# Patient Record
Sex: FEMALE | Race: WHITE | NOT HISPANIC OR LATINO | Employment: OTHER | URBAN - METROPOLITAN AREA
[De-identification: names, ages, dates, MRNs, and addresses within clinical notes are randomized per-mention and may not be internally consistent; named-entity substitution may affect disease eponyms.]

---

## 2017-01-07 ENCOUNTER — CONVERSION ENCOUNTER (OUTPATIENT)
Dept: OTHER | Facility: HOSPITAL | Age: 57
End: 2017-01-07

## 2017-03-29 ENCOUNTER — CONVERSION ENCOUNTER (OUTPATIENT)
Dept: OTHER | Facility: HOSPITAL | Age: 57
End: 2017-03-29

## 2017-03-29 ENCOUNTER — HOSPITAL ENCOUNTER (OUTPATIENT)
Dept: URGENT CARE | Facility: CLINIC | Age: 57
Discharge: HOME | End: 2017-03-29
Attending: FAMILY MEDICINE | Admitting: FAMILY MEDICINE

## 2017-03-29 ENCOUNTER — HOSPITAL ENCOUNTER (OUTPATIENT)
Dept: URGENT CARE | Facility: CLINIC | Age: 57
Discharge: HOME | End: 2017-03-29
Admitting: FAMILY MEDICINE

## 2017-04-05 ENCOUNTER — HOSPITAL ENCOUNTER (OUTPATIENT)
Dept: CT IMAGING | Facility: HOSPITAL | Age: 57
Discharge: HOME | End: 2017-04-05
Attending: FAMILY MEDICINE | Admitting: FAMILY MEDICINE

## 2017-04-05 LAB — CREAT BLDA-MCNC: 1 MG/DL (ref 0.6–1.3)

## 2017-07-12 ENCOUNTER — HOSPITAL ENCOUNTER (OUTPATIENT)
Dept: OTHER | Facility: HOSPITAL | Age: 57
Discharge: HOME | End: 2017-07-12
Attending: INTERNAL MEDICINE | Admitting: INTERNAL MEDICINE

## 2017-07-12 LAB
ANION GAP SERPL CALC-SCNC: 11 MMOL/L (ref 10–20)
APTT BLD: 25.2 SEC (ref 24–31)
BASOPHILS # BLD AUTO: 0.1 10*3/UL (ref 0–0.2)
BASOPHILS NFR BLD AUTO: 1 % (ref 0–2)
BUN SERPL-MCNC: 19 MG/DL (ref 8–20)
BUN/CREAT SERPL: 17.3 (ref 5.4–26.2)
CALCIUM SERPL-MCNC: 9.8 MG/DL (ref 8.9–10.3)
CHLORIDE SERPL-SCNC: 106 MMOL/L (ref 101–111)
CONV CO2: 27 MMOL/L (ref 22–32)
CREAT UR-MCNC: 1.1 MG/DL (ref 0.4–1)
DIFFERENTIAL METHOD BLD: (no result)
EOSINOPHIL # BLD AUTO: 0.3 10*3/UL (ref 0–0.3)
EOSINOPHIL # BLD AUTO: 5 % (ref 0–3)
ERYTHROCYTE [DISTWIDTH] IN BLOOD BY AUTOMATED COUNT: 13.1 % (ref 11.5–14.5)
GLUCOSE SERPL-MCNC: 99 MG/DL (ref 65–99)
HCT VFR BLD AUTO: 38.2 % (ref 35–49)
HGB BLD-MCNC: 13 G/DL (ref 12–15)
INR PPP: 1
LYMPHOCYTES # BLD AUTO: 1.9 10*3/UL (ref 0.8–4.8)
LYMPHOCYTES NFR BLD AUTO: 36 % (ref 18–42)
MCH RBC QN AUTO: 30.3 PG (ref 26–32)
MCHC RBC AUTO-ENTMCNC: 33.9 G/DL (ref 32–36)
MCV RBC AUTO: 89.2 FL (ref 80–94)
MONOCYTES # BLD AUTO: 0.3 10*3/UL (ref 0.1–1.3)
MONOCYTES NFR BLD AUTO: 6 % (ref 2–11)
NEUTROPHILS # BLD AUTO: 2.8 10*3/UL (ref 2.3–8.6)
NEUTROPHILS NFR BLD AUTO: 52 % (ref 50–75)
NRBC BLD AUTO-RTO: 0 /100{WBCS}
NRBC/RBC NFR BLD MANUAL: 0 10*3/UL
PLATELET # BLD AUTO: 215 10*3/UL (ref 150–450)
PMV BLD AUTO: 8.2 FL (ref 7.4–10.4)
POTASSIUM SERPL-SCNC: 4 MMOL/L (ref 3.6–5.1)
PROTHROMBIN TIME: 11 SEC (ref 9.6–11.7)
RBC # BLD AUTO: 4.28 10*6/UL (ref 4–5.4)
SODIUM SERPL-SCNC: 140 MMOL/L (ref 136–144)
WBC # BLD AUTO: 5.4 10*3/UL (ref 4.5–11.5)

## 2018-03-04 ENCOUNTER — INPATIENT HOSPITAL (AMBULATORY)
Dept: URBAN - METROPOLITAN AREA HOSPITAL 84 | Facility: HOSPITAL | Age: 58
End: 2018-03-04
Payer: MEDICARE

## 2018-03-04 DIAGNOSIS — R11.0 NAUSEA: ICD-10-CM

## 2018-03-04 DIAGNOSIS — R07.9 CHEST PAIN, UNSPECIFIED: ICD-10-CM

## 2018-03-04 DIAGNOSIS — R10.9 UNSPECIFIED ABDOMINAL PAIN: ICD-10-CM

## 2018-03-04 DIAGNOSIS — R94.5 ABNORMAL RESULTS OF LIVER FUNCTION STUDIES: ICD-10-CM

## 2018-03-04 DIAGNOSIS — K59.00 CONSTIPATION, UNSPECIFIED: ICD-10-CM

## 2018-03-04 PROCEDURE — 99204 OFFICE O/P NEW MOD 45 MIN: CPT | Performed by: NURSE PRACTITIONER

## 2018-03-04 PROCEDURE — 99222 1ST HOSP IP/OBS MODERATE 55: CPT | Performed by: NURSE PRACTITIONER

## 2018-03-05 ENCOUNTER — ON CAMPUS - OUTPATIENT (AMBULATORY)
Dept: URBAN - METROPOLITAN AREA HOSPITAL 85 | Facility: HOSPITAL | Age: 58
End: 2018-03-05

## 2018-03-05 DIAGNOSIS — R94.5 ABNORMAL RESULTS OF LIVER FUNCTION STUDIES: ICD-10-CM

## 2018-03-05 DIAGNOSIS — R11.0 NAUSEA: ICD-10-CM

## 2018-03-05 DIAGNOSIS — K59.00 CONSTIPATION, UNSPECIFIED: ICD-10-CM

## 2018-03-05 DIAGNOSIS — R10.9 UNSPECIFIED ABDOMINAL PAIN: ICD-10-CM

## 2018-03-05 DIAGNOSIS — R14.0 ABDOMINAL DISTENSION (GASEOUS): ICD-10-CM

## 2018-03-05 DIAGNOSIS — E72.20 DISORDER OF UREA CYCLE METABOLISM, UNSPECIFIED: ICD-10-CM

## 2018-03-05 PROCEDURE — 99212 OFFICE O/P EST SF 10 MIN: CPT | Performed by: NURSE PRACTITIONER

## 2018-03-26 ENCOUNTER — OFFICE (AMBULATORY)
Dept: URBAN - METROPOLITAN AREA CLINIC 64 | Facility: CLINIC | Age: 58
End: 2018-03-26

## 2018-03-26 VITALS
HEIGHT: 63 IN | SYSTOLIC BLOOD PRESSURE: 107 MMHG | WEIGHT: 197 LBS | HEART RATE: 69 BPM | DIASTOLIC BLOOD PRESSURE: 73 MMHG

## 2018-03-26 DIAGNOSIS — R11.2 NAUSEA WITH VOMITING, UNSPECIFIED: ICD-10-CM

## 2018-03-26 DIAGNOSIS — R74.8 ABNORMAL LEVELS OF OTHER SERUM ENZYMES: ICD-10-CM

## 2018-03-26 DIAGNOSIS — K59.00 CONSTIPATION, UNSPECIFIED: ICD-10-CM

## 2018-03-26 LAB
ACTIN (SMOOTH MUSCLE) ANTIBODY: 15 UNITS (ref 0–19)
AFP, SERUM, TUMOR MARKER: 3.8 NG/ML (ref 0–8.3)
ALPHA-1-ANTITRYPSIN PHENOTYP: ALPHA-1-ANTITRYPSIN, SERUM: 114 MG/DL (ref 90–200)
ALPHA-1-ANTITRYPSIN PHENOTYP: PHENOTYPE (PI): (no result)
ANTINUCLEAR ANTIBODIES, IFA: NEGATIVE
CBC WITH DIFFERENTIAL/PLATELET: BASO (ABSOLUTE): 0.1 X10E3/UL (ref 0–0.2)
CBC WITH DIFFERENTIAL/PLATELET: BASOS: 1 %
CBC WITH DIFFERENTIAL/PLATELET: EOS (ABSOLUTE): 0.2 X10E3/UL (ref 0–0.4)
CBC WITH DIFFERENTIAL/PLATELET: EOS: 3 %
CBC WITH DIFFERENTIAL/PLATELET: HEMATOCRIT: 43.5 % (ref 34–46.6)
CBC WITH DIFFERENTIAL/PLATELET: HEMATOLOGY COMMENTS: (no result)
CBC WITH DIFFERENTIAL/PLATELET: HEMOGLOBIN: 14.7 G/DL (ref 11.1–15.9)
CBC WITH DIFFERENTIAL/PLATELET: IMMATURE CELLS: (no result)
CBC WITH DIFFERENTIAL/PLATELET: IMMATURE GRANS (ABS): 0 X10E3/UL (ref 0–0.1)
CBC WITH DIFFERENTIAL/PLATELET: IMMATURE GRANULOCYTES: 0 %
CBC WITH DIFFERENTIAL/PLATELET: LYMPHS (ABSOLUTE): 2.9 X10E3/UL (ref 0.7–3.1)
CBC WITH DIFFERENTIAL/PLATELET: LYMPHS: 39 %
CBC WITH DIFFERENTIAL/PLATELET: MCH: 29.2 PG (ref 26.6–33)
CBC WITH DIFFERENTIAL/PLATELET: MCHC: 33.8 G/DL (ref 31.5–35.7)
CBC WITH DIFFERENTIAL/PLATELET: MCV: 86 FL (ref 79–97)
CBC WITH DIFFERENTIAL/PLATELET: MONOCYTES(ABSOLUTE): 0.6 X10E3/UL (ref 0.1–0.9)
CBC WITH DIFFERENTIAL/PLATELET: MONOCYTES: 8 %
CBC WITH DIFFERENTIAL/PLATELET: NEUTROPHILS (ABSOLUTE): 3.7 X10E3/UL (ref 1.4–7)
CBC WITH DIFFERENTIAL/PLATELET: NEUTROPHILS: 49 %
CBC WITH DIFFERENTIAL/PLATELET: NRBC: (no result)
CBC WITH DIFFERENTIAL/PLATELET: PLATELETS: 303 X10E3/UL (ref 150–379)
CBC WITH DIFFERENTIAL/PLATELET: RBC: 5.04 X10E6/UL (ref 3.77–5.28)
CBC WITH DIFFERENTIAL/PLATELET: RDW: 13.5 % (ref 12.3–15.4)
CBC WITH DIFFERENTIAL/PLATELET: WBC: 7.5 X10E3/UL (ref 3.4–10.8)
CELIAC DISEASE COMPREHENSIVE: DEAMIDATED GLIADIN ABS, IGA: 3 UNITS (ref 0–19)
CELIAC DISEASE COMPREHENSIVE: DEAMIDATED GLIADIN ABS, IGG: 2 UNITS (ref 0–19)
CELIAC DISEASE COMPREHENSIVE: ENDOMYSIAL ANTIBODY IGA: NEGATIVE
CELIAC DISEASE COMPREHENSIVE: IMMUNOGLOBULIN A, QN, SERUM: 220 MG/DL (ref 87–352)
CELIAC DISEASE COMPREHENSIVE: T-TRANSGLUTAMINASE (TTG) IGA: <2 U/ML
CELIAC DISEASE COMPREHENSIVE: T-TRANSGLUTAMINASE (TTG) IGG: 4 U/ML (ref 0–5)
CERULOPLASMIN: 33.3 MG/DL (ref 19–39)
COMP. METABOLIC PANEL (14): A/G RATIO: 1.6 (ref 1.2–2.2)
COMP. METABOLIC PANEL (14): ALBUMIN: 4.6 G/DL (ref 3.5–5.5)
COMP. METABOLIC PANEL (14): ALKALINE PHOSPHATASE: 90 IU/L (ref 39–117)
COMP. METABOLIC PANEL (14): ALT (SGPT): 35 IU/L — HIGH (ref 0–32)
COMP. METABOLIC PANEL (14): AST (SGOT): 24 IU/L (ref 0–40)
COMP. METABOLIC PANEL (14): BILIRUBIN, TOTAL: 0.3 MG/DL (ref 0–1.2)
COMP. METABOLIC PANEL (14): BUN/CREATININE RATIO: 21 (ref 9–23)
COMP. METABOLIC PANEL (14): BUN: 32 MG/DL — HIGH (ref 6–24)
COMP. METABOLIC PANEL (14): CALCIUM: 10.2 MG/DL (ref 8.7–10.2)
COMP. METABOLIC PANEL (14): CARBON DIOXIDE, TOTAL: 26 MMOL/L (ref 18–29)
COMP. METABOLIC PANEL (14): CHLORIDE: 95 MMOL/L — LOW (ref 96–106)
COMP. METABOLIC PANEL (14): CREATININE: 1.53 MG/DL — HIGH (ref 0.57–1)
COMP. METABOLIC PANEL (14): EGFR IF AFRICN AM: 43 ML/MIN/1.73 — LOW (ref 59–?)
COMP. METABOLIC PANEL (14): EGFR IF NONAFRICN AM: 37 ML/MIN/1.73 — LOW (ref 59–?)
COMP. METABOLIC PANEL (14): GLOBULIN, TOTAL: 2.8 G/DL (ref 1.5–4.5)
COMP. METABOLIC PANEL (14): GLUCOSE: 104 MG/DL — HIGH (ref 65–99)
COMP. METABOLIC PANEL (14): POTASSIUM: 3.4 MMOL/L — LOW (ref 3.5–5.2)
COMP. METABOLIC PANEL (14): PROTEIN, TOTAL: 7.4 G/DL (ref 6–8.5)
COMP. METABOLIC PANEL (14): SODIUM: 140 MMOL/L (ref 134–144)
FERRITIN, SERUM: 150 NG/ML (ref 15–150)
GGT: 46 IU/L (ref 0–60)
IMMUNOGLOBULINS A/G/M, QN, SER: IMMUNOGLOBULIN G, QN, SERUM: 947 MG/DL (ref 700–1600)
IMMUNOGLOBULINS A/G/M, QN, SER: IMMUNOGLOBULIN M, QN, SERUM: 115 MG/DL (ref 26–217)
IRON AND TIBC: IRON BIND.CAP.(TIBC): 329 UG/DL (ref 250–450)
IRON AND TIBC: IRON SATURATION: 22 % (ref 15–55)
IRON AND TIBC: IRON: 73 UG/DL (ref 27–159)
IRON AND TIBC: UIBC: 256 UG/DL (ref 131–425)
LP+NON-HDL CHOLESTEROL: CHOLESTEROL, TOTAL: 188 MG/DL (ref 100–199)
LP+NON-HDL CHOLESTEROL: COMMENT: (no result)
LP+NON-HDL CHOLESTEROL: HDL CHOLESTEROL: 44 MG/DL (ref 39–?)
LP+NON-HDL CHOLESTEROL: LDL CHOLESTEROL CALC: 69 MG/DL (ref 0–99)
LP+NON-HDL CHOLESTEROL: NON-HDL CHOLESTEROL: 144 MG/DL — HIGH (ref 0–129)
LP+NON-HDL CHOLESTEROL: TRIGLYCERIDES: 373 MG/DL — HIGH (ref 0–149)
LP+NON-HDL CHOLESTEROL: VLDL CHOLESTEROL CAL: 75 MG/DL — HIGH (ref 5–40)
MITOCHONDRIAL (M2) ANTIBODY: <20 UNITS
PROTHROMBIN TIME (PT): INR: 1.1 (ref 0.8–1.2)
PROTHROMBIN TIME (PT): PROTHROMBIN TIME: 11 SEC (ref 9.1–12)
TSH: 3.81 UIU/ML (ref 0.45–4.5)

## 2018-03-26 PROCEDURE — 99215 OFFICE O/P EST HI 40 MIN: CPT | Performed by: INTERNAL MEDICINE

## 2018-03-26 RX ORDER — ONDANSETRON HYDROCHLORIDE 4 MG/1
TABLET, FILM COATED ORAL
Qty: 45 | Refills: 1 | Status: COMPLETED
End: 2024-08-21

## 2018-03-26 RX ORDER — MAGESIUM CITRATE 1.75 G/29.6ML
LIQUID ORAL
Qty: 1 | Refills: -1 | Status: COMPLETED
Start: 2018-03-26 | End: 2018-03-29

## 2018-03-30 ENCOUNTER — OFFICE (AMBULATORY)
Dept: URBAN - METROPOLITAN AREA PATHOLOGY 4 | Facility: PATHOLOGY | Age: 58
End: 2018-03-30
Payer: MEDICARE

## 2018-03-30 ENCOUNTER — ON CAMPUS - OUTPATIENT (AMBULATORY)
Dept: URBAN - METROPOLITAN AREA HOSPITAL 2 | Facility: HOSPITAL | Age: 58
End: 2018-03-30
Payer: MEDICARE

## 2018-03-30 ENCOUNTER — HOSPITAL ENCOUNTER (OUTPATIENT)
Dept: OTHER | Facility: HOSPITAL | Age: 58
Setting detail: SPECIMEN
Discharge: HOME | End: 2018-03-30
Attending: INTERNAL MEDICINE | Admitting: INTERNAL MEDICINE

## 2018-03-30 VITALS
DIASTOLIC BLOOD PRESSURE: 64 MMHG | HEART RATE: 61 BPM | DIASTOLIC BLOOD PRESSURE: 79 MMHG | RESPIRATION RATE: 12 BRPM | OXYGEN SATURATION: 95 % | OXYGEN SATURATION: 100 % | RESPIRATION RATE: 15 BRPM | SYSTOLIC BLOOD PRESSURE: 135 MMHG | DIASTOLIC BLOOD PRESSURE: 57 MMHG | OXYGEN SATURATION: 90 % | RESPIRATION RATE: 14 BRPM | DIASTOLIC BLOOD PRESSURE: 76 MMHG | HEART RATE: 62 BPM | WEIGHT: 195 LBS | HEART RATE: 63 BPM | DIASTOLIC BLOOD PRESSURE: 90 MMHG | HEART RATE: 65 BPM | DIASTOLIC BLOOD PRESSURE: 88 MMHG | SYSTOLIC BLOOD PRESSURE: 126 MMHG | SYSTOLIC BLOOD PRESSURE: 119 MMHG | RESPIRATION RATE: 18 BRPM | SYSTOLIC BLOOD PRESSURE: 106 MMHG | OXYGEN SATURATION: 94 % | DIASTOLIC BLOOD PRESSURE: 66 MMHG | HEART RATE: 66 BPM | SYSTOLIC BLOOD PRESSURE: 101 MMHG | TEMPERATURE: 97.5 F | RESPIRATION RATE: 16 BRPM | HEIGHT: 63 IN | SYSTOLIC BLOOD PRESSURE: 137 MMHG

## 2018-03-30 DIAGNOSIS — K29.70 GASTRITIS, UNSPECIFIED, WITHOUT BLEEDING: ICD-10-CM

## 2018-03-30 DIAGNOSIS — R93.3 ABNORMAL FINDINGS ON DIAGNOSTIC IMAGING OF OTHER PARTS OF DI: ICD-10-CM

## 2018-03-30 DIAGNOSIS — K29.60 OTHER GASTRITIS WITHOUT BLEEDING: ICD-10-CM

## 2018-03-30 DIAGNOSIS — R11.2 NAUSEA WITH VOMITING, UNSPECIFIED: ICD-10-CM

## 2018-03-30 LAB
GI HISTOLOGY: A. SELECT: (no result)
GI HISTOLOGY: B. SELECT: (no result)
GI HISTOLOGY: PDF REPORT: (no result)

## 2018-03-30 PROCEDURE — 88305 TISSUE EXAM BY PATHOLOGIST: CPT | Mod: 26 | Performed by: INTERNAL MEDICINE

## 2018-03-30 PROCEDURE — 43239 EGD BIOPSY SINGLE/MULTIPLE: CPT | Performed by: INTERNAL MEDICINE

## 2018-03-30 RX ORDER — PANTOPRAZOLE SODIUM 40 MG/1
TABLET, DELAYED RELEASE ORAL
Qty: 90 | Refills: 1 | Status: ACTIVE

## 2018-03-30 RX ADMIN — PROPOFOL: 10 INJECTION, EMULSION INTRAVENOUS at 10:29

## 2018-04-02 ENCOUNTER — CONVERSION ENCOUNTER (OUTPATIENT)
Dept: OTHER | Facility: HOSPITAL | Age: 58
End: 2018-04-02

## 2018-04-27 ENCOUNTER — OFFICE (AMBULATORY)
Dept: URBAN - METROPOLITAN AREA CLINIC 64 | Facility: CLINIC | Age: 58
End: 2018-04-27

## 2018-04-27 VITALS
HEART RATE: 75 BPM | WEIGHT: 197 LBS | HEIGHT: 63 IN | DIASTOLIC BLOOD PRESSURE: 99 MMHG | SYSTOLIC BLOOD PRESSURE: 146 MMHG

## 2018-04-27 DIAGNOSIS — K59.00 CONSTIPATION, UNSPECIFIED: ICD-10-CM

## 2018-04-27 DIAGNOSIS — R74.8 ABNORMAL LEVELS OF OTHER SERUM ENZYMES: ICD-10-CM

## 2018-04-27 DIAGNOSIS — R79.89 OTHER SPECIFIED ABNORMAL FINDINGS OF BLOOD CHEMISTRY: ICD-10-CM

## 2018-04-27 PROCEDURE — 99214 OFFICE O/P EST MOD 30 MIN: CPT | Performed by: INTERNAL MEDICINE

## 2018-06-22 ENCOUNTER — CONVERSION ENCOUNTER (OUTPATIENT)
Dept: OTHER | Facility: HOSPITAL | Age: 58
End: 2018-06-22

## 2018-09-14 ENCOUNTER — HOSPITAL ENCOUNTER (OUTPATIENT)
Dept: MAMMOGRAPHY | Facility: HOSPITAL | Age: 58
Discharge: HOME | End: 2018-09-14
Attending: FAMILY MEDICINE | Admitting: FAMILY MEDICINE

## 2018-10-02 ENCOUNTER — CONVERSION ENCOUNTER (OUTPATIENT)
Dept: OTHER | Facility: HOSPITAL | Age: 58
End: 2018-10-02

## 2018-10-03 ENCOUNTER — ON CAMPUS - OUTPATIENT (AMBULATORY)
Dept: URBAN - METROPOLITAN AREA HOSPITAL 85 | Facility: HOSPITAL | Age: 58
End: 2018-10-03

## 2018-10-03 DIAGNOSIS — R19.4 CHANGE IN BOWEL HABIT: ICD-10-CM

## 2018-10-03 DIAGNOSIS — R11.2 NAUSEA WITH VOMITING, UNSPECIFIED: ICD-10-CM

## 2018-10-03 DIAGNOSIS — K21.9 GASTRO-ESOPHAGEAL REFLUX DISEASE WITHOUT ESOPHAGITIS: ICD-10-CM

## 2018-10-03 DIAGNOSIS — R14.0 ABDOMINAL DISTENSION (GASEOUS): ICD-10-CM

## 2018-10-03 PROCEDURE — 99213 OFFICE O/P EST LOW 20 MIN: CPT | Performed by: NURSE PRACTITIONER

## 2018-11-13 ENCOUNTER — CONVERSION ENCOUNTER (OUTPATIENT)
Dept: OTHER | Facility: HOSPITAL | Age: 58
End: 2018-11-13

## 2018-11-13 LAB
BUN SERPL-MCNC: 18 MG/DL (ref 7–25)
BUN/CREAT SERPL: ABNORMAL (ref 6–22)
CALCIUM SERPL-MCNC: 10 MG/DL (ref 8.6–10.4)
CHLORIDE SERPL-SCNC: 101 MMOL/L (ref 98–110)
CO2 CONTENT VENOUS: 29 MMOL/L (ref 20–32)
CREAT UR-MCNC: 1.13 MG/DL (ref 0.5–0.99)
GLUCOSE SERPL-MCNC: 115 MG/DL (ref 65–99)
POTASSIUM SERPL-SCNC: 3.9 MMOL/L (ref 3.5–5.3)
SODIUM SERPL-SCNC: 140 MMOL/L (ref 135–146)

## 2019-01-24 ENCOUNTER — HOSPITAL ENCOUNTER (OUTPATIENT)
Dept: OTHER | Facility: HOSPITAL | Age: 59
Discharge: HOME | End: 2019-01-24
Attending: FAMILY MEDICINE | Admitting: FAMILY MEDICINE

## 2019-01-24 ENCOUNTER — HOSPITAL ENCOUNTER (OUTPATIENT)
Dept: OTHER | Facility: HOSPITAL | Age: 59
Setting detail: SPECIMEN
Discharge: HOME | End: 2019-01-24
Attending: FAMILY MEDICINE | Admitting: FAMILY MEDICINE

## 2019-01-24 ENCOUNTER — CONVERSION ENCOUNTER (OUTPATIENT)
Dept: OTHER | Facility: HOSPITAL | Age: 59
End: 2019-01-24

## 2019-01-24 LAB
ALBUMIN SERPL-MCNC: 4.4 G/DL (ref 3.5–4.8)
ALBUMIN/GLOB SERPL: 1.5 {RATIO} (ref 1–1.7)
ALP SERPL-CCNC: 56 IU/L (ref 32–91)
ALT SERPL-CCNC: 38 IU/L (ref 14–54)
ANION GAP SERPL CALC-SCNC: 18 MMOL/L (ref 10–20)
AST SERPL-CCNC: 33 IU/L (ref 15–41)
BASOPHILS # BLD AUTO: 0.1 10*3/UL (ref 0–0.2)
BASOPHILS NFR BLD AUTO: 1 % (ref 0–2)
BILIRUB SERPL-MCNC: 0.3 MG/DL (ref 0.3–1.2)
BUN SERPL-MCNC: 13 MG/DL (ref 8–20)
BUN/CREAT SERPL: 11.8 (ref 5.4–26.2)
CALCIUM SERPL-MCNC: 10.1 MG/DL (ref 8.9–10.3)
CHLORIDE SERPL-SCNC: 99 MMOL/L (ref 101–111)
CHOLEST SERPL-MCNC: 190 MG/DL
CHOLEST/HDLC SERPL: 3.5 {RATIO}
CONV CO2: 23 MMOL/L (ref 22–32)
CONV LDL CHOLESTEROL DIRECT: 101 MG/DL (ref 0–100)
CONV TOTAL PROTEIN: 7.4 G/DL (ref 6.1–7.9)
CREAT UR-MCNC: 1.1 MG/DL (ref 0.4–1)
DIFFERENTIAL METHOD BLD: (no result)
EOSINOPHIL # BLD AUTO: 0.2 10*3/UL (ref 0–0.3)
EOSINOPHIL # BLD AUTO: 4 % (ref 0–3)
ERYTHROCYTE [DISTWIDTH] IN BLOOD BY AUTOMATED COUNT: 12.9 % (ref 11.5–14.5)
GLOBULIN UR ELPH-MCNC: 3 G/DL (ref 2.5–3.8)
GLUCOSE SERPL-MCNC: 100 MG/DL (ref 65–99)
HCT VFR BLD AUTO: 41.9 % (ref 35–49)
HDLC SERPL-MCNC: 54 MG/DL
HGB BLD-MCNC: 14 G/DL (ref 12–15)
LDLC/HDLC SERPL: 1.9 {RATIO}
LIPID INTERPRETATION: ABNORMAL
LYMPHOCYTES # BLD AUTO: 1.9 10*3/UL (ref 0.8–4.8)
LYMPHOCYTES NFR BLD AUTO: 29 % (ref 18–42)
MAGNESIUM SERPL-MCNC: 2.3 MG/DL (ref 1.8–2.5)
MCH RBC QN AUTO: 29.8 PG (ref 26–32)
MCHC RBC AUTO-ENTMCNC: 33.3 G/DL (ref 32–36)
MCV RBC AUTO: 89.5 FL (ref 80–94)
MONOCYTES # BLD AUTO: 0.4 10*3/UL (ref 0.1–1.3)
MONOCYTES NFR BLD AUTO: 6 % (ref 2–11)
NEUTROPHILS # BLD AUTO: 3.8 10*3/UL (ref 2.3–8.6)
NEUTROPHILS NFR BLD AUTO: 60 % (ref 50–75)
NRBC BLD AUTO-RTO: 1 /100{WBCS}
NRBC/RBC NFR BLD MANUAL: 0 10*3/UL
PLATELET # BLD AUTO: 245 10*3/UL (ref 150–450)
PMV BLD AUTO: 8 FL (ref 7.4–10.4)
POTASSIUM SERPL-SCNC: 5 MMOL/L (ref 3.6–5.1)
RBC # BLD AUTO: 4.69 10*6/UL (ref 4–5.4)
SODIUM SERPL-SCNC: 135 MMOL/L (ref 136–144)
TRIGL SERPL-MCNC: 261 MG/DL
VLDLC SERPL CALC-MCNC: 34.9 MG/DL
WBC # BLD AUTO: 6.3 10*3/UL (ref 4.5–11.5)

## 2019-04-24 ENCOUNTER — CONVERSION ENCOUNTER (OUTPATIENT)
Dept: OTHER | Facility: HOSPITAL | Age: 59
End: 2019-04-24

## 2019-05-04 ENCOUNTER — HOSPITAL ENCOUNTER (OUTPATIENT)
Dept: URGENT CARE | Facility: CLINIC | Age: 59
Discharge: HOME | End: 2019-05-04
Attending: FAMILY MEDICINE | Admitting: FAMILY MEDICINE

## 2019-06-04 VITALS
HEART RATE: 70 BPM | SYSTOLIC BLOOD PRESSURE: 140 MMHG | HEART RATE: 78 BPM | OXYGEN SATURATION: 96 % | DIASTOLIC BLOOD PRESSURE: 100 MMHG | HEART RATE: 71 BPM | OXYGEN SATURATION: 98 % | HEIGHT: 63 IN | HEIGHT: 63 IN | BODY MASS INDEX: 33.06 KG/M2 | HEIGHT: 63 IN | BODY MASS INDEX: 38.06 KG/M2 | WEIGHT: 189.4 LBS | DIASTOLIC BLOOD PRESSURE: 89 MMHG | DIASTOLIC BLOOD PRESSURE: 84 MMHG | WEIGHT: 202 LBS | SYSTOLIC BLOOD PRESSURE: 102 MMHG | DIASTOLIC BLOOD PRESSURE: 91 MMHG | OXYGEN SATURATION: 97 % | SYSTOLIC BLOOD PRESSURE: 126 MMHG | WEIGHT: 192.8 LBS | WEIGHT: 183.4 LBS | BODY MASS INDEX: 34.16 KG/M2 | HEART RATE: 89 BPM | OXYGEN SATURATION: 91 % | BODY MASS INDEX: 33.56 KG/M2 | HEIGHT: 63 IN | WEIGHT: 188.2 LBS | HEIGHT: 63 IN | RESPIRATION RATE: 18 BRPM | BODY MASS INDEX: 35.79 KG/M2 | BODY MASS INDEX: 36.29 KG/M2 | DIASTOLIC BLOOD PRESSURE: 72 MMHG | WEIGHT: 204.8 LBS | SYSTOLIC BLOOD PRESSURE: 126 MMHG | HEIGHT: 63 IN | HEART RATE: 80 BPM | HEART RATE: 108 BPM | OXYGEN SATURATION: 93 % | SYSTOLIC BLOOD PRESSURE: 167 MMHG | HEART RATE: 78 BPM | DIASTOLIC BLOOD PRESSURE: 71 MMHG | OXYGEN SATURATION: 96 % | SYSTOLIC BLOOD PRESSURE: 102 MMHG | RESPIRATION RATE: 18 BRPM | DIASTOLIC BLOOD PRESSURE: 84 MMHG | DIASTOLIC BLOOD PRESSURE: 83 MMHG | WEIGHT: 214.8 LBS | OXYGEN SATURATION: 99 % | SYSTOLIC BLOOD PRESSURE: 138 MMHG | WEIGHT: 186.6 LBS | HEART RATE: 78 BPM | SYSTOLIC BLOOD PRESSURE: 121 MMHG

## 2019-06-24 RX ORDER — POTASSIUM CHLORIDE 1500 MG/1
TABLET, EXTENDED RELEASE ORAL
Qty: 60 TABLET | Refills: 0 | Status: SHIPPED | OUTPATIENT
Start: 2019-06-24 | End: 2019-07-29 | Stop reason: SDUPTHER

## 2019-07-10 ENCOUNTER — OFFICE VISIT (OUTPATIENT)
Dept: PSYCHIATRY | Facility: CLINIC | Age: 59
End: 2019-07-10

## 2019-07-10 DIAGNOSIS — F43.12 CHRONIC POST-TRAUMATIC STRESS DISORDER (PTSD): Primary | ICD-10-CM

## 2019-07-10 DIAGNOSIS — F33.2 SEVERE EPISODE OF RECURRENT MAJOR DEPRESSIVE DISORDER, WITHOUT PSYCHOTIC FEATURES (HCC): ICD-10-CM

## 2019-07-10 PROCEDURE — 99214 OFFICE O/P EST MOD 30 MIN: CPT | Performed by: PSYCHIATRY & NEUROLOGY

## 2019-07-10 RX ORDER — METOCLOPRAMIDE 10 MG/1
10 TABLET ORAL
Qty: 90 TABLET | Refills: 1 | Status: SHIPPED | OUTPATIENT
Start: 2019-07-10 | End: 2019-10-21

## 2019-07-10 RX ORDER — HYDROXYZINE PAMOATE 25 MG/1
25 CAPSULE ORAL 3 TIMES DAILY PRN
Qty: 90 CAPSULE | Refills: 2 | Status: SHIPPED | OUTPATIENT
Start: 2019-07-10 | End: 2019-12-09 | Stop reason: SDUPTHER

## 2019-07-10 RX ORDER — CITALOPRAM 40 MG/1
TABLET ORAL
COMMUNITY
Start: 2019-06-10 | End: 2019-07-10 | Stop reason: SDUPTHER

## 2019-07-10 RX ORDER — METOCLOPRAMIDE 10 MG/1
TABLET ORAL
Refills: 1 | COMMUNITY
Start: 2019-04-06 | End: 2019-07-10 | Stop reason: SDUPTHER

## 2019-07-10 RX ORDER — ALPRAZOLAM 0.5 MG/1
0.5 TABLET ORAL 3 TIMES DAILY PRN
Qty: 90 TABLET | Refills: 2 | Status: SHIPPED | OUTPATIENT
Start: 2019-07-10 | End: 2019-10-10 | Stop reason: SDUPTHER

## 2019-07-10 RX ORDER — GABAPENTIN 300 MG/1
300 CAPSULE ORAL 3 TIMES DAILY
Qty: 90 CAPSULE | Refills: 2 | Status: SHIPPED | OUTPATIENT
Start: 2019-07-10 | End: 2019-10-10 | Stop reason: SDUPTHER

## 2019-07-10 RX ORDER — RISPERIDONE 0.5 MG/1
0.5 TABLET ORAL
Refills: 1 | COMMUNITY
Start: 2019-06-16 | End: 2019-07-10 | Stop reason: SDUPTHER

## 2019-07-10 RX ORDER — RISPERIDONE 0.5 MG/1
0.5 TABLET ORAL
Qty: 30 TABLET | Refills: 2 | Status: SHIPPED | OUTPATIENT
Start: 2019-07-10 | End: 2019-10-10 | Stop reason: SDUPTHER

## 2019-07-10 RX ORDER — GABAPENTIN 300 MG/1
CAPSULE ORAL
COMMUNITY
Start: 2019-06-10 | End: 2019-07-10 | Stop reason: SDUPTHER

## 2019-07-10 RX ORDER — CITALOPRAM 40 MG/1
40 TABLET ORAL EVERY MORNING
Qty: 30 TABLET | Refills: 2 | Status: SHIPPED | OUTPATIENT
Start: 2019-07-10 | End: 2019-08-09 | Stop reason: SDUPTHER

## 2019-07-10 RX ORDER — ALPRAZOLAM 0.5 MG/1
0.5 TABLET ORAL 3 TIMES DAILY PRN
Refills: 1 | COMMUNITY
Start: 2019-06-22 | End: 2019-07-10 | Stop reason: SDUPTHER

## 2019-07-10 RX ORDER — HYDROXYZINE PAMOATE 25 MG/1
1 CAPSULE ORAL EVERY 8 HOURS
COMMUNITY
Start: 2019-04-23 | End: 2019-07-10 | Stop reason: SDUPTHER

## 2019-07-10 NOTE — PROGRESS NOTES
Subjective   Jasmine Cerda is a 59 y.o. female who presents today for follow up    Chief Complaint:  Depression anxiety     History of Present Illness: long hx of depression anxiety, was not stable for the last year due to issues at home . Now son is in prison   Depression is rated as 9/10, no desire , no sleep , waking up at 3 AM, cleaning at night, but no E, getting vitamins but still no improvement   depressed every day  she has no hobbies , she used to work a lot in the past   her husbans is trying to protect her from working at home and hse feels useless, she needs to have a purpose of for the day   denied AVH/SI/HI   anxiety is pretty intense and persistent, constantly worries about her son   Precipitating and Ameliorating Factors: son is back to prison and eventually he will go to the rehab program   Alleviating factors - to spend time with her grand daughter         PAST PSYCHIATRIC HISTORY      Previous Psychiatric Diagnoses   Axis I: Anxiety/Panic Disorder     Past Hospitalizations or Residential Treatment   Locations\Providers: none      Past Outpatient Treatment   Diagnosis Treated: Anxiety/Panic Dis.  Treatment Type: Medication Management  Location: with PCP, Dr Manning      Prior Psychiatric Medications   Comments: xanax - effective      celexa- not effective   zoloft - not effective  cymbalta - not effective      Consequences of Mental Disorder   Consequences: emotional distress     SOCIAL HISTORY     Number of children: 2  Number of grandkids: 1  Current Relationship is: supportive  Family of Origin is: supportive  Comments: Patient has never smoked.  Passive Smoke: N  Alcohol Use: N  Drug Use: N  HIV/High Risk: N        Current Living Situation   Lives with: spouse     Education   Level: high school graduate     Employment   Job Status: disabled     Hobbies and Leisure Activities   Activity Type: quiet activities     Smoking History   Smoking Hx: Never smoker     Exercise   Exercise sessions (per  wk): 1     Illicit Drug Use   Illicit Drugs used: no     FAMILY HISTORY OF MENTAL DISORDERS   fh Grandparents: Non-contributory  fh Mother: Non-contributory  fh Father: Non-contributory  fh Siblings: Non-contributory  fh Other: Non-contributory  The following portions of the patient's history were reviewed and updated as appropriate: allergies, current medications, past family history, past medical history, past social history, past surgical history and problem list.            Interval History  Deteriorated    Side Effects  Denied       Past Medical History:  Past Medical History:   Diagnosis Date   • Anxiety    • CHF (congestive heart failure) (CMS/HCC)    • Coronary heart disease    • Depression    • Hyperlipidemia    • Hypertension        Social History:  Social History     Socioeconomic History   • Marital status:      Spouse name: Not on file   • Number of children: Not on file   • Years of education: Not on file   • Highest education level: Not on file   Tobacco Use   • Smoking status: Never Smoker   • Tobacco comment: Passive Smoke: N   Substance and Sexual Activity   • Alcohol use: No     Frequency: Never   • Drug use: No       Family History:  History reviewed. No pertinent family history.    Past Surgical History:  Past Surgical History:   Procedure Laterality Date   • APPENDECTOMY     • CARDIAC CATHETERIZATION  2017    No Stents placed - [facility]   • CERVICAL FUSION      C6-C7   •  SECTION      x 2   • CHOLECYSTECTOMY         Problem List:  Patient Active Problem List   Diagnosis   • Chronic post-traumatic stress disorder (PTSD)   • Severe episode of recurrent major depressive disorder (CMS/HCC)       Allergy:   No Known Allergies     Discontinued Medications:  Medications Discontinued During This Encounter   Medication Reason   • citalopram (CeleXA) 40 MG tablet Reorder   • ALPRAZolam (XANAX) 0.5 MG tablet Reorder   • gabapentin (NEURONTIN) 300 MG capsule Reorder   • hydrOXYzine pamoate  (VISTARIL) 25 MG capsule Reorder   • risperiDONE (risperDAL) 0.5 MG tablet Reorder       Current Medications:   Current Outpatient Medications   Medication Sig Dispense Refill   • albuterol (PROVENTIL) (5 MG/ML) 0.5% nebulizer solution ALBUTEROL SULFATE (5 MG/ML) 0.5% NEBU     • hydrOXYzine pamoate (VISTARIL) 25 MG capsule Take 1 capsule by mouth 3 (Three) Times a Day As Needed for Itching. 90 capsule 2   • ALPRAZolam (XANAX) 0.5 MG tablet Take 1 tablet by mouth 3 (Three) Times a Day As Needed for Anxiety. 90 tablet 2   • citalopram (CeleXA) 40 MG tablet Take 1 tablet by mouth Every Morning. 30 tablet 2   • gabapentin (NEURONTIN) 300 MG capsule Take 1 capsule by mouth 3 (Three) Times a Day. 90 capsule 2   • KLOR-CON 20 MEQ CR tablet TAKE 1 TABLET BY MOUTH TWICE DAILY WITH BREAKFAST AND SUPPER 60 tablet 0   • risperiDONE (risperDAL) 0.5 MG tablet Take 1 tablet by mouth every night at bedtime. 30 tablet 2     No current facility-administered medications for this visit.          Review of Symptoms:    Psychiatric/Behavioral: Negative for agitation, behavioral problems, confusion, decreased concentration, dysphoric mood, hallucinations, self-injury, sleep disturbance and suicidal ideas.   The patient is depressed,  nervous/anxious and is not hyperactive.        Physical Exam:   There were no vitals taken for this visit.    Mental Status Exam:   Hygiene:   good  Cooperation:  Cooperative  Eye Contact:  Good  Psychomotor Behavior:  Restless  Affect:  labile   Mood: anxious  Hopelessness: Denies  Speech:  Normal  Thought Process:  Goal directed and Linear  Thought Content:  Normal  Suicidal:  None  Homicidal:  None  Hallucinations:  None  Delusion:  None  Memory:  Intact  Orientation:  Person, Place, Time and Situation  Reliability:  fair  Insight:  Good  Judgement:  Good  Impulse Control:  Good  Physical/Medical Issues:  Yes GI issues      PHQ-9 Score:  PHQ-9 Score: 14      Never smoker    I advised Jasmine of the risks of  tobacco use.     Lab Results:   No visits with results within 3 Month(s) from this visit.   Latest known visit with results is:   Hospital Outpatient Visit on 01/24/2019   Component Date Value Ref Range Status   • WBC 01/24/2019 6.3  4.5 - 11.5 10*3/uL Final   • RBC 01/24/2019 4.69  4.00 - 5.40 10*6/uL Final   • Hemoglobin 01/24/2019 14.0  12.0 - 15.0 g/dL Final   • Hematocrit 01/24/2019 41.9  35 - 49 % Final   • MCV 01/24/2019 89.5  80 - 94 fL Final   • MCH 01/24/2019 29.8  26 - 32 pg Final   • MCHC 01/24/2019 33.3  32 - 36 g/dL Final   • RDW 01/24/2019 12.9  11.5 - 14.5 % Final   • Platelets 01/24/2019 245  150 - 450 10*3/uL Final   • MPV 01/24/2019 8.0  7.4 - 10.4 fL Final   • Differential Type 01/24/2019 AUTO   Final   • Neutrophils Absolute 01/24/2019 3.8  2.3 - 8.6 10*3/uL Final   • Lymphocytes Absolute 01/24/2019 1.9  0.8 - 4.8 10*3/uL Final   • Monocytes Absolute 01/24/2019 0.4  0.1 - 1.3 10*3/uL Final   • Eosinophils Absolute 01/24/2019 0.2  0.0 - 0.3 10*3/uL Final   • Basophils Absolute 01/24/2019 0.1  0 - 0.2 10*3/uL Final   • Neutrophil Rel % 01/24/2019 60  50 - 75 % Final   • Lymphocyte Rel % 01/24/2019 29  18 - 42 % Final   • Monocyte Rel % 01/24/2019 6  2 - 11 % Final   • Eosinophil Rel % 01/24/2019 4* 0 - 3 % Final   • Basophil Rel % 01/24/2019 1  0 - 2 % Final   • nRBC 01/24/2019 1* 0 /100[WBCs] Final   • Absolute nRBC 01/24/2019 0  10*3/uL Final   • Total Cholesterol 01/24/2019 190  <200 mg/dL Final   • Triglycerides 01/24/2019 261* <150 mg/dL Final   • HDL Cholesterol 01/24/2019 54  >39 mg/dL Final   • LDL Cholesterol  01/24/2019 101* 0 - 100 mg/dL Final   • VLDL Cholesterol 01/24/2019 34.9* <21 mg/dL Final   • LDL/HDL Ratio 01/24/2019 1.9   Final   • Chol/HDL Ratio 01/24/2019 3.5   Final    (SEE BELOW)  The following guidelines have been recommended by the NCEP for -    • Lipid Interpretation 01/24/2019 Total Cholesterol, Total Triglycerides, LDL Cholesterol,and HDL Cholesterol:    Final   •  Lipid Interpretation 01/24/2019 TOTAL CHOLESTEROL                    HDL CHOLESTEROL  Desirable:              Final   • Lipid Interpretation 01/24/2019 <200 mg/dL     Low HDL:            <40 mg/dL  Borderline High:   200-239 mg/dL    Final   • Lipid Interpretation 01/24/2019    Normal:           40-60 mg/dL  High:           > or = 240 mg/dL       Final   • Lipid Interpretation 01/24/2019 Desirable:          >60 mg/dL  TOTAL TRIGLYCERIDE                   LDL   Final   • Lipid Interpretation 01/24/2019 CHOLESTEROL  Normal:               <150 mg/dL     Optimal:           <100 mg/dL   Final   • Lipid Interpretation 01/24/2019  Borderline High:   150-199 mg/dL     Low Risk:       100-129 mg/dL  High:        Final   • Lipid Interpretation 01/24/2019         200-499 mg/dL     Borderline High:130-159 mg/dL  Very High:      > or =   Final   • Lipid Interpretation 01/24/2019 500 mg/dL     High:           160-189 mg/dL                                       Final   • Lipid Interpretation 01/24/2019   Very High:   > or = 190 mg/dL  The following ratios of LDL to HDL and Total   Final   • Lipid Interpretation 01/24/2019 Cholesterol to HDL are for information only:  LDL/HDL RATIO                       Final   • Lipid Interpretation 01/24/2019    TOTAL CHOLESTEROL/HDL RATIO  Desirable:              <5           Low Risk:    Final   • Lipid Interpretation 01/24/2019      3.3-4.4   Optimal:        < or = 3.5           Average Risk:   4.4-7.1       Final   • Lipid Interpretation 01/24/2019                                    Medium Risk:    7.1-11                         Final   • Lipid Interpretation 01/24/2019                   High Risk:         >11      Final   • Magnesium 01/24/2019 2.3  1.8 - 2.5 mg/dL Final   • Sodium 01/24/2019 135* 136 - 144 mmol/L Final   • Potassium 01/24/2019 5.0  3.6 - 5.1 mmol/L Final   • Chloride 01/24/2019 99* 101 - 111 mmol/L Final   • CO2 01/24/2019 23  22 - 32 mmol/L Final   • Glucose 01/24/2019  100* 65 - 99 mg/dL Final   • BUN 01/24/2019 13  8 - 20 mg/dL Final   • Creatinine 01/24/2019 1.1* 0.4 - 1.0 mg/dl Final   • Calcium 01/24/2019 10.1  8.9 - 10.3 mg/dL Final   • Total Protein 01/24/2019 7.4  6.1 - 7.9 g/dL Final   • Albumin 01/24/2019 4.4  3.5 - 4.8 g/dL Final   • Total Bilirubin 01/24/2019 0.3  0.3 - 1.2 mg/dL Final   • Alkaline Phosphatase 01/24/2019 56  32 - 91 IU/L Final   • AST (SGOT) 01/24/2019 33  15 - 41 IU/L Final   • ALT (SGPT) 01/24/2019 38  14 - 54 IU/L Final   • Anion Gap 01/24/2019 18.0  10 - 20 Final   • BUN/Creatinine Ratio 01/24/2019 11.8  5.4 - 26.2 Final   • GFR MDRD Non  01/24/2019 51* >60 mL/min/1.73m2 Final   • GFR MDRD  01/24/2019 >60  >60 mL/min/1.73m2 Final   • Globulin 01/24/2019 3.0  2.5 - 3.8 G/dL Final   • A/G Ratio 01/24/2019 1.5  1.0 - 1.7 Final       Assessment/Plan   Problems Addressed this Visit        Other    Chronic post-traumatic stress disorder (PTSD) - Primary    Relevant Medications    citalopram (CeleXA) 40 MG tablet    ALPRAZolam (XANAX) 0.5 MG tablet    gabapentin (NEURONTIN) 300 MG capsule    hydrOXYzine pamoate (VISTARIL) 25 MG capsule    risperiDONE (risperDAL) 0.5 MG tablet    Severe episode of recurrent major depressive disorder (CMS/HCC)    Relevant Medications    citalopram (CeleXA) 40 MG tablet    ALPRAZolam (XANAX) 0.5 MG tablet    hydrOXYzine pamoate (VISTARIL) 25 MG capsule    risperiDONE (risperDAL) 0.5 MG tablet          Visit Diagnoses:    ICD-10-CM ICD-9-CM   1. Chronic post-traumatic stress disorder (PTSD) F43.12 309.81   2. Severe episode of recurrent major depressive disorder, without psychotic features (CMS/HCC) F33.2 296.33       TREATMENT PLAN/GOALS: Continue supportive psychotherapy efforts and medications as indicated. Treatment and medication options discussed during today's visit. Patient ackowledged and verbally consented to continue with current treatment plan and was educated on the importance of  compliance with treatment and follow-up appointments.    MEDICATION ISSUES:  Cont current meds   supportive therapy, ALONON, support groups   INSPECT reviewed as expected  Discussed medication options and treatment plan of prescribed medication as well as the risks, benefits, and side effects including potential falls, possible impaired driving and metabolic adversities among others. Patient is agreeable to call the office with any worsening of symptoms or onset of side effects. Patient is agreeable to call 911 or go to the nearest ER should he/she begin having SI/HI. No medication side effects or related complaints today.     MEDS ORDERED DURING VISIT:  New Medications Ordered This Visit   Medications   • citalopram (CeleXA) 40 MG tablet     Sig: Take 1 tablet by mouth Every Morning.     Dispense:  30 tablet     Refill:  2   • ALPRAZolam (XANAX) 0.5 MG tablet     Sig: Take 1 tablet by mouth 3 (Three) Times a Day As Needed for Anxiety.     Dispense:  90 tablet     Refill:  2   • gabapentin (NEURONTIN) 300 MG capsule     Sig: Take 1 capsule by mouth 3 (Three) Times a Day.     Dispense:  90 capsule     Refill:  2   • hydrOXYzine pamoate (VISTARIL) 25 MG capsule     Sig: Take 1 capsule by mouth 3 (Three) Times a Day As Needed for Itching.     Dispense:  90 capsule     Refill:  2   • risperiDONE (risperDAL) 0.5 MG tablet     Sig: Take 1 tablet by mouth every night at bedtime.     Dispense:  30 tablet     Refill:  2       Return in about 3 months (around 10/10/2019).         This document has been electronically signed by Sivan Ken MD  July 10, 2019 11:57 AM

## 2019-07-10 NOTE — PATIENT INSTRUCTIONS
Living With Post-Traumatic Stress Disorder  If you have been diagnosed with post-traumatic stress disorder (PTSD), you may be relieved that you now know why you have felt or behaved a certain way. Still, you may feel overwhelmed about the treatment ahead. You may also wonder how to get the support you need and how to deal with the condition day-to-day.  If you are living with PTSD, there are ways to help you recover from it and manage your symptoms.  How to manage lifestyle changes  Managing stress  Stress is your body's reaction to life changes and events, both good and bad. Stress can make PTSD worse. Take the following steps to cope with stress:  · Talk with your health care provider or a counselor if you would like to learn more about techniques to reduce your stress. He or she may suggest some stress reduction techniques such as:  ? Muscle relaxation exercises.  ? Regular exercise.  ? Meditation, yoga, or other mind-body exercises.  ? Breathing exercises.  ? Listening to quiet music.  ? Spending time outside.  · Maintain a healthy lifestyle. Eat a healthy diet, exercise regularly, get plenty of sleep, and take time to relax.  · Spend time with others. Talk with them about how you are feeling and what kind of support you need. Try to not isolate yourself, even though you may feel like doing that. Isolating yourself can delay your recovery.  · Do activities and hobbies that you enjoy.  · Pace yourself when doing stressful things. Take breaks, and reward yourself when you finish. Make sure that you do not overload your schedule.    Medicines  Your health care provider may suggest certain medicines if he or she feels that they will help to improve your condition. Antidepressants or antipsychotic medicines may be used to treat PTSD. Avoid using alcohol and other substances that may prevent your medicines from working properly (may interact). It is also important to:  · Talk with your pharmacist or health care  provider about all medicines that you take, their possible side effects, and which medicines are safe to take together.  · Make it your goal to take part in all treatment decisions (shared decision-making). Ask about possible side effects of medicines that your health care provider recommends, and tell him or her how you feel about having those side effects. It is best if shared decision-making with your health care provider is part of your total treatment plan.    If your health care provider prescribes a medicine, you may not notice the full benefits of it for 4-8 weeks. Most people who are treated for PTSD need to take medicine for at least 6-12 months after they feel better. If you are taking medicines as part of your treatment, do not stop taking medicines before you ask your health care provider if it is safe to stop. You may need to have the medicine slowly decreased (tapered) over time to lower the risk of harmful side effects.  Relationships  Many people who have PTSD have difficulty trusting others. Make an effort to:  · Take risks and develop trust with close friends and family members. Developing trust in others can help you feel safe and connect you with emotional support.  · Be open and honest about your feelings.  · Try to have fun and relax in safe spaces, such as with friends and family.  · Think about going to couples counseling, family education classes, or family therapy. Your loved ones may not always know how to be supportive. Therapy can be helpful for everyone.    How to recognize changes in your condition  Be aware of your symptoms and how often you have them. The following symptoms mean that you need to seek help for your PTSD:  · You feel suspicious and angry.  · You have repeated flashbacks.  · You avoid going out or being with others.  · You have an increasing number of fights with close friends or family members, such as your spouse.  · You have thoughts about hurting yourself or  others.  · You cannot get relief from feelings of depression or anxiety.    Where to find support  Talking to others  · Explain that PTSD is a mental health problem. It is something that a person can develop after experiencing or seeing a life-threatening event. Tell them that PTSD makes you feel stress like you did during the event.  · Talk to your loved ones about the symptoms you have. Also tell them what things or situations can cause symptoms to start (are triggers for you).  · Assure your loved ones that there are treatments to help PTSD. Discuss possibly seeking family therapy or couples therapy.  · If you are worried or fearful about seeking treatment, ask for support.  Finances  Not all insurance plans cover mental health care, so it is important to check with your insurance carrier. If paying for co-pays or counseling services is a problem, search for a local or [facility] mental health care center. Public mental health care services may be offered there at a low cost or no cost when you are not able to see a private health care provider. If you are a , contact a local veterans organization or [facility] for more information.  If you are taking medicine for PTSD, you may be able to get the genericform, which may be less expensive than brand-name medicine. Some makers of prescription medicines also offer help to patients who cannot afford the medicines that they need.  Community resources  · Find a support group in your community. Often, groups are available for  veterans, trauma victims, and family members or caregivers.  · Look into volunteer opportunities. Taking part in these can help you feel more connected to your community.  · Contact a local organization to find out if you are eligible for a service dog.  · Keep daily contact with at least one trusted friend or family member.  Follow these instructions at home:  Lifestyle  · Exercise regularly. Try to do 30 or more minutes of  physical activity on most days of the week.  · Try to get 7-9 hours of sleep each night. To help with sleep:  ? Keep your bedroom cool and dark.  ? Do not eat a heavy meal during the hour before you go to bed.  ? Do not drink alcohol or caffeinated drinks before bed.  ? Avoid screen time before bedtime. This means avoiding use of your TV, computer, tablet, and cell phone.  · Avoid using alcohol or drugs.  · Practice self-soothing skills and use them daily.  · Try to have fun and seek humor in your life.  General instructions  · If your PTSD is affecting your marriage or family, seek help from a family therapist.  · Take over-the-counter and prescription medicines only as told by your health care provider.  · Make sure to let all of your health care providers know that you have PTSD. This is especially important if you are having surgery or need to be admitted to the hospital.  · Keep all follow-up visits as told by your health care providers. This is important.  Where to find more information  Go to this website to find more information about PTSD, treatment for PTSD, and how to get support:  · National Center for PTSD: www.ptsd.va.gov    Contact a health care provider if:  · Your symptoms get worse or they do not get better.  Get help right away if:  · You have thoughts about hurting yourself or others.  If you ever feel like you may hurt yourself or others, or have thoughts about taking your own life, get help right away. You can go to your nearest emergency department or call:  · Your local emergency services (911 in the [place]  · A suicide crisis helpline, such as the National Suicide Prevention Lifeline at 1-724.220.7091. This is open 24-hours a day.    Summary  · If you are living with PTSD, there are ways to help you recover from it and manage your symptoms.  · Find supportive environments and people who understand PTSD. Spend time in those places, and maintain contact with those people.  · Work with your  health care team to create a management plan that includes counseling, stress management techniques, and healthy lifestyle habits.  This information is not intended to replace advice given to you by your health care provider. Make sure you discuss any questions you have with your health care provider.  Document Released: 04/19/2018 Document Revised: 04/19/2018 Document Reviewed: 04/19/2018  [employer] Patient Education © 2019 [employer]

## 2019-07-29 RX ORDER — POTASSIUM CHLORIDE 20 MEQ/1
20 TABLET, EXTENDED RELEASE ORAL 2 TIMES DAILY
Qty: 60 TABLET | Refills: 2 | Status: SHIPPED | OUTPATIENT
Start: 2019-07-29 | End: 2019-10-21 | Stop reason: SDUPTHER

## 2019-08-02 ENCOUNTER — TELEPHONE (OUTPATIENT)
Dept: PSYCHIATRY | Facility: CLINIC | Age: 59
End: 2019-08-02

## 2019-08-02 NOTE — TELEPHONE ENCOUNTER
PATIENT IS ASKING FOR THE GABAPENTIN AND CITALOPRAM RX TO [email or web address]  THANKS

## 2019-08-09 DIAGNOSIS — F33.2 SEVERE EPISODE OF RECURRENT MAJOR DEPRESSIVE DISORDER, WITHOUT PSYCHOTIC FEATURES (HCC): ICD-10-CM

## 2019-08-09 DIAGNOSIS — F43.12 CHRONIC POST-TRAUMATIC STRESS DISORDER (PTSD): ICD-10-CM

## 2019-08-09 RX ORDER — CITALOPRAM 40 MG/1
TABLET ORAL
Qty: 90 TABLET | Refills: 1 | Status: SHIPPED | OUTPATIENT
Start: 2019-08-09 | End: 2019-10-10 | Stop reason: SDUPTHER

## 2019-08-30 RX ORDER — BUMETANIDE 2 MG/1
TABLET ORAL
Qty: 180 TABLET | Refills: 1 | Status: SHIPPED | OUTPATIENT
Start: 2019-08-30 | End: 2019-12-31

## 2019-09-10 ENCOUNTER — OFFICE VISIT (OUTPATIENT)
Dept: FAMILY MEDICINE CLINIC | Facility: CLINIC | Age: 59
End: 2019-09-10

## 2019-09-10 ENCOUNTER — HOSPITAL ENCOUNTER (EMERGENCY)
Facility: HOSPITAL | Age: 59
Discharge: HOME | End: 2019-09-10
Attending: EMERGENCY MEDICINE | Admitting: EMERGENCY MEDICINE

## 2019-09-10 ENCOUNTER — APPOINTMENT (OUTPATIENT)
Dept: GENERAL RADIOLOGY | Facility: HOSPITAL | Age: 59
End: 2019-09-10

## 2019-09-10 VITALS
HEIGHT: 63 IN | BODY MASS INDEX: 34.73 KG/M2 | HEART RATE: 90 BPM | SYSTOLIC BLOOD PRESSURE: 160 MMHG | WEIGHT: 196 LBS | DIASTOLIC BLOOD PRESSURE: 96 MMHG | OXYGEN SATURATION: 95 %

## 2019-09-10 VITALS
WEIGHT: 185 LBS | RESPIRATION RATE: 18 BRPM | HEIGHT: 63 IN | SYSTOLIC BLOOD PRESSURE: 121 MMHG | TEMPERATURE: 98.3 F | BODY MASS INDEX: 32.78 KG/M2 | HEART RATE: 104 BPM | OXYGEN SATURATION: 94 % | DIASTOLIC BLOOD PRESSURE: 93 MMHG

## 2019-09-10 DIAGNOSIS — Z13.29 SCREENING FOR THYROID DISORDER: ICD-10-CM

## 2019-09-10 DIAGNOSIS — R07.89 CHEST TIGHTNESS: Primary | ICD-10-CM

## 2019-09-10 DIAGNOSIS — R73.03 PREDIABETES: ICD-10-CM

## 2019-09-10 DIAGNOSIS — R00.0 TACHYCARDIA: ICD-10-CM

## 2019-09-10 DIAGNOSIS — Z13.0 SCREENING, ANEMIA, DEFICIENCY, IRON: Primary | ICD-10-CM

## 2019-09-10 DIAGNOSIS — E55.9 VITAMIN D DEFICIENCY: ICD-10-CM

## 2019-09-10 DIAGNOSIS — I10 ESSENTIAL HYPERTENSION: ICD-10-CM

## 2019-09-10 PROBLEM — I50.9 CONGESTIVE HEART FAILURE: Status: ACTIVE | Noted: 2019-01-24

## 2019-09-10 PROBLEM — M51.37 DEGENERATION OF INTERVERTEBRAL DISC OF LUMBOSACRAL REGION: Status: ACTIVE | Noted: 2019-04-24

## 2019-09-10 PROBLEM — M54.50 CHRONIC LOW BACK PAIN: Status: ACTIVE | Noted: 2019-01-24

## 2019-09-10 PROBLEM — M51.379 DEGENERATION OF INTERVERTEBRAL DISC OF LUMBOSACRAL REGION: Status: ACTIVE | Noted: 2019-04-24

## 2019-09-10 PROBLEM — R51.9 HEADACHE, TEMPORAL: Status: ACTIVE | Noted: 2017-01-07

## 2019-09-10 PROBLEM — G89.29 CHRONIC LOW BACK PAIN: Status: ACTIVE | Noted: 2019-01-24

## 2019-09-10 PROBLEM — J45.909 ASTHMA: Status: ACTIVE | Noted: 2018-06-22

## 2019-09-10 PROBLEM — I25.10 CORONARY HEART DISEASE: Status: ACTIVE | Noted: 2018-01-16

## 2019-09-10 PROBLEM — F32.A DEPRESSION: Status: ACTIVE | Noted: 2018-04-02

## 2019-09-10 LAB
ALBUMIN SERPL-MCNC: 5.1 G/DL (ref 3.5–4.8)
ALBUMIN/GLOB SERPL: 1.8 G/DL (ref 1–1.7)
ALP SERPL-CCNC: 46 U/L (ref 32–91)
ALT SERPL W P-5'-P-CCNC: 24 U/L (ref 14–54)
ANION GAP SERPL CALCULATED.3IONS-SCNC: 16.1 MMOL/L (ref 5–15)
ARTICHOKE IGE QN: 125 MG/DL (ref 0–100)
AST SERPL-CCNC: 24 U/L (ref 15–41)
BASOPHILS # BLD AUTO: 0.1 10*3/MM3 (ref 0–0.2)
BASOPHILS NFR BLD AUTO: 1.4 % (ref 0–1.5)
BILIRUB SERPL-MCNC: 0.9 MG/DL (ref 0.3–1.2)
BUN BLD-MCNC: 13 MG/DL (ref 8–20)
BUN/CREAT SERPL: 13 (ref 5.4–26.2)
CALCIUM SPEC-SCNC: 10.5 MG/DL (ref 8.9–10.3)
CHLORIDE SERPL-SCNC: 100 MMOL/L (ref 101–111)
CHOLEST SERPL-MCNC: 230 MG/DL
CO2 SERPL-SCNC: 26 MMOL/L (ref 22–32)
CREAT BLD-MCNC: 1 MG/DL (ref 0.4–1)
DEPRECATED RDW RBC AUTO: 45.1 FL (ref 37–54)
EOSINOPHIL # BLD AUTO: 0.2 10*3/MM3 (ref 0–0.4)
EOSINOPHIL NFR BLD AUTO: 2.3 % (ref 0.3–6.2)
ERYTHROCYTE [DISTWIDTH] IN BLOOD BY AUTOMATED COUNT: 13.8 % (ref 12.3–15.4)
GFR SERPL CREATININE-BSD FRML MDRD: 56 ML/MIN/1.73
GLOBULIN UR ELPH-MCNC: 2.9 GM/DL (ref 2.5–3.8)
GLUCOSE BLD-MCNC: 110 MG/DL (ref 65–99)
HBA1C MFR BLD: 5.3 % (ref 3.5–5.6)
HCT VFR BLD AUTO: 44.1 % (ref 34–46.6)
HDLC SERPL QL: 4.89
HDLC SERPL-MCNC: 47 MG/DL
HGB BLD-MCNC: 15 G/DL (ref 12–15.9)
HOLD SPECIMEN: NORMAL
LDLC/HDLC SERPL: 2.11 {RATIO}
LYMPHOCYTES # BLD AUTO: 2.5 10*3/MM3 (ref 0.7–3.1)
LYMPHOCYTES NFR BLD AUTO: 31.1 % (ref 19.6–45.3)
MAGNESIUM SERPL-MCNC: 2.2 MG/DL (ref 1.8–2.5)
MCH RBC QN AUTO: 31.4 PG (ref 26.6–33)
MCHC RBC AUTO-ENTMCNC: 34 G/DL (ref 31.5–35.7)
MCV RBC AUTO: 92.2 FL (ref 79–97)
MONOCYTES # BLD AUTO: 0.6 10*3/MM3 (ref 0.1–0.9)
MONOCYTES NFR BLD AUTO: 7.1 % (ref 5–12)
NEUTROPHILS # BLD AUTO: 4.7 10*3/MM3 (ref 1.7–7)
NEUTROPHILS NFR BLD AUTO: 58.1 % (ref 42.7–76)
NRBC BLD AUTO-RTO: 0 /100 WBC (ref 0–0.2)
PLATELET # BLD AUTO: 275 10*3/MM3 (ref 140–450)
PMV BLD AUTO: 7.5 FL (ref 6–12)
POTASSIUM BLD-SCNC: 4.1 MMOL/L (ref 3.6–5.1)
PROT SERPL-MCNC: 8 G/DL (ref 6.1–7.9)
RBC # BLD AUTO: 4.79 10*6/MM3 (ref 3.77–5.28)
SODIUM BLD-SCNC: 138 MMOL/L (ref 136–144)
TRIGL SERPL-MCNC: 418 MG/DL
TROPONIN I SERPL-MCNC: <0.03 NG/ML (ref 0–0.03)
TSH SERPL DL<=0.05 MIU/L-ACNC: 1.04 UIU/ML (ref 0.34–5.6)
VLDLC SERPL-MCNC: 83.6 MG/DL
WBC NRBC COR # BLD: 8.2 10*3/MM3 (ref 3.4–10.8)
WHOLE BLOOD HOLD SPECIMEN: NORMAL

## 2019-09-10 PROCEDURE — 71045 X-RAY EXAM CHEST 1 VIEW: CPT

## 2019-09-10 PROCEDURE — 84443 ASSAY THYROID STIM HORMONE: CPT | Performed by: NURSE PRACTITIONER

## 2019-09-10 PROCEDURE — 84484 ASSAY OF TROPONIN QUANT: CPT | Performed by: EMERGENCY MEDICINE

## 2019-09-10 PROCEDURE — 85025 COMPLETE CBC W/AUTO DIFF WBC: CPT | Performed by: EMERGENCY MEDICINE

## 2019-09-10 PROCEDURE — 80053 COMPREHEN METABOLIC PANEL: CPT | Performed by: EMERGENCY MEDICINE

## 2019-09-10 PROCEDURE — 93005 ELECTROCARDIOGRAM TRACING: CPT | Performed by: EMERGENCY MEDICINE

## 2019-09-10 PROCEDURE — 82306 VITAMIN D 25 HYDROXY: CPT | Performed by: NURSE PRACTITIONER

## 2019-09-10 PROCEDURE — 99284 EMERGENCY DEPT VISIT MOD MDM: CPT

## 2019-09-10 PROCEDURE — 83036 HEMOGLOBIN GLYCOSYLATED A1C: CPT | Performed by: NURSE PRACTITIONER

## 2019-09-10 PROCEDURE — 93000 ELECTROCARDIOGRAM COMPLETE: CPT | Performed by: NURSE PRACTITIONER

## 2019-09-10 PROCEDURE — 99213 OFFICE O/P EST LOW 20 MIN: CPT | Performed by: NURSE PRACTITIONER

## 2019-09-10 PROCEDURE — 83735 ASSAY OF MAGNESIUM: CPT | Performed by: EMERGENCY MEDICINE

## 2019-09-10 PROCEDURE — 80061 LIPID PANEL: CPT | Performed by: NURSE PRACTITIONER

## 2019-09-10 RX ORDER — BUDESONIDE AND FORMOTEROL FUMARATE DIHYDRATE 160; 4.5 UG/1; UG/1
2 AEROSOL RESPIRATORY (INHALATION) 2 TIMES DAILY
Refills: 5 | COMMUNITY
Start: 2019-08-31 | End: 2020-04-27

## 2019-09-10 RX ORDER — SODIUM CHLORIDE 0.9 % (FLUSH) 0.9 %
10 SYRINGE (ML) INJECTION AS NEEDED
Status: DISCONTINUED | OUTPATIENT
Start: 2019-09-10 | End: 2019-09-10 | Stop reason: HOSPADM

## 2019-09-10 RX ORDER — PANTOPRAZOLE SODIUM 40 MG/1
40 TABLET, DELAYED RELEASE ORAL DAILY
Refills: 1 | COMMUNITY
Start: 2019-08-03 | End: 2021-03-30

## 2019-09-10 RX ORDER — CARVEDILOL 25 MG/1
TABLET ORAL
COMMUNITY
Start: 2019-07-06 | End: 2019-09-11 | Stop reason: SDUPTHER

## 2019-09-10 RX ORDER — ASPIRIN 81 MG/1
TABLET ORAL
COMMUNITY
Start: 2016-05-15

## 2019-09-10 RX ORDER — ELECTROLYTES/DEXTROSE
1 SOLUTION, ORAL ORAL EVERY 24 HOURS
COMMUNITY
Start: 2019-01-24

## 2019-09-10 RX ORDER — LISINOPRIL 10 MG/1
10 TABLET ORAL DAILY
Qty: 30 TABLET | Refills: 0 | Status: SHIPPED | OUTPATIENT
Start: 2019-09-10 | End: 2019-10-08 | Stop reason: SDUPTHER

## 2019-09-10 RX ORDER — NITROGLYCERIN 0.4 MG/1
TABLET SUBLINGUAL
COMMUNITY
Start: 2017-09-19

## 2019-09-10 RX ORDER — ROSUVASTATIN CALCIUM 40 MG/1
TABLET, COATED ORAL
COMMUNITY
Start: 2019-07-06 | End: 2019-09-11 | Stop reason: SDUPTHER

## 2019-09-10 NOTE — ED PROVIDER NOTES
Subjective   Complaint elevated blood pressure chest tightness    History of present illness 59-year-old female complains 2-week history of elevated blood pressure in the morning with a diastolic running of 110 systolics in the 180s.  She states she been had some tightness in her chest which is been mild no neck arm or jaw pain.  No shortness of breath no dizziness or syncope nothing seems to make it better worse and is been intermittent for the last couple weeks.  Patient went to her primary care doctor today and was sent to the ER for possibly being in atrial fibrillation.  No foreign travels no recent long car ride plane ride mobilizations.  No leg pain or swelling.            Review of Systems   Constitutional: Negative for chills and fever.   HENT: Negative for congestion and sinus pressure.    Eyes: Negative for photophobia and visual disturbance.   Respiratory: Positive for chest tightness. Negative for shortness of breath.    Cardiovascular: Negative for chest pain and leg swelling.   Gastrointestinal: Negative for abdominal pain and vomiting.   Endocrine: Negative for cold intolerance and heat intolerance.   Genitourinary: Negative for difficulty urinating and dysuria.   Musculoskeletal: Negative for arthralgias and back pain.   Skin: Negative for color change and pallor.   Neurological: Negative for dizziness and light-headedness.   Psychiatric/Behavioral: Negative for agitation and behavioral problems.       Past Medical History:   Diagnosis Date   • Anxiety    • CHF (congestive heart failure) (CMS/HCC)    • Coronary heart disease    • Depression    • Hyperlipidemia    • Hypertension        No Known Allergies    Past Surgical History:   Procedure Laterality Date   • APPENDECTOMY     • CARDIAC CATHETERIZATION  2017    No Stents placed - [facility]   • CERVICAL FUSION      C6-C7   •  SECTION      x 2   • CHOLECYSTECTOMY         History reviewed. No pertinent family history.    Social History      Socioeconomic History   • Marital status:      Spouse name: Not on file   • Number of children: Not on file   • Years of education: Not on file   • Highest education level: Not on file   Tobacco Use   • Smoking status: Never Smoker   • Tobacco comment: Passive Smoke: N   Substance and Sexual Activity   • Alcohol use: No     Frequency: Never   • Drug use: No       Prior to Admission medications    Medication Sig Start Date End Date Taking? Authorizing Provider   albuterol (PROVENTIL) (5 MG/ML) 0.5% nebulizer solution ALBUTEROL SULFATE (5 MG/ML) 0.5% NEBU 10/2/18   Julieta Koehler MD   ALPRAZolam (XANAX) 0.5 MG tablet Take 1 tablet by mouth 3 (Three) Times a Day As Needed for Anxiety. 7/10/19   Sivan Ken MD   aspirin 81 MG EC tablet ASPIRIN EC 81 MG TBEC 5/15/16   Julieta Koehler MD   bumetanide (BUMEX) 2 MG tablet TAKE 1 TABLET TWICE DAILY 8/30/19   Cuauhtemoc Kwon MD   carvedilol (COREG) 25 MG tablet  7/6/19   Julieta Koehler MD   citalopram (CeleXA) 40 MG tablet TAKE 1 TABLET EVERY MORNING 8/9/19   Sivan Ken MD   gabapentin (NEURONTIN) 300 MG capsule Take 1 capsule by mouth 3 (Three) Times a Day. 7/10/19   Sivan Ken MD   hydrOXYzine pamoate (VISTARIL) 25 MG capsule Take 1 capsule by mouth 3 (Three) Times a Day As Needed for Itching. 7/10/19   Sivan Ken MD   lisinopril (PRINIVIL,ZESTRIL) 10 MG tablet Take 1 tablet by mouth Daily. 9/10/19   Lety Koch APRN   metoclopramide (REGLAN) 10 MG tablet Take 1 tablet by mouth 3 (Three) Times a Day Before Meals. 7/10/19   Henrry Monteiro Jr., MD   Multiple Vitamins-Minerals (MULTIVITAMIN ADULT) tablet Daily. 1/24/19   Julieta Koehler MD   nitroglycerin (NITROSTAT) 0.4 MG SL tablet NITROSTAT 0.4 MG SUBL 9/19/17   Julieta Koehler MD   pantoprazole (PROTONIX) 40 MG EC tablet Take 40 mg by mouth Daily. 8/3/19   Provider, MD Julieta   potassium chloride (KLOR-CON) 20 MEQ CR tablet Take 1  tablet by mouth 2 (Two) Times a Day. Take 1 tablet by mouth 2 (two) times a day with breakfast and supper 7/29/19   Henrry Monteiro Jr., MD   risperiDONE (risperDAL) 0.5 MG tablet Take 1 tablet by mouth every night at bedtime. 7/10/19   Sivan Ken MD   rosuvastatin (CRESTOR) 40 MG tablet  7/6/19   ProviderJulieta MD   SYMBICORT 160-4.5 MCG/ACT inhaler Inhale 2 puffs 2 (Two) Times a Day. 8/31/19   Provider, MD Julieta         Objective   Physical Exam  59-year-old awake alert no acute distress HEENT extraocular muscles intact pupils equal and reactive mouth is clear neck is supple no adenopathy no JVD no bruits lungs clear no retractions heart regular without murmur abdomen soft without tenderness no masses extremities pulses are equal throughout upper and lower extremities no edema cords or Homans sign no evidence of DVT.  Patient's awake alert follows commands no facial asymmetry normal speech no focal weakness.  Procedures           ED Course      Results for orders placed or performed during the hospital encounter of 09/10/19   Comprehensive Metabolic Panel   Result Value Ref Range    Glucose 110 (H) 65 - 99 mg/dL    BUN 13 8 - 20 mg/dL    Creatinine 1.00 0.40 - 1.00 mg/dL    Sodium 138 136 - 144 mmol/L    Potassium 4.1 3.6 - 5.1 mmol/L    Chloride 100 (L) 101 - 111 mmol/L    CO2 26.0 22.0 - 32.0 mmol/L    Calcium 10.5 (H) 8.9 - 10.3 mg/dL    Total Protein 8.0 (H) 6.1 - 7.9 g/dL    Albumin 5.10 (H) 3.50 - 4.80 g/dL    ALT (SGPT) 24 14 - 54 U/L    AST (SGOT) 24 15 - 41 U/L    Alkaline Phosphatase 46 32 - 91 U/L    Total Bilirubin 0.9 0.3 - 1.2 mg/dL    eGFR Non African Amer 56 (L) >60 mL/min/1.73    Globulin 2.9 2.5 - 3.8 gm/dL    A/G Ratio 1.8 (H) 1.0 - 1.7 g/dL    BUN/Creatinine Ratio 13.0 5.4 - 26.2    Anion Gap 16.1 (H) 5.0 - 15.0 mmol/L   Troponin   Result Value Ref Range    Troponin I <0.030 0.000 - 0.030 ng/mL   Magnesium   Result Value Ref Range    Magnesium 2.2 1.8 - 2.5 mg/dL   CBC  Auto Differential   Result Value Ref Range    WBC 8.20 3.40 - 10.80 10*3/mm3    RBC 4.79 3.77 - 5.28 10*6/mm3    Hemoglobin 15.0 12.0 - 15.9 g/dL    Hematocrit 44.1 34.0 - 46.6 %    MCV 92.2 79.0 - 97.0 fL    MCH 31.4 26.6 - 33.0 pg    MCHC 34.0 31.5 - 35.7 g/dL    RDW 13.8 12.3 - 15.4 %    RDW-SD 45.1 37.0 - 54.0 fl    MPV 7.5 6.0 - 12.0 fL    Platelets 275 140 - 450 10*3/mm3    Neutrophil % 58.1 42.7 - 76.0 %    Lymphocyte % 31.1 19.6 - 45.3 %    Monocyte % 7.1 5.0 - 12.0 %    Eosinophil % 2.3 0.3 - 6.2 %    Basophil % 1.4 0.0 - 1.5 %    Neutrophils, Absolute 4.70 1.70 - 7.00 10*3/mm3    Lymphocytes, Absolute 2.50 0.70 - 3.10 10*3/mm3    Monocytes, Absolute 0.60 0.10 - 0.90 10*3/mm3    Eosinophils, Absolute 0.20 0.00 - 0.40 10*3/mm3    Basophils, Absolute 0.10 0.00 - 0.20 10*3/mm3    nRBC 0.0 0.0 - 0.2 /100 WBC     Xr Chest 1 View    Result Date: 9/10/2019  Stable mild cardiac enlargement. No acute chest findings.  Electronically Signed By-Dr. Gillian Andrea MD On:9/10/2019 11:56 AM This report was finalized on [id number] by Dr. Gillian Andrea MD.    Medications   sodium chloride 0.9 % flush 10 mL (not administered)     EKG my interpretation normal sinus rhythm rate of 100 normal axis no hypertrophy nonspecific T wave changes noted lateral leads and nothing old to compare with abnormal EKG              MDM  Number of Diagnoses or Management Options  Chest tightness:   Diagnosis management comments: Medical decision making.  Patient was placed on a monitor IV established and had the above exam and evaluation CBC electrolytes troponin unremarkable.  Chest x-ray negative EKG is a normal sinus rhythm.  I see no evidence of atrial fibrillation EKG from the patient's office has so much artifact is not interpretable but she is been in a sinus rhythm with well-controlled rate.  Blood pressure in the 120s over 90s.  The patient has a lisinopril prescription which is been called in for her starting today.  At this point  is recommend to be placed in the hospital observation for stress testing and further cardiac work-up.  She does have some risk factors.  The patient voiced understanding but she refused we talked about risk of potential complications and what to return for.  She states she will follow-up with her cardiologist and come back she gets worse.  She feels like it is just related to her blood pressure would rather go home.  I see no evidence of acute cardiac ischemia pulmonary embolism aortic dissection.  Patient has heart score of 2 and she will be discharged home for outpatient follow-up      Final diagnoses:   Chest tightness              Agusto Chand MD  09/10/19 [id number]

## 2019-09-10 NOTE — PROGRESS NOTES
Subjective   Jasmine Cerda is a 59 y.o. female.     Patient presents today with concerns of hypertension over the past 2 weeks. She does have hx of CAD and CHF and reports sensation of heart palpitations. Denies chest pain.      Hypertension   This is a new problem. The current episode started in the past 7 days. The problem has been gradually worsening since onset. Associated symptoms include palpitations. Pertinent negatives include no anxiety, blurred vision, chest pain, headaches, malaise/fatigue, neck pain, orthopnea, peripheral edema, PND, shortness of breath or sweats.        The following portions of the patient's history were reviewed and updated as appropriate: allergies, current medications, past family history, past medical history, past social history, past surgical history and problem list.    Review of Systems   Constitutional: Negative for diaphoresis, fatigue and malaise/fatigue.   Eyes: Negative for blurred vision.   Respiratory: Negative for cough, choking, shortness of breath, wheezing and stridor.    Cardiovascular: Positive for palpitations. Negative for chest pain, orthopnea, leg swelling and PND.   Musculoskeletal: Negative for neck pain.       Objective   Physical Exam   Constitutional: She is oriented to person, place, and time. She appears well-developed and well-nourished. No distress.   Cardiovascular:   No murmur heard.  Tachycardia with variable rate   Pulmonary/Chest: Effort normal and breath sounds normal. No stridor. No respiratory distress. She has no wheezes. She has no rales. She exhibits no tenderness.   Neurological: She is alert and oriented to person, place, and time.   Skin: She is not diaphoretic.   Psychiatric: She has a normal mood and affect. Her behavior is normal. Judgment and thought content normal.         Assessment/Plan   Jasmine was seen today for hypertension.    Diagnoses and all orders for this visit:    Screening, anemia, deficiency, iron  -     Cancel: CBC &  Differential    Screening for thyroid disorder  -     TSH    Essential hypertension  -     Comprehensive Metabolic Panel  -     Lipid Panel  - BP is elevated today.  Patient HR with fluctuating rate and heart palpitations. Send to ED for evaluation.      Vitamin D deficiency  -     Vitamin D 25 Hydroxy    Prediabetes  -     Hemoglobin A1c    Tachycardia  - EKG completed and abnormal. Send to ED for further evaluation.  Call to ED for report.    ECG 12 Lead  Date/Time: 9/10/2019 12:17 PM  Performed by: Lety Koch APRN  Authorized by: Lety Koch APRN   Rhythm: sinus tachycardia  Rate: tachycardic  ST Segments: ST segments normal  Other findings: T wave abnormality    Clinical impression: abnormal EKG

## 2019-09-10 NOTE — DISCHARGE INSTRUCTIONS
Follow-up with your cardiologist this week.  Return for fever increasing pain neck arm jaw pain shortness of breath change of mind about staying or any other new or worsening problems or concerns.  Start your new blood pressure medication today

## 2019-09-11 LAB — 25(OH)D3 SERPL-MCNC: 18.1 NG/ML (ref 30–100)

## 2019-09-11 RX ORDER — CARVEDILOL 25 MG/1
TABLET ORAL
Qty: 180 TABLET | Refills: 1 | Status: SHIPPED | OUTPATIENT
Start: 2019-09-11 | End: 2020-01-21 | Stop reason: SDUPTHER

## 2019-09-11 RX ORDER — ERGOCALCIFEROL 1.25 MG/1
50000 CAPSULE ORAL WEEKLY
Qty: 5 CAPSULE | Refills: 1 | Status: SHIPPED | OUTPATIENT
Start: 2019-09-11 | End: 2019-11-13 | Stop reason: SDUPTHER

## 2019-09-11 RX ORDER — ROSUVASTATIN CALCIUM 40 MG/1
TABLET, COATED ORAL
Qty: 90 TABLET | Refills: 1 | Status: SHIPPED | OUTPATIENT
Start: 2019-09-11 | End: 2020-01-21 | Stop reason: SDUPTHER

## 2019-10-08 ENCOUNTER — OFFICE VISIT (OUTPATIENT)
Dept: CARDIOLOGY | Facility: CLINIC | Age: 59
End: 2019-10-08

## 2019-10-08 VITALS
HEART RATE: 79 BPM | SYSTOLIC BLOOD PRESSURE: 132 MMHG | BODY MASS INDEX: 35.26 KG/M2 | OXYGEN SATURATION: 95 % | RESPIRATION RATE: 18 BRPM | WEIGHT: 199 LBS | DIASTOLIC BLOOD PRESSURE: 84 MMHG | HEIGHT: 63 IN

## 2019-10-08 DIAGNOSIS — R07.89 CHEST PRESSURE: Primary | ICD-10-CM

## 2019-10-08 DIAGNOSIS — E78.2 MIXED HYPERLIPIDEMIA: ICD-10-CM

## 2019-10-08 DIAGNOSIS — I10 ESSENTIAL HYPERTENSION: ICD-10-CM

## 2019-10-08 DIAGNOSIS — I20.8 STABLE ANGINA PECTORIS (HCC): ICD-10-CM

## 2019-10-08 DIAGNOSIS — I25.118 CORONARY ARTERY DISEASE OF NATIVE ARTERY OF NATIVE HEART WITH STABLE ANGINA PECTORIS (HCC): ICD-10-CM

## 2019-10-08 PROCEDURE — 99214 OFFICE O/P EST MOD 30 MIN: CPT | Performed by: INTERNAL MEDICINE

## 2019-10-08 RX ORDER — LISINOPRIL 10 MG/1
10 TABLET ORAL DAILY
Qty: 30 TABLET | Refills: 0 | Status: SHIPPED | OUTPATIENT
Start: 2019-10-08 | End: 2019-11-01 | Stop reason: SDUPTHER

## 2019-10-08 NOTE — PROGRESS NOTES
"Cardiology Office Visit      Encounter Date:  10/08/2019    Patient ID:   Jasmine Cerda is a 59 y.o. female.    Reason For Followup:  Chest pain  Coronary artery disease    Brief Clinical History:  Dear Lety Gillis APRN    I had the pleasure of seeing Jasmine Cerda today. As you are well aware, this is a 59 y.o. female  had a prior history of known coronary artery disease prior moderate disease in the LAD that is currently being treated medically and also history of congestive heart failure secondary to diastolic dysfunction.     Echocardiogram with  normal LV systolic function and evidence of diastolic dysfunction    Interval History:  Patient is complaining of episodes of chest discomfort and also shortness of breath  His chest discomfort in the substernal pain with radiation to the left arm with some diaphoresis consistent with the anginal pain    Assessment & Plan    Impressions:  Coronary artery disease  Hypertension  Hyperlipidemia  Obesity    Recommendations:  Scheduled for a stress test for further stratification for symptoms of chest discomfort  Prior cath films reviewed with the patient    Objective:    Vitals:  Vitals:    10/08/19 0913   BP: 132/84   BP Location: Left arm   Pulse: 79   Resp: 18   SpO2: 95%   Weight: 90.3 kg (199 lb)   Height: 160 cm (63\")       Physical Exam:    General: Alert, cooperative, no distress, appears stated age  Head:  Normocephalic, atraumatic, mucous membranes moist  Eyes:  Conjunctiva/corneas clear, EOM's intact     Neck:  Supple,  no adenopathy;      Lungs: Clear to auscultation bilaterally, no wheezes rhonchi rales are noted  Chest wall: No tenderness  Heart::  Regular rate and rhythm, S1 and S2 normal, no murmur, rub or gallop  Abdomen: Soft, non-tender, nondistended bowel sounds active  Extremities: No cyanosis, clubbing, or edema  Pulses: 2+ and symmetric all extremities  Skin:  No rashes or lesions  Neuro/psych: A&O x3. CN II through XII are grossly intact with " appropriate affect      Allergies:  No Known Allergies    Medication Review:     Current Outpatient Medications:   •  albuterol (PROVENTIL) (5 MG/ML) 0.5% nebulizer solution, ALBUTEROL SULFATE (5 MG/ML) 0.5% NEBU, Disp: , Rfl:   •  ALPRAZolam (XANAX) 0.5 MG tablet, Take 1 tablet by mouth 3 (Three) Times a Day As Needed for Anxiety., Disp: 90 tablet, Rfl: 2  •  aspirin 81 MG EC tablet, ASPIRIN EC 81 MG TBEC, Disp: , Rfl:   •  bumetanide (BUMEX) 2 MG tablet, TAKE 1 TABLET TWICE DAILY, Disp: 180 tablet, Rfl: 1  •  carvedilol (COREG) 25 MG tablet, TAKE 1 TABLET TWICE DAILY, Disp: 180 tablet, Rfl: 1  •  citalopram (CeleXA) 40 MG tablet, TAKE 1 TABLET EVERY MORNING, Disp: 90 tablet, Rfl: 1  •  gabapentin (NEURONTIN) 300 MG capsule, Take 1 capsule by mouth 3 (Three) Times a Day., Disp: 90 capsule, Rfl: 2  •  hydrOXYzine pamoate (VISTARIL) 25 MG capsule, Take 1 capsule by mouth 3 (Three) Times a Day As Needed for Itching., Disp: 90 capsule, Rfl: 2  •  lisinopril (PRINIVIL,ZESTRIL) 10 MG tablet, Take 1 tablet by mouth Daily., Disp: 30 tablet, Rfl: 0  •  metoclopramide (REGLAN) 10 MG tablet, Take 1 tablet by mouth 3 (Three) Times a Day Before Meals., Disp: 90 tablet, Rfl: 1  •  Multiple Vitamins-Minerals (MULTIVITAMIN ADULT) tablet, Daily., Disp: , Rfl:   •  nitroglycerin (NITROSTAT) 0.4 MG SL tablet, NITROSTAT 0.4 MG SUBL, Disp: , Rfl:   •  pantoprazole (PROTONIX) 40 MG EC tablet, Take 40 mg by mouth Daily., Disp: , Rfl: 1  •  potassium chloride (KLOR-CON) 20 MEQ CR tablet, Take 1 tablet by mouth 2 (Two) Times a Day. Take 1 tablet by mouth 2 (two) times a day with breakfast and supper, Disp: 60 tablet, Rfl: 2  •  risperiDONE (risperDAL) 0.5 MG tablet, Take 1 tablet by mouth every night at bedtime., Disp: 30 tablet, Rfl: 2  •  rosuvastatin (CRESTOR) 40 MG tablet, TAKE 1 TABLET EVERY EVENING, Disp: 90 tablet, Rfl: 1  •  SYMBICORT 160-4.5 MCG/ACT inhaler, Inhale 2 puffs 2 (Two) Times a Day., Disp: , Rfl: 5  •  vitamin D  (ERGOCALCIFEROL) 90362 units capsule capsule, Take 1 capsule by mouth 1 (One) Time Per Week., Disp: 5 capsule, Rfl: 1    Family History:  History reviewed. No pertinent family history.    Past Medical History:  Past Medical History:   Diagnosis Date   • Anxiety    • CHF (congestive heart failure) (CMS/HCC)    • Coronary heart disease    • Depression    • Hyperlipidemia    • Hypertension        Past surgical History:  Past Surgical History:   Procedure Laterality Date   • APPENDECTOMY     • CARDIAC CATHETERIZATION  2017    No Stents placed - [facility]   • CERVICAL FUSION      C6-C7   •  SECTION      x 2   • CHOLECYSTECTOMY         Social History:  Social History     Socioeconomic History   • Marital status:      Spouse name: Not on file   • Number of children: Not on file   • Years of education: Not on file   • Highest education level: Not on file   Tobacco Use   • Smoking status: Never Smoker   • Smokeless tobacco: Never Used   • Tobacco comment: Passive Smoke: N   Substance and Sexual Activity   • Alcohol use: No     Frequency: Never   • Drug use: No   • Sexual activity: Defer       Review of Systems:  The following systems were reviewed as they relate to the cardiovascular system: Constitutional, Eyes, ENT, Cardiovascular, Respiratory, Gastrointestinal, Integumentary, Neurological, Psychiatric, Hematologic, Endocrine, Musculoskeletal, and Genitourinary. The pertinent cardiovascular findings are reported above with all other cardiovascular points within those systems being negative.    Diagnostic Study Review:     Current Electrocardiogram:  Procedures      NOTE: The following portions of the patient's history were reviewed and updated this visit as appropriate: allergies, current medications, past family history, past medical history, past social history, past surgical history and problem list.

## 2019-10-10 ENCOUNTER — OFFICE VISIT (OUTPATIENT)
Dept: PSYCHIATRY | Facility: CLINIC | Age: 59
End: 2019-10-10

## 2019-10-10 DIAGNOSIS — F43.12 CHRONIC POST-TRAUMATIC STRESS DISORDER (PTSD): ICD-10-CM

## 2019-10-10 DIAGNOSIS — F33.2 SEVERE EPISODE OF RECURRENT MAJOR DEPRESSIVE DISORDER, WITHOUT PSYCHOTIC FEATURES (HCC): Primary | ICD-10-CM

## 2019-10-10 PROCEDURE — 99213 OFFICE O/P EST LOW 20 MIN: CPT | Performed by: PSYCHIATRY & NEUROLOGY

## 2019-10-10 RX ORDER — CITALOPRAM 40 MG/1
40 TABLET ORAL EVERY MORNING
Qty: 90 TABLET | Refills: 1 | Status: SHIPPED | OUTPATIENT
Start: 2019-10-10 | End: 2019-12-02 | Stop reason: SDUPTHER

## 2019-10-10 RX ORDER — GABAPENTIN 300 MG/1
300 CAPSULE ORAL 3 TIMES DAILY
Qty: 270 CAPSULE | Refills: 1 | Status: SHIPPED | OUTPATIENT
Start: 2019-10-10 | End: 2019-12-02 | Stop reason: SDUPTHER

## 2019-10-10 RX ORDER — ALPRAZOLAM 0.5 MG/1
0.5 TABLET ORAL 3 TIMES DAILY PRN
Qty: 90 TABLET | Refills: 2 | Status: SHIPPED | OUTPATIENT
Start: 2019-10-10 | End: 2020-01-08

## 2019-10-10 RX ORDER — RISPERIDONE 0.5 MG/1
0.5 TABLET ORAL
Qty: 30 TABLET | Refills: 2 | Status: SHIPPED | OUTPATIENT
Start: 2019-10-10 | End: 2020-01-16 | Stop reason: SDUPTHER

## 2019-10-10 NOTE — PATIENT INSTRUCTIONS
Major Depressive Disorder, Adult  Major depressive disorder (MDD) is a mental health condition. MDD often makes you feel sad, hopeless, or helpless. MDD can also cause symptoms in your body. MDD can affect your:  · Work.  · School.  · Relationships.  · Other normal activities.  MDD can range from mild to very bad. It may occur once (single episode MDD). It can also occur many times (recurrent MDD).  The main symptoms of MDD often include:  · Feeling sad, depressed, or irritable most of the time.  · Loss of interest.  MDD symptoms also include:  · Sleeping too much or too little.  · Eating too much or too little.  · A change in your weight.  · Feeling tired (fatigue) or having low energy.  · Feeling worthless.  · Feeling guilty.  · Trouble making decisions.  · Trouble thinking clearly.  · Thoughts of suicide or harming others.  · Feeling weak.  · Feeling agitated.  · Keeping yourself from being around other people (isolation).  Follow these instructions at home:  Activity  · Do these things as told by your doctor:  ? Go back to your normal activities.  ? Exercise regularly.  ? Spend time outdoors.  Alcohol  · Talk with your doctor about how alcohol can affect your antidepressant medicines.  · Do not drink alcohol. Or, limit how much alcohol you drink.  ? This means no more than 1 drink a day for nonpregnant women and 2 drinks a day for men. One drink equals one of these:  § 12 oz of beer.  § 5 oz of wine.  § 1½ oz of hard liquor.  General instructions  · Take over-the-counter and prescription medicines only as told by your doctor.  · Eat a healthy diet.  · Get plenty of sleep.  · Find activities that you enjoy. Make time to do them.  · Think about joining a support group. Your doctor may be able to suggest a group for you.  · Keep all follow-up visits as told by your doctor. This is important.  Where to find more information:  · National [place] on Mental Illness:  ? www.[place].org  · [place] National [place] of Mental  Health:  ? www.[facility].gov  · National Suicide Prevention Lifeline:  ? 8-870-562-2398. This is free, 24-hour help.  Contact a doctor if:  · Your symptoms get worse.  · You have new symptoms.  Get help right away if:  · You self-harm.  · You see, hear, taste, smell, or feel things that are not present (hallucinate).  If you ever feel like you may hurt yourself or others, or have thoughts about taking your own life, get help right away. You can go to your nearest emergency department or call:  · Your local emergency services (911 in the [place]  · A suicide crisis helpline, such as the National Suicide Prevention Lifeline:  ? 9-792-521-0841. This is open 24 hours a day.  This information is not intended to replace advice given to you by your health care provider. Make sure you discuss any questions you have with your health care provider.  Document Released: 11/28/2016 Document Revised: 09/03/2017 Document Reviewed: 09/03/2017  [employer]vier Interactive Patient Education © 2019 [employer]
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
## 2019-10-21 ENCOUNTER — OFFICE VISIT (OUTPATIENT)
Dept: FAMILY MEDICINE CLINIC | Facility: CLINIC | Age: 59
End: 2019-10-21

## 2019-10-21 VITALS
BODY MASS INDEX: 35.65 KG/M2 | DIASTOLIC BLOOD PRESSURE: 74 MMHG | HEART RATE: 76 BPM | HEIGHT: 63 IN | SYSTOLIC BLOOD PRESSURE: 98 MMHG | WEIGHT: 201.2 LBS | OXYGEN SATURATION: 92 %

## 2019-10-21 DIAGNOSIS — Z23 FLU VACCINE NEED: ICD-10-CM

## 2019-10-21 DIAGNOSIS — E78.2 MIXED HYPERLIPIDEMIA: ICD-10-CM

## 2019-10-21 DIAGNOSIS — I10 ESSENTIAL HYPERTENSION: ICD-10-CM

## 2019-10-21 DIAGNOSIS — G89.29 CHRONIC LEFT-SIDED LOW BACK PAIN WITH LEFT-SIDED SCIATICA: Primary | ICD-10-CM

## 2019-10-21 DIAGNOSIS — M54.42 CHRONIC LEFT-SIDED LOW BACK PAIN WITH LEFT-SIDED SCIATICA: Primary | ICD-10-CM

## 2019-10-21 PROCEDURE — 99213 OFFICE O/P EST LOW 20 MIN: CPT | Performed by: NURSE PRACTITIONER

## 2019-10-21 PROCEDURE — G0008 ADMIN INFLUENZA VIRUS VAC: HCPCS | Performed by: NURSE PRACTITIONER

## 2019-10-21 PROCEDURE — 90674 CCIIV4 VAC NO PRSV 0.5 ML IM: CPT | Performed by: NURSE PRACTITIONER

## 2019-10-21 PROCEDURE — 80053 COMPREHEN METABOLIC PANEL: CPT | Performed by: NURSE PRACTITIONER

## 2019-10-21 RX ORDER — POTASSIUM CHLORIDE 20 MEQ/1
20 TABLET, EXTENDED RELEASE ORAL 2 TIMES DAILY
Qty: 60 TABLET | Refills: 2 | Status: SHIPPED | OUTPATIENT
Start: 2019-10-21 | End: 2020-01-17 | Stop reason: SDUPTHER

## 2019-10-21 RX ORDER — ICOSAPENT ETHYL 1000 MG/1
2 CAPSULE ORAL 2 TIMES DAILY WITH MEALS
Qty: 120 CAPSULE | Refills: 0 | Status: SHIPPED | OUTPATIENT
Start: 2019-10-21 | End: 2020-05-11 | Stop reason: SDUPTHER

## 2019-10-21 NOTE — PROGRESS NOTES
Subjective   Jasmine Cerda is a 59 y.o. female.     Patient presents today for follow up from hospital. She is being followed by cardiology and is scheduled for stress test. She reports chronic low back pain requesting pain medication.   She denies recent injury.          The following portions of the patient's history were reviewed and updated as appropriate: allergies, current medications, past family history, past medical history, past social history, past surgical history and problem list.    Review of Systems   Constitutional: Negative for diaphoresis, fatigue and fever.   Respiratory: Negative for cough, choking, chest tightness, shortness of breath, wheezing and stridor.    Cardiovascular: Positive for palpitations. Negative for chest pain and leg swelling.   Musculoskeletal: Positive for back pain. Negative for arthralgias, joint swelling and myalgias.       Objective   Physical Exam   Constitutional: She is oriented to person, place, and time. She appears well-developed and well-nourished. No distress.   Cardiovascular: Normal rate, regular rhythm, normal heart sounds and intact distal pulses. Exam reveals no gallop and no friction rub.   No murmur heard.  Pulmonary/Chest: Effort normal and breath sounds normal. No stridor. No respiratory distress. She has no wheezes. She has no rales. She exhibits no tenderness.   Musculoskeletal: Normal range of motion. She exhibits no edema.   Neurological: She is alert and oriented to person, place, and time. She displays normal reflexes. No cranial nerve deficit or sensory deficit. She exhibits normal muscle tone. Coordination normal.   Skin: She is not diaphoretic.   Psychiatric: She has a normal mood and affect. Her behavior is normal. Judgment and thought content normal.         Assessment/Plan   Jasmine was seen today for abnormal ecg and back pain.    Diagnoses and all orders for this visit:    Chronic left-sided low back pain with left-sided sciatica  -      Ambulatory Referral to Pain Management  - Patient with x-ray in Jan. Refer to pain management.     Essential hypertension  -     Comprehensive Metabolic Panel   -   Monitor at home.  Flu vaccine need  -     Flucelvax Quad=>4Years (PFS)    Hyperlipidemia  -     icosapent ethyl (VASCEPA) 1 g capsule capsule; Take 2 g by mouth 2 (Two) Times a Day With Meals.   - Add Vascepa

## 2019-10-22 LAB
ALBUMIN SERPL-MCNC: 4.7 G/DL (ref 3.5–5.2)
ALBUMIN/GLOB SERPL: 2 G/DL
ALP SERPL-CCNC: 61 U/L (ref 39–117)
ALT SERPL W P-5'-P-CCNC: 17 U/L (ref 1–33)
ANION GAP SERPL CALCULATED.3IONS-SCNC: 14.5 MMOL/L (ref 5–15)
AST SERPL-CCNC: 15 U/L (ref 1–32)
BILIRUB SERPL-MCNC: 0.2 MG/DL (ref 0.2–1.2)
BUN BLD-MCNC: 24 MG/DL (ref 8–23)
BUN/CREAT SERPL: 24.5 (ref 7–25)
CALCIUM SPEC-SCNC: 9.1 MG/DL (ref 8.6–10.5)
CHLORIDE SERPL-SCNC: 100 MMOL/L (ref 98–107)
CO2 SERPL-SCNC: 24.5 MMOL/L (ref 22–29)
CREAT BLD-MCNC: 0.98 MG/DL (ref 0.57–1)
GFR SERPL CREATININE-BSD FRML MDRD: 58 ML/MIN/1.73
GLOBULIN UR ELPH-MCNC: 2.4 GM/DL
GLUCOSE BLD-MCNC: 108 MG/DL (ref 65–99)
POTASSIUM BLD-SCNC: 4 MMOL/L (ref 3.5–5.2)
PROT SERPL-MCNC: 7.1 G/DL (ref 6–8.5)
SODIUM BLD-SCNC: 139 MMOL/L (ref 136–145)

## 2019-10-23 ENCOUNTER — TELEPHONE (OUTPATIENT)
Dept: FAMILY MEDICINE CLINIC | Facility: CLINIC | Age: 59
End: 2019-10-23

## 2019-10-23 RX ORDER — ONDANSETRON 4 MG/1
4 TABLET, FILM COATED ORAL EVERY 8 HOURS PRN
Qty: 20 TABLET | Refills: 0 | Status: SHIPPED | OUTPATIENT
Start: 2019-10-23 | End: 2019-11-05 | Stop reason: SDUPTHER

## 2019-10-29 ENCOUNTER — HOSPITAL ENCOUNTER (OUTPATIENT)
Dept: CARDIOLOGY | Facility: HOSPITAL | Age: 59
Discharge: HOME | End: 2019-10-29

## 2019-10-29 DIAGNOSIS — R07.89 CHEST PRESSURE: ICD-10-CM

## 2019-10-29 LAB
BH CV STRESS COMMENTS STAGE 1: NORMAL
BH CV STRESS DOSE REGADENOSON STAGE 1: 0.4
BH CV STRESS DURATION MIN STAGE 1: 0
BH CV STRESS DURATION SEC STAGE 1: 10
BH CV STRESS PROTOCOL 1: NORMAL
BH CV STRESS RECOVERY BP: NORMAL MMHG
BH CV STRESS RECOVERY HR: 79 BPM
BH CV STRESS STAGE 1: 1
LV EF NUC BP: 60 %
MAXIMAL PREDICTED HEART RATE: 159 BPM
PERCENT MAX PREDICTED HR: 51.57 %
STRESS BASELINE BP: NORMAL MMHG
STRESS BASELINE HR: 73 BPM
STRESS PERCENT HR: 61 %
STRESS POST PEAK BP: NORMAL MMHG
STRESS POST PEAK HR: 82 BPM
STRESS TARGET HR: 135 BPM

## 2019-10-29 PROCEDURE — 0 TECHNETIUM SESTAMIBI: Performed by: INTERNAL MEDICINE

## 2019-10-29 PROCEDURE — A9500 TC99M SESTAMIBI: HCPCS | Performed by: INTERNAL MEDICINE

## 2019-10-29 PROCEDURE — 25010000002 REGADENOSON 0.4 MG/5ML SOLUTION: Performed by: INTERNAL MEDICINE

## 2019-10-29 PROCEDURE — 93017 CV STRESS TEST TRACING ONLY: CPT

## 2019-10-29 PROCEDURE — 78452 HT MUSCLE IMAGE SPECT MULT: CPT | Performed by: INTERNAL MEDICINE

## 2019-10-29 PROCEDURE — 93016 CV STRESS TEST SUPVJ ONLY: CPT | Performed by: INTERNAL MEDICINE

## 2019-10-29 PROCEDURE — 93018 CV STRESS TEST I&R ONLY: CPT | Performed by: INTERNAL MEDICINE

## 2019-10-29 PROCEDURE — 78452 HT MUSCLE IMAGE SPECT MULT: CPT

## 2019-10-29 RX ADMIN — REGADENOSON 0.4 MG: 0.08 INJECTION, SOLUTION INTRAVENOUS at 09:45

## 2019-10-29 RX ADMIN — TECHNETIUM TC 99M SESTAMIBI 1 DOSE: 1 INJECTION INTRAVENOUS at 09:00

## 2019-10-29 RX ADMIN — TECHNETIUM TC 99M SESTAMIBI 1 DOSE: 1 INJECTION INTRAVENOUS at 09:45

## 2019-11-04 RX ORDER — LISINOPRIL 10 MG/1
TABLET ORAL
Qty: 90 TABLET | Refills: 1 | Status: SHIPPED | OUTPATIENT
Start: 2019-11-04 | End: 2020-04-30 | Stop reason: SDUPTHER

## 2019-11-06 RX ORDER — ONDANSETRON 4 MG/1
4 TABLET, FILM COATED ORAL EVERY 8 HOURS PRN
Qty: 20 TABLET | Refills: 0 | Status: SHIPPED | OUTPATIENT
Start: 2019-11-06 | End: 2019-11-25 | Stop reason: SDUPTHER

## 2019-11-14 RX ORDER — ERGOCALCIFEROL 1.25 MG/1
50000 CAPSULE ORAL WEEKLY
Qty: 5 CAPSULE | Refills: 1 | Status: SHIPPED | OUTPATIENT
Start: 2019-11-14 | End: 2020-08-18 | Stop reason: SDUPTHER

## 2019-11-25 RX ORDER — ONDANSETRON 4 MG/1
4 TABLET, FILM COATED ORAL EVERY 8 HOURS PRN
Qty: 20 TABLET | Refills: 0 | Status: SHIPPED | OUTPATIENT
Start: 2019-11-25 | End: 2019-12-04 | Stop reason: SDUPTHER

## 2019-12-02 DIAGNOSIS — F43.12 CHRONIC POST-TRAUMATIC STRESS DISORDER (PTSD): ICD-10-CM

## 2019-12-02 DIAGNOSIS — F33.2 SEVERE EPISODE OF RECURRENT MAJOR DEPRESSIVE DISORDER, WITHOUT PSYCHOTIC FEATURES (HCC): ICD-10-CM

## 2019-12-03 RX ORDER — GABAPENTIN 300 MG/1
300 CAPSULE ORAL 3 TIMES DAILY
Qty: 270 CAPSULE | Refills: 1 | Status: SHIPPED | OUTPATIENT
Start: 2019-12-03 | End: 2020-01-16 | Stop reason: SDUPTHER

## 2019-12-03 RX ORDER — CITALOPRAM 40 MG/1
40 TABLET ORAL EVERY MORNING
Qty: 90 TABLET | Refills: 1 | Status: SHIPPED | OUTPATIENT
Start: 2019-12-03 | End: 2020-01-16 | Stop reason: SDUPTHER

## 2019-12-04 RX ORDER — ONDANSETRON 4 MG/1
4 TABLET, FILM COATED ORAL EVERY 8 HOURS PRN
Qty: 20 TABLET | Refills: 0 | Status: SHIPPED | OUTPATIENT
Start: 2019-12-04 | End: 2020-01-06 | Stop reason: SDUPTHER

## 2019-12-07 DIAGNOSIS — F43.12 CHRONIC POST-TRAUMATIC STRESS DISORDER (PTSD): ICD-10-CM

## 2019-12-09 RX ORDER — HYDROXYZINE PAMOATE 25 MG/1
25 CAPSULE ORAL 3 TIMES DAILY PRN
Qty: 270 CAPSULE | Refills: 0 | Status: SHIPPED | OUTPATIENT
Start: 2019-12-09 | End: 2020-01-16 | Stop reason: SDUPTHER

## 2019-12-31 RX ORDER — BUMETANIDE 2 MG/1
TABLET ORAL
Qty: 180 TABLET | Refills: 1 | Status: SHIPPED | OUTPATIENT
Start: 2019-12-31 | End: 2020-05-11 | Stop reason: SDUPTHER

## 2020-01-06 RX ORDER — ONDANSETRON 4 MG/1
4 TABLET, FILM COATED ORAL EVERY 8 HOURS PRN
Qty: 20 TABLET | Refills: 0 | Status: SHIPPED | OUTPATIENT
Start: 2020-01-06 | End: 2020-02-03

## 2020-01-08 DIAGNOSIS — F43.12 CHRONIC POST-TRAUMATIC STRESS DISORDER (PTSD): ICD-10-CM

## 2020-01-08 RX ORDER — ALPRAZOLAM 0.5 MG/1
0.5 TABLET ORAL 3 TIMES DAILY PRN
Qty: 21 TABLET | Refills: 0 | Status: SHIPPED | OUTPATIENT
Start: 2020-01-08 | End: 2020-01-16 | Stop reason: SDUPTHER

## 2020-01-16 ENCOUNTER — OFFICE VISIT (OUTPATIENT)
Dept: PSYCHIATRY | Facility: CLINIC | Age: 60
End: 2020-01-16

## 2020-01-16 DIAGNOSIS — F51.04 PSYCHOPHYSIOLOGICAL INSOMNIA: ICD-10-CM

## 2020-01-16 DIAGNOSIS — F43.12 CHRONIC POST-TRAUMATIC STRESS DISORDER (PTSD): ICD-10-CM

## 2020-01-16 DIAGNOSIS — F33.2 SEVERE EPISODE OF RECURRENT MAJOR DEPRESSIVE DISORDER, WITHOUT PSYCHOTIC FEATURES (HCC): Primary | ICD-10-CM

## 2020-01-16 PROCEDURE — 99213 OFFICE O/P EST LOW 20 MIN: CPT | Performed by: PSYCHIATRY & NEUROLOGY

## 2020-01-16 RX ORDER — ALPRAZOLAM 0.5 MG/1
0.5 TABLET ORAL 3 TIMES DAILY PRN
Qty: 90 TABLET | Refills: 2 | Status: SHIPPED | OUTPATIENT
Start: 2020-01-16 | End: 2020-04-07

## 2020-01-16 RX ORDER — GABAPENTIN 300 MG/1
300 CAPSULE ORAL 3 TIMES DAILY
Qty: 270 CAPSULE | Refills: 1 | Status: SHIPPED | OUTPATIENT
Start: 2020-01-16 | End: 2020-05-01 | Stop reason: SDUPTHER

## 2020-01-16 RX ORDER — HYDROXYZINE PAMOATE 25 MG/1
25 CAPSULE ORAL 3 TIMES DAILY PRN
Qty: 270 CAPSULE | Refills: 0 | Status: SHIPPED | OUTPATIENT
Start: 2020-01-16 | End: 2020-05-29

## 2020-01-16 RX ORDER — RISPERIDONE 1 MG/1
1 TABLET ORAL
Qty: 90 TABLET | Refills: 1 | Status: SHIPPED | OUTPATIENT
Start: 2020-01-16 | End: 2020-07-17

## 2020-01-16 RX ORDER — CITALOPRAM 40 MG/1
40 TABLET ORAL EVERY MORNING
Qty: 90 TABLET | Refills: 1 | Status: SHIPPED | OUTPATIENT
Start: 2020-01-16 | End: 2020-05-01 | Stop reason: SDUPTHER

## 2020-01-16 NOTE — PATIENT INSTRUCTIONS
Living With Depression  Everyone experiences occasional disappointment, sadness, and loss in their lives. When you are feeling down, blue, or sad for at least 2 weeks in a row, it may mean that you have depression. Depression can affect your thoughts and feelings, relationships, daily activities, and physical health. It is caused by changes in the way your brain functions. If you receive a diagnosis of depression, your health care provider will tell you which type of depression you have and what treatment options are available to you.  If you are living with depression, there are ways to help you recover from it and also ways to prevent it from coming back.  How to cope with lifestyle changes  Coping with stress         Stress is your body’s reaction to life changes and events, both good and bad. Stressful situations may include:  · Getting .  · The death of a spouse.  · Losing a job.  · Retiring.  · Having a baby.  Stress can last just a few hours or it can be ongoing. Stress can play a major role in depression, so it is important to learn both how to cope with stress and how to think about it differently.  Talk with your health care provider or a counselor if you would like to learn more about stress reduction. He or she may suggest some stress reduction techniques, such as:  · Music therapy. This can include creating music or listening to music. Choose music that you enjoy and that inspires you.  · Mindfulness-based meditation. This kind of meditation can be done while sitting or walking. It involves being aware of your normal breaths, rather than trying to control your breathing.  · Centering prayer. This is a kind of meditation that involves focusing on a spiritual word or phrase. Choose a word, phrase, or sacred image that is meaningful to you and that brings you peace.  · Deep breathing. To do this, expand your stomach and inhale slowly through your nose. Hold your breath for 3-5 seconds, then exhale  slowly, allowing your stomach muscles to relax.  · Muscle relaxation. This involves intentionally tensing muscles then relaxing them.  Choose a stress reduction technique that fits your lifestyle and personality. Stress reduction techniques take time and practice to develop. Set aside 5-15 minutes a day to do them. Therapists can offer training in these techniques. The training may be covered by some insurance plans. Other things you can do to manage stress include:  · Keeping a stress diary. This can help you learn what triggers your stress and ways to control your response.  · Understanding what your limits are and saying no to requests or events that lead to a schedule that is too full.  · Thinking about how you respond to certain situations. You may not be able to control everything, but you can control how you react.  · Adding humor to your life by watching funny films or TV shows.  · Making time for activities that help you relax and not feeling guilty about spending your time this way.    Medicines  Your health care provider may suggest certain medicines if he or she feels that they will help improve your condition. Avoid using alcohol and other substances that may prevent your medicines from working properly (may interact). It is also important to:  · Talk with your pharmacist or health care provider about all the medicines that you take, their possible side effects, and what medicines are safe to take together.  · Make it your goal to take part in all treatment decisions (shared decision-making). This includes giving input on the side effects of medicines. It is best if shared decision-making with your health care provider is part of your total treatment plan.  If your health care provider prescribes a medicine, you may not notice the full benefits of it for 4-8 weeks. Most people who are treated for depression need to be on medicine for at least 6-12 months after they feel better. If you are taking  medicines as part of your treatment, do not stop taking medicines without first talking to your health care provider. You may need to have the medicine slowly decreased (tapered) over time to decrease the risk of harmful side effects.  Relationships  Your health care provider may suggest family therapy along with individual therapy and drug therapy. While there may not be family problems that are causing you to feel depressed, it is still important to make sure your family learns as much as they can about your mental health. Having your family’s support can help make your treatment successful.  How to recognize changes in your condition  Everyone has a different response to treatment for depression. Recovery from major depression happens when you have not had signs of major depression for two months. This may mean that you will start to:  · Have more interest in doing activities.  · Feel less hopeless than you did 2 months ago.  · Have more energy.  · Overeat less often, or have better or improving appetite.  · Have better concentration.  Your health care provider will work with you to decide the next steps in your recovery. It is also important to recognize when your condition is getting worse. Watch for these signs:  · Having fatigue or low energy.  · Eating too much or too little.  · Sleeping too much or too little.  · Feeling restless, agitated, or hopeless.  · Having trouble concentrating or making decisions.  · Having unexplained physical complaints.  · Feeling irritable, angry, or aggressive.  Get help as soon as you or your family members notice these symptoms coming back.  How to get support and help from others  How to talk with friends and family members about your condition    Talking to friends and family members about your condition can provide you with one way to get support and guidance. Reach out to trusted friends or family members, explain your symptoms to them, and let them know that you are  working with a health care provider to treat your depression.  Financial resources  Not all insurance plans cover mental health care, so it is important to check with your insurance carrier. If paying for co-pays or counseling services is a problem, search for a local or [facility] mental health care center. They may be able to offer public mental health care services at low or no cost when you are not able to see a private health care provider.  If you are taking medicine for depression, you may be able to get the generic form, which may be less expensive. Some makers of prescription medicines also offer help to patients who cannot afford the medicines they need.  Follow these instructions at home:    · Get the right amount and quality of sleep.  · Cut down on using caffeine, tobacco, alcohol, and other potentially harmful substances.  · Try to exercise, such as walking or lifting small weights.  · Take over-the-counter and prescription medicines only as told by your health care provider.  · Eat a healthy diet that includes plenty of vegetables, fruits, whole grains, low-fat dairy products, and lean protein. Do not eat a lot of foods that are high in solid fats, added sugars, or salt.  · Keep all follow-up visits as told by your health care provider. This is important.  Contact a health care provider if:  · You stop taking your antidepressant medicines, and you have any of these symptoms:  ? Nausea.  ? Headache.  ? Feeling lightheaded.  ? Chills and body aches.  ? Not being able to sleep (insomnia).  · You or your friends and family think your depression is getting worse.  Get help right away if:  · You have thoughts of hurting yourself or others.  If you ever feel like you may hurt yourself or others, or have thoughts about taking your own life, get help right away. You can go to your nearest emergency department or call:  · Your local emergency services (911 in the [place]  · A suicide crisis helpline, such as the  National Suicide Prevention Lifeline at 1-510.835.6598. This is open 24-hours a day.  Summary  · If you are living with depression, there are ways to help you recover from it and also ways to prevent it from coming back.  · Work with your health care team to create a management plan that includes counseling, stress management techniques, and healthy lifestyle habits.  This information is not intended to replace advice given to you by your health care provider. Make sure you discuss any questions you have with your health care provider.  Document Released: 11/20/2017 Document Revised: 11/20/2017 Document Reviewed: 11/20/2017  [employer] Interactive Patient Education © 2019 [employer]
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
## 2020-01-17 RX ORDER — POTASSIUM CHLORIDE 20 MEQ/1
20 TABLET, EXTENDED RELEASE ORAL 2 TIMES DAILY
Qty: 60 TABLET | Refills: 2 | Status: SHIPPED | OUTPATIENT
Start: 2020-01-17 | End: 2020-04-16

## 2020-01-21 RX ORDER — ROSUVASTATIN CALCIUM 40 MG/1
40 TABLET, COATED ORAL EVERY EVENING
Qty: 90 TABLET | Refills: 1 | Status: SHIPPED | OUTPATIENT
Start: 2020-01-21 | End: 2020-06-02

## 2020-01-21 RX ORDER — CARVEDILOL 25 MG/1
25 TABLET ORAL 2 TIMES DAILY
Qty: 180 TABLET | Refills: 1 | Status: SHIPPED | OUTPATIENT
Start: 2020-01-21 | End: 2020-06-02

## 2020-01-23 RX ORDER — CARVEDILOL 25 MG/1
TABLET ORAL
Qty: 180 TABLET | Refills: 1 | OUTPATIENT
Start: 2020-01-23

## 2020-01-23 RX ORDER — ROSUVASTATIN CALCIUM 40 MG/1
TABLET, COATED ORAL
Qty: 90 TABLET | Refills: 1 | OUTPATIENT
Start: 2020-01-23

## 2020-02-03 RX ORDER — ONDANSETRON 4 MG/1
4 TABLET, FILM COATED ORAL EVERY 8 HOURS PRN
Qty: 20 TABLET | Refills: 0 | Status: SHIPPED | OUTPATIENT
Start: 2020-02-03 | End: 2020-03-06

## 2020-03-02 RX ORDER — METOCLOPRAMIDE 10 MG/1
TABLET ORAL
COMMUNITY
Start: 2020-01-06 | End: 2020-03-02 | Stop reason: SDUPTHER

## 2020-03-02 RX ORDER — METOCLOPRAMIDE 10 MG/1
10 TABLET ORAL
Qty: 90 TABLET | Refills: 0 | Status: SHIPPED | OUTPATIENT
Start: 2020-03-02 | End: 2020-03-30

## 2020-03-04 NOTE — TELEPHONE ENCOUNTER
Anesthetic History   No history of anesthetic complications            Review of Systems / Medical History  Patient summary reviewed, nursing notes reviewed and pertinent labs reviewed    Pulmonary          Smoker      Comments: Smoker - 0.5 ppd x 50 years   Neuro/Psych             Comments: Bilateral lower extremity neuropathy Cardiovascular    Hypertension: well controlled          Hyperlipidemia    Exercise tolerance: >4 METS     GI/Hepatic/Renal     GERD: well controlled      Liver disease (daily ETOH )    Comments: Urinary Incontinence  Fatty liver Endo/Other        Arthritis    Comments: H/O Prostate cancer s/p prostatectomy (2010) Other Findings   Comments: Gout         Physical Exam    Airway  Mallampati: I  TM Distance: > 6 cm  Neck ROM: normal range of motion   Mouth opening: Normal     Cardiovascular  Regular rate and rhythm,  S1 and S2 normal,  no murmur, click, rub, or gallop             Dental      Comments: Several damaged teeth, none loose.    Pulmonary  Breath sounds clear to auscultation               Abdominal  GI exam deferred       Other Findings            Anesthetic Plan    ASA: 2  Anesthesia type: general          Induction: Intravenous  Anesthetic plan and risks discussed with: Patient LOV 10/21/19

## 2020-03-06 RX ORDER — ONDANSETRON 4 MG/1
4 TABLET, FILM COATED ORAL EVERY 8 HOURS PRN
Qty: 20 TABLET | Refills: 0 | Status: SHIPPED | OUTPATIENT
Start: 2020-03-06 | End: 2020-03-20

## 2020-03-20 RX ORDER — ONDANSETRON 4 MG/1
4 TABLET, FILM COATED ORAL EVERY 8 HOURS PRN
Qty: 20 TABLET | Refills: 0 | Status: SHIPPED | OUTPATIENT
Start: 2020-03-20 | End: 2020-03-30

## 2020-03-30 ENCOUNTER — TELEPHONE (OUTPATIENT)
Dept: FAMILY MEDICINE CLINIC | Facility: CLINIC | Age: 60
End: 2020-03-30

## 2020-03-30 RX ORDER — ONDANSETRON 4 MG/1
4 TABLET, FILM COATED ORAL EVERY 8 HOURS PRN
Qty: 20 TABLET | Refills: 0 | Status: SHIPPED | OUTPATIENT
Start: 2020-03-30 | End: 2020-03-30

## 2020-03-30 RX ORDER — ONDANSETRON 4 MG/1
4 TABLET, FILM COATED ORAL EVERY 8 HOURS PRN
Qty: 20 TABLET | Refills: 0 | Status: SHIPPED | OUTPATIENT
Start: 2020-03-30 | End: 2020-04-22 | Stop reason: SDUPTHER

## 2020-03-30 RX ORDER — METOCLOPRAMIDE 10 MG/1
10 TABLET ORAL
Qty: 90 TABLET | Refills: 0 | Status: SHIPPED | OUTPATIENT
Start: 2020-03-30 | End: 2020-04-27

## 2020-03-30 NOTE — TELEPHONE ENCOUNTER
Pt has not seen you before, however she has vomiting and very sick to her stomach.  Said she had cough, right now no fever or shortness of breath.  She said she called her pharmacy for something but didn't know the name of it.  Please advise .  She does not have a computer and does us my chart has no way of signing up she said. bm

## 2020-04-06 DIAGNOSIS — F43.12 CHRONIC POST-TRAUMATIC STRESS DISORDER (PTSD): ICD-10-CM

## 2020-04-07 RX ORDER — ALPRAZOLAM 0.5 MG/1
0.5 TABLET ORAL 3 TIMES DAILY PRN
Qty: 90 TABLET | Refills: 2 | Status: SHIPPED | OUTPATIENT
Start: 2020-04-07 | End: 2020-07-01 | Stop reason: SDUPTHER

## 2020-04-16 RX ORDER — ONDANSETRON 4 MG/1
4 TABLET, FILM COATED ORAL EVERY 8 HOURS PRN
Qty: 20 TABLET | Refills: 0 | OUTPATIENT
Start: 2020-04-16

## 2020-04-16 RX ORDER — POTASSIUM CHLORIDE 1500 MG/1
TABLET, EXTENDED RELEASE ORAL
Qty: 180 TABLET | Refills: 0 | Status: SHIPPED | OUTPATIENT
Start: 2020-04-16 | End: 2020-07-09

## 2020-04-21 ENCOUNTER — TELEPHONE (OUTPATIENT)
Dept: FAMILY MEDICINE CLINIC | Facility: CLINIC | Age: 60
End: 2020-04-21

## 2020-04-21 NOTE — TELEPHONE ENCOUNTER
This patient requested a refill on her medication.  Can you please call to schedule her an appointment?  Then I will send medication refill to [facility] after appt is made.  Thank you.

## 2020-04-22 ENCOUNTER — OFFICE VISIT (OUTPATIENT)
Dept: FAMILY MEDICINE CLINIC | Facility: CLINIC | Age: 60
End: 2020-04-22

## 2020-04-22 DIAGNOSIS — R11.2 NAUSEA AND VOMITING, INTRACTABILITY OF VOMITING NOT SPECIFIED, UNSPECIFIED VOMITING TYPE: ICD-10-CM

## 2020-04-22 DIAGNOSIS — Z20.822 SUSPECTED COVID-19 VIRUS INFECTION: Primary | ICD-10-CM

## 2020-04-22 DIAGNOSIS — Z71.89 ADVICE GIVEN ABOUT COVID-19 VIRUS BY TELEPHONE: ICD-10-CM

## 2020-04-22 PROCEDURE — 99442 PR PHYS/QHP TELEPHONE EVALUATION 11-20 MIN: CPT | Performed by: NURSE PRACTITIONER

## 2020-04-22 RX ORDER — ONDANSETRON 4 MG/1
4 TABLET, FILM COATED ORAL EVERY 8 HOURS PRN
Qty: 60 TABLET | Refills: 0 | Status: SHIPPED | OUTPATIENT
Start: 2020-04-22 | End: 2020-05-14

## 2020-04-22 NOTE — PATIENT INSTRUCTIONS

## 2020-04-22 NOTE — PROGRESS NOTES
Chief Complaint   Patient presents with   • Nausea   • Diarrhea   • Cough   • Fever     low grade     You have chosen to receive care through a telephone visit. Do you consent to use a telephone visit for your medical care today? Yes    HPI     Pt called office for acute visit, reports fever tmax 100.4, cough/copd, nausea, vomiting, diarrhea present for approx 6-7 days now, nausea/vomiting with movement/position change, comes/goes, has been using ondansetron and improves s/s. She denies any recent known covid19 exposure. She has self quarantined for 8 days now. She has COPD, using nebulizer and symbicort, she denies being severely short of air and distressed. Reports the worst of her s/s is the nausea. Reports s/s have not improved or worsened, essentially unchanged.         Past Medical History:   Diagnosis Date   • Anxiety    • CHF (congestive heart failure) (CMS/HCC)    • Coronary heart disease    • Depression    • Hyperlipidemia    • Hypertension      Past Surgical History:   Procedure Laterality Date   • APPENDECTOMY     • CARDIAC CATHETERIZATION  2017    No Stents placed - [facility]   • CERVICAL FUSION      C6-C7   •  SECTION      x 2   • CHOLECYSTECTOMY       No family history on file.  Social History     Tobacco Use   • Smoking status: Never Smoker   • Smokeless tobacco: Never Used   • Tobacco comment: Passive Smoke: N   Substance Use Topics   • Alcohol use: No     Frequency: Never         Current Outpatient Medications:   •  albuterol (PROVENTIL) (5 MG/ML) 0.5% nebulizer solution, Take 0.5 mL by nebulization Daily As Needed for Wheezing or Shortness of Air (due to asthma)., Disp: 300 mL, Rfl: 1  •  ALPRAZolam (XANAX) 0.5 MG tablet, TAKE 1 TABLET BY MOUTH 3 (THREE) TIMES A DAY AS NEEDED FOR ANXIETY, Disp: 90 tablet, Rfl: 2  •  aspirin 81 MG EC tablet, ASPIRIN EC 81 MG TBEC, Disp: , Rfl:   •  bumetanide (BUMEX) 2 MG tablet, TAKE 1 TABLET TWICE DAILY, Disp: 180 tablet, Rfl: 1  •  carvedilol (COREG) 25 MG  tablet, Take 1 tablet by mouth 2 (Two) Times a Day., Disp: 180 tablet, Rfl: 1  •  citalopram (CeleXA) 40 MG tablet, Take 1 tablet by mouth Every Morning., Disp: 90 tablet, Rfl: 1  •  gabapentin (NEURONTIN) 300 MG capsule, Take 1 capsule by mouth 3 (Three) Times a Day., Disp: 270 capsule, Rfl: 1  •  hydrOXYzine pamoate (VISTARIL) 25 MG capsule, Take 1 capsule by mouth 3 (Three) Times a Day As Needed for Itching., Disp: 270 capsule, Rfl: 0  •  icosapent ethyl (VASCEPA) 1 g capsule capsule, Take 2 g by mouth 2 (Two) Times a Day With Meals., Disp: 120 capsule, Rfl: 0  •  KLOR-CON 20 MEQ CR tablet, TAKE 1 TABLET BY MOUTH 2 (TWO) TIMES A DAY WITH BREAKFAST AND SUPPER, Disp: 180 tablet, Rfl: 0  •  lisinopril (PRINIVIL,ZESTRIL) 10 MG tablet, TAKE 1 TABLET BY MOUTH EVERY DAY, Disp: 90 tablet, Rfl: 1  •  metoclopramide (REGLAN) 10 MG tablet, TAKE 1 TABLET BY MOUTH 3 (THREE) TIMES A DAY BEFORE MEALS., Disp: 90 tablet, Rfl: 0  •  Multiple Vitamins-Minerals (MULTIVITAMIN ADULT) tablet, Daily., Disp: , Rfl:   •  nitroglycerin (NITROSTAT) 0.4 MG SL tablet, NITROSTAT 0.4 MG SUBL, Disp: , Rfl:   •  ondansetron (Zofran) 4 MG tablet, Take 1 tablet by mouth Every 8 (Eight) Hours As Needed for Nausea or Vomiting., Disp: 60 tablet, Rfl: 0  •  pantoprazole (PROTONIX) 40 MG EC tablet, Take 40 mg by mouth Daily., Disp: , Rfl: 1  •  risperiDONE (risperDAL) 1 MG tablet, Take 1 tablet by mouth every night at bedtime., Disp: 90 tablet, Rfl: 1  •  rosuvastatin (CRESTOR) 40 MG tablet, Take 1 tablet by mouth Every Evening., Disp: 90 tablet, Rfl: 1  •  SYMBICORT 160-4.5 MCG/ACT inhaler, Inhale 2 puffs 2 (Two) Times a Day., Disp: , Rfl: 5  •  vitamin D (ERGOCALCIFEROL) 1.25 MG (84593 UT) capsule capsule, TAKE 1 CAPSULE BY MOUTH 1 (ONE) TIME PER WEEK., Disp: 5 capsule, Rfl: 1      The following portions of the patient's history were reviewed and updated as appropriate: allergies, current medications, past family history, past medical history, past  social history, past surgical history and problem list.    Review of Systems   Constitutional: Positive for chills and fever. Negative for fatigue.   HENT: Negative for dental problem, ear pain, sinus pressure and sore throat.    Eyes: Negative for visual disturbance.   Respiratory: Positive for cough. Negative for shortness of breath and wheezing.    Cardiovascular: Negative for chest pain, palpitations and leg swelling.   Gastrointestinal: Positive for diarrhea, nausea and vomiting. Negative for abdominal pain, blood in stool, constipation and GERD.   Genitourinary: Negative for difficulty urinating, frequency, urgency and urinary incontinence.   Musculoskeletal: Negative for arthralgias, back pain, gait problem, joint swelling, myalgias and neck pain.   Skin: Negative for dry skin, pallor and rash.   Neurological: Negative for dizziness, seizures, speech difficulty and weakness.   Hematological: Negative for adenopathy.   Psychiatric/Behavioral: Negative for sleep disturbance, depressed mood and stress. The patient is not nervous/anxious.      There were no vitals filed for this visit.  There is no height or weight on file to calculate BMI.     Physical Exam   Constitutional: She is oriented to person, place, and time. No distress.   Exam otherwise deferred through video or audio visit   Pulmonary/Chest: Effort normal. No respiratory distress.   Pt able to communicate with normal words and respirations, does not sound labored through telephone visit.    Neurological: She is alert and oriented to person, place, and time.   Psychiatric: She has a normal mood and affect. Her behavior is normal. Judgment and thought content normal.     No visits with results within 7 Day(s) from this visit.   Latest known visit with results is:   Hospital Outpatient Visit on 10/29/2019   Component Date Value Ref Range Status   • Target HR (85%) 10/29/2019 135  bpm Final   • Max. Pred. HR (100%) 10/29/2019 159  bpm Final   • BH CV  STRESS PROTOCOL 1 10/29/2019 Pharmacologic   Final   • Stage 1 10/29/2019 1   Final   • Duration Min Stage 1 10/29/2019 0   Final   • Duration Sec Stage 1 10/29/2019 10   Final   • Stress Dose Regadenoson Stage 1 10/29/2019 0.4   Final   • Stress Comments Stage 1 10/29/2019 10 sec bolus injection   Final   • Baseline HR 10/29/2019 73  bpm Final   • Baseline BP 10/29/2019 140/90  mmHg Final   • Peak HR 10/29/2019 82  bpm Final   • Percent Max Pred HR 10/29/2019 51.57  % Final   • Percent Target HR 10/29/2019 61  % Final   • Peak BP 10/29/2019 128/84  mmHg Final   • Recovery HR 10/29/2019 79  bpm Final   • Recovery BP 10/29/2019 148/90  mmHg Final   • Nuc Stress EF 10/29/2019 60  % Final       Diagnoses and all orders for this visit:    1. Suspected Covid-19 Virus Infection (Primary)    2. Advice Given About Covid-19 Virus by Telephone    3. Nausea and vomiting, intractability of vomiting not specified, unspecified vomiting type    Other orders  -     ondansetron (Zofran) 4 MG tablet; Take 1 tablet by mouth Every 8 (Eight) Hours As Needed for Nausea or Vomiting.  Dispense: 60 tablet; Refill: 0         Pt instructed to self quarantine, discussed and will mail instructions to patients home.   Continue using symbicort BID, nebulizer q4 hours if needed.   Refill zofran  No antidiarrheals, rec BRAT diet and advance as tolerated.     Patient can leave the home after these 3 things happen:     1. No fever for 72 hours, that is 3 days with no fever without the use of medicines to reduce  a fever  2. Other symptoms have improved, cough, shortness of breath have improved    AND    3. At least 7 days passed since your symptoms first appeared.     Call if s/s worsen, or if develops severe soa or respiratory distress instructed to go to ED.       This visit has been rescheduled as a phone visit to comply with patient safety concerns in accordance with CDC recommendations. Total time of discussion was 20 minutes.

## 2020-04-27 RX ORDER — METOCLOPRAMIDE 10 MG/1
10 TABLET ORAL
Qty: 90 TABLET | Refills: 0 | Status: SHIPPED | OUTPATIENT
Start: 2020-04-27 | End: 2020-05-28

## 2020-04-27 RX ORDER — BUDESONIDE AND FORMOTEROL FUMARATE DIHYDRATE 160; 4.5 UG/1; UG/1
AEROSOL RESPIRATORY (INHALATION)
Qty: 30.6 INHALER | Refills: 1 | Status: SHIPPED | OUTPATIENT
Start: 2020-04-27 | End: 2021-04-06

## 2020-04-30 RX ORDER — LISINOPRIL 10 MG/1
10 TABLET ORAL DAILY
Qty: 90 TABLET | Refills: 1 | Status: SHIPPED | OUTPATIENT
Start: 2020-04-30 | End: 2020-10-26

## 2020-05-01 DIAGNOSIS — F43.12 CHRONIC POST-TRAUMATIC STRESS DISORDER (PTSD): ICD-10-CM

## 2020-05-01 DIAGNOSIS — F33.2 SEVERE EPISODE OF RECURRENT MAJOR DEPRESSIVE DISORDER, WITHOUT PSYCHOTIC FEATURES (HCC): ICD-10-CM

## 2020-05-01 RX ORDER — BUMETANIDE 2 MG/1
TABLET ORAL
Qty: 180 TABLET | Refills: 1 | OUTPATIENT
Start: 2020-05-01

## 2020-05-05 RX ORDER — GABAPENTIN 300 MG/1
300 CAPSULE ORAL 3 TIMES DAILY
Qty: 270 CAPSULE | Refills: 1 | Status: SHIPPED | OUTPATIENT
Start: 2020-05-05 | End: 2020-07-01 | Stop reason: SDUPTHER

## 2020-05-05 RX ORDER — CITALOPRAM 40 MG/1
40 TABLET ORAL EVERY MORNING
Qty: 90 TABLET | Refills: 1 | Status: SHIPPED | OUTPATIENT
Start: 2020-05-05 | End: 2020-07-01 | Stop reason: SDUPTHER

## 2020-05-11 ENCOUNTER — OFFICE VISIT (OUTPATIENT)
Dept: CARDIOLOGY | Facility: CLINIC | Age: 60
End: 2020-05-11

## 2020-05-11 VITALS — SYSTOLIC BLOOD PRESSURE: 127 MMHG | BODY MASS INDEX: 31.89 KG/M2 | DIASTOLIC BLOOD PRESSURE: 70 MMHG | WEIGHT: 180 LBS

## 2020-05-11 DIAGNOSIS — I25.10 CORONARY ARTERY DISEASE INVOLVING NATIVE CORONARY ARTERY OF NATIVE HEART WITHOUT ANGINA PECTORIS: Primary | ICD-10-CM

## 2020-05-11 DIAGNOSIS — I50.32 CHRONIC DIASTOLIC CONGESTIVE HEART FAILURE (HCC): ICD-10-CM

## 2020-05-11 DIAGNOSIS — E78.2 MIXED HYPERLIPIDEMIA: ICD-10-CM

## 2020-05-11 DIAGNOSIS — I10 ESSENTIAL HYPERTENSION: ICD-10-CM

## 2020-05-11 PROBLEM — E78.5 HYPERLIPIDEMIA: Status: ACTIVE | Noted: 2020-05-11

## 2020-05-11 PROCEDURE — 99443 PR PHYS/QHP TELEPHONE EVALUATION 21-30 MIN: CPT | Performed by: INTERNAL MEDICINE

## 2020-05-11 RX ORDER — BUMETANIDE 2 MG/1
2 TABLET ORAL 2 TIMES DAILY
Qty: 180 TABLET | Refills: 3 | Status: SHIPPED | OUTPATIENT
Start: 2020-05-11 | End: 2020-05-15 | Stop reason: SDUPTHER

## 2020-05-11 RX ORDER — ICOSAPENT ETHYL 1000 MG/1
2 CAPSULE ORAL 2 TIMES DAILY WITH MEALS
Qty: 120 CAPSULE | Refills: 4 | Status: SHIPPED | OUTPATIENT
Start: 2020-05-11 | End: 2020-05-11 | Stop reason: SDUPTHER

## 2020-05-11 RX ORDER — ICOSAPENT ETHYL 1000 MG/1
2 CAPSULE ORAL 2 TIMES DAILY WITH MEALS
Qty: 360 CAPSULE | Refills: 3 | Status: SHIPPED | OUTPATIENT
Start: 2020-05-11 | End: 2021-03-30

## 2020-05-11 NOTE — PROGRESS NOTES
Cardiology Telephone Visit    Encounter Date:  05/11/2020    Patient ID:   Jasmine Cerda is a 59 y.o. female.    Reason For Followup:  Shortness of breath  Coronary artery disease    Brief Clinical History:  Dear Lety Gillis APRN    I had the pleasure of seeing Jasmine Cerda today. As you are well aware, this is a 59 y.o. female  had a prior history of known coronary artery disease prior moderate disease in the LAD that is currently being treated medically and also history of congestive heart failure secondary to diastolic dysfunction.     Echocardiogram with  normal LV systolic function and evidence of diastolic dysfunction    Interpretation Summary        · Left ventricular ejection fraction is normal (Calculated EF = 60%).  · Myocardial perfusion imaging indicates a normal myocardial perfusion study with no evidence of ischemia.  · Impressions are consistent with a low risk study.  · Findings consistent with a normal ECG stress test.  · Small apical mild fixed perfusion defect most likely apical thinning artifact         Interval History:  Denies any further chest pain  Occasional shortness of breath  Feels better with the diuretic therapy  No new complaints    Assessment & Plan    Impressions:  Coronary artery disease  Hypertension  Hyperlipidemia  Obesity    Recommendations:  Continue current medical therapy  Continued aggressive risk factor modification  Labs discussed with the patient  Elevated triglycerides with high LDL  Continue Crestor 40 mg p.o. once a day  Start patient on Vacepa 2 g p.o. twice daily  Continued aggressive risk factor modification  Continue medical management  Follow-up in office in 6 months    You have chosen to receive care through a telephone visit. Do you consent to use a telephone visit for your medical care today? Yes    Vitals:  Vitals:    05/11/20 1322   BP: 127/70   Weight: 81.6 kg (180 lb)       Allergies:  No Known Allergies    Medication Review:     Current Outpatient  Medications:   •  albuterol (PROVENTIL) (5 MG/ML) 0.5% nebulizer solution, Take 0.5 mL by nebulization Daily As Needed for Wheezing or Shortness of Air (due to asthma)., Disp: 300 mL, Rfl: 1  •  ALPRAZolam (XANAX) 0.5 MG tablet, TAKE 1 TABLET BY MOUTH 3 (THREE) TIMES A DAY AS NEEDED FOR ANXIETY, Disp: 90 tablet, Rfl: 2  •  aspirin 81 MG EC tablet, ASPIRIN EC 81 MG TBEC, Disp: , Rfl:   •  bumetanide (BUMEX) 2 MG tablet, Take 1 tablet by mouth 2 (Two) Times a Day., Disp: 180 tablet, Rfl: 3  •  carvedilol (COREG) 25 MG tablet, Take 1 tablet by mouth 2 (Two) Times a Day., Disp: 180 tablet, Rfl: 1  •  citalopram (CeleXA) 40 MG tablet, Take 1 tablet by mouth Every Morning., Disp: 90 tablet, Rfl: 1  •  gabapentin (NEURONTIN) 300 MG capsule, Take 1 capsule by mouth 3 (Three) Times a Day., Disp: 270 capsule, Rfl: 1  •  hydrOXYzine pamoate (VISTARIL) 25 MG capsule, Take 1 capsule by mouth 3 (Three) Times a Day As Needed for Itching., Disp: 270 capsule, Rfl: 0  •  icosapent ethyl (Vascepa) 1 g capsule capsule, Take 2 g by mouth 2 (Two) Times a Day With Meals., Disp: 360 capsule, Rfl: 3  •  KLOR-CON 20 MEQ CR tablet, TAKE 1 TABLET BY MOUTH 2 (TWO) TIMES A DAY WITH BREAKFAST AND SUPPER, Disp: 180 tablet, Rfl: 0  •  lisinopril (PRINIVIL,ZESTRIL) 10 MG tablet, Take 1 tablet by mouth Daily., Disp: 90 tablet, Rfl: 1  •  metoclopramide (REGLAN) 10 MG tablet, TAKE 1 TABLET BY MOUTH 3 (THREE) TIMES A DAY BEFORE MEALS., Disp: 90 tablet, Rfl: 0  •  Multiple Vitamins-Minerals (MULTIVITAMIN ADULT) tablet, Daily., Disp: , Rfl:   •  nitroglycerin (NITROSTAT) 0.4 MG SL tablet, NITROSTAT 0.4 MG SUBL, Disp: , Rfl:   •  ondansetron (Zofran) 4 MG tablet, Take 1 tablet by mouth Every 8 (Eight) Hours As Needed for Nausea or Vomiting., Disp: 60 tablet, Rfl: 0  •  pantoprazole (PROTONIX) 40 MG EC tablet, Take 40 mg by mouth Daily., Disp: , Rfl: 1  •  risperiDONE (risperDAL) 1 MG tablet, Take 1 tablet by mouth every night at bedtime., Disp: 90 tablet,  Rfl: 1  •  rosuvastatin (CRESTOR) 40 MG tablet, Take 1 tablet by mouth Every Evening., Disp: 90 tablet, Rfl: 1  •  SYMBICORT 160-4.5 MCG/ACT inhaler, INHALE 2 PUFFS BY MOUTH TWICE DAILY, Disp: 30.6 inhaler, Rfl: 1  •  vitamin D (ERGOCALCIFEROL) 1.25 MG (29776 UT) capsule capsule, TAKE 1 CAPSULE BY MOUTH 1 (ONE) TIME PER WEEK., Disp: 5 capsule, Rfl: 1    Family History:  History reviewed. No pertinent family history.    Past Medical History:  Past Medical History:   Diagnosis Date   • Anxiety    • CHF (congestive heart failure) (CMS/[facility])    • Coronary heart disease    • Depression    • Hyperlipidemia    • Hypertension        Past surgical History:  Past Surgical History:   Procedure Laterality Date   • APPENDECTOMY     • CARDIAC CATHETERIZATION  2017    No Stents placed - [facility]   • CERVICAL FUSION      C6-C7   •  SECTION      x 2   • CHOLECYSTECTOMY         Social History:  Social History     Socioeconomic History   • Marital status:      Spouse name: Not on file   • Number of children: Not on file   • Years of education: Not on file   • Highest education level: Not on file   Tobacco Use   • Smoking status: Never Smoker   • Smokeless tobacco: Never Used   • Tobacco comment: Passive Smoke: N   Substance and Sexual Activity   • Alcohol use: No     Frequency: Never   • Drug use: No   • Sexual activity: Defer       Review of Systems:  The following systems were reviewed as they relate to the cardiovascular system: Constitutional, Eyes, ENT, Cardiovascular, Respiratory, Gastrointestinal, Integumentary, Neurological, Psychiatric, Hematologic, Endocrine, Musculoskeletal, and Genitourinary. The pertinent cardiovascular findings are reported above with all other cardiovascular points within those systems being negative.    Diagnostic Study Review:     Current Electrocardiogram:  Procedures      NOTE: The following portions of the patient's history were reviewed and updated this visit as appropriate: allergies,  current medications, past family history, past medical history, past social history, past surgical history and problem list.    A total of 25 minutes of medical discussion occurred during this encounter.      Novel Coronavirus (COVID-19) Disclaimer:     A proclamation declaring a national emergency concerning the Novel Coronavirus Disease (COVID-19) Outbreak was issued on March 13, 2020 at the direction of the .    This virtual visit was performed with the patient's consent in lieu of the patient's regularly scheduled appointment in order to provide the patient with the opportunity to maintain contact with their healthcare provider while also adhering to social distancing guidelines set forth by the CDC to reduce exposure to the Novel Coronavirus (COVID-19).  Any vital signs obtained during this visit were provided by the patient.

## 2020-05-14 RX ORDER — ONDANSETRON 4 MG/1
4 TABLET, FILM COATED ORAL EVERY 8 HOURS PRN
Qty: 60 TABLET | Refills: 0 | Status: SHIPPED | OUTPATIENT
Start: 2020-05-14 | End: 2020-05-26

## 2020-05-15 RX ORDER — BUMETANIDE 2 MG/1
2 TABLET ORAL 2 TIMES DAILY
Qty: 180 TABLET | Refills: 3 | Status: SHIPPED | OUTPATIENT
Start: 2020-05-15 | End: 2021-03-01

## 2020-05-26 RX ORDER — ONDANSETRON 4 MG/1
4 TABLET, FILM COATED ORAL EVERY 8 HOURS PRN
Qty: 60 TABLET | Refills: 0 | Status: SHIPPED | OUTPATIENT
Start: 2020-05-26 | End: 2020-06-19

## 2020-05-28 RX ORDER — METOCLOPRAMIDE 10 MG/1
10 TABLET ORAL
Qty: 90 TABLET | Refills: 2 | Status: SHIPPED | OUTPATIENT
Start: 2020-05-28 | End: 2020-08-06

## 2020-05-29 DIAGNOSIS — F43.12 CHRONIC POST-TRAUMATIC STRESS DISORDER (PTSD): ICD-10-CM

## 2020-05-29 RX ORDER — HYDROXYZINE PAMOATE 25 MG/1
CAPSULE ORAL
Qty: 270 CAPSULE | Refills: 0 | Status: SHIPPED | OUTPATIENT
Start: 2020-05-29 | End: 2020-07-24 | Stop reason: SDUPTHER

## 2020-06-02 RX ORDER — CARVEDILOL 25 MG/1
TABLET ORAL
Qty: 180 TABLET | Refills: 1 | Status: SHIPPED | OUTPATIENT
Start: 2020-06-02 | End: 2020-06-10 | Stop reason: SDUPTHER

## 2020-06-02 RX ORDER — ROSUVASTATIN CALCIUM 40 MG/1
TABLET, COATED ORAL
Qty: 90 TABLET | Refills: 1 | Status: SHIPPED | OUTPATIENT
Start: 2020-06-02 | End: 2020-06-10 | Stop reason: SDUPTHER

## 2020-06-10 RX ORDER — CARVEDILOL 25 MG/1
25 TABLET ORAL 2 TIMES DAILY
Qty: 180 TABLET | Refills: 1 | Status: SHIPPED | OUTPATIENT
Start: 2020-06-10 | End: 2020-10-29

## 2020-06-10 RX ORDER — ROSUVASTATIN CALCIUM 40 MG/1
40 TABLET, COATED ORAL EVERY EVENING
Qty: 90 TABLET | Refills: 1 | Status: SHIPPED | OUTPATIENT
Start: 2020-06-10 | End: 2020-10-29

## 2020-06-19 RX ORDER — ONDANSETRON 4 MG/1
4 TABLET, FILM COATED ORAL EVERY 8 HOURS PRN
Qty: 60 TABLET | Refills: 0 | Status: SHIPPED | OUTPATIENT
Start: 2020-06-19 | End: 2020-06-30

## 2020-06-23 ENCOUNTER — OFFICE (AMBULATORY)
Dept: URBAN - METROPOLITAN AREA PATHOLOGY 4 | Facility: PATHOLOGY | Age: 60
End: 2020-06-23
Payer: MEDICARE

## 2020-06-23 ENCOUNTER — ON CAMPUS - OUTPATIENT (AMBULATORY)
Dept: URBAN - METROPOLITAN AREA HOSPITAL 2 | Facility: HOSPITAL | Age: 60
End: 2020-06-23
Payer: MEDICARE

## 2020-06-23 ENCOUNTER — OFFICE (AMBULATORY)
Dept: URBAN - METROPOLITAN AREA PATHOLOGY 4 | Facility: PATHOLOGY | Age: 60
End: 2020-06-23
Payer: COMMERCIAL

## 2020-06-23 VITALS
DIASTOLIC BLOOD PRESSURE: 71 MMHG | SYSTOLIC BLOOD PRESSURE: 92 MMHG | DIASTOLIC BLOOD PRESSURE: 38 MMHG | SYSTOLIC BLOOD PRESSURE: 83 MMHG | HEART RATE: 66 BPM | HEART RATE: 73 BPM | SYSTOLIC BLOOD PRESSURE: 99 MMHG | OXYGEN SATURATION: 92 % | DIASTOLIC BLOOD PRESSURE: 63 MMHG | HEART RATE: 70 BPM | SYSTOLIC BLOOD PRESSURE: 94 MMHG | HEIGHT: 63 IN | SYSTOLIC BLOOD PRESSURE: 89 MMHG | WEIGHT: 193 LBS | HEART RATE: 67 BPM | SYSTOLIC BLOOD PRESSURE: 102 MMHG | OXYGEN SATURATION: 98 % | DIASTOLIC BLOOD PRESSURE: 58 MMHG | RESPIRATION RATE: 14 BRPM | SYSTOLIC BLOOD PRESSURE: 106 MMHG | OXYGEN SATURATION: 93 % | HEART RATE: 68 BPM | DIASTOLIC BLOOD PRESSURE: 74 MMHG | DIASTOLIC BLOOD PRESSURE: 78 MMHG | TEMPERATURE: 97.1 F | RESPIRATION RATE: 16 BRPM | OXYGEN SATURATION: 90 % | DIASTOLIC BLOOD PRESSURE: 62 MMHG | SYSTOLIC BLOOD PRESSURE: 95 MMHG | OXYGEN SATURATION: 91 % | SYSTOLIC BLOOD PRESSURE: 119 MMHG | HEART RATE: 69 BPM | DIASTOLIC BLOOD PRESSURE: 65 MMHG | OXYGEN SATURATION: 95 % | DIASTOLIC BLOOD PRESSURE: 69 MMHG | OXYGEN SATURATION: 97 %

## 2020-06-23 DIAGNOSIS — R19.7 DIARRHEA, UNSPECIFIED: ICD-10-CM

## 2020-06-23 DIAGNOSIS — K57.30 DIVERTICULOSIS OF LARGE INTESTINE WITHOUT PERFORATION OR ABS: ICD-10-CM

## 2020-06-23 DIAGNOSIS — D12.0 BENIGN NEOPLASM OF CECUM: ICD-10-CM

## 2020-06-23 DIAGNOSIS — K29.50 UNSPECIFIED CHRONIC GASTRITIS WITHOUT BLEEDING: ICD-10-CM

## 2020-06-23 DIAGNOSIS — K21.0 GASTRO-ESOPHAGEAL REFLUX DISEASE WITH ESOPHAGITIS: ICD-10-CM

## 2020-06-23 DIAGNOSIS — Z86.010 PERSONAL HISTORY OF COLONIC POLYPS: ICD-10-CM

## 2020-06-23 DIAGNOSIS — R11.0 NAUSEA: ICD-10-CM

## 2020-06-23 DIAGNOSIS — K63.89 OTHER SPECIFIED DISEASES OF INTESTINE: ICD-10-CM

## 2020-06-23 PROBLEM — K22.8 OTHER SPECIFIED DISEASES OF ESOPHAGUS: Status: ACTIVE | Noted: 2020-06-23

## 2020-06-23 PROBLEM — K31.89 OTHER DISEASES OF STOMACH AND DUODENUM: Status: ACTIVE | Noted: 2020-06-23

## 2020-06-23 PROBLEM — K63.5 POLYP OF COLON: Status: ACTIVE | Noted: 2020-06-23

## 2020-06-23 LAB
GI HISTOLOGY: A. SELECT: (no result)
GI HISTOLOGY: B. UNSPECIFIED: (no result)
GI HISTOLOGY: C. SELECT: (no result)
GI HISTOLOGY: D. UNSPECIFIED: (no result)
GI HISTOLOGY: E. UNSPECIFIED: (no result)
GI HISTOLOGY: PDF REPORT: (no result)

## 2020-06-23 PROCEDURE — 45380 COLONOSCOPY AND BIOPSY: CPT | Mod: PT | Performed by: INTERNAL MEDICINE

## 2020-06-23 PROCEDURE — 43239 EGD BIOPSY SINGLE/MULTIPLE: CPT | Mod: 59 | Performed by: INTERNAL MEDICINE

## 2020-06-23 PROCEDURE — 88305 TISSUE EXAM BY PATHOLOGIST: CPT | Mod: 26 | Performed by: INTERNAL MEDICINE

## 2020-06-30 RX ORDER — ONDANSETRON 4 MG/1
4 TABLET, FILM COATED ORAL EVERY 8 HOURS PRN
Qty: 60 TABLET | Refills: 0 | Status: SHIPPED | OUTPATIENT
Start: 2020-06-30 | End: 2020-07-21

## 2020-07-01 ENCOUNTER — OFFICE VISIT (OUTPATIENT)
Dept: PSYCHIATRY | Facility: CLINIC | Age: 60
End: 2020-07-01

## 2020-07-01 DIAGNOSIS — F51.04 PSYCHOPHYSIOLOGICAL INSOMNIA: ICD-10-CM

## 2020-07-01 DIAGNOSIS — F33.2 SEVERE EPISODE OF RECURRENT MAJOR DEPRESSIVE DISORDER, WITHOUT PSYCHOTIC FEATURES (HCC): Primary | ICD-10-CM

## 2020-07-01 DIAGNOSIS — F43.12 CHRONIC POST-TRAUMATIC STRESS DISORDER (PTSD): ICD-10-CM

## 2020-07-01 PROCEDURE — 99442 PR PHYS/QHP TELEPHONE EVALUATION 11-20 MIN: CPT | Performed by: PSYCHIATRY & NEUROLOGY

## 2020-07-01 RX ORDER — GABAPENTIN 300 MG/1
300 CAPSULE ORAL 3 TIMES DAILY
Qty: 270 CAPSULE | Refills: 1 | Status: SHIPPED | OUTPATIENT
Start: 2020-07-01 | End: 2020-10-07 | Stop reason: SDUPTHER

## 2020-07-01 RX ORDER — ALPRAZOLAM 0.5 MG/1
0.5 TABLET ORAL 3 TIMES DAILY PRN
Qty: 90 TABLET | Refills: 2 | Status: SHIPPED | OUTPATIENT
Start: 2020-07-01 | End: 2020-09-30

## 2020-07-01 RX ORDER — CITALOPRAM 40 MG/1
40 TABLET ORAL EVERY MORNING
Qty: 90 TABLET | Refills: 1 | Status: SHIPPED | OUTPATIENT
Start: 2020-07-01 | End: 2020-07-21

## 2020-07-01 NOTE — PATIENT INSTRUCTIONS
Managing Post-Traumatic Stress Disorder  If you have been diagnosed with post-traumatic stress disorder (PTSD), you may be relieved that you now know why you have felt or behaved a certain way. Still, you may feel overwhelmed about the treatment ahead. You may also wonder how to get the support you need and how to deal with the condition day-to-day.  If you are living with PTSD, there are ways to help you recover from it and manage your symptoms.  How to manage lifestyle changes  Managing stress  Stress is your body's reaction to life changes and events, both good and bad. Stress can make PTSD worse. Take the following steps to manage stress:  · Talk with your health care provider or a counselor if you would like to learn more about techniques to reduce your stress. He or she may suggest some stress reduction techniques such as:  ? Muscle relaxation exercises.  ? Regular exercise.  ? Meditation, yoga, or other mind-body exercises.  ? Breathing exercises.  ? Listening to quiet music.  ? Spending time outside.  · Maintain a healthy lifestyle. Eat a healthy diet, exercise regularly, get plenty of sleep, and take time to relax.  · Spend time with others. Talk with them about how you are feeling and what kind of support you need. Try not to isolate yourself, even though you may feel like doing that. Isolating yourself can delay your recovery.  · Do activities and hobbies that you enjoy.  · Pace yourself when doing stressful things. Take breaks, and reward yourself when you finish. Make sure that you do not overload your schedule.    Medicines  Your health care provider may suggest certain medicines if he or she feels that they will help to improve your condition. Medicines for depression (antidepressants) or severe loss of contact with reality (antipsychotics) may be used to treat PTSD. Avoid using alcohol and other substances that may prevent your medicines from working properly. It is also important to:  · Talk with  your pharmacist or health care provider about all medicines that you take, their possible side effects, and which medicines are safe to take together.  · Make it your goal to take part in all treatment decisions (shared decision-making). Ask about possible side effects of medicines that your health care provider recommends, and tell him or her how you feel about having those side effects. It is best if shared decision-making with your health care provider is part of your total treatment plan.  If your health care provider prescribes a medicine, you may not notice the full benefits of it for 4-8 weeks. Most people who are treated for PTSD need to take medicine for at least 6-12 months after they feel better. If you are taking medicines as part of your treatment, do not stop taking medicines before you ask your health care provider if it is safe to stop. You may need to have the medicine slowly decreased (tapered) over time to lower the risk of harmful side effects.  Relationships  Many people who have PTSD have difficulty trusting others. Make an effort to:  · Take risks and develop trust with close friends and family members. Developing trust in others can help you feel safe and connect you with emotional support.  · Be open and honest about your feelings.  · Have fun and relax in safe spaces, such as with friends and family.  · Think about going to couples counseling, family education classes, or family therapy. Your loved ones may not always know how to be supportive. Therapy can be helpful for everyone.  How to recognize changes in your condition  Be aware of your symptoms and how often you have them. The following symptoms mean that you need to seek help for your PTSD:  · You feel suspicious and angry.  · You have repeated flashbacks.  · You avoid going out or being with others.  · You have an increasing number of fights with close friends or family members, such as your spouse.  · You have thoughts about  hurting yourself or others.  · You cannot get relief from feelings of depression or anxiety.  Follow these instructions at home:  Lifestyle  · Exercise regularly. Try to do 30 or more minutes of physical activity on most days of the week.  · Try to get 7-9 hours of sleep each night. To help with sleep:  ? Keep your bedroom cool and dark.  ? Avoid screen time before bedtime. This means avoiding use of your TV, computer, tablet, and cell phone.  · Practice self-soothing skills and use them daily.  · Try to have fun and seek humor in your life.  Eating and drinking  · Do not eat a heavy meal during the hour before you go to bed.  · Do not drink alcohol or caffeinated drinks before bed.  · Avoid using alcohol or drugs.  General instructions  · If your PTSD is affecting your marriage or family, seek help from a family therapist.  · Take over-the-counter and prescription medicines only as told by your health care provider.  · Make sure to let all of your health care providers know that you have PTSD. This is especially important if you are having surgery or need to be admitted to the hospital.  · Keep all follow-up visits as told by your health care providers. This is important.  Where to find support  Talking to others  · Explain that PTSD is a mental health problem. It is something that a person can develop after experiencing or seeing a life-threatening event. Tell them that PTSD makes you feel stress like you did during the event.  · Talk to your loved ones about the symptoms you have. Also tell them what things or situations can cause symptoms to start (are triggers for you).  · Assure your loved ones that there are treatments to help PTSD. Discuss possibly seeking family therapy or couples therapy.  · If you are worried or fearful about seeking treatment, ask for support.  · Keep daily contact with at least one trusted friend or family member.  Finances  Not all insurance plans cover mental health care, so it is  important to check with your insurance carrier. If paying for co-pays or counseling services is a problem, search for a local or [facility] mental health care center. Public mental health care services may be offered there at a low cost or no cost when you are not able to see a private health care provider. If you are a , contact a local veterans organization or [facility] for more information.  If you are taking medicine for PTSD, you may be able to get the genericform, which may be less expensive than brand-name medicine. Some makers of prescription medicines also offer help to patients who cannot afford the medicines that they need.  Therapy and support groups  · Find a support group in your community. Often, groups are available for  veterans, trauma victims, and family members or caregivers.  · Look into volunteer opportunities. Taking part in these can help you feel more connected to your community.  · Contact a local organization to find out if you are eligible for a service dog.  Where to find more information  Go to this website to find more information about PTSD, treatment of PTSD, and how to get support:  · National Center for PTSD: www.ptsd.va.gov  Contact a health care provider if:  · Your symptoms get worse or do not get better.  Get help right away if:  · You have thoughts about hurting yourself or others.  If you ever feel like you may hurt yourself or others, or have thoughts about taking your own life, get help right away. You can go to your nearest emergency department or call:  · Your local emergency services (911 in the [place]  · A suicide crisis helpline, such as the National Suicide Prevention Lifeline at 1-219.727.5320. This is open 24-hours a day.  Summary  · If you are living with PTSD, there are ways to help you recover from it and manage your symptoms.  · Find supportive environments and people who understand PTSD. Spend time in those places, and maintain contact with  those people.  · Work with your health care team to create a plan for managing PTSD. The plan should include counseling, stress reduction techniques, and healthy lifestyle habits.  This information is not intended to replace advice given to you by your health care provider. Make sure you discuss any questions you have with your health care provider.  Document Released: 04/19/2018 Document Revised: 04/10/2020 Document Reviewed: 04/19/2018  [employer] Patient Education © 2020 [employer]

## 2020-07-01 NOTE — PROGRESS NOTES
Subjective   Jasmine Cerda is a 60 y.o. female who presents today for follow up via phone, the pt was unable to establish video connection .  You have chosen to receive care through a telephone visit. Do you consent to use a telephone visit for your medical care today? Yes    Chief Complaint:  Depression anxiety     History of Present Illness:   The pt suffers from depression for a long time, was on multiple meds, severe  Anxiety, worse recently due to family situation ,  son is in recovery (11 months clean)  now and doing well , relationship issues with her  who does not support their son.  Since covid , the pt feels more depressed since she can not go out and do something   Depression is the same, still rated as 8/10, she worries about her son and her  not getting along, some guilt feelings, feels irritable    Sleep - fair   When depressed -  decreased E level. Poor motivations, low drives   denied AVH/SI/HI   anxiety is pretty intense and persistent, unable to relax, meds are effective      Precipitating and Ameliorating Factors: covid   Alleviating factors - to spend time with her grand daughter         PAST PSYCHIATRIC HISTORY      Previous Psychiatric Diagnoses   Axis I: Anxiety/Panic Disorder     Past Hospitalizations or Residential Treatment   Locations\Providers: none      Past Outpatient Treatment   Diagnosis Treated: Anxiety/Panic Dis.  Treatment Type: Medication Management  Location: with PCP, Dr Manning      Prior Psychiatric Medications   Comments: xanax - effective      celexa- not effective   zoloft - not effective  cymbalta - not effective      Consequences of Mental Disorder   Consequences: emotional distress     SOCIAL HISTORY     Number of children: 2  Number of grandkids: 1  Current Relationship is: supportive  Family of Origin is: supportive  Comments: Patient has never smoked.  Passive Smoke: N  Alcohol Use: N  Drug Use: N  HIV/High Risk: N        Current Living Situation    Lives with: spouse     Education   Level: high school graduate     Employment   Job Status: disabled     Hobbies and Leisure Activities   Activity Type: quiet activities     Smoking History   Smoking Hx: Never smoker     Exercise   Exercise sessions (per wk): 1     Illicit Drug Use   Illicit Drugs used: no     FAMILY HISTORY OF MENTAL DISORDERS   fh Grandparents: Non-contributory  fh Mother: Non-contributory  fh Father: Non-contributory  fh Siblings: Non-contributory  fh Other: Non-contributory  The following portions of the patient's history were reviewed and updated as appropriate: allergies, current medications, past family history, past medical history, past social history, past surgical history and problem list.            Interval History  No changes     Side Effects  Denied       Past Medical History:  Past Medical History:   Diagnosis Date   • Anxiety    • CHF (congestive heart failure) (CMS/HCC)    • Coronary heart disease    • Depression    • Hyperlipidemia    • Hypertension        Social History:  Social History     Socioeconomic History   • Marital status:      Spouse name: Not on file   • Number of children: Not on file   • Years of education: Not on file   • Highest education level: Not on file   Tobacco Use   • Smoking status: Never Smoker   • Smokeless tobacco: Never Used   • Tobacco comment: Passive Smoke: N   Substance and Sexual Activity   • Alcohol use: No     Frequency: Never   • Drug use: No   • Sexual activity: Defer       Family History:  No family history on file.    Past Surgical History:  Past Surgical History:   Procedure Laterality Date   • APPENDECTOMY     • CARDIAC CATHETERIZATION  2017    No Stents placed - [facility]   • CERVICAL FUSION      C6-C7   •  SECTION      x 2   • CHOLECYSTECTOMY         Problem List:  Patient Active Problem List   Diagnosis   • Chronic post-traumatic stress disorder (PTSD)   • Severe episode of recurrent major depressive disorder (CMS/HCC)   •  Asthma   • Chronic low back pain   • Congestive heart failure (CMS/HCC)   • Coronary heart disease   • Depression   • Degeneration of intervertebral disc of lumbosacral region   • Headache, temporal   • Psychophysiological insomnia   • Hyperlipidemia   • Hypertension       Allergy:   No Known Allergies     Discontinued Medications:  There are no discontinued medications.    Current Medications:   Current Outpatient Medications   Medication Sig Dispense Refill   • albuterol (PROVENTIL) (5 MG/ML) 0.5% nebulizer solution Take 0.5 mL by nebulization Daily As Needed for Wheezing or Shortness of Air (due to asthma). 300 mL 1   • ALPRAZolam (XANAX) 0.5 MG tablet TAKE 1 TABLET BY MOUTH 3 (THREE) TIMES A DAY AS NEEDED FOR ANXIETY 90 tablet 2   • aspirin 81 MG EC tablet ASPIRIN EC 81 MG TBEC     • bumetanide (BUMEX) 2 MG tablet Take 1 tablet by mouth 2 (Two) Times a Day. 180 tablet 3   • carvedilol (COREG) 25 MG tablet Take 1 tablet by mouth 2 (Two) Times a Day. 180 tablet 1   • citalopram (CeleXA) 40 MG tablet Take 1 tablet by mouth Every Morning. 90 tablet 1   • gabapentin (NEURONTIN) 300 MG capsule Take 1 capsule by mouth 3 (Three) Times a Day. 270 capsule 1   • hydrOXYzine pamoate (VISTARIL) 25 MG capsule TAKE 1 CAPSULE BY MOUTH 3 TIMES A DAY AS NEEDED FOR ITCHING. 270 capsule 0   • icosapent ethyl (Vascepa) 1 g capsule capsule Take 2 g by mouth 2 (Two) Times a Day With Meals. 360 capsule 3   • KLOR-CON 20 MEQ CR tablet TAKE 1 TABLET BY MOUTH 2 (TWO) TIMES A DAY WITH BREAKFAST AND SUPPER 180 tablet 0   • lisinopril (PRINIVIL,ZESTRIL) 10 MG tablet Take 1 tablet by mouth Daily. 90 tablet 1   • metoclopramide (REGLAN) 10 MG tablet TAKE 1 TABLET BY MOUTH 3 (THREE) TIMES A DAY BEFORE MEALS. 90 tablet 2   • Multiple Vitamins-Minerals (MULTIVITAMIN ADULT) tablet Daily.     • nitroglycerin (NITROSTAT) 0.4 MG SL tablet NITROSTAT 0.4 MG SUBL     • ondansetron (ZOFRAN) 4 MG tablet TAKE 1 TABLET BY MOUTH EVERY 8 (EIGHT) HOURS AS  NEEDED FOR NAUSEA OR VOMITING. 60 tablet 0   • pantoprazole (PROTONIX) 40 MG EC tablet Take 40 mg by mouth Daily.  1   • risperiDONE (risperDAL) 1 MG tablet Take 1 tablet by mouth every night at bedtime. 90 tablet 1   • rosuvastatin (CRESTOR) 40 MG tablet Take 1 tablet by mouth Every Evening. 90 tablet 1   • SYMBICORT 160-4.5 MCG/ACT inhaler INHALE 2 PUFFS BY MOUTH TWICE DAILY 30.6 inhaler 1   • vitamin D (ERGOCALCIFEROL) 1.25 MG (71837 UT) capsule capsule TAKE 1 CAPSULE BY MOUTH 1 (ONE) TIME PER WEEK. 5 capsule 1     No current facility-administered medications for this visit.          Review of Symptoms:    Psychiatric/Behavioral: Negative for agitation, behavioral problems, confusion, decreased concentration, dysphoric mood, hallucinations, self-injury, sleep disturbance and suicidal ideas.   The patient is depressed,  Still very  nervous/anxious and is not hyperactive.        Physical Exam:   There were no vitals taken for this visit.    Mental Status Exam:   Hygiene:   unable to assess due to phone visit   Cooperation:  Cooperative  Eye Contact:  unable to assess due to phone visit   Psychomotor Behavior:  Appropriate  Affect:  Appropriate  Mood: depressed,  Anxious    Hopelessness: Denies  Speech:  Normal  Thought Process:  Goal directed and Linear  Thought Content:  Normal  Suicidal:  None  Homicidal:  None  Hallucinations:  None  Delusion:  None  Memory:  Intact  Orientation:  Person, Place, Time and Situation  Reliability:  fair  Insight:  Good  Judgement:  Good  Impulse Control:  Good  Physical/Medical Issues:  Yes GI issues        MSE from 1/16/2020  reviewed and accepted with changes     PHQ-9 Score:  PHQ-9 Score: 10      Never smoker    I advised Jasmine of the risks of tobacco use.     Lab Results:   No visits with results within 3 Month(s) from this visit.   Latest known visit with results is:   Hospital Outpatient Visit on 10/29/2019   Component Date Value Ref Range Status   • Target HR (85%) 10/29/2019  135  bpm Final   • Max. Pred. HR (100%) 10/29/2019 159  bpm Final   • BH CV STRESS PROTOCOL 1 10/29/2019 Pharmacologic   Final   • Stage 1 10/29/2019 1   Final   • Duration Min Stage 1 10/29/2019 0   Final   • Duration Sec Stage 1 10/29/2019 10   Final   • Stress Dose Regadenoson Stage 1 10/29/2019 0.4   Final   • Stress Comments Stage 1 10/29/2019 10 sec bolus injection   Final   • Baseline HR 10/29/2019 73  bpm Final   • Baseline BP 10/29/2019 140/90  mmHg Final   • Peak HR 10/29/2019 82  bpm Final   • Percent Max Pred HR 10/29/2019 51.57  % Final   • Percent Target HR 10/29/2019 61  % Final   • Peak BP 10/29/2019 128/84  mmHg Final   • Recovery HR 10/29/2019 79  bpm Final   • Recovery BP 10/29/2019 148/90  mmHg Final   • Nuc Stress EF 10/29/2019 60  % Final       Assessment/Plan   Problems Addressed this Visit     None          Visit Diagnoses:  No diagnosis found.    TREATMENT PLAN/GOALS: Continue supportive psychotherapy efforts and medications as indicated. Treatment and medication options discussed during today's visit. Patient ackowledged and verbally consented to continue with current treatment plan and was educated on the importance of compliance with treatment and follow-up appointments.    MEDICATION ISSUES:  Cont current meds, tolerates well, no side effects  Cont risperidone  1 mg po QHS    Xanax - low dose 0.5 mg TID, alternating with vistaril PRN , long term benzo use discussed    supportive therapy,   INSPECT reviewed as expected, last xanax refill was on 6/5/2020     Discussed medication options and treatment plan of prescribed medication as well as the risks, benefits, and side effects including potential falls, possible impaired driving and metabolic adversities among others. Patient is agreeable to call the office with any worsening of symptoms or onset of side effects. Patient is agreeable to call 911 or go to the nearest ER should he/she begin having SI/HI. No medication side effects or  related complaints today.     MEDS ORDERED DURING VISIT:  No orders of the defined types were placed in this encounter.      No follow-ups on file.       This visit has been rescheduled as a phone visit to comply with patient safety concerns in accordance with CDC recommendations. Total time of discussion was 14 minutes.    This document has been electronically signed by Sivan Ken MD  July 1, 2020 08:28

## 2020-07-02 DIAGNOSIS — F43.12 CHRONIC POST-TRAUMATIC STRESS DISORDER (PTSD): ICD-10-CM

## 2020-07-02 RX ORDER — ALPRAZOLAM 0.5 MG/1
0.5 TABLET ORAL 3 TIMES DAILY PRN
Qty: 90 TABLET | Refills: 2 | OUTPATIENT
Start: 2020-07-02

## 2020-07-09 RX ORDER — POTASSIUM CHLORIDE 1500 MG/1
TABLET, EXTENDED RELEASE ORAL
Qty: 180 TABLET | Refills: 0 | Status: SHIPPED | OUTPATIENT
Start: 2020-07-09 | End: 2020-07-24

## 2020-07-17 DIAGNOSIS — F43.12 CHRONIC POST-TRAUMATIC STRESS DISORDER (PTSD): ICD-10-CM

## 2020-07-17 DIAGNOSIS — F33.2 SEVERE EPISODE OF RECURRENT MAJOR DEPRESSIVE DISORDER, WITHOUT PSYCHOTIC FEATURES (HCC): ICD-10-CM

## 2020-07-17 RX ORDER — RISPERIDONE 1 MG/1
TABLET ORAL
Qty: 90 TABLET | Refills: 1 | Status: SHIPPED | OUTPATIENT
Start: 2020-07-17 | End: 2021-01-07

## 2020-07-21 RX ORDER — ALBUTEROL SULFATE 2.5 MG/3ML
SOLUTION RESPIRATORY (INHALATION)
Qty: 300 ML | Refills: 1 | Status: SHIPPED | OUTPATIENT
Start: 2020-07-21 | End: 2021-07-26

## 2020-07-21 RX ORDER — ONDANSETRON 4 MG/1
4 TABLET, FILM COATED ORAL EVERY 8 HOURS PRN
Qty: 60 TABLET | Refills: 0 | Status: SHIPPED | OUTPATIENT
Start: 2020-07-21 | End: 2020-07-30

## 2020-07-21 RX ORDER — CITALOPRAM 40 MG/1
TABLET ORAL
Qty: 90 TABLET | Refills: 1 | Status: SHIPPED | OUTPATIENT
Start: 2020-07-21 | End: 2020-07-31

## 2020-07-24 DIAGNOSIS — F43.12 CHRONIC POST-TRAUMATIC STRESS DISORDER (PTSD): ICD-10-CM

## 2020-07-24 RX ORDER — POTASSIUM CHLORIDE 1500 MG/1
TABLET, EXTENDED RELEASE ORAL
Qty: 180 TABLET | Refills: 0 | Status: SHIPPED | OUTPATIENT
Start: 2020-07-24 | End: 2020-12-16

## 2020-07-24 RX ORDER — HYDROXYZINE PAMOATE 25 MG/1
25 CAPSULE ORAL 3 TIMES DAILY PRN
Qty: 270 CAPSULE | Refills: 0 | Status: SHIPPED | OUTPATIENT
Start: 2020-07-24 | End: 2020-10-07 | Stop reason: SDUPTHER

## 2020-07-30 RX ORDER — ONDANSETRON 4 MG/1
4 TABLET, FILM COATED ORAL EVERY 8 HOURS PRN
Qty: 60 TABLET | Refills: 0 | Status: SHIPPED | OUTPATIENT
Start: 2020-07-30 | End: 2020-08-31

## 2020-07-31 DIAGNOSIS — F43.12 CHRONIC POST-TRAUMATIC STRESS DISORDER (PTSD): ICD-10-CM

## 2020-07-31 DIAGNOSIS — F33.2 SEVERE EPISODE OF RECURRENT MAJOR DEPRESSIVE DISORDER, WITHOUT PSYCHOTIC FEATURES (HCC): ICD-10-CM

## 2020-07-31 RX ORDER — CITALOPRAM 40 MG/1
TABLET ORAL
Qty: 90 TABLET | Refills: 0 | Status: SHIPPED | OUTPATIENT
Start: 2020-07-31 | End: 2020-10-07 | Stop reason: SDUPTHER

## 2020-08-06 RX ORDER — METOCLOPRAMIDE 10 MG/1
10 TABLET ORAL
Qty: 90 TABLET | Refills: 2 | Status: SHIPPED | OUTPATIENT
Start: 2020-08-06 | End: 2020-10-28

## 2020-08-12 ENCOUNTER — OFFICE VISIT (OUTPATIENT)
Dept: FAMILY MEDICINE CLINIC | Facility: CLINIC | Age: 60
End: 2020-08-12

## 2020-08-12 VITALS
BODY MASS INDEX: 34.02 KG/M2 | SYSTOLIC BLOOD PRESSURE: 120 MMHG | WEIGHT: 192 LBS | OXYGEN SATURATION: 95 % | HEART RATE: 86 BPM | HEIGHT: 63 IN | TEMPERATURE: 97.1 F | DIASTOLIC BLOOD PRESSURE: 83 MMHG

## 2020-08-12 DIAGNOSIS — Z00.00 PREVENTATIVE HEALTH CARE: Primary | ICD-10-CM

## 2020-08-12 DIAGNOSIS — R53.83 OTHER FATIGUE: ICD-10-CM

## 2020-08-12 DIAGNOSIS — E55.9 VITAMIN D DEFICIENCY: ICD-10-CM

## 2020-08-12 DIAGNOSIS — F41.9 ANXIETY: ICD-10-CM

## 2020-08-12 DIAGNOSIS — Z12.31 BREAST CANCER SCREENING BY MAMMOGRAM: ICD-10-CM

## 2020-08-12 PROCEDURE — 83036 HEMOGLOBIN GLYCOSYLATED A1C: CPT | Performed by: NURSE PRACTITIONER

## 2020-08-12 PROCEDURE — 86803 HEPATITIS C AB TEST: CPT | Performed by: NURSE PRACTITIONER

## 2020-08-12 PROCEDURE — 99214 OFFICE O/P EST MOD 30 MIN: CPT | Performed by: NURSE PRACTITIONER

## 2020-08-12 PROCEDURE — 83721 ASSAY OF BLOOD LIPOPROTEIN: CPT | Performed by: NURSE PRACTITIONER

## 2020-08-12 PROCEDURE — 80053 COMPREHEN METABOLIC PANEL: CPT | Performed by: NURSE PRACTITIONER

## 2020-08-12 PROCEDURE — 82607 VITAMIN B-12: CPT | Performed by: NURSE PRACTITIONER

## 2020-08-12 PROCEDURE — 85027 COMPLETE CBC AUTOMATED: CPT | Performed by: NURSE PRACTITIONER

## 2020-08-12 PROCEDURE — 80061 LIPID PANEL: CPT | Performed by: NURSE PRACTITIONER

## 2020-08-12 PROCEDURE — 36415 COLL VENOUS BLD VENIPUNCTURE: CPT | Performed by: NURSE PRACTITIONER

## 2020-08-12 PROCEDURE — 84443 ASSAY THYROID STIM HORMONE: CPT | Performed by: NURSE PRACTITIONER

## 2020-08-12 PROCEDURE — 82306 VITAMIN D 25 HYDROXY: CPT | Performed by: NURSE PRACTITIONER

## 2020-08-12 NOTE — PROGRESS NOTES
The ABCs of the Annual Wellness Visit  Welcome to Medicare Visit    Chief Complaint   Patient presents with   • Welcome To Medicare       Subjective   History of Present Illness:  Jasmine Cerda is a 60 y.o. female who presents for a  Welcome to Medicare Visit.    HEALTH RISK ASSESSMENT    Recent Hospitalizations:  No hospitalization(s) within the last year.    Current Medical Providers:  Patient Care Team:  Eleni Miranda APRN as PCP - General (Nurse Practitioner)  Henrry Monteiro Jr., MD as PCP - Family Medicine    Smoking Status:  Social History     Tobacco Use   Smoking Status Never Smoker   Smokeless Tobacco Never Used   Tobacco Comment    Passive Smoke: N       Alcohol Consumption:  Social History     Substance and Sexual Activity   Alcohol Use No   • Frequency: Never       Depression Screen:   PHQ-2/PHQ-9 Depression Screening 8/12/2020   Little interest or pleasure in doing things 3   Feeling down, depressed, or hopeless 3   Trouble falling or staying asleep, or sleeping too much 3   Feeling tired or having little energy 3   Poor appetite or overeating 0   Feeling bad about yourself - or that you are a failure or have let yourself or your family down 3   Trouble concentrating on things, such as reading the newspaper or watching television 3   Moving or speaking so slowly that other people could have noticed. Or the opposite - being so fidgety or restless that you have been moving around a lot more than usual 1   Thoughts that you would be better off dead, or of hurting yourself in some way 0   Total Score 19   If you checked off any problems, how difficult have these problems made it for you to do your work, take care of things at home, or get along with other people? Not difficult at all       Fall Risk Screen:  STEADI Fall Risk Assessment was completed, and patient is at HIGH risk for falls. Assessment completed on:8/12/2020    Health Habits and Functional and Cognitive Screening:  Functional &  Cognitive Status 8/12/2020   Do you have difficulty preparing food and eating? No   Do you have difficulty bathing yourself, getting dressed or grooming yourself? No   Do you have difficulty using the toilet? No   Do you have difficulty moving around from place to place? No   Do you have trouble with steps or getting out of a bed or a chair? No   Do you need help using the phone?  No   Are you deaf or do you have serious difficulty hearing?  No   Do you need help with transportation? No   Do you need help shopping? No   Do you need help preparing meals?  No   Do you need help with housework?  No   Do you need help with laundry? No   Do you need help taking your medications? No   Do you need help managing money? No   Do you ever drive or ride in a car without wearing a seat belt? No   Have you felt unusual stress, anger or loneliness in the last month? Yes   Who do you live with? Spouse   If you need help, do you have trouble finding someone available to you? No   Have you been bothered in the last four weeks by sexual problems? No   Do you have difficulty concentrating, remembering or making decisions? Yes         Does the patient have evidence of cognitive impairment? No    Asprin use counseling:Taking ASA appropriately as indicated    Visual Acuity:    No exam data present    Age-appropriate Screening Schedule:  Refer to the list below for future screening recommendations based on patient's age, sex and/or medical conditions. Orders for these recommended tests are listed in the plan section. The patient has been provided with a written plan.    Health Maintenance   Topic Date Due   • TDAP/TD VACCINES (1 - Tdap) 06/01/1969   • ZOSTER VACCINE (1 of 2) 06/01/2008   • INFLUENZA VACCINE  08/01/2020   • LIPID PANEL  09/10/2020   • MAMMOGRAM  09/14/2020   • COLONOSCOPY  06/23/2030   • PNEUMOCOCCAL VACCINE (19-64 MEDIUM RISK)  Completed   • PAP SMEAR  Discontinued          The following portions of the patient's history  were reviewed and updated as appropriate: allergies, current medications, past family history, past medical history, past social history, past surgical history and problem list.    Outpatient Medications Prior to Visit   Medication Sig Dispense Refill   • albuterol (PROVENTIL) (2.5 MG/3ML) 0.083% nebulizer solution INHALE 1 VIAL VIA NEBULIZER DAILY AS NEEDED FOR WHEEZING 300 mL 1   • ALPRAZolam (XANAX) 0.5 MG tablet Take 1 tablet by mouth 3 (Three) Times a Day As Needed for Anxiety. 90 tablet 2   • aspirin 81 MG EC tablet ASPIRIN EC 81 MG TBEC     • bumetanide (BUMEX) 2 MG tablet Take 1 tablet by mouth 2 (Two) Times a Day. 180 tablet 3   • carvedilol (COREG) 25 MG tablet Take 1 tablet by mouth 2 (Two) Times a Day. 180 tablet 1   • citalopram (CeleXA) 40 MG tablet TAKE 1 TABLET BY MOUTH EVERY DAY IN THE MORNING 90 tablet 0   • gabapentin (NEURONTIN) 300 MG capsule Take 1 capsule by mouth 3 (Three) Times a Day. 270 capsule 1   • hydrOXYzine pamoate (VISTARIL) 25 MG capsule Take 1 capsule by mouth 3 (Three) Times a Day As Needed for Anxiety. 270 capsule 0   • icosapent ethyl (Vascepa) 1 g capsule capsule Take 2 g by mouth 2 (Two) Times a Day With Meals. 360 capsule 3   • KLOR-CON 20 MEQ CR tablet TAKE 1 TABLET BY MOUTH 2 (TWO) TIMES A DAY WITH BREAKFAST AND SUPPER 180 tablet 0   • lisinopril (PRINIVIL,ZESTRIL) 10 MG tablet Take 1 tablet by mouth Daily. 90 tablet 1   • metoclopramide (REGLAN) 10 MG tablet TAKE 1 TABLET BY MOUTH 3 (THREE) TIMES A DAY BEFORE MEALS. 90 tablet 2   • Multiple Vitamins-Minerals (MULTIVITAMIN ADULT) tablet Daily.     • nitroglycerin (NITROSTAT) 0.4 MG SL tablet NITROSTAT 0.4 MG SUBL     • ondansetron (ZOFRAN) 4 MG tablet TAKE 1 TABLET BY MOUTH EVERY 8 (EIGHT) HOURS AS NEEDED FOR NAUSEA OR VOMITING. 60 tablet 0   • pantoprazole (PROTONIX) 40 MG EC tablet Take 40 mg by mouth Daily.  1   • risperiDONE (risperDAL) 1 MG tablet TAKE 1 TABLET BY MOUTH EVERYDAY AT BEDTIME 90 tablet 1   • rosuvastatin  (CRESTOR) 40 MG tablet Take 1 tablet by mouth Every Evening. 90 tablet 1   • SYMBICORT 160-4.5 MCG/ACT inhaler INHALE 2 PUFFS BY MOUTH TWICE DAILY 30.6 inhaler 1   • vitamin D (ERGOCALCIFEROL) 1.25 MG (31500 UT) capsule capsule TAKE 1 CAPSULE BY MOUTH 1 (ONE) TIME PER WEEK. 5 capsule 1     No facility-administered medications prior to visit.        Patient Active Problem List   Diagnosis   • Chronic post-traumatic stress disorder (PTSD)   • Severe episode of recurrent major depressive disorder (CMS/HCC)   • Asthma   • Chronic low back pain   • Congestive heart failure (CMS/HCC)   • Coronary heart disease   • Depression   • Degeneration of intervertebral disc of lumbosacral region   • Headache, temporal   • Psychophysiological insomnia   • Hyperlipidemia   • Hypertension       Advanced Care Planning:  ACP discussion was held with the patient during this visit. Patient does not have an advance directive, information provided.    Review of Systems   Constitutional: Negative for activity change, appetite change and fatigue.   HENT: Negative for congestion, dental problem, postnasal drip, rhinorrhea, sore throat and trouble swallowing.    Eyes: Negative.    Respiratory: Negative for cough, shortness of breath and wheezing.    Cardiovascular: Negative for chest pain, palpitations and leg swelling.   Gastrointestinal: Negative for abdominal distention, constipation, diarrhea, nausea and vomiting.   Endocrine: Negative.    Genitourinary: Negative for decreased urine volume, difficulty urinating, dysuria, frequency and urgency.   Musculoskeletal: Positive for arthralgias. Negative for gait problem, myalgias, neck pain and neck stiffness.   Skin: Negative.    Allergic/Immunologic: Negative for environmental allergies and food allergies.   Neurological: Negative for dizziness, tremors, weakness and numbness.   Psychiatric/Behavioral: Positive for agitation and dysphoric mood. Negative for decreased concentration, sleep  "disturbance and suicidal ideas. The patient is nervous/anxious.        Compared to one year ago, the patient feels her physical health is worse.  Compared to one year ago, the patient feels her mental health is worse.    Reviewed chart for potential of high risk medication in the elderly: yes  Reviewed chart for potential of harmful drug interactions in the elderly:yes    Objective         Vitals:    08/12/20 1324   BP: 120/83   BP Location: Left arm   Patient Position: Sitting   Cuff Size: Adult   Pulse: 86   Temp: 97.1 °F (36.2 °C)   TempSrc: Skin   SpO2: 95%   Weight: 87.1 kg (192 lb)   Height: 160 cm (62.99\")       Body mass index is 34.02 kg/m².  Discussed the patient's BMI with her. The BMI is above average; BMI management plan is completed.    Physical Exam   Constitutional: She is oriented to person, place, and time. She appears well-developed and well-nourished. No distress.   Eyes: Pupils are equal, round, and reactive to light.   Neck: Normal range of motion. Neck supple. No JVD present. No thyromegaly present.   Cardiovascular: Normal rate, regular rhythm, normal heart sounds and intact distal pulses.   No murmur heard.  Pulmonary/Chest: Effort normal and breath sounds normal. No respiratory distress.   Abdominal: Soft. Bowel sounds are normal. She exhibits no distension. There is no tenderness.   Musculoskeletal: Normal range of motion. She exhibits no tenderness.   Neurological: She is alert and oriented to person, place, and time. No sensory deficit.   Skin: Skin is warm and dry. She is not diaphoretic.   Psychiatric: Her speech is normal and behavior is normal. Judgment and thought content normal. Her mood appears anxious. Cognition and memory are normal. She exhibits a depressed mood.   Nursing note and vitals reviewed.              Assessment/Plan   Medicare Risks and Personalized Health Plan  CMS Preventative Services Quick Reference  Advance Directive Discussion  Cardiovascular " risk  Depression/Dysphoria  Diabetic Lab Screening   Immunizations Discussed/Encouraged (specific immunizations; Td, Influenza, Pneumococcal 23 and Shingrix )  Obesity/Overweight     The above risks/problems have been discussed with the patient.  Pertinent information has been shared with the patient in the After Visit Summary.  Follow up plans and orders are seen below in the Assessment/Plan Section.    Diagnoses and all orders for this visit:    1. Preventative health care (Primary)  -     Lipid Panel  -     Comprehensive Metabolic Panel  -     CBC (No Diff)  -     TSH  -     Hemoglobin A1c  -     Mammo Screening Digital Tomosynthesis Bilateral With CAD; Future  -     Hepatitis C Antibody    2. Vitamin D deficiency  -     Vitamin D 25 Hydroxy    3. Other fatigue  -     Vitamin B12    4. Breast cancer screening by mammogram  -     Mammo Screening Digital Tomosynthesis Bilateral With CAD; Future    5. Anxiety      Mammogram due: ordered  Colonoscopy completed 2020, polyps removed, return 3 years.    Flu vaccine: update this year when available  Discussed/recommended Tdap/shingles vaccines  Pneumonia vaccine UTD 2019    Follow Up:  Return in about 6 months (around 2/12/2021) for htn, lipids, anxiety, memory.     An After Visit Summary and PPPS were given to the patient.

## 2020-08-12 NOTE — PATIENT INSTRUCTIONS
Advance Directive    Advance directives are legal documents that let you make choices ahead of time about your health care and medical treatment in case you become unable to communicate for yourself. Advance directives are a way for you to communicate your wishes to family, friends, and health care providers. This can help convey your decisions about end-of-life care if you become unable to communicate.  Discussing and writing advance directives should happen over time rather than all at once. Advance directives can be changed depending on your situation and what you want, even after you have signed the advance directives.  If you do not have an advance directive, some states assign family decision makers to act on your behalf based on how closely you are related to them. Each state has its own laws regarding advance directives. You may want to check with your health care provider, , or state representative about the laws in your state. There are different types of advance directives, such as:  · Medical power of .  · Living will.  · Do not resuscitate (DNR) or do not attempt resuscitation (DNAR) order.  Health care proxy and medical power of   A health care proxy, also called a health care agent, is a person who is appointed to make medical decisions for you in cases in which you are unable to make the decisions yourself. Generally, people choose someone they know well and trust to represent their preferences. Make sure to ask this person for an agreement to act as your proxy. A proxy may have to exercise judgment in the event of a medical decision for which your wishes are not known.  A medical power of  is a legal document that names your health care proxy. Depending on the laws in your state, after the document is written, it may also need to be:  · Signed.  · Notarized.  · Dated.  · Copied.  · Witnessed.  · Incorporated into your medical record.  You may also want to appoint  someone to manage your financial affairs in a situation in which you are unable to do so. This is called a durable power of  for finances. It is a separate legal document from the durable power of  for health care. You may choose the same person or someone different from your health care proxy to act as your agent in financial matters.  If you do not appoint a proxy, or if there is a concern that the proxy is not acting in your best interests, a court-appointed guardian may be designated to act on your behalf.  Living will  A living will is a set of instructions documenting your wishes about medical care when you cannot express them yourself. Health care providers should keep a copy of your living will in your medical record. You may want to give a copy to family members or friends. To alert caregivers in case of an emergency, you can place a card in your wallet to let them know that you have a living will and where they can find it. A living will is used if you become:  · Terminally ill.  · Incapacitated.  · Unable to communicate or make decisions.  Items to consider in your living will include:  · The use or non-use of life-sustaining equipment, such as dialysis machines and breathing machines (ventilators).  · A DNR or DNAR order, which is the instruction not to use cardiopulmonary resuscitation (CPR) if breathing or heartbeat stops.  · The use or non-use of tube feeding.  · Withholding of food and fluids.  · Comfort (palliative) care when the goal becomes comfort rather than a cure.  · Organ and tissue donation.  A living will does not give instructions for distributing your money and property if you should pass away. It is recommended that you seek the advice of a  when writing a will. Decisions about taxes, beneficiaries, and asset distribution will be legally binding. This process can relieve your family and friends of any concerns surrounding disputes or questions that may come up about  the distribution of your assets.  DNR or DNAR  A DNR or DNAR order is a request not to have CPR in the event that your heart stops beating or you stop breathing. If a DNR or DNAR order has not been made and shared, a health care provider will try to help any patient whose heart has stopped or who has stopped breathing. If you plan to have surgery, talk with your health care provider about how your DNR or DNAR order will be followed if problems occur.  Summary  · Advance directives are the legal documents that allow you to make choices ahead of time about your health care and medical treatment in case you become unable to communicate for yourself.  · The process of discussing and writing advance directives should happen over time. You can change the advance directives, even after you have signed them.  · Advance directives include DNR or DNAR orders, living [place], and designating an agent as your medical power of .  This information is not intended to replace advice given to you by your health care provider. Make sure you discuss any questions you have with your health care provider.  Document Released: 03/26/2009 Document Revised: 01/22/2020 Document Reviewed: 11/06/2017  Elsevier Patient Education © 2020 [employer]

## 2020-08-13 LAB
25(OH)D3 SERPL-MCNC: 25 NG/ML (ref 30–100)
ALBUMIN SERPL-MCNC: 4.5 G/DL (ref 3.5–5.2)
ALBUMIN/GLOB SERPL: 1.8 G/DL
ALP SERPL-CCNC: 61 U/L (ref 39–117)
ALT SERPL W P-5'-P-CCNC: 29 U/L (ref 1–33)
ANION GAP SERPL CALCULATED.3IONS-SCNC: 15.2 MMOL/L (ref 5–15)
ARTICHOKE IGE QN: 91 MG/DL (ref 0–100)
AST SERPL-CCNC: 22 U/L (ref 1–32)
BILIRUB SERPL-MCNC: 0.3 MG/DL (ref 0–1.2)
BUN SERPL-MCNC: 13 MG/DL (ref 8–23)
BUN/CREAT SERPL: 11.7 (ref 7–25)
CALCIUM SPEC-SCNC: 9.9 MG/DL (ref 8.6–10.5)
CHLORIDE SERPL-SCNC: 101 MMOL/L (ref 98–107)
CHOLEST SERPL-MCNC: 200 MG/DL (ref 0–200)
CO2 SERPL-SCNC: 25.8 MMOL/L (ref 22–29)
CREAT SERPL-MCNC: 1.11 MG/DL (ref 0.57–1)
DEPRECATED RDW RBC AUTO: 41.2 FL (ref 37–54)
ERYTHROCYTE [DISTWIDTH] IN BLOOD BY AUTOMATED COUNT: 12.6 % (ref 12.3–15.4)
GFR SERPL CREATININE-BSD FRML MDRD: 50 ML/MIN/1.73
GLOBULIN UR ELPH-MCNC: 2.5 GM/DL
GLUCOSE SERPL-MCNC: 91 MG/DL (ref 65–99)
HBA1C MFR BLD: 5.3 % (ref 3.5–5.6)
HCT VFR BLD AUTO: 38.8 % (ref 34–46.6)
HCV AB SER DONR QL: NORMAL
HDLC SERPL-MCNC: 50 MG/DL (ref 40–60)
HGB BLD-MCNC: 13.5 G/DL (ref 12–15.9)
LDLC SERPL CALC-MCNC: ABNORMAL MG/DL
LDLC/HDLC SERPL: ABNORMAL {RATIO}
MCH RBC QN AUTO: 31.4 PG (ref 26.6–33)
MCHC RBC AUTO-ENTMCNC: 34.8 G/DL (ref 31.5–35.7)
MCV RBC AUTO: 90.2 FL (ref 79–97)
PLATELET # BLD AUTO: 278 10*3/MM3 (ref 140–450)
PMV BLD AUTO: 10.1 FL (ref 6–12)
POTASSIUM SERPL-SCNC: 4.3 MMOL/L (ref 3.5–5.2)
PROT SERPL-MCNC: 7 G/DL (ref 6–8.5)
RBC # BLD AUTO: 4.3 10*6/MM3 (ref 3.77–5.28)
SODIUM SERPL-SCNC: 142 MMOL/L (ref 136–145)
TRIGL SERPL-MCNC: 429 MG/DL (ref 0–150)
TSH SERPL DL<=0.05 MIU/L-ACNC: 0.92 UIU/ML (ref 0.27–4.2)
VIT B12 BLD-MCNC: 573 PG/ML (ref 211–946)
VLDLC SERPL-MCNC: ABNORMAL MG/DL
WBC # BLD AUTO: 6.68 10*3/MM3 (ref 3.4–10.8)

## 2020-08-18 RX ORDER — ERGOCALCIFEROL 1.25 MG/1
50000 CAPSULE ORAL WEEKLY
Qty: 5 CAPSULE | Refills: 11 | Status: SHIPPED | OUTPATIENT
Start: 2020-08-18 | End: 2021-06-30 | Stop reason: SDUPTHER

## 2020-08-28 ENCOUNTER — HOSPITAL ENCOUNTER (OUTPATIENT)
Dept: MAMMOGRAPHY | Facility: HOSPITAL | Age: 60
Discharge: HOME | End: 2020-08-28
Admitting: NURSE PRACTITIONER

## 2020-08-28 DIAGNOSIS — Z12.31 BREAST CANCER SCREENING BY MAMMOGRAM: ICD-10-CM

## 2020-08-28 DIAGNOSIS — Z00.00 PREVENTATIVE HEALTH CARE: ICD-10-CM

## 2020-08-28 PROCEDURE — 77067 SCR MAMMO BI INCL CAD: CPT

## 2020-08-28 PROCEDURE — 77063 BREAST TOMOSYNTHESIS BI: CPT

## 2020-08-31 RX ORDER — ONDANSETRON 4 MG/1
4 TABLET, FILM COATED ORAL EVERY 8 HOURS PRN
Qty: 60 TABLET | Refills: 0 | Status: SHIPPED | OUTPATIENT
Start: 2020-08-31 | End: 2020-09-18

## 2020-09-18 RX ORDER — ONDANSETRON 4 MG/1
4 TABLET, FILM COATED ORAL EVERY 8 HOURS PRN
Qty: 30 TABLET | Refills: 0 | Status: SHIPPED | OUTPATIENT
Start: 2020-09-18 | End: 2020-09-24

## 2020-09-18 NOTE — TELEPHONE ENCOUNTER
Pt should not be using zofran so frequently, it is to be used as needed for nausea/vomiting and not on a daily basis. Is she taking protonix or other otc nexium, prilosec. protonix appears to have been prescribed last year. I will refill zofran at lower amount and must last several months. If having this much nausea, she needs to schedule appt to discuss. Thanks.

## 2020-09-24 RX ORDER — ONDANSETRON 4 MG/1
4 TABLET, FILM COATED ORAL EVERY 8 HOURS PRN
Qty: 30 TABLET | Refills: 0 | Status: SHIPPED | OUTPATIENT
Start: 2020-09-24 | End: 2020-10-09

## 2020-09-29 DIAGNOSIS — F43.12 CHRONIC POST-TRAUMATIC STRESS DISORDER (PTSD): ICD-10-CM

## 2020-09-30 RX ORDER — ALPRAZOLAM 0.5 MG/1
0.5 TABLET ORAL 3 TIMES DAILY PRN
Qty: 90 TABLET | Refills: 0 | Status: SHIPPED | OUTPATIENT
Start: 2020-09-30 | End: 2020-10-28

## 2020-10-06 NOTE — TELEPHONE ENCOUNTER
Is pt truly having this much nausea? She should be seen, if already out of zofran sent last week.

## 2020-10-07 ENCOUNTER — OFFICE VISIT (OUTPATIENT)
Dept: PSYCHIATRY | Facility: CLINIC | Age: 60
End: 2020-10-07

## 2020-10-07 DIAGNOSIS — F43.12 CHRONIC POST-TRAUMATIC STRESS DISORDER (PTSD): ICD-10-CM

## 2020-10-07 DIAGNOSIS — F33.2 SEVERE EPISODE OF RECURRENT MAJOR DEPRESSIVE DISORDER, WITHOUT PSYCHOTIC FEATURES (HCC): ICD-10-CM

## 2020-10-07 PROCEDURE — 99214 OFFICE O/P EST MOD 30 MIN: CPT | Performed by: PSYCHIATRY & NEUROLOGY

## 2020-10-07 RX ORDER — CITALOPRAM 40 MG/1
40 TABLET ORAL EVERY MORNING
Qty: 90 TABLET | Refills: 1 | Status: SHIPPED | OUTPATIENT
Start: 2020-10-07 | End: 2021-01-12 | Stop reason: SDUPTHER

## 2020-10-07 RX ORDER — HYDROXYZINE PAMOATE 25 MG/1
25 CAPSULE ORAL 3 TIMES DAILY PRN
Qty: 270 CAPSULE | Refills: 0 | Status: SHIPPED | OUTPATIENT
Start: 2020-10-07 | End: 2020-11-03

## 2020-10-07 RX ORDER — GABAPENTIN 300 MG/1
300 CAPSULE ORAL 3 TIMES DAILY
Qty: 270 CAPSULE | Refills: 1 | Status: SHIPPED | OUTPATIENT
Start: 2020-10-07 | End: 2021-03-01

## 2020-10-07 NOTE — TELEPHONE ENCOUNTER
Patient called back stating that she is not out of the medication yet but she is taking it 3 times per day (q8h).  If she needs to make an appointment, she states that this is fine and she will do so.  Thanks.  cc

## 2020-10-07 NOTE — PROGRESS NOTES
Subjective   Jasmine Cerda is a 60 y.o. female who presents today for follow up   Chief Complaint:  Depression anxiety     History of Present Illness:   The pt suffers from depression for a long time, was on multiple meds, severe  Anxiety, worse recently due to family situation ,  son is in recovery (11 months clean)  now and doing well , relationship issues with her  who does not support their son.  Today the pt reported feeling very depressed,every day, all day long , decreased motivations, low self esteem   Since covid , the pt feels more depressed since she can not go out and do something   Depression is worse , still rated as 8-9/10, she worries about her son and her  not getting along, some guilt feelings, feels irritable    Sleep - decreased   When depressed -  decreased E level. Poor motivations, low drives   denied AVH/SI/HI   anxiety is pretty intense and persistent, unable to relax, meds are effective      Precipitating and Ameliorating Factors: covid   Alleviating factors - to spend time with her grand daughter         PAST PSYCHIATRIC HISTORY      Previous Psychiatric Diagnoses   Axis I: Anxiety/Panic Disorder     Past Hospitalizations or Residential Treatment   Locations\Providers: none      Past Outpatient Treatment   Diagnosis Treated: Anxiety/Panic Dis.  Treatment Type: Medication Management  Location: with PCP, Dr Manning      Prior Psychiatric Medications   Comments: xanax - effective      celexa- not effective   zoloft - not effective  cymbalta - not effective      Consequences of Mental Disorder   Consequences: emotional distress     SOCIAL HISTORY     Number of children: 2  Number of grandkids: 1  Current Relationship is: supportive  Family of Origin is: supportive  Comments: Patient has never smoked.  Passive Smoke: N  Alcohol Use: N  Drug Use: N  HIV/High Risk: N        Current Living Situation   Lives with: spouse     Education   Level: high school  graduate     Employment   Job Status: disabled     Hobbies and Leisure Activities   Activity Type: quiet activities     Smoking History   Smoking Hx: Never smoker     Exercise   Exercise sessions (per wk): 1     Illicit Drug Use   Illicit Drugs used: no     FAMILY HISTORY OF MENTAL DISORDERS   fh Grandparents: Non-contributory  fh Mother: Non-contributory  fh Father: Non-contributory  fh Siblings: Non-contributory  fh Other: Non-contributory  The following portions of the patient's history were reviewed and updated as appropriate: allergies, current medications, past family history, past medical history, past social history, past surgical history and problem list.            Interval History  Deteriorated      Side Effects  Denied       Past Medical History:  Past Medical History:   Diagnosis Date   • Anxiety    • Breast cyst    • CHF (congestive heart failure) (CMS/[facility])    • Coronary heart disease    • Depression    • Hyperlipidemia    • Hypertension        Social History:  Social History     Socioeconomic History   • Marital status:      Spouse name: Not on file   • Number of children: Not on file   • Years of education: Not on file   • Highest education level: Not on file   Tobacco Use   • Smoking status: Never Smoker   • Smokeless tobacco: Never Used   • Tobacco comment: Passive Smoke: N   Substance and Sexual Activity   • Alcohol use: No     Frequency: Never   • Drug use: No   • Sexual activity: Defer       Family History:  Family History   Problem Relation Age of Onset   • Colon cancer Mother        Past Surgical History:  Past Surgical History:   Procedure Laterality Date   • APPENDECTOMY     • BREAST CYST ASPIRATION     • CARDIAC CATHETERIZATION  2017    No Stents placed - [facility]   • CERVICAL FUSION      C6-C7   •  SECTION      x 2   • CHOLECYSTECTOMY     • HYSTERECTOMY         Problem List:  Patient Active Problem List   Diagnosis   • Chronic post-traumatic stress disorder (PTSD)   • Severe  episode of recurrent major depressive disorder (CMS/HCC)   • Asthma   • Chronic low back pain   • Congestive heart failure (CMS/HCC)   • Coronary heart disease   • Depression   • Degeneration of intervertebral disc of lumbosacral region   • Headache, temporal   • Psychophysiological insomnia   • Hyperlipidemia   • Hypertension       Allergy:   No Known Allergies     Discontinued Medications:  Medications Discontinued During This Encounter   Medication Reason   • gabapentin (NEURONTIN) 300 MG capsule Reorder   • hydrOXYzine pamoate (VISTARIL) 25 MG capsule Reorder   • citalopram (CeleXA) 40 MG tablet Reorder       Current Medications:   Current Outpatient Medications   Medication Sig Dispense Refill   • albuterol (PROVENTIL) (2.5 MG/3ML) 0.083% nebulizer solution INHALE 1 VIAL VIA NEBULIZER DAILY AS NEEDED FOR WHEEZING 300 mL 1   • ALPRAZolam (XANAX) 0.5 MG tablet TAKE 1 TABLET BY MOUTH 3 (THREE) TIMES A DAY AS NEEDED FOR ANXIETY. 90 tablet 0   • aspirin 81 MG EC tablet ASPIRIN EC 81 MG TBEC     • bumetanide (BUMEX) 2 MG tablet Take 1 tablet by mouth 2 (Two) Times a Day. 180 tablet 3   • carvedilol (COREG) 25 MG tablet Take 1 tablet by mouth 2 (Two) Times a Day. 180 tablet 1   • citalopram (CeleXA) 40 MG tablet Take 1 tablet by mouth Every Morning. 90 tablet 1   • gabapentin (NEURONTIN) 300 MG capsule Take 1 capsule by mouth 3 (Three) Times a Day. 270 capsule 1   • hydrOXYzine pamoate (VISTARIL) 25 MG capsule Take 1 capsule by mouth 3 (Three) Times a Day As Needed for Anxiety. 270 capsule 0   • icosapent ethyl (Vascepa) 1 g capsule capsule Take 2 g by mouth 2 (Two) Times a Day With Meals. 360 capsule 3   • KLOR-CON 20 MEQ CR tablet TAKE 1 TABLET BY MOUTH 2 (TWO) TIMES A DAY WITH BREAKFAST AND SUPPER 180 tablet 0   • lisinopril (PRINIVIL,ZESTRIL) 10 MG tablet Take 1 tablet by mouth Daily. 90 tablet 1   • metoclopramide (REGLAN) 10 MG tablet TAKE 1 TABLET BY MOUTH 3 (THREE) TIMES A DAY BEFORE MEALS. 90 tablet 2   •  Multiple Vitamins-Minerals (MULTIVITAMIN ADULT) tablet Daily.     • nitroglycerin (NITROSTAT) 0.4 MG SL tablet NITROSTAT 0.4 MG SUBL     • ondansetron (ZOFRAN) 4 MG tablet TAKE 1 TABLET BY MOUTH EVERY 8 (EIGHT) HOURS AS NEEDED FOR NAUSEA OR VOMITING. 30 tablet 0   • pantoprazole (PROTONIX) 40 MG EC tablet Take 40 mg by mouth Daily.  1   • risperiDONE (risperDAL) 1 MG tablet TAKE 1 TABLET BY MOUTH EVERYDAY AT BEDTIME 90 tablet 1   • rosuvastatin (CRESTOR) 40 MG tablet Take 1 tablet by mouth Every Evening. 90 tablet 1   • SYMBICORT 160-4.5 MCG/ACT inhaler INHALE 2 PUFFS BY MOUTH TWICE DAILY 30.6 inhaler 1   • vitamin D (ERGOCALCIFEROL) 1.25 MG (51065 UT) capsule capsule Take 1 capsule by mouth 1 (One) Time Per Week. 5 capsule 11     No current facility-administered medications for this visit.          Review of Symptoms:    Psychiatric/Behavioral: Negative for agitation, behavioral problems, confusion, decreased concentration, dysphoric mood, hallucinations, self-injury, sleep disturbance and suicidal ideas.   The patient is  More depressed,  Still very  nervous/anxious and is not hyperactive.        Physical Exam:   There were no vitals taken for this visit.    Mental Status Exam:   Hygiene:   good  Cooperation:  Cooperative  Eye Contact:  Good  Psychomotor Behavior:  Appropriate  Affect:  Blunted  Mood: depressed,  Anxious    Hopelessness: Denies  Speech:  Normal  Thought Process:  Goal directed and Linear  Thought Content:  Normal  Suicidal:  None  Homicidal:  None  Hallucinations:  None  Delusion:  None  Memory:  Intact  Orientation:  Person, Place, Time and Situation  Reliability:  fair  Insight:  Good  Judgement:  Good  Impulse Control:  Good  Physical/Medical Issues:  Yes GI issues        MSE from 1/16/2020  reviewed and accepted with changes     PHQ-9 Score:  PHQ-9 Score:  13       Never smoker    I advised Jasmine of the risks of tobacco use.     Lab Results:   Office Visit on 08/12/2020   Component Date Value  Ref Range Status   • Total Cholesterol 08/12/2020 200  0 - 200 mg/dL Final   • Triglycerides 08/12/2020 429* 0 - 150 mg/dL Final   • HDL Cholesterol 08/12/2020 50  40 - 60 mg/dL Final   • LDL Cholesterol  08/12/2020    Final    Unable to calculate   • VLDL Cholesterol 08/12/2020    Final    Unable to calculate   • LDL/HDL Ratio 08/12/2020    Final    Unable to calculate   • Glucose 08/12/2020 91  65 - 99 mg/dL Final   • BUN 08/12/2020 13  8 - 23 mg/dL Final   • Creatinine 08/12/2020 1.11* 0.57 - 1.00 mg/dL Final   • Sodium 08/12/2020 142  136 - 145 mmol/L Final   • Potassium 08/12/2020 4.3  3.5 - 5.2 mmol/L Final   • Chloride 08/12/2020 101  98 - 107 mmol/L Final   • CO2 08/12/2020 25.8  22.0 - 29.0 mmol/L Final   • Calcium 08/12/2020 9.9  8.6 - 10.5 mg/dL Final   • Total Protein 08/12/2020 7.0  6.0 - 8.5 g/dL Final   • Albumin 08/12/2020 4.50  3.50 - 5.20 g/dL Final   • ALT (SGPT) 08/12/2020 29  1 - 33 U/L Final   • AST (SGOT) 08/12/2020 22  1 - 32 U/L Final   • Alkaline Phosphatase 08/12/2020 61  39 - 117 U/L Final   • Total Bilirubin 08/12/2020 0.3  0.0 - 1.2 mg/dL Final   • eGFR Non African Amer 08/12/2020 50* >60 mL/min/1.73 Final   • Globulin 08/12/2020 2.5  gm/dL Final   • A/G Ratio 08/12/2020 1.8  g/dL Final   • BUN/Creatinine Ratio 08/12/2020 11.7  7.0 - 25.0 Final   • Anion Gap 08/12/2020 15.2* 5.0 - 15.0 mmol/L Final   • WBC 08/12/2020 6.68  3.40 - 10.80 10*3/mm3 Final   • RBC 08/12/2020 4.30  3.77 - 5.28 10*6/mm3 Final   • Hemoglobin 08/12/2020 13.5  12.0 - 15.9 g/dL Final   • Hematocrit 08/12/2020 38.8  34.0 - 46.6 % Final   • MCV 08/12/2020 90.2  79.0 - 97.0 fL Final   • MCH 08/12/2020 31.4  26.6 - 33.0 pg Final   • MCHC 08/12/2020 34.8  31.5 - 35.7 g/dL Final   • RDW 08/12/2020 12.6  12.3 - 15.4 % Final   • RDW-SD 08/12/2020 41.2  37.0 - 54.0 fl Final   • MPV 08/12/2020 10.1  6.0 - 12.0 fL Final   • Platelets 08/12/2020 278  140 - 450 10*3/mm3 Final   • TSH 08/12/2020 0.918  0.270 - 4.200 uIU/mL Final    • Hemoglobin A1C 08/12/2020 5.3  3.5 - 5.6 % Final   • 25 Hydroxy, Vitamin D 08/12/2020 25.0* 30.0 - 100.0 ng/ml Final   • Vitamin B-12 08/12/2020 573  211 - 946 pg/mL Final   • Hepatitis C Ab 08/12/2020 Non-Reactive  Non-Reactive Final   • LDL Cholesterol  08/12/2020 91  0 - 100 mg/dL Final       Assessment/Plan   Problems Addressed this Visit        Other    Chronic post-traumatic stress disorder (PTSD)    Relevant Medications    citalopram (CeleXA) 40 MG tablet    hydrOXYzine pamoate (VISTARIL) 25 MG capsule    gabapentin (NEURONTIN) 300 MG capsule    Depression    Relevant Medications    citalopram (CeleXA) 40 MG tablet    hydrOXYzine pamoate (VISTARIL) 25 MG capsule      Diagnoses       Codes Comments    Severe episode of recurrent major depressive disorder, without psychotic features (CMS/HCC)     ICD-10-CM: F33.2  ICD-9-CM: 296.33     Chronic post-traumatic stress disorder (PTSD)     ICD-10-CM: F43.12  ICD-9-CM: 309.81           Visit Diagnoses:    ICD-10-CM ICD-9-CM   1. Severe episode of recurrent major depressive disorder, without psychotic features (CMS/HCC)  F33.2 296.33   2. Chronic post-traumatic stress disorder (PTSD)  F43.12 309.81       TREATMENT PLAN/GOALS: Continue supportive psychotherapy efforts and medications as indicated. Treatment and medication options discussed during today's visit. Patient ackowledged and verbally consented to continue with current treatment plan and was educated on the importance of compliance with treatment and follow-up appointments.    MEDICATION ISSUES:  Cont current meds, tolerates well, no side effects  Cont risperidone  1 mg po QHS    Xanax - low dose 0.5 mg TID, alternating with vistaril PRN , long term benzo use discussed again   supportive therapy,   INSPECT reviewed as expected, last xanax refill was on 9/30/2020 , will fill when it is due   genesight today       Discussed medication options and treatment plan of prescribed medication as well as the risks,  benefits, and side effects including potential falls, possible impaired driving and metabolic adversities among others. Patient is agreeable to call the office with any worsening of symptoms or onset of side effects. Patient is agreeable to call 911 or go to the nearest ER should he/she begin having SI/HI. No medication side effects or related complaints today.     MEDS ORDERED DURING VISIT:  New Medications Ordered This Visit   Medications   • citalopram (CeleXA) 40 MG tablet     Sig: Take 1 tablet by mouth Every Morning.     Dispense:  90 tablet     Refill:  1   • hydrOXYzine pamoate (VISTARIL) 25 MG capsule     Sig: Take 1 capsule by mouth 3 (Three) Times a Day As Needed for Anxiety.     Dispense:  270 capsule     Refill:  0   • gabapentin (NEURONTIN) 300 MG capsule     Sig: Take 1 capsule by mouth 3 (Three) Times a Day.     Dispense:  270 capsule     Refill:  1       Return in about 1 month (around 11/7/2020).         This document has been electronically signed by Sivan Ken MD  October 7, 2020 10:45 EDT

## 2020-10-09 RX ORDER — ONDANSETRON 4 MG/1
4 TABLET, FILM COATED ORAL EVERY 8 HOURS PRN
Qty: 30 TABLET | Refills: 2 | Status: SHIPPED | OUTPATIENT
Start: 2020-10-09 | End: 2020-11-11

## 2020-10-09 NOTE — TELEPHONE ENCOUNTER
She needs to try to use only as needed not routinely 3 times/day. I will refill, but she needs to stretch it out. Thanks.

## 2020-10-26 RX ORDER — LISINOPRIL 10 MG/1
TABLET ORAL
Qty: 90 TABLET | Refills: 1 | Status: SHIPPED | OUTPATIENT
Start: 2020-10-26 | End: 2021-04-26

## 2020-10-28 DIAGNOSIS — F43.12 CHRONIC POST-TRAUMATIC STRESS DISORDER (PTSD): ICD-10-CM

## 2020-10-28 RX ORDER — METOCLOPRAMIDE 10 MG/1
10 TABLET ORAL
Qty: 90 TABLET | Refills: 4 | Status: SHIPPED | OUTPATIENT
Start: 2020-10-28 | End: 2021-03-22

## 2020-10-28 RX ORDER — ALPRAZOLAM 0.5 MG/1
0.5 TABLET ORAL 3 TIMES DAILY PRN
Qty: 90 TABLET | Refills: 0 | Status: SHIPPED | OUTPATIENT
Start: 2020-10-28 | End: 2020-11-25 | Stop reason: SDUPTHER

## 2020-10-29 RX ORDER — CARVEDILOL 25 MG/1
TABLET ORAL
Qty: 180 TABLET | Refills: 2 | Status: SHIPPED | OUTPATIENT
Start: 2020-10-29 | End: 2021-07-09 | Stop reason: SDUPTHER

## 2020-10-29 RX ORDER — ROSUVASTATIN CALCIUM 40 MG/1
TABLET, COATED ORAL
Qty: 90 TABLET | Refills: 2 | Status: SHIPPED | OUTPATIENT
Start: 2020-10-29 | End: 2021-07-09 | Stop reason: SDUPTHER

## 2020-11-03 DIAGNOSIS — F43.12 CHRONIC POST-TRAUMATIC STRESS DISORDER (PTSD): ICD-10-CM

## 2020-11-03 RX ORDER — HYDROXYZINE PAMOATE 25 MG/1
CAPSULE ORAL
Qty: 270 CAPSULE | Refills: 0 | Status: SHIPPED | OUTPATIENT
Start: 2020-11-03 | End: 2020-12-16

## 2020-11-06 ENCOUNTER — OFFICE VISIT (OUTPATIENT)
Dept: PSYCHIATRY | Facility: CLINIC | Age: 60
End: 2020-11-06

## 2020-11-06 DIAGNOSIS — F43.12 CHRONIC POST-TRAUMATIC STRESS DISORDER (PTSD): ICD-10-CM

## 2020-11-06 DIAGNOSIS — F51.04 PSYCHOPHYSIOLOGICAL INSOMNIA: ICD-10-CM

## 2020-11-06 DIAGNOSIS — F33.2 SEVERE EPISODE OF RECURRENT MAJOR DEPRESSIVE DISORDER, WITHOUT PSYCHOTIC FEATURES (HCC): Primary | ICD-10-CM

## 2020-11-06 PROCEDURE — 99213 OFFICE O/P EST LOW 20 MIN: CPT | Performed by: PSYCHIATRY & NEUROLOGY

## 2020-11-06 RX ORDER — LEVOMEFOLATE CALCIUM 15 MG
15 TABLET ORAL DAILY
Qty: 30 TABLET | Refills: 2 | Status: SHIPPED | OUTPATIENT
Start: 2020-11-06 | End: 2021-02-05

## 2020-11-06 RX ORDER — FLUOXETINE HYDROCHLORIDE 20 MG/1
20 CAPSULE ORAL DAILY
Qty: 30 CAPSULE | Refills: 2 | Status: SHIPPED | OUTPATIENT
Start: 2020-11-06 | End: 2021-01-12 | Stop reason: SINTOL

## 2020-11-06 RX ORDER — ESZOPICLONE 2 MG/1
2 TABLET, FILM COATED ORAL NIGHTLY PRN
Qty: 30 TABLET | Refills: 1 | Status: SHIPPED | OUTPATIENT
Start: 2020-11-06 | End: 2021-01-06

## 2020-11-06 NOTE — PROGRESS NOTES
Subjective   Jasmine Cerda is a 60 y.o. female who presents today for follow up     Chief Complaint:  Depression anxiety     History of Present Illness:   The pt suffers from depression for a long time, was on multiple meds, severe  Anxiety, worse recently due to family situation ,  son is in recovery (11 months clean)  now and doing well , relationship issues with her  who does not support their son.  Today the pt reported feeling very depressed, now  worries about relationship between  and son, she feels exhausted, anxious every day, all day long , depression is associated with decreased motivations     depression is rated as 8-9/10,   Sleep - decreased   When depressed -  decreased E level. Poor motivations, low drives   denied AVH/SI/HI   anxiety is pretty intense and persistent, unable to relax, meds are effective      Precipitating and Ameliorating Factors: covid   Alleviating factors - to spend time with her grand daughter         PAST PSYCHIATRIC HISTORY      Previous Psychiatric Diagnoses   Axis I: Anxiety/Panic Disorder     Past Hospitalizations or Residential Treatment   Locations\Providers: none      Past Outpatient Treatment   Diagnosis Treated: Anxiety/Panic Dis.  Treatment Type: Medication Management  Location: with PCP, Dr Manning      Prior Psychiatric Medications   Comments: xanax - effective      celexa- not effective   zoloft - not effective  cymbalta - not effective      ambien - side effects     Consequences of Mental Disorder   Consequences: emotional distress     SOCIAL HISTORY     Number of children: 2  Number of grandkids: 1  Current Relationship is: supportive  Family of Origin is: supportive  Comments: Patient has never smoked.  Passive Smoke: N  Alcohol Use: N  Drug Use: N  HIV/High Risk: N        Current Living Situation   Lives with: spouse     Education   Level: high school graduate     Employment   Job Status: disabled     Hobbies and Leisure Activities    Activity Type: quiet activities     Smoking History   Smoking Hx: Never smoker     Exercise   Exercise sessions (per wk): 1     Illicit Drug Use   Illicit Drugs used: no     FAMILY HISTORY OF MENTAL DISORDERS   fh Grandparents: Non-contributory  fh Mother: Non-contributory  fh Father: Non-contributory  fh Siblings: Non-contributory  fh Other: Non-contributory  The following portions of the patient's history were reviewed and updated as appropriate: allergies, current medications, past family history, past medical history, past social history, past surgical history and problem list.            Interval History  No changes      Side Effects  Denied       Past Medical History:  Past Medical History:   Diagnosis Date   • Anxiety    • Breast cyst    • CHF (congestive heart failure) (CMS/HCC)    • Coronary heart disease    • Depression    • Hyperlipidemia    • Hypertension        Social History:  Social History     Socioeconomic History   • Marital status:      Spouse name: Not on file   • Number of children: Not on file   • Years of education: Not on file   • Highest education level: Not on file   Tobacco Use   • Smoking status: Never Smoker   • Smokeless tobacco: Never Used   • Tobacco comment: Passive Smoke: N   Substance and Sexual Activity   • Alcohol use: No     Frequency: Never   • Drug use: No   • Sexual activity: Defer       Family History:  Family History   Problem Relation Age of Onset   • Colon cancer Mother        Past Surgical History:  Past Surgical History:   Procedure Laterality Date   • APPENDECTOMY     • BREAST CYST ASPIRATION     • CARDIAC CATHETERIZATION  2017    No Stents placed - [facility]   • CERVICAL FUSION      C6-C7   •  SECTION      x 2   • CHOLECYSTECTOMY     • HYSTERECTOMY         Problem List:  Patient Active Problem List   Diagnosis   • Chronic post-traumatic stress disorder (PTSD)   • Severe episode of recurrent major depressive disorder (CMS/HCC)   • Asthma   • Chronic low  back pain   • Congestive heart failure (CMS/HCC)   • Coronary heart disease   • Depression   • Degeneration of intervertebral disc of lumbosacral region   • Headache, temporal   • Psychophysiological insomnia   • Hyperlipidemia   • Hypertension       Allergy:   No Known Allergies     Discontinued Medications:  There are no discontinued medications.    Current Medications:   Current Outpatient Medications   Medication Sig Dispense Refill   • albuterol (PROVENTIL) (2.5 MG/3ML) 0.083% nebulizer solution INHALE 1 VIAL VIA NEBULIZER DAILY AS NEEDED FOR WHEEZING 300 mL 1   • ALPRAZolam (XANAX) 0.5 MG tablet TAKE 1 TABLET BY MOUTH 3 (THREE) TIMES A DAY AS NEEDED FOR ANXIETY. 90 tablet 0   • aspirin 81 MG EC tablet ASPIRIN EC 81 MG TBEC     • bumetanide (BUMEX) 2 MG tablet Take 1 tablet by mouth 2 (Two) Times a Day. 180 tablet 3   • carvedilol (COREG) 25 MG tablet TAKE 1 TABLET TWICE DAILY 180 tablet 2   • citalopram (CeleXA) 40 MG tablet Take 1 tablet by mouth Every Morning. 90 tablet 1   • eszopiclone (Lunesta) 2 MG tablet Take 1 tablet by mouth At Night As Needed for Sleep. Take immediately before bedtime 30 tablet 1   • FLUoxetine (PROzac) 20 MG capsule Take 1 capsule by mouth Daily. 30 capsule 2   • gabapentin (NEURONTIN) 300 MG capsule Take 1 capsule by mouth 3 (Three) Times a Day. 270 capsule 1   • hydrOXYzine pamoate (VISTARIL) 25 MG capsule TAKE 1 CAPSULE BY MOUTH 3 TIMES A DAY AS NEEDED FOR ANXIETY. 270 capsule 0   • icosapent ethyl (Vascepa) 1 g capsule capsule Take 2 g by mouth 2 (Two) Times a Day With Meals. 360 capsule 3   • KLOR-CON 20 MEQ CR tablet TAKE 1 TABLET BY MOUTH 2 (TWO) TIMES A DAY WITH BREAKFAST AND SUPPER 180 tablet 0   • l-methylfolate 15 MG tablet tablet Take 1 tablet by mouth Daily. 30 tablet 2   • lisinopril (PRINIVIL,ZESTRIL) 10 MG tablet TAKE 1 TABLET BY MOUTH EVERY DAY 90 tablet 1   • metoclopramide (REGLAN) 10 MG tablet TAKE 1 TABLET BY MOUTH 3 (THREE) TIMES A DAY BEFORE MEALS. 90 tablet 4    • Multiple Vitamins-Minerals (MULTIVITAMIN ADULT) tablet Daily.     • nitroglycerin (NITROSTAT) 0.4 MG SL tablet NITROSTAT 0.4 MG SUBL     • ondansetron (ZOFRAN) 4 MG tablet TAKE 1 TABLET BY MOUTH EVERY 8 (EIGHT) HOURS AS NEEDED FOR NAUSEA OR VOMITING. 30 tablet 2   • pantoprazole (PROTONIX) 40 MG EC tablet Take 40 mg by mouth Daily.  1   • risperiDONE (risperDAL) 1 MG tablet TAKE 1 TABLET BY MOUTH EVERYDAY AT BEDTIME 90 tablet 1   • rosuvastatin (CRESTOR) 40 MG tablet TAKE 1 TABLET EVERY EVENING 90 tablet 2   • SYMBICORT 160-4.5 MCG/ACT inhaler INHALE 2 PUFFS BY MOUTH TWICE DAILY 30.6 inhaler 1   • vitamin D (ERGOCALCIFEROL) 1.25 MG (17526 UT) capsule capsule Take 1 capsule by mouth 1 (One) Time Per Week. 5 capsule 11     No current facility-administered medications for this visit.          Review of Symptoms:    Psychiatric/Behavioral: Negative for agitation, behavioral problems, confusion, decreased concentration, dysphoric mood, hallucinations, self-injury, sleep disturbance and suicidal ideas.   The patient is  More depressed,  Still very  nervous/anxious and is not hyperactive.        Physical Exam:   There were no vitals taken for this visit.    Mental Status Exam:   Hygiene:   good  Cooperation:  Cooperative  Eye Contact:  Good  Psychomotor Behavior:  Appropriate  Affect:  Blunted  Mood: depressed,  Anxious    Hopelessness: Denies  Speech:  Normal  Thought Process:  Goal directed and Linear  Thought Content:  Normal  Suicidal:  None  Homicidal:  None  Hallucinations:  None  Delusion:  None  Memory:  Intact  Orientation:  Person, Place, Time and Situation  Reliability:  fair  Insight:  Good  Judgement:  Good  Impulse Control:  Good  Physical/Medical Issues:  Yes GI issues        MSE from 1/16/2020  reviewed and no  changes necessary     PHQ-9 Score:  PHQ-9 Score:  12        Never smoker    I advised Jasmine of the risks of tobacco use.     Lab Results:   Office Visit on 08/12/2020   Component Date Value Ref  Range Status   • Total Cholesterol 08/12/2020 200  0 - 200 mg/dL Final   • Triglycerides 08/12/2020 429* 0 - 150 mg/dL Final   • HDL Cholesterol 08/12/2020 50  40 - 60 mg/dL Final   • LDL Cholesterol  08/12/2020    Final    Unable to calculate   • VLDL Cholesterol 08/12/2020    Final    Unable to calculate   • LDL/HDL Ratio 08/12/2020    Final    Unable to calculate   • Glucose 08/12/2020 91  65 - 99 mg/dL Final   • BUN 08/12/2020 13  8 - 23 mg/dL Final   • Creatinine 08/12/2020 1.11* 0.57 - 1.00 mg/dL Final   • Sodium 08/12/2020 142  136 - 145 mmol/L Final   • Potassium 08/12/2020 4.3  3.5 - 5.2 mmol/L Final   • Chloride 08/12/2020 101  98 - 107 mmol/L Final   • CO2 08/12/2020 25.8  22.0 - 29.0 mmol/L Final   • Calcium 08/12/2020 9.9  8.6 - 10.5 mg/dL Final   • Total Protein 08/12/2020 7.0  6.0 - 8.5 g/dL Final   • Albumin 08/12/2020 4.50  3.50 - 5.20 g/dL Final   • ALT (SGPT) 08/12/2020 29  1 - 33 U/L Final   • AST (SGOT) 08/12/2020 22  1 - 32 U/L Final   • Alkaline Phosphatase 08/12/2020 61  39 - 117 U/L Final   • Total Bilirubin 08/12/2020 0.3  0.0 - 1.2 mg/dL Final   • eGFR Non African Amer 08/12/2020 50* >60 mL/min/1.73 Final   • Globulin 08/12/2020 2.5  gm/dL Final   • A/G Ratio 08/12/2020 1.8  g/dL Final   • BUN/Creatinine Ratio 08/12/2020 11.7  7.0 - 25.0 Final   • Anion Gap 08/12/2020 15.2* 5.0 - 15.0 mmol/L Final   • WBC 08/12/2020 6.68  3.40 - 10.80 10*3/mm3 Final   • RBC 08/12/2020 4.30  3.77 - 5.28 10*6/mm3 Final   • Hemoglobin 08/12/2020 13.5  12.0 - 15.9 g/dL Final   • Hematocrit 08/12/2020 38.8  34.0 - 46.6 % Final   • MCV 08/12/2020 90.2  79.0 - 97.0 fL Final   • MCH 08/12/2020 31.4  26.6 - 33.0 pg Final   • MCHC 08/12/2020 34.8  31.5 - 35.7 g/dL Final   • RDW 08/12/2020 12.6  12.3 - 15.4 % Final   • RDW-SD 08/12/2020 41.2  37.0 - 54.0 fl Final   • MPV 08/12/2020 10.1  6.0 - 12.0 fL Final   • Platelets 08/12/2020 278  140 - 450 10*3/mm3 Final   • TSH 08/12/2020 0.918  0.270 - 4.200 uIU/mL Final   •  Hemoglobin A1C 08/12/2020 5.3  3.5 - 5.6 % Final   • 25 Hydroxy, Vitamin D 08/12/2020 25.0* 30.0 - 100.0 ng/ml Final   • Vitamin B-12 08/12/2020 573  211 - 946 pg/mL Final   • Hepatitis C Ab 08/12/2020 Non-Reactive  Non-Reactive Final   • LDL Cholesterol  08/12/2020 91  0 - 100 mg/dL Final       Assessment/Plan   Problems Addressed this Visit        Other    Chronic post-traumatic stress disorder (PTSD)    Relevant Medications    FLUoxetine (PROzac) 20 MG capsule    eszopiclone (Lunesta) 2 MG tablet    Severe episode of recurrent major depressive disorder (CMS/HCC) - Primary    Relevant Medications    FLUoxetine (PROzac) 20 MG capsule    eszopiclone (Lunesta) 2 MG tablet    Psychophysiological insomnia    Relevant Medications    eszopiclone (Lunesta) 2 MG tablet      Diagnoses       Codes Comments    Severe episode of recurrent major depressive disorder, without psychotic features (CMS/HCC)    -  Primary ICD-10-CM: F33.2  ICD-9-CM: 296.33     Chronic post-traumatic stress disorder (PTSD)     ICD-10-CM: F43.12  ICD-9-CM: 309.81     Psychophysiological insomnia     ICD-10-CM: F51.04  ICD-9-CM: 307.42           Visit Diagnoses:    ICD-10-CM ICD-9-CM   1. Severe episode of recurrent major depressive disorder, without psychotic features (CMS/HCC)  F33.2 296.33   2. Chronic post-traumatic stress disorder (PTSD)  F43.12 309.81   3. Psychophysiological insomnia  F51.04 307.42       TREATMENT PLAN/GOALS: Continue supportive psychotherapy efforts and medications as indicated. Treatment and medication options discussed during today's visit. Patient ackowledged and verbally consented to continue with current treatment plan and was educated on the importance of compliance with treatment and follow-up appointments.    MEDICATION ISSUES:    Cont risperidone  1 mg po QHS    Xanax - low dose 0.5 mg TID, alternating with vistaril PRN , long term benzo use discussed again   supportive therapy,   INSPECT reviewed as expected, last  xanax refill was on 9/30/2020 , will fill when it is due   genesight discussed   Cross taper from celexa to prozac 20 mg   Add deplin 15 m g  Add lunesta 2 mg       Discussed medication options and treatment plan of prescribed medication as well as the risks, benefits, and side effects including potential falls, possible impaired driving and metabolic adversities among others. Patient is agreeable to call the office with any worsening of symptoms or onset of side effects. Patient is agreeable to call 911 or go to the nearest ER should he/she begin having SI/HI. No medication side effects or related complaints today.     MEDS ORDERED DURING VISIT:  New Medications Ordered This Visit   Medications   • FLUoxetine (PROzac) 20 MG capsule     Sig: Take 1 capsule by mouth Daily.     Dispense:  30 capsule     Refill:  2   • eszopiclone (Lunesta) 2 MG tablet     Sig: Take 1 tablet by mouth At Night As Needed for Sleep. Take immediately before bedtime     Dispense:  30 tablet     Refill:  1   • l-methylfolate 15 MG tablet tablet     Sig: Take 1 tablet by mouth Daily.     Dispense:  30 tablet     Refill:  2       Return in about 2 months (around 1/6/2021).         This document has been electronically signed by Sivan Ken MD  November 6, 2020 08:51 EST

## 2020-11-06 NOTE — PATIENT INSTRUCTIONS
Managing Post-Traumatic Stress Disorder  If you have been diagnosed with post-traumatic stress disorder (PTSD), you may be relieved that you now know why you have felt or behaved a certain way. Still, you may feel overwhelmed about the treatment ahead. You may also wonder how to get the support you need and how to deal with the condition day-to-day.  If you are living with PTSD, there are ways to help you recover from it and manage your symptoms.  How to manage lifestyle changes  Managing stress  Stress is your body's reaction to life changes and events, both good and bad. Stress can make PTSD worse. Take the following steps to manage stress:  · Talk with your health care provider or a counselor if you would like to learn more about techniques to reduce your stress. He or she may suggest some stress reduction techniques such as:  ? Muscle relaxation exercises.  ? Regular exercise.  ? Meditation, yoga, or other mind-body exercises.  ? Breathing exercises.  ? Listening to quiet music.  ? Spending time outside.  · Maintain a healthy lifestyle. Eat a healthy diet, exercise regularly, get plenty of sleep, and take time to relax.  · Spend time with others. Talk with them about how you are feeling and what kind of support you need. Try not to isolate yourself, even though you may feel like doing that. Isolating yourself can delay your recovery.  · Do activities and hobbies that you enjoy.  · Pace yourself when doing stressful things. Take breaks, and reward yourself when you finish. Make sure that you do not overload your schedule.    Medicines  Your health care provider may suggest certain medicines if he or she feels that they will help to improve your condition. Medicines for depression (antidepressants) or severe loss of contact with reality (antipsychotics) may be used to treat PTSD. Avoid using alcohol and other substances that may prevent your medicines from working properly. It is also important to:  · Talk with  your pharmacist or health care provider about all medicines that you take, their possible side effects, and which medicines are safe to take together.  · Make it your goal to take part in all treatment decisions (shared decision-making). Ask about possible side effects of medicines that your health care provider recommends, and tell him or her how you feel about having those side effects. It is best if shared decision-making with your health care provider is part of your total treatment plan.  If your health care provider prescribes a medicine, you may not notice the full benefits of it for 4-8 weeks. Most people who are treated for PTSD need to take medicine for at least 6-12 months after they feel better. If you are taking medicines as part of your treatment, do not stop taking medicines before you ask your health care provider if it is safe to stop. You may need to have the medicine slowly decreased (tapered) over time to lower the risk of harmful side effects.  Relationships  Many people who have PTSD have difficulty trusting others. Make an effort to:  · Take risks and develop trust with close friends and family members. Developing trust in others can help you feel safe and connect you with emotional support.  · Be open and honest about your feelings.  · Have fun and relax in safe spaces, such as with friends and family.  · Think about going to couples counseling, family education classes, or family therapy. Your loved ones may not always know how to be supportive. Therapy can be helpful for everyone.  How to recognize changes in your condition  Be aware of your symptoms and how often you have them. The following symptoms mean that you need to seek help for your PTSD:  · You feel suspicious and angry.  · You have repeated flashbacks.  · You avoid going out or being with others.  · You have an increasing number of fights with close friends or family members, such as your spouse.  · You have thoughts about  hurting yourself or others.  · You cannot get relief from feelings of depression or anxiety.  Follow these instructions at home:  Lifestyle  · Exercise regularly. Try to do 30 or more minutes of physical activity on most days of the week.  · Try to get 7-9 hours of sleep each night. To help with sleep:  ? Keep your bedroom cool and dark.  ? Avoid screen time before bedtime. This means avoiding use of your TV, computer, tablet, and cell phone.  · Practice self-soothing skills and use them daily.  · Try to have fun and seek humor in your life.  Eating and drinking  · Do not eat a heavy meal during the hour before you go to bed.  · Do not drink alcohol or caffeinated drinks before bed.  · Avoid using alcohol or drugs.  General instructions  · If your PTSD is affecting your marriage or family, seek help from a family therapist.  · Take over-the-counter and prescription medicines only as told by your health care provider.  · Make sure to let all of your health care providers know that you have PTSD. This is especially important if you are having surgery or need to be admitted to the hospital.  · Keep all follow-up visits as told by your health care providers. This is important.  Where to find support  Talking to others  · Explain that PTSD is a mental health problem. It is something that a person can develop after experiencing or seeing a life-threatening event. Tell them that PTSD makes you feel stress like you did during the event.  · Talk to your loved ones about the symptoms you have. Also tell them what things or situations can cause symptoms to start (are triggers for you).  · Assure your loved ones that there are treatments to help PTSD. Discuss possibly seeking family therapy or couples therapy.  · If you are worried or fearful about seeking treatment, ask for support.  · Keep daily contact with at least one trusted friend or family member.  Finances  Not all insurance plans cover mental health care, so it is  important to check with your insurance carrier. If paying for co-pays or counseling services is a problem, search for a local or [facility] mental health care center. Public mental health care services may be offered there at a low cost or no cost when you are not able to see a private health care provider. If you are a , contact a local veterans organization or [facility] for more information.  If you are taking medicine for PTSD, you may be able to get the genericform, which may be less expensive than brand-name medicine. Some makers of prescription medicines also offer help to patients who cannot afford the medicines that they need.  Therapy and support groups  · Find a support group in your community. Often, groups are available for  veterans, trauma victims, and family members or caregivers.  · Look into volunteer opportunities. Taking part in these can help you feel more connected to your community.  · Contact a local organization to find out if you are eligible for a service dog.  Where to find more information  Go to this website to find more information about PTSD, treatment of PTSD, and how to get support:  · National Center for PTSD: www.ptsd.va.gov  Contact a health care provider if:  · Your symptoms get worse or do not get better.  Get help right away if:  · You have thoughts about hurting yourself or others.  If you ever feel like you may hurt yourself or others, or have thoughts about taking your own life, get help right away. You can go to your nearest emergency department or call:  · Your local emergency services (911 in the [place]  · A suicide crisis helpline, such as the National Suicide Prevention Lifeline at 1-813.118.5845. This is open 24-hours a day.  Summary  · If you are living with PTSD, there are ways to help you recover from it and manage your symptoms.  · Find supportive environments and people who understand PTSD. Spend time in those places, and maintain contact with  those people.  · Work with your health care team to create a plan for managing PTSD. The plan should include counseling, stress reduction techniques, and healthy lifestyle habits.  This information is not intended to replace advice given to you by your health care provider. Make sure you discuss any questions you have with your health care provider.  Document Released: 04/19/2018 Document Revised: 04/10/2020 Document Reviewed: 04/19/2018  Elsevier Patient Education © 2020 [employer]

## 2020-11-11 RX ORDER — ONDANSETRON 4 MG/1
4 TABLET, FILM COATED ORAL EVERY 8 HOURS PRN
Qty: 45 TABLET | Refills: 2 | Status: SHIPPED | OUTPATIENT
Start: 2020-11-11 | End: 2021-02-17

## 2020-11-20 ENCOUNTER — TELEPHONE (OUTPATIENT)
Dept: FAMILY MEDICINE CLINIC | Facility: CLINIC | Age: 60
End: 2020-11-20

## 2020-11-20 NOTE — TELEPHONE ENCOUNTER
Pt wanted to let you know that her nausea is slightly improved, but is still present.  She is also experiencing diarrhea now.

## 2020-11-20 NOTE — TELEPHONE ENCOUNTER
Ok, she just needs to try to limit use of zofran and she can back off on reglan to 1-2 times/day if has diarrhea, it will speed up motility of the gut.

## 2020-11-25 DIAGNOSIS — F43.12 CHRONIC POST-TRAUMATIC STRESS DISORDER (PTSD): ICD-10-CM

## 2020-11-25 RX ORDER — ALPRAZOLAM 0.5 MG/1
0.5 TABLET ORAL 3 TIMES DAILY PRN
Qty: 90 TABLET | Refills: 1 | Status: SHIPPED | OUTPATIENT
Start: 2020-11-25 | End: 2021-01-12 | Stop reason: SDUPTHER

## 2020-12-14 DIAGNOSIS — F43.12 CHRONIC POST-TRAUMATIC STRESS DISORDER (PTSD): ICD-10-CM

## 2020-12-16 RX ORDER — POTASSIUM CHLORIDE 1500 MG/1
TABLET, EXTENDED RELEASE ORAL
Qty: 180 TABLET | Refills: 0 | Status: SHIPPED | OUTPATIENT
Start: 2020-12-16 | End: 2021-03-22

## 2020-12-16 RX ORDER — HYDROXYZINE PAMOATE 25 MG/1
CAPSULE ORAL
Qty: 270 CAPSULE | Refills: 0 | Status: SHIPPED | OUTPATIENT
Start: 2020-12-16 | End: 2021-02-23

## 2021-01-06 DIAGNOSIS — F51.04 PSYCHOPHYSIOLOGICAL INSOMNIA: ICD-10-CM

## 2021-01-06 RX ORDER — ESZOPICLONE 2 MG/1
2 TABLET, FILM COATED ORAL NIGHTLY PRN
Qty: 30 TABLET | Refills: 0 | Status: SHIPPED | OUTPATIENT
Start: 2021-01-06 | End: 2021-01-12 | Stop reason: SDUPTHER

## 2021-01-07 DIAGNOSIS — F43.12 CHRONIC POST-TRAUMATIC STRESS DISORDER (PTSD): ICD-10-CM

## 2021-01-07 DIAGNOSIS — F33.2 SEVERE EPISODE OF RECURRENT MAJOR DEPRESSIVE DISORDER, WITHOUT PSYCHOTIC FEATURES (HCC): ICD-10-CM

## 2021-01-07 RX ORDER — RISPERIDONE 1 MG/1
TABLET ORAL
Qty: 90 TABLET | Refills: 1 | Status: SHIPPED | OUTPATIENT
Start: 2021-01-07 | End: 2021-01-12

## 2021-01-12 ENCOUNTER — OFFICE VISIT (OUTPATIENT)
Dept: PSYCHIATRY | Facility: CLINIC | Age: 61
End: 2021-01-12

## 2021-01-12 DIAGNOSIS — F43.12 CHRONIC POST-TRAUMATIC STRESS DISORDER (PTSD): Chronic | ICD-10-CM

## 2021-01-12 DIAGNOSIS — F33.2 SEVERE EPISODE OF RECURRENT MAJOR DEPRESSIVE DISORDER, WITHOUT PSYCHOTIC FEATURES (HCC): Chronic | ICD-10-CM

## 2021-01-12 DIAGNOSIS — F51.04 PSYCHOPHYSIOLOGICAL INSOMNIA: Chronic | ICD-10-CM

## 2021-01-12 PROCEDURE — 99214 OFFICE O/P EST MOD 30 MIN: CPT | Performed by: PSYCHIATRY & NEUROLOGY

## 2021-01-12 RX ORDER — ESZOPICLONE 2 MG/1
2 TABLET, FILM COATED ORAL NIGHTLY PRN
Qty: 30 TABLET | Refills: 2 | Status: SHIPPED | OUTPATIENT
Start: 2021-01-12 | End: 2021-02-09

## 2021-01-12 RX ORDER — ALPRAZOLAM 0.5 MG/1
0.5 TABLET ORAL 3 TIMES DAILY PRN
Qty: 90 TABLET | Refills: 2 | Status: SHIPPED | OUTPATIENT
Start: 2021-01-12 | End: 2021-04-20

## 2021-01-12 RX ORDER — ARIPIPRAZOLE 5 MG/1
5 TABLET ORAL DAILY
Qty: 30 TABLET | Refills: 2 | Status: SHIPPED | OUTPATIENT
Start: 2021-01-12 | End: 2021-02-07

## 2021-01-12 RX ORDER — CITALOPRAM 40 MG/1
40 TABLET ORAL EVERY MORNING
Qty: 90 TABLET | Refills: 1 | Status: SHIPPED | OUTPATIENT
Start: 2021-01-12 | End: 2021-04-14 | Stop reason: SDUPTHER

## 2021-01-12 NOTE — PROGRESS NOTES
Subjective   Jasmine Cerda is a 60 y.o. female who presents today for follow up     Chief Complaint:  Depression anxiety     History of Present Illness:   The pt suffers from depression for a long time, was on multiple meds, severe  Anxiety, worse recently due to family situation ,  son is in recovery (11 months clean)  now and doing well , relationship issues with her  who does not support their son.  Today the pt reported feeling slightly better, her  let her son in the house for the holidays , feels some relief. She was started on fluoxetine last time, developed side effects   lunesta is effective for sleep    depression is rated as 7/10,      When depressed -  decreased E level. Poor motivations, low drives   denied AVH/SI/HI   anxiety is pretty intense and persistent, unable to relax, meds are effective      Precipitating and Ameliorating Factors: covid   Alleviating factors - to spend time with her grand daughter         PAST PSYCHIATRIC HISTORY      Previous Psychiatric Diagnoses   Axis I: Anxiety/Panic Disorder     Past Hospitalizations or Residential Treatment   Locations\Providers: none      Past Outpatient Treatment   Diagnosis Treated: Anxiety/Panic Dis.  Treatment Type: Medication Management  Location: with PCP, Dr Manning      Prior Psychiatric Medications   Comments: xanax - effective      celexa- not effective   zoloft - not effective  cymbalta - not effective      ambien - side effects     Consequences of Mental Disorder   Consequences: emotional distress     SOCIAL HISTORY     Number of children: 2  Number of grandkids: 1  Current Relationship is: supportive  Family of Origin is: supportive  Comments: Patient has never smoked.  Passive Smoke: N  Alcohol Use: N  Drug Use: N  HIV/High Risk: N        Current Living Situation   Lives with: spouse     Education   Level: high school graduate     Employment   Job Status: disabled     Hobbies and Leisure Activities   Activity Type:  quiet activities     Smoking History   Smoking Hx: Never smoker     Exercise   Exercise sessions (per wk): 1     Illicit Drug Use   Illicit Drugs used: no     FAMILY HISTORY OF MENTAL DISORDERS   fh Grandparents: Non-contributory  fh Mother: Non-contributory  fh Father: Non-contributory  fh Siblings: Non-contributory  fh Other: Non-contributory  The following portions of the patient's history were reviewed and updated as appropriate: allergies, current medications, past family history, past medical history, past social history, past surgical history and problem list.            Interval History  Some improvement       Side Effects  prozac - GI       Past Medical History:  Past Medical History:   Diagnosis Date   • Anxiety    • Breast cyst    • CHF (congestive heart failure) (CMS/HCC)    • Coronary heart disease    • Depression    • Hyperlipidemia    • Hypertension        Social History:  Social History     Socioeconomic History   • Marital status:      Spouse name: Not on file   • Number of children: Not on file   • Years of education: Not on file   • Highest education level: Not on file   Tobacco Use   • Smoking status: Never Smoker   • Smokeless tobacco: Never Used   • Tobacco comment: Passive Smoke: N   Substance and Sexual Activity   • Alcohol use: No     Frequency: Never   • Drug use: No   • Sexual activity: Defer       Family History:  Family History   Problem Relation Age of Onset   • Colon cancer Mother        Past Surgical History:  Past Surgical History:   Procedure Laterality Date   • APPENDECTOMY     • BREAST CYST ASPIRATION     • CARDIAC CATHETERIZATION  2017    No Stents placed - [facility]   • CERVICAL FUSION      C6-C7   •  SECTION      x 2   • CHOLECYSTECTOMY     • HYSTERECTOMY         Problem List:  Patient Active Problem List   Diagnosis   • Chronic post-traumatic stress disorder (PTSD)   • Severe episode of recurrent major depressive disorder (CMS/HCC)   • Asthma   • Chronic low back  pain   • Congestive heart failure (CMS/HCC)   • Coronary heart disease   • Depression   • Degeneration of intervertebral disc of lumbosacral region   • Headache, temporal   • Psychophysiological insomnia   • Hyperlipidemia   • Hypertension       Allergy:   No Known Allergies     Discontinued Medications:  Medications Discontinued During This Encounter   Medication Reason   • risperiDONE (risperDAL) 1 MG tablet Not Efficacious   • ALPRAZolam (XANAX) 0.5 MG tablet Reorder   • eszopiclone (LUNESTA) 2 MG tablet Reorder   • citalopram (CeleXA) 40 MG tablet Reorder   • FLUoxetine (PROzac) 20 MG capsule Side effects       Current Medications:   Current Outpatient Medications   Medication Sig Dispense Refill   • albuterol (PROVENTIL) (2.5 MG/3ML) 0.083% nebulizer solution INHALE 1 VIAL VIA NEBULIZER DAILY AS NEEDED FOR WHEEZING 300 mL 1   • ALPRAZolam (XANAX) 0.5 MG tablet Take 1 tablet by mouth 3 (Three) Times a Day As Needed for Anxiety. 90 tablet 2   • ARIPiprazole (ABILIFY) 5 MG tablet Take 1 tablet by mouth Daily. 30 tablet 2   • aspirin 81 MG EC tablet ASPIRIN EC 81 MG TBEC     • bumetanide (BUMEX) 2 MG tablet Take 1 tablet by mouth 2 (Two) Times a Day. 180 tablet 3   • carvedilol (COREG) 25 MG tablet TAKE 1 TABLET TWICE DAILY 180 tablet 2   • citalopram (CeleXA) 40 MG tablet Take 1 tablet by mouth Every Morning. 90 tablet 1   • eszopiclone (LUNESTA) 2 MG tablet Take 1 tablet by mouth At Night As Needed for Sleep. Take immediately before bedtime 30 tablet 2   • gabapentin (NEURONTIN) 300 MG capsule Take 1 capsule by mouth 3 (Three) Times a Day. 270 capsule 1   • hydrOXYzine pamoate (VISTARIL) 25 MG capsule TAKE 1 CAPSULE BY MOUTH 3 (THREE) TIMES A DAY AS NEEDED FOR ANXIETY. 270 capsule 0   • icosapent ethyl (Vascepa) 1 g capsule capsule Take 2 g by mouth 2 (Two) Times a Day With Meals. 360 capsule 3   • KLOR-CON 20 MEQ CR tablet TAKE 1 TABLET BY MOUTH 2 (TWO) TIMES A DAY WITH BREAKFAST AND SUPPER 180 tablet 0   •  l-methylfolate 15 MG tablet tablet Take 1 tablet by mouth Daily. 30 tablet 2   • lisinopril (PRINIVIL,ZESTRIL) 10 MG tablet TAKE 1 TABLET BY MOUTH EVERY DAY 90 tablet 1   • metoclopramide (REGLAN) 10 MG tablet TAKE 1 TABLET BY MOUTH 3 (THREE) TIMES A DAY BEFORE MEALS. 90 tablet 4   • Multiple Vitamins-Minerals (MULTIVITAMIN ADULT) tablet Daily.     • nitroglycerin (NITROSTAT) 0.4 MG SL tablet NITROSTAT 0.4 MG SUBL     • ondansetron (ZOFRAN) 4 MG tablet TAKE 1 TABLET BY MOUTH EVERY 8 (EIGHT) HOURS AS NEEDED FOR NAUSEA OR VOMITING. 45 tablet 2   • pantoprazole (PROTONIX) 40 MG EC tablet Take 40 mg by mouth Daily.  1   • rosuvastatin (CRESTOR) 40 MG tablet TAKE 1 TABLET EVERY EVENING 90 tablet 2   • SYMBICORT 160-4.5 MCG/ACT inhaler INHALE 2 PUFFS BY MOUTH TWICE DAILY 30.6 inhaler 1   • vitamin D (ERGOCALCIFEROL) 1.25 MG (76900 UT) capsule capsule Take 1 capsule by mouth 1 (One) Time Per Week. 5 capsule 11     No current facility-administered medications for this visit.          Review of Symptoms:    Psychiatric/Behavioral: Negative for agitation, behavioral problems, confusion, decreased concentration, dysphoric mood, hallucinations, self-injury, sleep disturbance and suicidal ideas.   The patient is  Less  depressed,  Still very  nervous/anxious and is not hyperactive.        Physical Exam:   There were no vitals taken for this visit.    Mental Status Exam:   Hygiene:   good  Cooperation:  Cooperative  Eye Contact:  Good  Psychomotor Behavior:  Appropriate  Affect:  Appropriate  Mood: depressed,  Anxious    Hopelessness: Denies  Speech:  Normal  Thought Process:  Goal directed and Linear  Thought Content:  Normal  Suicidal:  None  Homicidal:  None  Hallucinations:  None  Delusion:  None  Memory:  Intact  Orientation:  Person, Place, Time and Situation  Reliability:  fair  Insight:  Good  Judgement:  Good  Impulse Control:  Good  Physical/Medical Issues:  Yes GI issues        MSE from 11/6/2020  reviewed and accepted  with changes     PHQ-9 Score:  PHQ-9 Score:  9         Never smoker    I advised Jasmine of the risks of tobacco use.     Lab Results:   No visits with results within 3 Month(s) from this visit.   Latest known visit with results is:   Office Visit on 08/12/2020   Component Date Value Ref Range Status   • Total Cholesterol 08/12/2020 200  0 - 200 mg/dL Final   • Triglycerides 08/12/2020 429* 0 - 150 mg/dL Final   • HDL Cholesterol 08/12/2020 50  40 - 60 mg/dL Final   • LDL Cholesterol  08/12/2020    Final    Unable to calculate   • VLDL Cholesterol 08/12/2020    Final    Unable to calculate   • LDL/HDL Ratio 08/12/2020    Final    Unable to calculate   • Glucose 08/12/2020 91  65 - 99 mg/dL Final   • BUN 08/12/2020 13  8 - 23 mg/dL Final   • Creatinine 08/12/2020 1.11* 0.57 - 1.00 mg/dL Final   • Sodium 08/12/2020 142  136 - 145 mmol/L Final   • Potassium 08/12/2020 4.3  3.5 - 5.2 mmol/L Final   • Chloride 08/12/2020 101  98 - 107 mmol/L Final   • CO2 08/12/2020 25.8  22.0 - 29.0 mmol/L Final   • Calcium 08/12/2020 9.9  8.6 - 10.5 mg/dL Final   • Total Protein 08/12/2020 7.0  6.0 - 8.5 g/dL Final   • Albumin 08/12/2020 4.50  3.50 - 5.20 g/dL Final   • ALT (SGPT) 08/12/2020 29  1 - 33 U/L Final   • AST (SGOT) 08/12/2020 22  1 - 32 U/L Final   • Alkaline Phosphatase 08/12/2020 61  39 - 117 U/L Final   • Total Bilirubin 08/12/2020 0.3  0.0 - 1.2 mg/dL Final   • eGFR Non African Amer 08/12/2020 50* >60 mL/min/1.73 Final   • Globulin 08/12/2020 2.5  gm/dL Final   • A/G Ratio 08/12/2020 1.8  g/dL Final   • BUN/Creatinine Ratio 08/12/2020 11.7  7.0 - 25.0 Final   • Anion Gap 08/12/2020 15.2* 5.0 - 15.0 mmol/L Final   • WBC 08/12/2020 6.68  3.40 - 10.80 10*3/mm3 Final   • RBC 08/12/2020 4.30  3.77 - 5.28 10*6/mm3 Final   • Hemoglobin 08/12/2020 13.5  12.0 - 15.9 g/dL Final   • Hematocrit 08/12/2020 38.8  34.0 - 46.6 % Final   • MCV 08/12/2020 90.2  79.0 - 97.0 fL Final   • MCH 08/12/2020 31.4  26.6 - 33.0 pg Final   • MCHC  08/12/2020 34.8  31.5 - 35.7 g/dL Final   • RDW 08/12/2020 12.6  12.3 - 15.4 % Final   • RDW-SD 08/12/2020 41.2  37.0 - 54.0 fl Final   • MPV 08/12/2020 10.1  6.0 - 12.0 fL Final   • Platelets 08/12/2020 278  140 - 450 10*3/mm3 Final   • TSH 08/12/2020 0.918  0.270 - 4.200 uIU/mL Final   • Hemoglobin A1C 08/12/2020 5.3  3.5 - 5.6 % Final   • 25 Hydroxy, Vitamin D 08/12/2020 25.0* 30.0 - 100.0 ng/ml Final   • Vitamin B-12 08/12/2020 573  211 - 946 pg/mL Final   • Hepatitis C Ab 08/12/2020 Non-Reactive  Non-Reactive Final   • LDL Cholesterol  08/12/2020 91  0 - 100 mg/dL Final       Assessment/Plan   Problems Addressed this Visit        Other    Chronic post-traumatic stress disorder (PTSD) (Chronic)    Relevant Medications    ARIPiprazole (ABILIFY) 5 MG tablet    eszopiclone (LUNESTA) 2 MG tablet    ALPRAZolam (XANAX) 0.5 MG tablet    citalopram (CeleXA) 40 MG tablet    Severe episode of recurrent major depressive disorder (CMS/HCC) (Chronic)    Relevant Medications    ARIPiprazole (ABILIFY) 5 MG tablet    eszopiclone (LUNESTA) 2 MG tablet    ALPRAZolam (XANAX) 0.5 MG tablet    citalopram (CeleXA) 40 MG tablet    Psychophysiological insomnia (Chronic)    Relevant Medications    eszopiclone (LUNESTA) 2 MG tablet      Diagnoses       Codes Comments    Severe episode of recurrent major depressive disorder, without psychotic features (CMS/HCC)     ICD-10-CM: F33.2  ICD-9-CM: 296.33     Chronic post-traumatic stress disorder (PTSD)     ICD-10-CM: F43.12  ICD-9-CM: 309.81     Psychophysiological insomnia     ICD-10-CM: F51.04  ICD-9-CM: 307.42           Visit Diagnoses:    ICD-10-CM ICD-9-CM   1. Severe episode of recurrent major depressive disorder, without psychotic features (CMS/HCC)  F33.2 296.33   2. Chronic post-traumatic stress disorder (PTSD)  F43.12 309.81   3. Psychophysiological insomnia  F51.04 307.42       TREATMENT PLAN/GOALS: Continue supportive psychotherapy efforts and medications as indicated. Treatment  and medication options discussed during today's visit. Patient ackowledged and verbally consented to continue with current treatment plan and was educated on the importance of compliance with treatment and follow-up appointments.    MEDICATION ISSUES:  1. MDD - back to celexa, did not tolerate prozac,  D/c risperidone, start abilify 5 mg, cont deplin       2. PTSD - cont celexa , Xanax - low dose 0.5 mg TID, alternating with vistaril PRN , long term benzo use discussed again  3. Insomnia - cont lunesta     supportive therapy,     INSPECT reviewed as expected, last xanax refill was on 12/25/2020     Discussed medication options and treatment plan of prescribed medication as well as the risks, benefits, and side effects including potential falls, possible impaired driving and metabolic adversities among others. Patient is agreeable to call the office with any worsening of symptoms or onset of side effects. Patient is agreeable to call 911 or go to the nearest ER should he/she begin having SI/HI. No medication side effects or related complaints today.     MEDS ORDERED DURING VISIT:  New Medications Ordered This Visit   Medications   • ARIPiprazole (ABILIFY) 5 MG tablet     Sig: Take 1 tablet by mouth Daily.     Dispense:  30 tablet     Refill:  2   • eszopiclone (LUNESTA) 2 MG tablet     Sig: Take 1 tablet by mouth At Night As Needed for Sleep. Take immediately before bedtime     Dispense:  30 tablet     Refill:  2     Not to exceed 4 additional fills before 05/06/2021   • ALPRAZolam (XANAX) 0.5 MG tablet     Sig: Take 1 tablet by mouth 3 (Three) Times a Day As Needed for Anxiety.     Dispense:  90 tablet     Refill:  2     Please dispense on or after Jan 20, 2021   • citalopram (CeleXA) 40 MG tablet     Sig: Take 1 tablet by mouth Every Morning.     Dispense:  90 tablet     Refill:  1       Return in about 3 months (around 4/12/2021).         This document has been electronically signed by Sivan Ken,  MD  January 12, 2021 08:18 EST

## 2021-01-27 RX ORDER — FLUOXETINE HYDROCHLORIDE 20 MG/1
CAPSULE ORAL
Qty: 90 CAPSULE | OUTPATIENT
Start: 2021-01-27

## 2021-02-05 RX ORDER — LEVOMEFOLATE CALCIUM 15 MG
TABLET ORAL
Qty: 90 TABLET | Refills: 1 | Status: SHIPPED | OUTPATIENT
Start: 2021-02-05 | End: 2021-08-31 | Stop reason: SDUPTHER

## 2021-02-06 DIAGNOSIS — F33.2 SEVERE EPISODE OF RECURRENT MAJOR DEPRESSIVE DISORDER, WITHOUT PSYCHOTIC FEATURES (HCC): Chronic | ICD-10-CM

## 2021-02-07 RX ORDER — ARIPIPRAZOLE 5 MG/1
TABLET ORAL
Qty: 30 TABLET | Refills: 2 | Status: SHIPPED | OUTPATIENT
Start: 2021-02-07 | End: 2021-04-14 | Stop reason: SDUPTHER

## 2021-02-08 DIAGNOSIS — F51.04 PSYCHOPHYSIOLOGICAL INSOMNIA: Chronic | ICD-10-CM

## 2021-02-09 RX ORDER — ESZOPICLONE 2 MG/1
2 TABLET, FILM COATED ORAL NIGHTLY PRN
Qty: 30 TABLET | Refills: 2 | Status: SHIPPED | OUTPATIENT
Start: 2021-02-09 | End: 2021-03-30

## 2021-02-17 RX ORDER — ONDANSETRON 4 MG/1
TABLET, FILM COATED ORAL
Qty: 45 TABLET | Refills: 0 | Status: SHIPPED | OUTPATIENT
Start: 2021-02-17 | End: 2021-03-17

## 2021-02-17 NOTE — TELEPHONE ENCOUNTER
LOV 8/12/20  Cancelled appt on 2/11/21 with no rescheduled follow up    Last refill 11/11/20 #45 with 2 refills

## 2021-02-22 DIAGNOSIS — F43.12 CHRONIC POST-TRAUMATIC STRESS DISORDER (PTSD): ICD-10-CM

## 2021-02-23 RX ORDER — HYDROXYZINE PAMOATE 25 MG/1
CAPSULE ORAL
Qty: 270 CAPSULE | Refills: 0 | Status: SHIPPED | OUTPATIENT
Start: 2021-02-23 | End: 2021-05-05

## 2021-02-28 DIAGNOSIS — F43.12 CHRONIC POST-TRAUMATIC STRESS DISORDER (PTSD): ICD-10-CM

## 2021-03-01 RX ORDER — GABAPENTIN 300 MG/1
CAPSULE ORAL
Qty: 270 CAPSULE | Refills: 1 | Status: SHIPPED | OUTPATIENT
Start: 2021-03-01 | End: 2021-04-14 | Stop reason: SDUPTHER

## 2021-03-01 RX ORDER — BUMETANIDE 2 MG/1
TABLET ORAL
Qty: 180 TABLET | Refills: 0 | Status: SHIPPED | OUTPATIENT
Start: 2021-03-01 | End: 2021-04-27

## 2021-03-17 RX ORDER — ONDANSETRON 4 MG/1
TABLET, FILM COATED ORAL
Qty: 45 TABLET | Refills: 0 | Status: SHIPPED | OUTPATIENT
Start: 2021-03-17 | End: 2021-03-30 | Stop reason: SDUPTHER

## 2021-03-17 NOTE — TELEPHONE ENCOUNTER
LOV 8/12/20  Next OV 3/30/21    Last refill 2/17/21 #45 with no refills. Pt is scheduled to see Jessica for a 6 month follow up because she could not get in over spring break due to her schedule.

## 2021-03-22 RX ORDER — METOCLOPRAMIDE 10 MG/1
10 TABLET ORAL
Qty: 90 TABLET | Refills: 0 | Status: SHIPPED | OUTPATIENT
Start: 2021-03-22 | End: 2021-05-24

## 2021-03-22 RX ORDER — POTASSIUM CHLORIDE 1500 MG/1
TABLET, EXTENDED RELEASE ORAL
Qty: 180 TABLET | Refills: 0 | Status: SHIPPED | OUTPATIENT
Start: 2021-03-22 | End: 2021-05-24

## 2021-03-22 NOTE — TELEPHONE ENCOUNTER
LOV 8/12/20  Next OV 3/30/21    Last refill:  Metoclopramide 10/28/20 #90 with 4 refills  klor-con 12/16/20 #180 with no refills

## 2021-03-30 ENCOUNTER — OFFICE VISIT (OUTPATIENT)
Dept: FAMILY MEDICINE CLINIC | Facility: CLINIC | Age: 61
End: 2021-03-30

## 2021-03-30 VITALS
BODY MASS INDEX: 35.17 KG/M2 | SYSTOLIC BLOOD PRESSURE: 142 MMHG | OXYGEN SATURATION: 97 % | HEIGHT: 64 IN | WEIGHT: 206 LBS | HEART RATE: 65 BPM | DIASTOLIC BLOOD PRESSURE: 80 MMHG | TEMPERATURE: 97.3 F

## 2021-03-30 DIAGNOSIS — E78.2 MIXED HYPERLIPIDEMIA: ICD-10-CM

## 2021-03-30 DIAGNOSIS — R53.83 OTHER FATIGUE: ICD-10-CM

## 2021-03-30 DIAGNOSIS — E55.9 VITAMIN D DEFICIENCY: Primary | ICD-10-CM

## 2021-03-30 DIAGNOSIS — R73.9 HYPERGLYCEMIA, UNSPECIFIED: ICD-10-CM

## 2021-03-30 DIAGNOSIS — R11.0 NAUSEA: ICD-10-CM

## 2021-03-30 DIAGNOSIS — Z00.00 PREVENTATIVE HEALTH CARE: ICD-10-CM

## 2021-03-30 PROCEDURE — 36415 COLL VENOUS BLD VENIPUNCTURE: CPT | Performed by: NURSE PRACTITIONER

## 2021-03-30 PROCEDURE — 82607 VITAMIN B-12: CPT | Performed by: NURSE PRACTITIONER

## 2021-03-30 PROCEDURE — 80053 COMPREHEN METABOLIC PANEL: CPT | Performed by: NURSE PRACTITIONER

## 2021-03-30 PROCEDURE — 85027 COMPLETE CBC AUTOMATED: CPT | Performed by: NURSE PRACTITIONER

## 2021-03-30 PROCEDURE — 82306 VITAMIN D 25 HYDROXY: CPT | Performed by: NURSE PRACTITIONER

## 2021-03-30 PROCEDURE — 84443 ASSAY THYROID STIM HORMONE: CPT | Performed by: NURSE PRACTITIONER

## 2021-03-30 PROCEDURE — 99214 OFFICE O/P EST MOD 30 MIN: CPT | Performed by: NURSE PRACTITIONER

## 2021-03-30 PROCEDURE — 80061 LIPID PANEL: CPT | Performed by: NURSE PRACTITIONER

## 2021-03-30 PROCEDURE — 83036 HEMOGLOBIN GLYCOSYLATED A1C: CPT | Performed by: NURSE PRACTITIONER

## 2021-03-30 RX ORDER — ONDANSETRON 4 MG/1
4 TABLET, FILM COATED ORAL EVERY 8 HOURS PRN
Qty: 45 TABLET | Refills: 0 | Status: SHIPPED | OUTPATIENT
Start: 2021-03-30 | End: 2021-04-20

## 2021-03-30 RX ORDER — PANTOPRAZOLE SODIUM 40 MG/1
40 TABLET, DELAYED RELEASE ORAL DAILY
Qty: 90 TABLET | Refills: 1 | Status: SHIPPED | OUTPATIENT
Start: 2021-03-30 | End: 2021-09-20

## 2021-03-30 NOTE — ASSESSMENT & PLAN NOTE
1.  Check labs  2.  If negative, order sleep study  3.  Advised discussing medications with psych as this may be a side effect

## 2021-03-30 NOTE — PROGRESS NOTES
Chief Complaint  Follow-up (6 month follow up needing refills on nausea medication)    Subjective          Jasmine Cerda presents to [facility]     Hyperlipidemia  This is a chronic problem. Lipid results: triglycerides previously elevated. Associated symptoms include shortness of breath. Pertinent negatives include no chest pain. Current antihyperlipidemic treatment includes statins (fish oil).   Hypertension  This is a chronic problem. The problem is controlled (Patient reports BP ranges 120s-130s/70s-80s at home). Associated symptoms include shortness of breath. Pertinent negatives include no blurred vision, chest pain or headaches. Current antihypertension treatment includes beta blockers and ACE inhibitors. The current treatment provides moderate improvement.   COPD  She complains of cough and shortness of breath. There is no difficulty breathing or wheezing. This is a chronic problem. The problem has been waxing and waning. Associated symptoms include dyspnea on exertion. Pertinent negatives include no chest pain or headaches. Her symptoms are alleviated by beta-agonist (Taking Symbicort BID and neb treatments PRN, typically twie daily). She reports moderate improvement on treatment.     Vitamin D Deficiency: Patient started on vitamin D supplementation last August due to low vitamin D levels.    Fatigue: Patient c/o increasing fatigue over the last month. She is only sleeping for couple of hours at a time. Patient does not take any OTC sleep medications. She has daytime fatigue. Patient states that  reports snoring.     Nausea: Patient complaining of moderate to severe nausea, worse every morning.  Patient has a history of GERD but Protonix has not been refilled in a while, reason unknown.  Patient denies abdominal pain, fever, vomiting, diarrhea or constipation.    Objective   Vital Signs:   /80 (BP Location: Left arm, Patient Position: Sitting, Cuff Size: Adult)   " Pulse 65   Temp 97.3 °F (36.3 °C) (Skin)   Ht 162.6 cm (64\")   Wt 93.4 kg (206 lb)   SpO2 97%   BMI 35.36 kg/m²     Physical Exam  Constitutional:       Appearance: Normal appearance.   HENT:      Head: Normocephalic.   Cardiovascular:      Rate and Rhythm: Normal rate and regular rhythm.   Pulmonary:      Effort: Pulmonary effort is normal.      Breath sounds: Normal breath sounds.   Abdominal:      General: Abdomen is flat. Bowel sounds are normal.      Palpations: Abdomen is soft.   Musculoskeletal:         General: Normal range of motion.      Cervical back: Neck supple.      Right lower leg: No edema.      Left lower leg: No edema.   Skin:     General: Skin is warm and dry.   Neurological:      Mental Status: She is alert and oriented to person, place, and time.      Gait: Gait is intact.   Psychiatric:         Attention and Perception: Attention normal.         Mood and Affect: Mood normal.         Speech: Speech normal.        Result Review :     CMP    CMP 8/12/20   Glucose 91   BUN 13   Creatinine 1.11 (A)   eGFR Non African Am 50 (A)   Sodium 142   Potassium 4.3   Chloride 101   Calcium 9.9   Albumin 4.50   Total Bilirubin 0.3   Alkaline Phosphatase 61   AST (SGOT) 22   ALT (SGPT) 29   (A) Abnormal value            CBC    CBC 8/12/20   WBC 6.68   RBC 4.30   Hemoglobin 13.5   Hematocrit 38.8   MCV 90.2   MCH 31.4   MCHC 34.8   RDW 12.6   Platelets 278           Lipid Panel    Lipid Panel 8/12/20 8/12/20    1432 1432   Total Cholesterol 200    Triglycerides 429 (A)    HDL Cholesterol 50    VLDL Cholesterol     LDL Cholesterol   91   LDL/HDL Ratio     (A) Abnormal value       Comments are available for some flowsheets but are not being displayed.           TSH    TSH 8/12/20   TSH 0.918           Most Recent A1C    HGBA1C Most Recent 8/12/20   Hemoglobin A1C 5.3                     Assessment and Plan    Diagnoses and all orders for this visit:    1. Vitamin D deficiency (Primary)  Assessment & " Plan:  1.  Continue vitamin D weekly  2.  Repeat vitamin D level    Orders:  -     Vitamin D 25 Hydroxy    2. Mixed hyperlipidemia  Assessment & Plan:  1.  Continue Crestor and fish oil  2.  Repeat lipids today    Orders:  -     CBC (No Diff)  -     Comprehensive Metabolic Panel  -     Lipid Panel    3. Other fatigue  Assessment & Plan:  1.  Check labs  2.  If negative, order sleep study  3.  Advised discussing medications with psych as this may be a side effect    Orders:  -     Vitamin B12  -     TSH  -     Hemoglobin A1c    4. Preventative health care  Assessment & Plan:  1.  Pneumovax ordered    Orders:  -     pneumococcal polysaccharide 23-valent (PNEUMOVAX-23) vaccine 0.5 mL    5. Hyperglycemia, unspecified   Assessment & Plan:  1.  Repeat A1c due to increasing fatigue    Orders:  -     Hemoglobin A1c    6. Nausea  Assessment & Plan:  1.  Consistent with GERD  2.  Start Protonix 40 mg daily  3.  Refilled Zofran, to be taken as needed      Other orders  -     pantoprazole (PROTONIX) 40 MG EC tablet; Take 1 tablet by mouth Daily.  Dispense: 90 tablet; Refill: 1  -     ondansetron (ZOFRAN) 4 MG tablet; Take 1 tablet by mouth Every 8 (Eight) Hours As Needed for Nausea or Vomiting.  Dispense: 45 tablet; Refill: 0    I spent 30 minutes caring for Jasmine on this date of service. This time includes time spent by me in the following activities:preparing for the visit, reviewing tests, obtaining and/or reviewing a separately obtained history, performing a medically appropriate examination and/or evaluation , counseling and educating the patient/family/caregiver, ordering medications, tests, or procedures and documenting information in the medical record  Follow Up   Return in about 3 months (around 6/30/2021).  Patient was given instructions and counseling regarding her condition or for health maintenance advice. Please see specific information pulled into the AVS if appropriate.

## 2021-03-30 NOTE — ASSESSMENT & PLAN NOTE
1.  Consistent with GERD  2.  Start Protonix 40 mg daily  3.  Refilled Zofran, to be taken as needed

## 2021-03-31 ENCOUNTER — TELEPHONE (OUTPATIENT)
Dept: FAMILY MEDICINE CLINIC | Facility: CLINIC | Age: 61
End: 2021-03-31

## 2021-03-31 LAB
25(OH)D3 SERPL-MCNC: 51.8 NG/ML (ref 30–100)
ALBUMIN SERPL-MCNC: 4.6 G/DL (ref 3.5–5.2)
ALBUMIN/GLOB SERPL: 2 G/DL
ALP SERPL-CCNC: 64 U/L (ref 39–117)
ALT SERPL W P-5'-P-CCNC: 21 U/L (ref 1–33)
ANION GAP SERPL CALCULATED.3IONS-SCNC: 10.7 MMOL/L (ref 5–15)
AST SERPL-CCNC: 17 U/L (ref 1–32)
BILIRUB SERPL-MCNC: 0.3 MG/DL (ref 0–1.2)
BUN SERPL-MCNC: 27 MG/DL (ref 8–23)
BUN/CREAT SERPL: 23.7 (ref 7–25)
CALCIUM SPEC-SCNC: 9.7 MG/DL (ref 8.6–10.5)
CHLORIDE SERPL-SCNC: 100 MMOL/L (ref 98–107)
CHOLEST SERPL-MCNC: 155 MG/DL (ref 0–200)
CO2 SERPL-SCNC: 28.3 MMOL/L (ref 22–29)
CREAT SERPL-MCNC: 1.14 MG/DL (ref 0.57–1)
DEPRECATED RDW RBC AUTO: 42.1 FL (ref 37–54)
ERYTHROCYTE [DISTWIDTH] IN BLOOD BY AUTOMATED COUNT: 12.9 % (ref 12.3–15.4)
GFR SERPL CREATININE-BSD FRML MDRD: 48 ML/MIN/1.73
GLOBULIN UR ELPH-MCNC: 2.3 GM/DL
GLUCOSE SERPL-MCNC: 87 MG/DL (ref 65–99)
HBA1C MFR BLD: 5.2 % (ref 3.5–5.6)
HCT VFR BLD AUTO: 39.2 % (ref 34–46.6)
HDLC SERPL-MCNC: 58 MG/DL (ref 40–60)
HGB BLD-MCNC: 12.7 G/DL (ref 12–15.9)
LDLC SERPL CALC-MCNC: 61 MG/DL (ref 0–100)
LDLC/HDLC SERPL: 0.9 {RATIO}
MCH RBC QN AUTO: 28.8 PG (ref 26.6–33)
MCHC RBC AUTO-ENTMCNC: 32.4 G/DL (ref 31.5–35.7)
MCV RBC AUTO: 88.9 FL (ref 79–97)
PLATELET # BLD AUTO: 258 10*3/MM3 (ref 140–450)
PMV BLD AUTO: 9.8 FL (ref 6–12)
POTASSIUM SERPL-SCNC: 4.5 MMOL/L (ref 3.5–5.2)
PROT SERPL-MCNC: 6.9 G/DL (ref 6–8.5)
RBC # BLD AUTO: 4.41 10*6/MM3 (ref 3.77–5.28)
SODIUM SERPL-SCNC: 139 MMOL/L (ref 136–145)
TRIGL SERPL-MCNC: 223 MG/DL (ref 0–150)
TSH SERPL DL<=0.05 MIU/L-ACNC: 1.86 UIU/ML (ref 0.27–4.2)
VIT B12 BLD-MCNC: 341 PG/ML (ref 211–946)
VLDLC SERPL-MCNC: 36 MG/DL (ref 5–40)
WBC # BLD AUTO: 7.14 10*3/MM3 (ref 3.4–10.8)

## 2021-03-31 NOTE — PROGRESS NOTES
Labs look good. B12 on low end of normal at 341. If patient having increased fatigue, we could consider B12 injections vs oral B12. Patient's labs indicate dehydration. I would encourage increased water intake. Lastly, triglycerides are elevated. I want patient to start taking Fish Oil and we will repeat labs in 6 months.

## 2021-04-02 RX ORDER — CYANOCOBALAMIN 1000 UG/ML
1000 INJECTION, SOLUTION INTRAMUSCULAR; SUBCUTANEOUS
Qty: 1 ML | Refills: 5 | Status: SHIPPED | OUTPATIENT
Start: 2021-04-02 | End: 2021-07-20

## 2021-04-02 NOTE — TELEPHONE ENCOUNTER
Pt is asking if you would be willing to order a prescription for this because her son is an LPN and he can give them to her at home. She would need the medication along with the needles and syringes if this is ok.

## 2021-04-02 NOTE — TELEPHONE ENCOUNTER
PATIENT CALLED IN GAVE PATIENT THE RESULTS,  ACKNOWLEDGED, IS INTERESTED IN THE B12 INJECTIONS.    PLEASE ADVISE      [phone]

## 2021-04-06 RX ORDER — BUDESONIDE AND FORMOTEROL FUMARATE DIHYDRATE 160; 4.5 UG/1; UG/1
AEROSOL RESPIRATORY (INHALATION)
Qty: 30.6 G | Refills: 1 | Status: SHIPPED | OUTPATIENT
Start: 2021-04-06 | End: 2021-05-25 | Stop reason: SDUPTHER

## 2021-04-14 ENCOUNTER — OFFICE VISIT (OUTPATIENT)
Dept: PSYCHIATRY | Facility: CLINIC | Age: 61
End: 2021-04-14

## 2021-04-14 DIAGNOSIS — F43.12 CHRONIC POST-TRAUMATIC STRESS DISORDER (PTSD): Chronic | ICD-10-CM

## 2021-04-14 DIAGNOSIS — F33.2 SEVERE EPISODE OF RECURRENT MAJOR DEPRESSIVE DISORDER, WITHOUT PSYCHOTIC FEATURES (HCC): Primary | Chronic | ICD-10-CM

## 2021-04-14 DIAGNOSIS — F51.04 PSYCHOPHYSIOLOGICAL INSOMNIA: Chronic | ICD-10-CM

## 2021-04-14 PROCEDURE — 99214 OFFICE O/P EST MOD 30 MIN: CPT | Performed by: PSYCHIATRY & NEUROLOGY

## 2021-04-14 RX ORDER — TRAZODONE HYDROCHLORIDE 50 MG/1
TABLET ORAL
Qty: 60 TABLET | Refills: 2 | Status: SHIPPED | OUTPATIENT
Start: 2021-04-14 | End: 2021-05-06

## 2021-04-14 RX ORDER — CITALOPRAM 40 MG/1
40 TABLET ORAL EVERY MORNING
Qty: 90 TABLET | Refills: 1 | Status: SHIPPED | OUTPATIENT
Start: 2021-04-14 | End: 2021-05-18

## 2021-04-14 RX ORDER — ARIPIPRAZOLE 10 MG/1
10 TABLET ORAL DAILY
Qty: 90 TABLET | Refills: 1 | Status: SHIPPED | OUTPATIENT
Start: 2021-04-14 | End: 2021-08-31 | Stop reason: SDUPTHER

## 2021-04-14 RX ORDER — GABAPENTIN 300 MG/1
300 CAPSULE ORAL 3 TIMES DAILY
Qty: 270 CAPSULE | Refills: 1 | Status: SHIPPED | OUTPATIENT
Start: 2021-04-14 | End: 2021-09-27

## 2021-04-14 NOTE — PATIENT INSTRUCTIONS
Post-Traumatic Stress Disorder, Adult  Post-traumatic stress disorder (PTSD) is a mental health disorder that can occur after a traumatic event, such as a threat to life, serious injury, or sexual violence. Sometimes, PTSD can occur in people who hear about trauma that occurs to a close family member or friend. PTSD can happen to anyone at any age.  What are the causes?  The condition may be caused by experiencing a traumatic event.  What increases the risk?  This condition is more likely to occur in:  ·  servicemen and servicewomen.  · People who are in circumstances where their lives are threatened.  · People who have been the victim of, or witness to, a traumatic event, such as:  ? Domestic violence.  ? Physical or sexual abuse.  ? Rape.  ? A terrorist act or gun violence.  ? Natural disasters.  ? Accidents involving serious injury.  What are the signs or symptoms?  PTSD symptoms may start soon after a frightening event or months or years later. Symptoms last at least one month and tend to disrupt relationships, work, and daily activities. Symptoms of PTSD can be grouped into several categories.  Intrusive symptoms  This is when you re-experience the physical and emotional sensations of the traumatic event through one or more of the following ways:  · Having upsetting dreams.  · Feeling fear, horror, intense sadness, or anger in response to a reminder of the trauma.  · Having unwanted upsetting memories while awake.  · Having physical reactions triggered by reminders of the trauma, such as increased heart rate, shortness of breath, sweating, and shaking.  · Having flashbacks, or feeling like you are going through the event again.  Avoidance symptoms  This is when you avoid anything that reminds you of the trauma. Symptoms may also include:  · Losing interest or not participating in daily activities.  · Feeling disconnected from or avoiding other people.  · Isolating yourself.  Increased arousal  symptoms  You may have physical or emotional reactions triggered by your environment. Symptoms may include:  · Being easily startled.  · Behaving in a careless or self-destructive way.  · Becoming easily irritated.  · Feeling worried and nervous.  · Having trouble concentrating.  · Yelling at or hitting other people or objects.  · Having trouble sleeping.  Negative mood and thoughts  · Believing that you or others are bad.  · Feeling fear, horror, anger, sadness, guilt, or shame regularly.  · Not being able to remember certain parts of the traumatic event.  · Blaming yourself or others for the trauma.  · Being unable to experience positive emotions, such as happiness or love.  How is this diagnosed?  PTSD is diagnosed through an assessment by a mental health professional. You will be asked questions about your symptoms.  How is this treated?  Treatment for this condition may include any of the following or a combination:  · Taking medicines to reduce PTSD symptoms.  · Having counseling with a mental health professional or therapist who is experienced in treating PTSD.  · Doing eye movement desensitization and reprocessing therapy (EMDR). This type of therapy occurs with a specialized therapist.  If you have other mental health concerns, these conditions will also be treated.  Follow these instructions at home:  Lifestyle  · Find a support group in your community. Groups are often available for  veterans, trauma victims, and family members or caregivers.  · Try to get 7-9 hours of sleep each night. To help with sleep:  ? Keep your bedroom cool and dark.  ? Do not eat a heavy meal within 1 hour of bedtime.  ? Do not drink alcohol or caffeinated drinks before bed.  ? Avoid screen time, such as television, computers, tablets, or mobile phones, before bed.  · Do not use illegal drugs.  · Contact a local organization to find out if you are eligible for a service dog.  Activity  · Exercise regularly. Try to do at  least 30 minutes of physical activity most days of the week.  · Practice self-calming through:  ? Breathing exercises.  ? Meditation.  ? Yoga.  ? Listening to quiet music.  · Do not isolate yourself. Make connections with other people.  · Consider volunteering. Volunteering can help you feel more connected.  Eating and drinking  · Do not drink alcohol if:  ? Your health care provider tells you not to drink.  ? You are pregnant, may be pregnant, or are planning to become pregnant.  · If you drink alcohol:  ? Limit how much you use to:  § 0-1 drink a day for women.  § 0-2 drinks a day for men.  ? Be aware of how much alcohol is in your drink. In the [place] one drink equals one 12 oz bottle of beer (355 mL), one 5 oz glass of wine (148 mL), or one 1½ oz glass of hard liquor (44 mL).  General instructions  · Take steps to help yourself feel safer at home, such as by installing a security system.  · Work with a health care provider or therapist to help manage your symptoms.  · Take over-the-counter and prescription medicines as told by your health care provider.  · Let others know that you have PTSD and the things that may trigger symptoms. This can protect you and help them understand you better.  · If your PTSD is affecting your marriage or family, seek help from a family therapist.  · Make sure to let all of your health care providers know you have PTSD. This is especially important if you are having surgery or need to be admitted to the hospital.  · Keep all follow-up visits as told by your health care provider. This is important.  Contact a health care provider if:  · Your symptoms do not get better.  · You are feeling overwhelmed by your symptoms.  Get help right away if:  · You have thoughts of hurting yourself or others.  If you ever feel like you may hurt yourself or others, or have thoughts about taking your own life, get help right away. You can go to your nearest emergency department or call:  · Your local  emergency services (911 in the [place]  · A suicide crisis helpline, such as the National Suicide Prevention Lifeline at 1-624.549.3951. This is open 24 hours a day.  Summary  · Post-traumatic stress disorder (PTSD) is a mental health disorder that can occur after a traumatic event.  · Treatment for PTSD may include medicines, counseling, eye movement desensitization and reprocessing therapy (EMDR), or a combination of therapies.  · Find a support group in your community.  · Get help right away if you have thoughts of hurting yourself or others.  This information is not intended to replace advice given to you by your health care provider. Make sure you discuss any questions you have with your health care provider.  Document Revised: 02/27/2020 Document Reviewed: 02/27/2020  Elsevier Patient Education © 2021 [employer]

## 2021-04-14 NOTE — PROGRESS NOTES
Subjective   Jasmine Cerda is a 60 y.o. female who presents today for follow up     Chief Complaint:  Depression anxiety     History of Present Illness:   The pt suffers from depression for a long time, was on multiple meds    Today the pt reported feeling slightly better, abilify was effective, but her  is drinking more now and that makes her upset    pt's son started coming to the house      depression is rated as 6-7/10,      When depressed -  decreased E level. Poor motivations, low drives   denied AVH/SI/HI   anxiety is pretty intense and persistent, unable to relax, meds are effective      Precipitating and Ameliorating Factors: covid   Alleviating factors - to spend time with her grand daughter         PAST PSYCHIATRIC HISTORY      Previous Psychiatric Diagnoses   Axis I: Anxiety/Panic Disorder     Past Hospitalizations or Residential Treatment   Locations\Providers: none      Past Outpatient Treatment   Diagnosis Treated: Anxiety/Panic Dis.  Treatment Type: Medication Management  Location: with PCP, Dr Manning      Prior Psychiatric Medications   Comments: xanax - effective      celexa- not effective   zoloft - not effective  cymbalta - not effective      Prozac - GI sxs   ambien - side effects     Consequences of Mental Disorder   Consequences: emotional distress     SOCIAL HISTORY     Number of children: 2  Number of grandkids: 1  Current Relationship is: supportive  Family of Origin is: supportive  Comments: Patient has never smoked.  Passive Smoke: N  Alcohol Use: N  Drug Use: N  HIV/High Risk: N        Current Living Situation   Lives with: spouse     Education   Level: high school graduate     Employment   Job Status: disabled     Hobbies and Leisure Activities   Activity Type: quiet activities     Smoking History   Smoking Hx: Never smoker     Exercise   Exercise sessions (per wk): 1     Illicit Drug Use   Illicit Drugs used: no     FAMILY HISTORY OF MENTAL DISORDERS   fh Grandparents:  Non-contributory  fh Mother: Non-contributory  fh Father: Non-contributory  fh Siblings: Non-contributory  fh Other: Non-contributory  The following portions of the patient's history were reviewed and updated as appropriate: allergies, current medications, past family history, past medical history, past social history, past surgical history and problem list.            Interval History  Some improvement       Side Effects  Denied       Past Medical History:  Past Medical History:   Diagnosis Date   • Anxiety    • Breast cyst    • CHF (congestive heart failure) (CMS/HCC)    • Coronary heart disease    • Depression    • Hyperlipidemia    • Hypertension        Social History:  Social History     Socioeconomic History   • Marital status:      Spouse name: Not on file   • Number of children: Not on file   • Years of education: Not on file   • Highest education level: Not on file   Tobacco Use   • Smoking status: Never Smoker   • Smokeless tobacco: Never Used   • Tobacco comment: Passive Smoke: N   Vaping Use   • Vaping Use: Never used   Substance and Sexual Activity   • Alcohol use: No   • Drug use: No   • Sexual activity: Defer       Family History:  Family History   Problem Relation Age of Onset   • Colon cancer Mother        Past Surgical History:  Past Surgical History:   Procedure Laterality Date   • APPENDECTOMY     • BREAST CYST ASPIRATION     • CARDIAC CATHETERIZATION  2017    No Stents placed - [facility]   • CERVICAL FUSION      C6-C7   •  SECTION      x 2   • CHOLECYSTECTOMY     • HYSTERECTOMY         Problem List:  Patient Active Problem List   Diagnosis   • Chronic post-traumatic stress disorder (PTSD)   • Severe episode of recurrent major depressive disorder (CMS/HCC)   • Asthma   • Chronic low back pain   • Congestive heart failure (CMS/HCC)   • Coronary heart disease   • Depression   • Degeneration of intervertebral disc of lumbosacral region   • Headache, temporal   • Psychophysiological  insomnia   • Hyperlipidemia   • Hypertension   • Vitamin D deficiency   • Other fatigue   • Preventative health care   • Hyperglycemia, unspecified    • Nausea       Allergy:   No Known Allergies     Discontinued Medications:  Medications Discontinued During This Encounter   Medication Reason   • citalopram (CeleXA) 40 MG tablet Reorder   • ARIPiprazole (ABILIFY) 5 MG tablet Reorder   • gabapentin (NEURONTIN) 300 MG capsule Reorder       Current Medications:   Current Outpatient Medications   Medication Sig Dispense Refill   • albuterol (PROVENTIL) (2.5 MG/3ML) 0.083% nebulizer solution INHALE 1 VIAL VIA NEBULIZER DAILY AS NEEDED FOR WHEEZING 300 mL 1   • ALPRAZolam (XANAX) 0.5 MG tablet Take 1 tablet by mouth 3 (Three) Times a Day As Needed for Anxiety. 90 tablet 2   • ARIPiprazole (ABILIFY) 10 MG tablet Take 1 tablet by mouth Daily. 90 tablet 1   • aspirin 81 MG EC tablet ASPIRIN EC 81 MG TBEC     • bumetanide (BUMEX) 2 MG tablet TAKE 1 TABLET TWICE DAILY 180 tablet 0   • carvedilol (COREG) 25 MG tablet TAKE 1 TABLET TWICE DAILY 180 tablet 2   • citalopram (CeleXA) 40 MG tablet Take 1 tablet by mouth Every Morning. 90 tablet 1   • cyanocobalamin 1000 MCG/ML injection Inject 1 mL into the appropriate muscle as directed by prescriber Every 28 (Twenty-Eight) Days. 1 mL 5   • gabapentin (NEURONTIN) 300 MG capsule Take 1 capsule by mouth 3 (Three) Times a Day. 270 capsule 1   • hydrOXYzine pamoate (VISTARIL) 25 MG capsule TAKE 1 CAPSULE BY MOUTH 3 (THREE) TIMES A DAY AS NEEDED FOR ANXIETY. 270 capsule 0   • KLOR-CON 20 MEQ CR tablet TAKE 1 TABLET BY MOUTH 2 (TWO) TIMES A DAY WITH BREAKFAST AND SUPPER 180 tablet 0   • L-Methylfolate (Elfolate) 15 MG tablet TAKE 1 TABLET BY MOUTH EVERY DAY 90 tablet 1   • lisinopril (PRINIVIL,ZESTRIL) 10 MG tablet TAKE 1 TABLET BY MOUTH EVERY DAY 90 tablet 1   • metoclopramide (REGLAN) 10 MG tablet TAKE 1 TABLET BY MOUTH 3 (THREE) TIMES A DAY BEFORE MEALS. 90 tablet 0   • Multiple  "Vitamins-Minerals (MULTIVITAMIN ADULT) tablet Daily.     • nitroglycerin (NITROSTAT) 0.4 MG SL tablet NITROSTAT 0.4 MG SUBL     • ondansetron (ZOFRAN) 4 MG tablet Take 1 tablet by mouth Every 8 (Eight) Hours As Needed for Nausea or Vomiting. 45 tablet 0   • pantoprazole (PROTONIX) 40 MG EC tablet Take 1 tablet by mouth Daily. 90 tablet 1   • rosuvastatin (CRESTOR) 40 MG tablet TAKE 1 TABLET EVERY EVENING 90 tablet 2   • Symbicort 160-4.5 MCG/ACT inhaler INHALE 2 PUFFS BY MOUTH TWICE DAILY 30.6 g 1   • Syringe/Needle, Disp, 23G X 1\" 3 ML misc Use to inject B12 every 30 days intramuscularly 12 each 0   • traZODone (DESYREL) 50 MG tablet 1 or 2 tabs po QHS 60 tablet 2   • vitamin D (ERGOCALCIFEROL) 1.25 MG (13269 UT) capsule capsule Take 1 capsule by mouth 1 (One) Time Per Week. 5 capsule 11     No current facility-administered medications for this visit.         Review of Symptoms:    Psychiatric/Behavioral: Negative for agitation, behavioral problems, confusion, decreased concentration, dysphoric mood, hallucinations, self-injury, sleep disturbance and suicidal ideas.   The patient is  Less  depressed,  Still very  nervous/anxious and is not hyperactive.        Physical Exam:   There were no vitals taken for this visit.    Mental Status Exam:   Hygiene:   good  Cooperation:  Cooperative  Eye Contact:  Good  Psychomotor Behavior:  Appropriate  Affect:  Blunted  Mood: fluctates,  Anxious    Hopelessness: Denies  Speech:  Normal  Thought Process:  Goal directed and Linear  Thought Content:  Normal  Suicidal:  None  Homicidal:  None  Hallucinations:  None  Delusion:  None  Memory:  Intact  Orientation:  Person, Place, Time and Situation  Reliability:  fair  Insight:  Good  Judgement:  Good  Impulse Control:  Good  Physical/Medical Issues:  Yes GI issues        MSE from 1/12/2021  reviewed and accepted with changes     PHQ-9 Score:  PHQ-9 Score:  13          Never smoker    I advised Jasmine of the risks of tobacco use. "     Lab Results:   Office Visit on 03/30/2021   Component Date Value Ref Range Status   • WBC 03/30/2021 7.14  3.40 - 10.80 10*3/mm3 Final   • RBC 03/30/2021 4.41  3.77 - 5.28 10*6/mm3 Final   • Hemoglobin 03/30/2021 12.7  12.0 - 15.9 g/dL Final   • Hematocrit 03/30/2021 39.2  34.0 - 46.6 % Final   • MCV 03/30/2021 88.9  79.0 - 97.0 fL Final   • MCH 03/30/2021 28.8  26.6 - 33.0 pg Final   • MCHC 03/30/2021 32.4  31.5 - 35.7 g/dL Final   • RDW 03/30/2021 12.9  12.3 - 15.4 % Final   • RDW-SD 03/30/2021 42.1  37.0 - 54.0 fl Final   • MPV 03/30/2021 9.8  6.0 - 12.0 fL Final   • Platelets 03/30/2021 258  140 - 450 10*3/mm3 Final   • Glucose 03/30/2021 87  65 - 99 mg/dL Final   • BUN 03/30/2021 27* 8 - 23 mg/dL Final   • Creatinine 03/30/2021 1.14* 0.57 - 1.00 mg/dL Final   • Sodium 03/30/2021 139  136 - 145 mmol/L Final   • Potassium 03/30/2021 4.5  3.5 - 5.2 mmol/L Final   • Chloride 03/30/2021 100  98 - 107 mmol/L Final   • CO2 03/30/2021 28.3  22.0 - 29.0 mmol/L Final   • Calcium 03/30/2021 9.7  8.6 - 10.5 mg/dL Final   • Total Protein 03/30/2021 6.9  6.0 - 8.5 g/dL Final   • Albumin 03/30/2021 4.60  3.50 - 5.20 g/dL Final   • ALT (SGPT) 03/30/2021 21  1 - 33 U/L Final   • AST (SGOT) 03/30/2021 17  1 - 32 U/L Final   • Alkaline Phosphatase 03/30/2021 64  39 - 117 U/L Final   • Total Bilirubin 03/30/2021 0.3  0.0 - 1.2 mg/dL Final   • eGFR Non African Amer 03/30/2021 48* >60 mL/min/1.73 Final   • Globulin 03/30/2021 2.3  gm/dL Final   • A/G Ratio 03/30/2021 2.0  g/dL Final   • BUN/Creatinine Ratio 03/30/2021 23.7  7.0 - 25.0 Final   • Anion Gap 03/30/2021 10.7  5.0 - 15.0 mmol/L Final   • Total Cholesterol 03/30/2021 155  0 - 200 mg/dL Final   • Triglycerides 03/30/2021 223* 0 - 150 mg/dL Final   • HDL Cholesterol 03/30/2021 58  40 - 60 mg/dL Final   • LDL Cholesterol  03/30/2021 61  0 - 100 mg/dL Final   • VLDL Cholesterol 03/30/2021 36  5 - 40 mg/dL Final   • LDL/HDL Ratio 03/30/2021 0.90   Final   • Vitamin B-12  03/30/2021 341  211 - 946 pg/mL Final   • 25 Hydroxy, Vitamin D 03/30/2021 51.8  30.0 - 100.0 ng/ml Final   • TSH 03/30/2021 1.860  0.270 - 4.200 uIU/mL Final   • Hemoglobin A1C 03/30/2021 5.2  3.5 - 5.6 % Final       Assessment/Plan   Problems Addressed this Visit        Mental Health    Chronic post-traumatic stress disorder (PTSD) (Chronic)    Relevant Medications    traZODone (DESYREL) 50 MG tablet    citalopram (CeleXA) 40 MG tablet    gabapentin (NEURONTIN) 300 MG capsule    ARIPiprazole (ABILIFY) 10 MG tablet    Severe episode of recurrent major depressive disorder (CMS/HCC) - Primary (Chronic)    Relevant Medications    traZODone (DESYREL) 50 MG tablet    citalopram (CeleXA) 40 MG tablet    ARIPiprazole (ABILIFY) 10 MG tablet       Sleep    Psychophysiological insomnia (Chronic)    Relevant Medications    traZODone (DESYREL) 50 MG tablet      Diagnoses       Codes Comments    Severe episode of recurrent major depressive disorder, without psychotic features (CMS/HCC)    -  Primary ICD-10-CM: F33.2  ICD-9-CM: 296.33     Chronic post-traumatic stress disorder (PTSD)     ICD-10-CM: F43.12  ICD-9-CM: 309.81     Psychophysiological insomnia     ICD-10-CM: F51.04  ICD-9-CM: 307.42           Visit Diagnoses:    ICD-10-CM ICD-9-CM   1. Severe episode of recurrent major depressive disorder, without psychotic features (CMS/HCC)  F33.2 296.33   2. Chronic post-traumatic stress disorder (PTSD)  F43.12 309.81   3. Psychophysiological insomnia  F51.04 307.42       TREATMENT PLAN/GOALS: Continue supportive psychotherapy efforts and medications as indicated. Treatment and medication options discussed during today's visit. Patient ackowledged and verbally consented to continue with current treatment plan and was educated on the importance of compliance with treatment and follow-up appointments.    MEDICATION ISSUES:  1. MDD - cont celexa, increase  abilify  To 10 mg, cont deplin       2. PTSD - cont celexa , Xanax - low dose  0.5 mg TID, alternating with vistaril PRN , long term benzo use discussed again  3. Insomnia -  Start trazodone     supportive therapy,     INSPECT reviewed as expected, last xanax refill was on 3/22/2021    PHQ scored 13 and indicated moderate depression     Discussed medication options and treatment plan of prescribed medication as well as the risks, benefits, and side effects including potential falls, possible impaired driving and metabolic adversities among others. Patient is agreeable to call the office with any worsening of symptoms or onset of side effects. Patient is agreeable to call 911 or go to the nearest ER should he/she begin having SI/HI. No medication side effects or related complaints today.     MEDS ORDERED DURING VISIT:  New Medications Ordered This Visit   Medications   • traZODone (DESYREL) 50 MG tablet     Si or 2 tabs po QHS     Dispense:  60 tablet     Refill:  2   • citalopram (CeleXA) 40 MG tablet     Sig: Take 1 tablet by mouth Every Morning.     Dispense:  90 tablet     Refill:  1   • gabapentin (NEURONTIN) 300 MG capsule     Sig: Take 1 capsule by mouth 3 (Three) Times a Day.     Dispense:  270 capsule     Refill:  1   • ARIPiprazole (ABILIFY) 10 MG tablet     Sig: Take 1 tablet by mouth Daily.     Dispense:  90 tablet     Refill:  1       Return in about 3 months (around 2021).         This document has been electronically signed by Sivan Ken MD  2021 09:47 EDT

## 2021-04-20 DIAGNOSIS — F43.12 CHRONIC POST-TRAUMATIC STRESS DISORDER (PTSD): Chronic | ICD-10-CM

## 2021-04-20 RX ORDER — ONDANSETRON 4 MG/1
TABLET, FILM COATED ORAL
Qty: 45 TABLET | Refills: 0 | Status: SHIPPED | OUTPATIENT
Start: 2021-04-20 | End: 2021-05-19

## 2021-04-20 RX ORDER — ALPRAZOLAM 0.5 MG/1
0.5 TABLET ORAL 3 TIMES DAILY PRN
Qty: 90 TABLET | Refills: 3 | Status: SHIPPED | OUTPATIENT
Start: 2021-04-20 | End: 2021-08-12

## 2021-04-26 RX ORDER — LISINOPRIL 10 MG/1
TABLET ORAL
Qty: 90 TABLET | Refills: 0 | Status: SHIPPED | OUTPATIENT
Start: 2021-04-26 | End: 2021-06-30 | Stop reason: SDUPTHER

## 2021-04-27 RX ORDER — BUMETANIDE 2 MG/1
TABLET ORAL
Qty: 180 TABLET | Refills: 0 | Status: SHIPPED | OUTPATIENT
Start: 2021-04-27 | End: 2021-07-09 | Stop reason: SDUPTHER

## 2021-05-03 RX ORDER — LISINOPRIL 10 MG/1
TABLET ORAL
Qty: 90 TABLET | Refills: 0 | OUTPATIENT
Start: 2021-05-03

## 2021-05-04 DIAGNOSIS — F43.12 CHRONIC POST-TRAUMATIC STRESS DISORDER (PTSD): ICD-10-CM

## 2021-05-05 RX ORDER — HYDROXYZINE PAMOATE 25 MG/1
CAPSULE ORAL
Qty: 270 CAPSULE | Refills: 0 | Status: SHIPPED | OUTPATIENT
Start: 2021-05-05 | End: 2021-07-08

## 2021-05-06 DIAGNOSIS — F33.2 SEVERE EPISODE OF RECURRENT MAJOR DEPRESSIVE DISORDER, WITHOUT PSYCHOTIC FEATURES (HCC): Chronic | ICD-10-CM

## 2021-05-06 DIAGNOSIS — F51.04 PSYCHOPHYSIOLOGICAL INSOMNIA: Chronic | ICD-10-CM

## 2021-05-06 RX ORDER — TRAZODONE HYDROCHLORIDE 50 MG/1
TABLET ORAL
Qty: 60 TABLET | Refills: 2 | Status: SHIPPED | OUTPATIENT
Start: 2021-05-06 | End: 2021-08-03

## 2021-05-06 RX ORDER — ARIPIPRAZOLE 5 MG/1
TABLET ORAL
Qty: 90 TABLET | Refills: 0 | Status: SHIPPED | OUTPATIENT
Start: 2021-05-06 | End: 2021-06-30

## 2021-05-17 DIAGNOSIS — F33.2 SEVERE EPISODE OF RECURRENT MAJOR DEPRESSIVE DISORDER, WITHOUT PSYCHOTIC FEATURES (HCC): Chronic | ICD-10-CM

## 2021-05-17 DIAGNOSIS — F43.12 CHRONIC POST-TRAUMATIC STRESS DISORDER (PTSD): Chronic | ICD-10-CM

## 2021-05-18 RX ORDER — CITALOPRAM 40 MG/1
TABLET ORAL
Qty: 90 TABLET | Refills: 1 | Status: SHIPPED | OUTPATIENT
Start: 2021-05-18 | End: 2021-08-31 | Stop reason: SDUPTHER

## 2021-05-19 RX ORDER — ONDANSETRON 4 MG/1
TABLET, FILM COATED ORAL
Qty: 45 TABLET | Refills: 0 | Status: SHIPPED | OUTPATIENT
Start: 2021-05-19 | End: 2021-06-21

## 2021-05-24 RX ORDER — METOCLOPRAMIDE 10 MG/1
10 TABLET ORAL
Qty: 90 TABLET | Refills: 0 | Status: SHIPPED | OUTPATIENT
Start: 2021-05-24 | End: 2021-06-30 | Stop reason: SDUPTHER

## 2021-05-24 RX ORDER — POTASSIUM CHLORIDE 1500 MG/1
TABLET, EXTENDED RELEASE ORAL
Qty: 180 TABLET | Refills: 0 | Status: SHIPPED | OUTPATIENT
Start: 2021-05-24 | End: 2021-06-30 | Stop reason: SDUPTHER

## 2021-05-25 NOTE — TELEPHONE ENCOUNTER
Caller: Jasmine Cerda    Relationship: Self    Best call back number: [phone]    Medication needed:   Requested Prescriptions     Pending Prescriptions Disp Refills   • budesonide-formoterol (Symbicort) 160-4.5 MCG/ACT inhaler 30.6 g 1     Sig: Inhale 2 puffs 2 (Two) Times a Day.       When do you need the refill by: ASAP     Does the patient have less than a 3 day supply:  [x] Yes  [] No    What is the patient's preferred pharmacy: [facility] [id number] [address] [phone] [facility] [phone]

## 2021-05-26 RX ORDER — BUDESONIDE AND FORMOTEROL FUMARATE DIHYDRATE 160; 4.5 UG/1; UG/1
2 AEROSOL RESPIRATORY (INHALATION) 2 TIMES DAILY
Qty: 30.6 G | Refills: 1 | Status: SHIPPED | OUTPATIENT
Start: 2021-05-26 | End: 2021-12-15

## 2021-06-21 RX ORDER — ONDANSETRON 4 MG/1
TABLET, FILM COATED ORAL
Qty: 45 TABLET | Refills: 0 | Status: SHIPPED | OUTPATIENT
Start: 2021-06-21 | End: 2021-07-19

## 2021-06-30 ENCOUNTER — OFFICE VISIT (OUTPATIENT)
Dept: FAMILY MEDICINE CLINIC | Facility: CLINIC | Age: 61
End: 2021-06-30

## 2021-06-30 VITALS
SYSTOLIC BLOOD PRESSURE: 122 MMHG | HEART RATE: 62 BPM | WEIGHT: 208 LBS | OXYGEN SATURATION: 97 % | HEIGHT: 64 IN | BODY MASS INDEX: 35.51 KG/M2 | DIASTOLIC BLOOD PRESSURE: 84 MMHG

## 2021-06-30 DIAGNOSIS — E53.8 B12 DEFICIENCY: ICD-10-CM

## 2021-06-30 DIAGNOSIS — E55.9 VITAMIN D DEFICIENCY: ICD-10-CM

## 2021-06-30 DIAGNOSIS — J44.9 CHRONIC OBSTRUCTIVE PULMONARY DISEASE, UNSPECIFIED COPD TYPE (HCC): Primary | ICD-10-CM

## 2021-06-30 DIAGNOSIS — I10 ESSENTIAL HYPERTENSION: ICD-10-CM

## 2021-06-30 DIAGNOSIS — K21.9 GASTROESOPHAGEAL REFLUX DISEASE WITHOUT ESOPHAGITIS: ICD-10-CM

## 2021-06-30 DIAGNOSIS — R11.0 NAUSEA: ICD-10-CM

## 2021-06-30 DIAGNOSIS — E78.2 MIXED HYPERLIPIDEMIA: ICD-10-CM

## 2021-06-30 PROCEDURE — 99214 OFFICE O/P EST MOD 30 MIN: CPT | Performed by: NURSE PRACTITIONER

## 2021-06-30 RX ORDER — RISPERIDONE 1 MG/1
1 TABLET ORAL
COMMUNITY
Start: 2021-04-05 | End: 2021-07-06

## 2021-06-30 RX ORDER — METOCLOPRAMIDE 10 MG/1
10 TABLET ORAL
Qty: 90 TABLET | Refills: 0 | Status: SHIPPED | OUTPATIENT
Start: 2021-06-30 | End: 2021-08-16

## 2021-06-30 RX ORDER — TIOTROPIUM BROMIDE INHALATION SPRAY 3.12 UG/1
1 SPRAY, METERED RESPIRATORY (INHALATION)
Qty: 2 EACH | Refills: 0 | COMMUNITY
Start: 2021-06-30 | End: 2021-10-13 | Stop reason: SDUPTHER

## 2021-06-30 RX ORDER — POTASSIUM CHLORIDE 20 MEQ/1
20 TABLET, EXTENDED RELEASE ORAL DAILY
Qty: 180 TABLET | Refills: 0 | Status: SHIPPED | OUTPATIENT
Start: 2021-06-30 | End: 2021-10-13 | Stop reason: SDUPTHER

## 2021-06-30 RX ORDER — ERGOCALCIFEROL 1.25 MG/1
50000 CAPSULE ORAL WEEKLY
Qty: 15 CAPSULE | Refills: 3 | Status: SHIPPED | OUTPATIENT
Start: 2021-06-30 | End: 2022-04-14 | Stop reason: SDUPTHER

## 2021-06-30 RX ORDER — LISINOPRIL 10 MG/1
10 TABLET ORAL DAILY
Qty: 90 TABLET | Refills: 0 | Status: SHIPPED | OUTPATIENT
Start: 2021-06-30 | End: 2021-08-25

## 2021-06-30 NOTE — PROGRESS NOTES
Chief Complaint  Hyperlipidemia, Vitamin D Deficiency, Follow-up (3 mo f/u from seeing isabela on 3/30), and COPD (pt reports soa, pt given COPD Assessment Test)    Subjective          Jasmine Cerda presents to [facility] for   History of Present Illness     Hyperlipidemia/CAD, The patient denies muscle aches, constipation, diarrhea, GI upset, fatigue, chest pain/pressure, exercise intolerance, dyspnea, palpitations, syncope and pedal edema.    Patient follows with Dr. Montana    HTN/CHF, stable on meds and takes as directed, denies chest pain, headache, shortness of air, palpitations and swelling of extremities.     GERD, patient continues Reglan and started on pantoprazole 3 months ago w/ daytime improvement in symptoms, she now reports a.m. nausea, Zofran is also effective as needed almost daily. She denies vomiting, constipation, abdominal pain and diarrhea. She does frequently wake up coughing and gagging at night.     Vitamin D deficiency, on weekly vitamin D.     B12 deficiency, patient started B12 injections 3 months ago, fatigue some improved.     COPD: The patient has been previously diagnosed with COPD. Symptoms include chest tightness, dyspnea, non-productive cough and wheezing, especially with exertion and heat.   Symptoms have been gradually worsening since onset.  The pt is using Symbicort twice daily and albuterol as needed as directed. She has never been a smoker.     COPD Assessment Test  Cough frequency: 4 (21)  Phlegm: 1 (21)  Chest tightness: 4 (21)  Breathless when walkin (When I walk up a hill or flight of stiars I am breathless) (21)  Home activities: 3 (21)  Confidence leaving home: 3 (21)  Sleep: 4 (21)  Energy: 4 (21)  TOTAL: 28 (21)     Anxiety/PTSD, patient follows with Dr. Ken, stable on medications. Patient denies significant weight loss/gain, insomnia,  hypersomnia, psychomotor agitation, psychomotor retardation, fatigue (loss of energy), feelings of worthlessness (guilt), impaired concentration (indecisiveness), thoughts of death or suicide.           The following portions of the patient's history were reviewed and updated as appropriate: allergies, current medications, past family history, past medical history, past social history, past surgical history and problem list.    Past Medical History:   Diagnosis Date   • Anxiety    • Breast cyst    • CHF (congestive heart failure) (CMS/HCC)    • Coronary heart disease    • Depression    • Hyperlipidemia    • Hypertension      Past Surgical History:   Procedure Laterality Date   • APPENDECTOMY     • BREAST CYST ASPIRATION     • CARDIAC CATHETERIZATION  2017    No Stents placed - [facility]   • CERVICAL FUSION      C6-C7   •  SECTION      x 2   • CHOLECYSTECTOMY     • HYSTERECTOMY       Family History   Problem Relation Age of Onset   • Colon cancer Mother    • Diabetes Father    • Pulmonary embolism Brother    • Heart failure Brother      Social History     Tobacco Use   • Smoking status: Never Smoker   • Smokeless tobacco: Never Used   • Tobacco comment: Passive Smoke: N   Substance Use Topics   • Alcohol use: No       Current Outpatient Medications:   •  albuterol (PROVENTIL) (2.5 MG/3ML) 0.083% nebulizer solution, INHALE 1 VIAL VIA NEBULIZER DAILY AS NEEDED FOR WHEEZING, Disp: 300 mL, Rfl: 1  •  ALPRAZolam (XANAX) 0.5 MG tablet, TAKE 1 TABLET BY MOUTH 3 (THREE) TIMES A DAY AS NEEDED FOR ANXIETY., Disp: 90 tablet, Rfl: 3  •  ARIPiprazole (ABILIFY) 10 MG tablet, Take 1 tablet by mouth Daily., Disp: 90 tablet, Rfl: 1  •  aspirin 81 MG EC tablet, ASPIRIN EC 81 MG TBEC, Disp: , Rfl:   •  budesonide-formoterol (Symbicort) 160-4.5 MCG/ACT inhaler, Inhale 2 puffs 2 (Two) Times a Day., Disp: 30.6 g, Rfl: 1  •  bumetanide (BUMEX) 2 MG tablet, TAKE 1 TABLET TWICE DAILY (MUST MAKE AN APPOINTMENT WITH DR TURNER FOR FURTHER  "REFILLS), Disp: 180 tablet, Rfl: 0  •  carvedilol (COREG) 25 MG tablet, TAKE 1 TABLET TWICE DAILY, Disp: 180 tablet, Rfl: 2  •  citalopram (CeleXA) 40 MG tablet, TAKE 1 TABLET EVERY MORNING, Disp: 90 tablet, Rfl: 1  •  cyanocobalamin 1000 MCG/ML injection, Inject 1 mL into the appropriate muscle as directed by prescriber Every 28 (Twenty-Eight) Days., Disp: 1 mL, Rfl: 5  •  gabapentin (NEURONTIN) 300 MG capsule, Take 1 capsule by mouth 3 (Three) Times a Day., Disp: 270 capsule, Rfl: 1  •  hydrOXYzine pamoate (VISTARIL) 25 MG capsule, TAKE 1 CAPSULE THREE TIMES A DAY AS NEEDED FOR ANXIETY., Disp: 270 capsule, Rfl: 0  •  L-Methylfolate (Elfolate) 15 MG tablet, TAKE 1 TABLET BY MOUTH EVERY DAY, Disp: 90 tablet, Rfl: 1  •  lisinopril (PRINIVIL,ZESTRIL) 10 MG tablet, Take 1 tablet by mouth Daily., Disp: 90 tablet, Rfl: 0  •  metoclopramide (REGLAN) 10 MG tablet, Take 1 tablet by mouth 3 (Three) Times a Day Before Meals., Disp: 90 tablet, Rfl: 0  •  Multiple Vitamins-Minerals (MULTIVITAMIN ADULT) tablet, Daily. With Omega XL, Disp: , Rfl:   •  ondansetron (ZOFRAN) 4 MG tablet, TAKE 1 TABLET BY MOUTH EVERY 8 (EIGHT) HOURS AS NEEDED FOR NAUSEA OR VOMITING. MUST LAST 30 DAYS., Disp: 45 tablet, Rfl: 0  •  pantoprazole (PROTONIX) 40 MG EC tablet, Take 1 tablet by mouth Daily., Disp: 90 tablet, Rfl: 1  •  potassium chloride (KLOR-CON) 20 MEQ CR tablet, Take 1 tablet by mouth Daily., Disp: 180 tablet, Rfl: 0  •  rosuvastatin (CRESTOR) 40 MG tablet, TAKE 1 TABLET EVERY EVENING, Disp: 90 tablet, Rfl: 2  •  Syringe/Needle, Disp, 23G X 1\" 3 ML misc, Use to inject B12 every 30 days intramuscularly, Disp: 12 each, Rfl: 0  •  traZODone (DESYREL) 50 MG tablet, TAKE 1 TO 2 TABLETS BY MOUTH NIGHTLY AT BEDTIME, Disp: 60 tablet, Rfl: 2  •  vitamin D (ERGOCALCIFEROL) 1.25 MG (17345 UT) capsule capsule, Take 1 capsule by mouth 1 (One) Time Per Week., Disp: 15 capsule, Rfl: 3  •  nitroglycerin (NITROSTAT) 0.4 MG SL tablet, NITROSTAT 0.4 MG " "SUBL, Disp: , Rfl:   •  risperiDONE (risperDAL) 1 MG tablet, Take 1 mg by mouth every night at bedtime., Disp: , Rfl:   •  tiotropium bromide monohydrate (Spiriva Respimat) 2.5 MCG/ACT aerosol solution inhaler, Inhale 1 puff Daily., Disp: 2 each, Rfl: 0    Objective   Vital Signs:   /84 (BP Location: Left arm, Patient Position: Sitting, Cuff Size: Adult)   Pulse 62   Ht 162.6 cm (64.02\")   Wt 94.3 kg (208 lb)   SpO2 97%   BMI 35.69 kg/m²       Physical Exam  Vitals and nursing note reviewed.   Constitutional:       General: She is not in acute distress.     Appearance: She is well-developed. She is not diaphoretic.   Eyes:      Pupils: Pupils are equal, round, and reactive to light.   Neck:      Thyroid: No thyromegaly.      Vascular: No JVD.   Cardiovascular:      Rate and Rhythm: Normal rate and regular rhythm.      Heart sounds: Normal heart sounds. No murmur heard.     Pulmonary:      Effort: Pulmonary effort is normal. No respiratory distress.      Breath sounds: Normal breath sounds. No wheezing or rhonchi.      Comments: Prolonged expiration  Abdominal:      General: Bowel sounds are normal. There is no distension.      Palpations: Abdomen is soft.      Tenderness: There is no abdominal tenderness.   Musculoskeletal:         General: No tenderness. Normal range of motion.      Cervical back: Normal range of motion and neck supple.   Skin:     General: Skin is warm and dry.   Neurological:      Mental Status: She is alert and oriented to person, place, and time.      Sensory: No sensory deficit.   Psychiatric:         Behavior: Behavior normal.         Thought Content: Thought content normal.         Judgment: Judgment normal.          Result Review :     No visits with results within 7 Day(s) from this visit.   Latest known visit with results is:   Office Visit on 03/30/2021   Component Date Value Ref Range Status   • WBC 03/30/2021 7.14  3.40 - 10.80 10*3/mm3 Final   • RBC 03/30/2021 4.41  3.77 - " 5.28 10*6/mm3 Final   • Hemoglobin 03/30/2021 12.7  12.0 - 15.9 g/dL Final   • Hematocrit 03/30/2021 39.2  34.0 - 46.6 % Final   • MCV 03/30/2021 88.9  79.0 - 97.0 fL Final   • MCH 03/30/2021 28.8  26.6 - 33.0 pg Final   • MCHC 03/30/2021 32.4  31.5 - 35.7 g/dL Final   • RDW 03/30/2021 12.9  12.3 - 15.4 % Final   • RDW-SD 03/30/2021 42.1  37.0 - 54.0 fl Final   • MPV 03/30/2021 9.8  6.0 - 12.0 fL Final   • Platelets 03/30/2021 258  140 - 450 10*3/mm3 Final   • Glucose 03/30/2021 87  65 - 99 mg/dL Final   • BUN 03/30/2021 27* 8 - 23 mg/dL Final   • Creatinine 03/30/2021 1.14* 0.57 - 1.00 mg/dL Final   • Sodium 03/30/2021 139  136 - 145 mmol/L Final   • Potassium 03/30/2021 4.5  3.5 - 5.2 mmol/L Final   • Chloride 03/30/2021 100  98 - 107 mmol/L Final   • CO2 03/30/2021 28.3  22.0 - 29.0 mmol/L Final   • Calcium 03/30/2021 9.7  8.6 - 10.5 mg/dL Final   • Total Protein 03/30/2021 6.9  6.0 - 8.5 g/dL Final   • Albumin 03/30/2021 4.60  3.50 - 5.20 g/dL Final   • ALT (SGPT) 03/30/2021 21  1 - 33 U/L Final   • AST (SGOT) 03/30/2021 17  1 - 32 U/L Final   • Alkaline Phosphatase 03/30/2021 64  39 - 117 U/L Final   • Total Bilirubin 03/30/2021 0.3  0.0 - 1.2 mg/dL Final   • eGFR Non African Amer 03/30/2021 48* >60 mL/min/1.73 Final   • Globulin 03/30/2021 2.3  gm/dL Final   • A/G Ratio 03/30/2021 2.0  g/dL Final   • BUN/Creatinine Ratio 03/30/2021 23.7  7.0 - 25.0 Final   • Anion Gap 03/30/2021 10.7  5.0 - 15.0 mmol/L Final   • Total Cholesterol 03/30/2021 155  0 - 200 mg/dL Final   • Triglycerides 03/30/2021 223* 0 - 150 mg/dL Final   • HDL Cholesterol 03/30/2021 58  40 - 60 mg/dL Final   • LDL Cholesterol  03/30/2021 61  0 - 100 mg/dL Final   • VLDL Cholesterol 03/30/2021 36  5 - 40 mg/dL Final   • LDL/HDL Ratio 03/30/2021 0.90   Final   • Vitamin B-12 03/30/2021 341  211 - 946 pg/mL Final   • 25 Hydroxy, Vitamin D 03/30/2021 51.8  30.0 - 100.0 ng/ml Final   • TSH 03/30/2021 1.860  0.270 - 4.200 uIU/mL Final   • Hemoglobin  A1C 03/30/2021 5.2  3.5 - 5.6 % Final                       Assessment and Plan    Diagnoses and all orders for this visit:    1. Chronic obstructive pulmonary disease, unspecified COPD type (CMS/[facility]) (Primary)  Comments:  cont symbicort and albuterol, will give 2-month samples of spiriva.  Notify if effective and will send to pharmacy.  CAT score of 28  Orders:  -     tiotropium bromide monohydrate (Spiriva Respimat) 2.5 MCG/ACT aerosol solution inhaler; Inhale 1 puff Daily.  Dispense: 2 each; Refill: 0    2. Vitamin D deficiency  Comments:  Continue/refill weekly vitamin D, check level prior to next follow-up appointment in 3 months  Orders:  -     vitamin D (ERGOCALCIFEROL) 1.25 MG (11499 UT) capsule capsule; Take 1 capsule by mouth 1 (One) Time Per Week.  Dispense: 15 capsule; Refill: 3    3. B12 deficiency  Comments:  Fatigue some improved with B12 injections monthly, continue for now and repeat B12 level in 3 months    4. Gastroesophageal reflux disease without esophagitis  Comments:  Switch pantoprazole to p.m. due to a.m. nausea.  Continue Reglan and Zofran as needed, consider twice daily dosing  Orders:  -     metoclopramide (REGLAN) 10 MG tablet; Take 1 tablet by mouth 3 (Three) Times a Day Before Meals.  Dispense: 90 tablet; Refill: 0    5. Nausea  Comments:  Okay to continue Zofran as needed    6. Mixed hyperlipidemia  Comments:  Continue statin and following with cardiology as directed    7. Essential hypertension  Comments:  BP stable, continue lisinopril, bumex and kcl. refill med needed today  Orders:  -     lisinopril (PRINIVIL,ZESTRIL) 10 MG tablet; Take 1 tablet by mouth Daily.  Dispense: 90 tablet; Refill: 0  -     potassium chloride (KLOR-CON) 20 MEQ CR tablet; Take 1 tablet by mouth Daily.  Dispense: 180 tablet; Refill: 0        I spent 40 minutes caring for Jasmine Cerda on this date of service. This time includes time spent by me in the following activities: preparing for the visit,  reviewing tests, performing a medically appropriate examination and/or evaluation , counseling and educating the patient/family/caregiver, ordering medications, tests, or procedures and documenting information in the medical record        Follow Up     Return in about 3 months (around 9/30/2021) for copd, htn, GERD, b12, vit d def. HTN panel b12 and vitamin D 1 week prior, Medicare Wellness.  Patient was given instructions and counseling regarding her condition or for health maintenance advice. Please see specific information pulled into the AVS if appropriate.      EMR Dragon transcription disclaimer:  Some of this encounter note is an electronic transcription translation of spoken language to printed text. The electronic translation of spoken language may permit erroneous, or at times, nonsensical words or phrases to be inadvertently transcribed; Although I have reviewed the note for such errors some may still exist.

## 2021-07-06 DIAGNOSIS — J44.9 CHRONIC OBSTRUCTIVE PULMONARY DISEASE, UNSPECIFIED COPD TYPE (HCC): ICD-10-CM

## 2021-07-06 RX ORDER — RISPERIDONE 1 MG/1
TABLET ORAL
Qty: 90 TABLET | Refills: 1 | Status: SHIPPED | OUTPATIENT
Start: 2021-07-06 | End: 2021-08-31 | Stop reason: SDUPTHER

## 2021-07-07 DIAGNOSIS — F43.12 CHRONIC POST-TRAUMATIC STRESS DISORDER (PTSD): ICD-10-CM

## 2021-07-08 RX ORDER — BUMETANIDE 2 MG/1
TABLET ORAL
Qty: 180 TABLET | Refills: 0 | OUTPATIENT
Start: 2021-07-08

## 2021-07-08 RX ORDER — HYDROXYZINE PAMOATE 25 MG/1
CAPSULE ORAL
Qty: 270 CAPSULE | Refills: 0 | Status: SHIPPED | OUTPATIENT
Start: 2021-07-08 | End: 2021-08-31 | Stop reason: SDUPTHER

## 2021-07-09 RX ORDER — CARVEDILOL 25 MG/1
25 TABLET ORAL 2 TIMES DAILY
Qty: 180 TABLET | Refills: 0 | Status: SHIPPED | OUTPATIENT
Start: 2021-07-09 | End: 2021-09-20

## 2021-07-09 RX ORDER — ROSUVASTATIN CALCIUM 40 MG/1
40 TABLET, COATED ORAL EVERY EVENING
Qty: 90 TABLET | Refills: 0 | Status: SHIPPED | OUTPATIENT
Start: 2021-07-09 | End: 2021-09-20

## 2021-07-09 RX ORDER — BUMETANIDE 2 MG/1
2 TABLET ORAL 2 TIMES DAILY
Qty: 180 TABLET | Refills: 0 | Status: SHIPPED | OUTPATIENT
Start: 2021-07-09 | End: 2021-09-27

## 2021-07-19 RX ORDER — ONDANSETRON 4 MG/1
TABLET, FILM COATED ORAL
Qty: 45 TABLET | Refills: 0 | Status: SHIPPED | OUTPATIENT
Start: 2021-07-19 | End: 2021-09-02

## 2021-07-20 RX ORDER — CYANOCOBALAMIN 1000 UG/ML
1000 INJECTION, SOLUTION INTRAMUSCULAR; SUBCUTANEOUS
Qty: 3 ML | Refills: 1 | Status: SHIPPED | OUTPATIENT
Start: 2021-07-20 | End: 2021-10-13 | Stop reason: SDUPTHER

## 2021-07-26 RX ORDER — ALBUTEROL SULFATE 2.5 MG/3ML
SOLUTION RESPIRATORY (INHALATION)
Qty: 300 ML | Refills: 1 | Status: SHIPPED | OUTPATIENT
Start: 2021-07-26 | End: 2022-03-02

## 2021-08-03 DIAGNOSIS — F33.2 SEVERE EPISODE OF RECURRENT MAJOR DEPRESSIVE DISORDER, WITHOUT PSYCHOTIC FEATURES (HCC): Chronic | ICD-10-CM

## 2021-08-03 DIAGNOSIS — F51.04 PSYCHOPHYSIOLOGICAL INSOMNIA: Chronic | ICD-10-CM

## 2021-08-03 RX ORDER — ARIPIPRAZOLE 5 MG/1
TABLET ORAL
Qty: 90 TABLET | Refills: 0 | OUTPATIENT
Start: 2021-08-03

## 2021-08-03 RX ORDER — TRAZODONE HYDROCHLORIDE 50 MG/1
TABLET ORAL
Qty: 180 TABLET | Refills: 1 | Status: SHIPPED | OUTPATIENT
Start: 2021-08-03 | End: 2021-08-31 | Stop reason: SDUPTHER

## 2021-08-12 DIAGNOSIS — F43.12 CHRONIC POST-TRAUMATIC STRESS DISORDER (PTSD): Chronic | ICD-10-CM

## 2021-08-12 RX ORDER — ALPRAZOLAM 0.5 MG/1
0.5 TABLET ORAL 3 TIMES DAILY PRN
Qty: 90 TABLET | Refills: 0 | Status: SHIPPED | OUTPATIENT
Start: 2021-08-12 | End: 2021-08-31 | Stop reason: SDUPTHER

## 2021-08-15 DIAGNOSIS — K21.9 GASTROESOPHAGEAL REFLUX DISEASE WITHOUT ESOPHAGITIS: ICD-10-CM

## 2021-08-16 RX ORDER — METOCLOPRAMIDE 10 MG/1
10 TABLET ORAL
Qty: 90 TABLET | Refills: 2 | Status: SHIPPED | OUTPATIENT
Start: 2021-08-16 | End: 2021-10-13 | Stop reason: SDUPTHER

## 2021-08-25 ENCOUNTER — OFFICE VISIT (OUTPATIENT)
Dept: CARDIOLOGY | Facility: CLINIC | Age: 61
End: 2021-08-25

## 2021-08-25 VITALS
OXYGEN SATURATION: 91 % | DIASTOLIC BLOOD PRESSURE: 68 MMHG | HEART RATE: 64 BPM | SYSTOLIC BLOOD PRESSURE: 97 MMHG | HEIGHT: 64 IN | BODY MASS INDEX: 36.23 KG/M2 | WEIGHT: 212.2 LBS

## 2021-08-25 DIAGNOSIS — I20.8 STABLE ANGINA PECTORIS (HCC): ICD-10-CM

## 2021-08-25 DIAGNOSIS — E78.2 MIXED HYPERLIPIDEMIA: ICD-10-CM

## 2021-08-25 DIAGNOSIS — I10 ESSENTIAL HYPERTENSION: ICD-10-CM

## 2021-08-25 DIAGNOSIS — I25.10 CORONARY ARTERY DISEASE INVOLVING NATIVE CORONARY ARTERY OF NATIVE HEART WITHOUT ANGINA PECTORIS: Primary | ICD-10-CM

## 2021-08-25 DIAGNOSIS — I25.118 CORONARY ARTERY DISEASE OF NATIVE ARTERY OF NATIVE HEART WITH STABLE ANGINA PECTORIS (HCC): ICD-10-CM

## 2021-08-25 DIAGNOSIS — I50.32 CHRONIC DIASTOLIC CONGESTIVE HEART FAILURE (HCC): ICD-10-CM

## 2021-08-25 PROBLEM — I20.89 STABLE ANGINA PECTORIS: Status: ACTIVE | Noted: 2021-08-25

## 2021-08-25 PROCEDURE — 93000 ELECTROCARDIOGRAM COMPLETE: CPT | Performed by: INTERNAL MEDICINE

## 2021-08-25 PROCEDURE — 99214 OFFICE O/P EST MOD 30 MIN: CPT | Performed by: INTERNAL MEDICINE

## 2021-08-25 RX ORDER — LISINOPRIL 5 MG/1
5 TABLET ORAL DAILY
Status: SHIPPED | COMMUNITY
Start: 2021-08-25 | End: 2021-09-08

## 2021-08-25 NOTE — PROGRESS NOTES
Cardiology Office Visit      Encounter Date:  08/25/2021    Patient ID:   Jasmine Cerda is a 61 y.o. female.    Reason For Followup:  Shortness of breath  Coronary artery disease    Brief Clinical History:  Dear Lety Gillis APRN    I had the pleasure of seeing Jasmine Cerda today. As you are well aware, this is a 61 y.o. female  had a prior history of known coronary artery disease prior moderate disease in the LAD that is currently being treated medically and also history of congestive heart failure secondary to diastolic dysfunction.     Echocardiogram with  normal LV systolic function and evidence of diastolic dysfunction    Interpretation Summary        · Left ventricular ejection fraction is normal (Calculated EF = 60%).  · Myocardial perfusion imaging indicates a normal myocardial perfusion study with no evidence of ischemia.  · Impressions are consistent with a low risk study.  · Findings consistent with a normal ECG stress test.  · Small apical mild fixed perfusion defect most likely apical thinning artifact         Interval History:  Denies any further chest pain  Planing of significant shortness of breath which is progressively getting worse  Complaining of significant weight gain  Dyspnea on exertion significant functional limitation  Complaining of some orthopnea and PND  No significant lower extremity edema    Assessment & Plan    Impressions:  Coronary artery disease  Hypertension  Hyperlipidemia  Obesity  Worsening shortness of breath dyspnea on exertion and functional limitation    Recommendations:  Continue current medical therapy  Decrease the dose of lisinopril to 5 mg p.o. once a day secondary to low blood pressure  Check labs CBC CMP lipids thyroid profile and also proBNP  We will patient for an echocardiogram to assess LV systolic function rule out any structural heart disease or significant valve pathology contributing to patient's shortness  Schedule patient for [facility]  "stress test for further stratification for symptoms of shortness of breath dyspnea on exertion that sounds like angina [employer] at this time  Continue close monitoring  Follow-up in office in 1 month    Objective:    Vitals:  Vitals:    08/25/21 0807   BP: 97/68   BP Location: Left arm   Patient Position: Sitting   Cuff Size: Adult   Pulse: 64   SpO2: 91%   Weight: 96.3 kg (212 lb 3.2 oz)   Height: 162.6 cm (64.02\")       Physical Exam:    General: Alert, cooperative, no distress, appears stated age  Head:  Normocephalic, atraumatic, mucous membranes moist  Eyes:  Conjunctiva/corneas clear, EOM's intact     Neck:  Supple,  no adenopathy;      Lungs: Clear to auscultation bilaterally, no wheezes rhonchi rales are noted  Chest wall: No tenderness  Heart::  Regular rate and rhythm, S1 and S2 normal, no murmur, rub or gallop  Abdomen: Soft, non-tender, nondistended bowel sounds active  Extremities: No cyanosis, clubbing, or edema  Pulses: 2+ and symmetric all extremities  Skin:  No rashes or lesions  Neuro/psych: A&O x3. CN II through XII are grossly intact with appropriate affect      Allergies:  No Known Allergies    Medication Review:     Current Outpatient Medications:   •  albuterol (PROVENTIL) (2.5 MG/3ML) 0.083% nebulizer solution, INHALE 1 VIAL VIA NEBULIZER DAILY AS NEEDED FOR WHEEZING, Disp: 300 mL, Rfl: 1  •  ALPRAZolam (XANAX) 0.5 MG tablet, TAKE 1 TABLET BY MOUTH 3 (THREE) TIMES A DAY AS NEEDED FOR ANXIETY., Disp: 90 tablet, Rfl: 0  •  ARIPiprazole (ABILIFY) 10 MG tablet, Take 1 tablet by mouth Daily., Disp: 90 tablet, Rfl: 1  •  aspirin 81 MG EC tablet, ASPIRIN EC 81 MG TBEC, Disp: , Rfl:   •  budesonide-formoterol (Symbicort) 160-4.5 MCG/ACT inhaler, Inhale 2 puffs 2 (Two) Times a Day., Disp: 30.6 g, Rfl: 1  •  bumetanide (BUMEX) 2 MG tablet, Take 1 tablet by mouth 2 (Two) Times a Day., Disp: 180 tablet, Rfl: 0  •  carvedilol (COREG) 25 MG tablet, Take 1 tablet by mouth 2 (Two) Times a Day., Disp: 180 " "tablet, Rfl: 0  •  citalopram (CeleXA) 40 MG tablet, TAKE 1 TABLET EVERY MORNING, Disp: 90 tablet, Rfl: 1  •  cyanocobalamin 1000 MCG/ML injection, INJECT 1 ML INTO THE APPROPRIATE MUSCLE AS DIRECTED BY PRESCRIBER EVERY 28 (TWENTY-EIGHT) DAYS., Disp: 3 mL, Rfl: 1  •  gabapentin (NEURONTIN) 300 MG capsule, Take 1 capsule by mouth 3 (Three) Times a Day., Disp: 270 capsule, Rfl: 1  •  hydrOXYzine pamoate (VISTARIL) 25 MG capsule, TAKE 1 CAPSULE THREE TIMES DAILY AS NEEDED FOR ANXIETY, Disp: 270 capsule, Rfl: 0  •  L-Methylfolate (Elfolate) 15 MG tablet, TAKE 1 TABLET BY MOUTH EVERY DAY, Disp: 90 tablet, Rfl: 1  •  lisinopril (PRINIVIL,ZESTRIL) 5 MG tablet, Take 1 tablet by mouth Daily., Disp: , Rfl:   •  metoclopramide (REGLAN) 10 MG tablet, TAKE 1 TABLET BY MOUTH 3 (THREE) TIMES A DAY BEFORE MEALS., Disp: 90 tablet, Rfl: 2  •  Multiple Vitamins-Minerals (MULTIVITAMIN ADULT) tablet, Daily. With Omega XL, Disp: , Rfl:   •  nitroglycerin (NITROSTAT) 0.4 MG SL tablet, NITROSTAT 0.4 MG SUBL, Disp: , Rfl:   •  ondansetron (ZOFRAN) 4 MG tablet, TAKE 1 TABLET BY MOUTH EVERY 8 (EIGHT) HOURS AS NEEDED FOR NAUSEA OR VOMITING. MUST LAST 30 DAYS., Disp: 45 tablet, Rfl: 0  •  pantoprazole (PROTONIX) 40 MG EC tablet, Take 1 tablet by mouth Daily., Disp: 90 tablet, Rfl: 1  •  potassium chloride (KLOR-CON) 20 MEQ CR tablet, Take 1 tablet by mouth Daily., Disp: 180 tablet, Rfl: 0  •  risperiDONE (risperDAL) 1 MG tablet, TAKE 1 TABLET BY MOUTH EVERYDAY AT BEDTIME, Disp: 90 tablet, Rfl: 1  •  rosuvastatin (CRESTOR) 40 MG tablet, Take 1 tablet by mouth Every Evening., Disp: 90 tablet, Rfl: 0  •  Syringe/Needle, Disp, 23G X 1\" 3 ML misc, Use to inject B12 every 30 days intramuscularly, Disp: 12 each, Rfl: 0  •  tiotropium bromide monohydrate (Spiriva Respimat) 2.5 MCG/ACT aerosol solution inhaler, Inhale 1 puff Daily., Disp: 2 each, Rfl: 0  •  traZODone (DESYREL) 50 MG tablet, TAKE 1 TO 2 TABLETS BY MOUTH NIGHTLY AT BEDTIME, Disp: 180 " tablet, Rfl: 1  •  vitamin D (ERGOCALCIFEROL) 1.25 MG (16234 UT) capsule capsule, Take 1 capsule by mouth 1 (One) Time Per Week., Disp: 15 capsule, Rfl: 3    Family History:  Family History   Problem Relation Age of Onset   • Colon cancer Mother    • Diabetes Father    • Pulmonary embolism Brother    • Heart failure Brother        Past Medical History:  Past Medical History:   Diagnosis Date   • Anxiety    • Breast cyst    • CHF (congestive heart failure) (CMS/HCC)    • Coronary heart disease    • Depression    • Hyperlipidemia    • Hypertension        Past surgical History:  Past Surgical History:   Procedure Laterality Date   • APPENDECTOMY     • BREAST CYST ASPIRATION     • CARDIAC CATHETERIZATION  2017    No Stents placed - [facility]   • CERVICAL FUSION      C6-C7   •  SECTION      x 2   • CHOLECYSTECTOMY     • HYSTERECTOMY         Social History:  Social History     Socioeconomic History   • Marital status:      Spouse name: Not on file   • Number of children: Not on file   • Years of education: Not on file   • Highest education level: Not on file   Tobacco Use   • Smoking status: Never Smoker   • Smokeless tobacco: Never Used   • Tobacco comment: Passive Smoke: N   Vaping Use   • Vaping Use: Never used   Substance and Sexual Activity   • Alcohol use: No   • Drug use: No   • Sexual activity: Defer       Review of Systems:  The following systems were reviewed as they relate to the cardiovascular system: Constitutional, Eyes, ENT, Cardiovascular, Respiratory, Gastrointestinal, Integumentary, Neurological, Psychiatric, Hematologic, Endocrine, Musculoskeletal, and Genitourinary. The pertinent cardiovascular findings are reported above with all other cardiovascular points within those systems being negative.    Diagnostic Study Review:     Current Electrocardiogram:    ECG 12 Lead    Date/Time: 2021 9:20 AM  Performed by: Cuauhtemoc Kwon MD  Authorized by: Cuauhtemoc Kwon MD    Comparison: compared with previous ECG   Similar to previous ECG  Rhythm: sinus rhythm  Rate: normal  BPM: 65  Conduction: left posterior fascicular block  QRS axis: normal  Other findings: non-specific ST-T wave changes    Clinical impression: abnormal EKG  Comments: Diffuse low voltage in chest and limb leads              NOTE: The following portions of the patient's history were reviewed and updated this visit as appropriate: allergies, current medications, past family history, past medical history, past social history, past surgical history and problem list.

## 2021-08-31 ENCOUNTER — OFFICE VISIT (OUTPATIENT)
Dept: PSYCHIATRY | Facility: CLINIC | Age: 61
End: 2021-08-31

## 2021-08-31 ENCOUNTER — LAB (OUTPATIENT)
Dept: LAB | Facility: HOSPITAL | Age: 61
End: 2021-08-31

## 2021-08-31 DIAGNOSIS — F43.12 CHRONIC POST-TRAUMATIC STRESS DISORDER (PTSD): Chronic | ICD-10-CM

## 2021-08-31 DIAGNOSIS — F51.04 PSYCHOPHYSIOLOGICAL INSOMNIA: Chronic | ICD-10-CM

## 2021-08-31 DIAGNOSIS — J44.9 CHRONIC OBSTRUCTIVE PULMONARY DISEASE, UNSPECIFIED COPD TYPE (HCC): ICD-10-CM

## 2021-08-31 DIAGNOSIS — I25.118 CORONARY ARTERY DISEASE OF NATIVE ARTERY OF NATIVE HEART WITH STABLE ANGINA PECTORIS (HCC): ICD-10-CM

## 2021-08-31 DIAGNOSIS — I10 ESSENTIAL HYPERTENSION: ICD-10-CM

## 2021-08-31 DIAGNOSIS — Z79.899 ENCOUNTER FOR LONG-TERM (CURRENT) USE OF OTHER MEDICATIONS: ICD-10-CM

## 2021-08-31 DIAGNOSIS — I20.8 STABLE ANGINA PECTORIS (HCC): ICD-10-CM

## 2021-08-31 DIAGNOSIS — E78.2 MIXED HYPERLIPIDEMIA: ICD-10-CM

## 2021-08-31 DIAGNOSIS — I25.10 CORONARY ARTERY DISEASE INVOLVING NATIVE CORONARY ARTERY OF NATIVE HEART WITHOUT ANGINA PECTORIS: ICD-10-CM

## 2021-08-31 DIAGNOSIS — I50.32 CHRONIC DIASTOLIC CONGESTIVE HEART FAILURE (HCC): ICD-10-CM

## 2021-08-31 DIAGNOSIS — F33.2 SEVERE EPISODE OF RECURRENT MAJOR DEPRESSIVE DISORDER, WITHOUT PSYCHOTIC FEATURES (HCC): Primary | Chronic | ICD-10-CM

## 2021-08-31 PROBLEM — F32.A DEPRESSION: Status: RESOLVED | Noted: 2018-04-02 | Resolved: 2021-08-31

## 2021-08-31 LAB
ALBUMIN SERPL-MCNC: 4.6 G/DL (ref 3.5–5.2)
ALBUMIN/GLOB SERPL: 1.6 G/DL
ALP SERPL-CCNC: 59 U/L (ref 39–117)
ALT SERPL W P-5'-P-CCNC: 25 U/L (ref 1–33)
ANION GAP SERPL CALCULATED.3IONS-SCNC: 14.1 MMOL/L (ref 5–15)
AST SERPL-CCNC: 21 U/L (ref 1–32)
BASOPHILS # BLD AUTO: 0.11 10*3/MM3 (ref 0–0.2)
BASOPHILS NFR BLD AUTO: 1.4 % (ref 0–1.5)
BILIRUB SERPL-MCNC: 0.3 MG/DL (ref 0–1.2)
BUN SERPL-MCNC: 22 MG/DL (ref 8–23)
BUN/CREAT SERPL: 15.5 (ref 7–25)
CALCIUM SPEC-SCNC: 9.8 MG/DL (ref 8.6–10.5)
CHLORIDE SERPL-SCNC: 103 MMOL/L (ref 98–107)
CHOLEST SERPL-MCNC: 151 MG/DL (ref 0–200)
CO2 SERPL-SCNC: 24.9 MMOL/L (ref 22–29)
CREAT SERPL-MCNC: 1.42 MG/DL (ref 0.57–1)
DEPRECATED RDW RBC AUTO: 40.8 FL (ref 37–54)
EOSINOPHIL # BLD AUTO: 0.22 10*3/MM3 (ref 0–0.4)
EOSINOPHIL NFR BLD AUTO: 2.8 % (ref 0.3–6.2)
ERYTHROCYTE [DISTWIDTH] IN BLOOD BY AUTOMATED COUNT: 13.1 % (ref 12.3–15.4)
GFR SERPL CREATININE-BSD FRML MDRD: 37 ML/MIN/1.73
GLOBULIN UR ELPH-MCNC: 2.8 GM/DL
GLUCOSE SERPL-MCNC: 98 MG/DL (ref 65–99)
HCT VFR BLD AUTO: 38.2 % (ref 34–46.6)
HDLC SERPL-MCNC: 57 MG/DL (ref 40–60)
HGB BLD-MCNC: 12.9 G/DL (ref 12–15.9)
IMM GRANULOCYTES # BLD AUTO: 0.01 10*3/MM3 (ref 0–0.05)
IMM GRANULOCYTES NFR BLD AUTO: 0.1 % (ref 0–0.5)
LDLC SERPL CALC-MCNC: 65 MG/DL (ref 0–100)
LDLC/HDLC SERPL: 1.03 {RATIO}
LYMPHOCYTES # BLD AUTO: 3.15 10*3/MM3 (ref 0.7–3.1)
LYMPHOCYTES NFR BLD AUTO: 39.5 % (ref 19.6–45.3)
MCH RBC QN AUTO: 29.2 PG (ref 26.6–33)
MCHC RBC AUTO-ENTMCNC: 33.8 G/DL (ref 31.5–35.7)
MCV RBC AUTO: 86.4 FL (ref 79–97)
MONOCYTES # BLD AUTO: 0.54 10*3/MM3 (ref 0.1–0.9)
MONOCYTES NFR BLD AUTO: 6.8 % (ref 5–12)
NEUTROPHILS NFR BLD AUTO: 3.94 10*3/MM3 (ref 1.7–7)
NEUTROPHILS NFR BLD AUTO: 49.4 % (ref 42.7–76)
NRBC BLD AUTO-RTO: 0 /100 WBC (ref 0–0.2)
NT-PROBNP SERPL-MCNC: 162.8 PG/ML (ref 0–900)
PLATELET # BLD AUTO: 249 10*3/MM3 (ref 140–450)
PMV BLD AUTO: 9.5 FL (ref 6–12)
POTASSIUM SERPL-SCNC: 4 MMOL/L (ref 3.5–5.2)
PROT SERPL-MCNC: 7.4 G/DL (ref 6–8.5)
RBC # BLD AUTO: 4.42 10*6/MM3 (ref 3.77–5.28)
SODIUM SERPL-SCNC: 142 MMOL/L (ref 136–145)
TRIGL SERPL-MCNC: 176 MG/DL (ref 0–150)
TSH SERPL DL<=0.05 MIU/L-ACNC: 2.29 UIU/ML (ref 0.27–4.2)
VLDLC SERPL-MCNC: 29 MG/DL (ref 5–40)
WBC # BLD AUTO: 7.97 10*3/MM3 (ref 3.4–10.8)

## 2021-08-31 PROCEDURE — 85025 COMPLETE CBC W/AUTO DIFF WBC: CPT

## 2021-08-31 PROCEDURE — 36415 COLL VENOUS BLD VENIPUNCTURE: CPT

## 2021-08-31 PROCEDURE — 80053 COMPREHEN METABOLIC PANEL: CPT

## 2021-08-31 PROCEDURE — 83880 ASSAY OF NATRIURETIC PEPTIDE: CPT

## 2021-08-31 PROCEDURE — 80061 LIPID PANEL: CPT

## 2021-08-31 PROCEDURE — 99214 OFFICE O/P EST MOD 30 MIN: CPT | Performed by: PSYCHIATRY & NEUROLOGY

## 2021-08-31 PROCEDURE — 84443 ASSAY THYROID STIM HORMONE: CPT

## 2021-08-31 RX ORDER — TRAZODONE HYDROCHLORIDE 50 MG/1
TABLET ORAL
Qty: 180 TABLET | Refills: 1 | Status: SHIPPED | OUTPATIENT
Start: 2021-08-31 | End: 2022-05-04

## 2021-08-31 RX ORDER — HYDROXYZINE PAMOATE 25 MG/1
25 CAPSULE ORAL 3 TIMES DAILY PRN
Qty: 270 CAPSULE | Refills: 1 | Status: SHIPPED | OUTPATIENT
Start: 2021-08-31 | End: 2022-01-04 | Stop reason: SDUPTHER

## 2021-08-31 RX ORDER — ALPRAZOLAM 0.5 MG/1
0.5 TABLET ORAL 3 TIMES DAILY PRN
Qty: 90 TABLET | Refills: 3 | Status: SHIPPED | OUTPATIENT
Start: 2021-08-31 | End: 2022-01-04 | Stop reason: SDUPTHER

## 2021-08-31 RX ORDER — ARIPIPRAZOLE 10 MG/1
10 TABLET ORAL DAILY
Qty: 90 TABLET | Refills: 1 | Status: SHIPPED | OUTPATIENT
Start: 2021-08-31 | End: 2021-12-15

## 2021-08-31 RX ORDER — LEVOMEFOLATE CALCIUM 15 MG
1 TABLET ORAL DAILY
Qty: 90 TABLET | Refills: 1 | Status: SHIPPED | OUTPATIENT
Start: 2021-08-31 | End: 2022-01-04

## 2021-08-31 RX ORDER — RISPERIDONE 1 MG/1
1 TABLET ORAL
Qty: 90 TABLET | Refills: 1 | Status: SHIPPED | OUTPATIENT
Start: 2021-08-31 | End: 2022-01-04 | Stop reason: SDUPTHER

## 2021-08-31 RX ORDER — CITALOPRAM 40 MG/1
40 TABLET ORAL EVERY MORNING
Qty: 90 TABLET | Refills: 1 | Status: SHIPPED | OUTPATIENT
Start: 2021-08-31 | End: 2022-01-04 | Stop reason: SDUPTHER

## 2021-08-31 NOTE — PATIENT INSTRUCTIONS
Post-Traumatic Stress Disorder, Adult  Post-traumatic stress disorder (PTSD) is a mental health disorder that can occur after a traumatic event, such as a threat to life, serious injury, or sexual violence. Sometimes, PTSD can occur in people who hear about trauma that occurs to a close family member or friend. PTSD can happen to anyone at any age.  What are the causes?  The condition may be caused by experiencing a traumatic event.  What increases the risk?  This condition is more likely to occur in:  ·  servicemen and servicewomen.  · People who are in circumstances where their lives are threatened.  · People who have been the victim of, or witness to, a traumatic event, such as:  ? Domestic violence.  ? Physical or sexual abuse.  ? Rape.  ? A terrorist act or gun violence.  ? Natural disasters.  ? Accidents involving serious injury.  What are the signs or symptoms?  PTSD symptoms may start soon after a frightening event or months or years later. Symptoms last at least one month and tend to disrupt relationships, work, and daily activities. Symptoms of PTSD can be grouped into several categories.  Intrusive symptoms  This is when you re-experience the physical and emotional sensations of the traumatic event through one or more of the following ways:  · Having upsetting dreams.  · Feeling fear, horror, intense sadness, or anger in response to a reminder of the trauma.  · Having unwanted upsetting memories while awake.  · Having physical reactions triggered by reminders of the trauma, such as increased heart rate, shortness of breath, sweating, and shaking.  · Having flashbacks, or feeling like you are going through the event again.  Avoidance symptoms  This is when you avoid anything that reminds you of the trauma. Symptoms may also include:  · Losing interest or not participating in daily activities.  · Feeling disconnected from or avoiding other people.  · Isolating yourself.  Increased arousal  symptoms  You may have physical or emotional reactions triggered by your environment. Symptoms may include:  · Being easily startled.  · Behaving in a careless or self-destructive way.  · Becoming easily irritated.  · Feeling worried and nervous.  · Having trouble concentrating.  · Yelling at or hitting other people or objects.  · Having trouble sleeping.  Negative mood and thoughts  · Believing that you or others are bad.  · Feeling fear, horror, anger, sadness, guilt, or shame regularly.  · Not being able to remember certain parts of the traumatic event.  · Blaming yourself or others for the trauma.  · Being unable to experience positive emotions, such as happiness or love.  How is this diagnosed?  PTSD is diagnosed through an assessment by a mental health professional. You will be asked questions about your symptoms.  How is this treated?  Treatment for this condition may include any of the following or a combination:  · Taking medicines to reduce PTSD symptoms.  · Having counseling with a mental health professional or therapist who is experienced in treating PTSD.  · Doing eye movement desensitization and reprocessing therapy (EMDR). This type of therapy occurs with a specialized therapist.  If you have other mental health concerns, these conditions will also be treated.  Follow these instructions at home:  Lifestyle  · Find a support group in your community. Groups are often available for  veterans, trauma victims, and family members or caregivers.  · Try to get 7-9 hours of sleep each night. To help with sleep:  ? Keep your bedroom cool and dark.  ? Do not eat a heavy meal within 1 hour of bedtime.  ? Do not drink alcohol or caffeinated drinks before bed.  ? Avoid screen time, such as television, computers, tablets, or mobile phones, before bed.  · Do not use illegal drugs.  · Contact a local organization to find out if you are eligible for a service dog.  Activity  · Exercise regularly. Try to do at  least 30 minutes of physical activity most days of the week.  · Practice self-calming through:  ? Breathing exercises.  ? Meditation.  ? Yoga.  ? Listening to quiet music.  · Do not isolate yourself. Make connections with other people.  · Consider volunteering. Volunteering can help you feel more connected.  Eating and drinking  · Do not drink alcohol if:  ? Your health care provider tells you not to drink.  ? You are pregnant, may be pregnant, or are planning to become pregnant.  · If you drink alcohol:  ? Limit how much you use to:  § 0-1 drink a day for women.  § 0-2 drinks a day for men.  ? Be aware of how much alcohol is in your drink. In the [place] one drink equals one 12 oz bottle of beer (355 mL), one 5 oz glass of wine (148 mL), or one 1½ oz glass of hard liquor (44 mL).  General instructions  · Take steps to help yourself feel safer at home, such as by installing a security system.  · Work with a health care provider or therapist to help manage your symptoms.  · Take over-the-counter and prescription medicines as told by your health care provider.  · Let others know that you have PTSD and the things that may trigger symptoms. This can protect you and help them understand you better.  · If your PTSD is affecting your marriage or family, seek help from a family therapist.  · Make sure to let all of your health care providers know you have PTSD. This is especially important if you are having surgery or need to be admitted to the hospital.  · Keep all follow-up visits as told by your health care provider. This is important.  Contact a health care provider if:  · Your symptoms do not get better.  · You are feeling overwhelmed by your symptoms.  Get help right away if:  · You have thoughts of hurting yourself or others.  If you ever feel like you may hurt yourself or others, or have thoughts about taking your own life, get help right away. You can go to your nearest emergency department or call:  · Your local  emergency services (911 in the [place]  · A suicide crisis helpline, such as the National Suicide Prevention Lifeline at 1-256.619.4054. This is open 24 hours a day.  Summary  · Post-traumatic stress disorder (PTSD) is a mental health disorder that can occur after a traumatic event.  · Treatment for PTSD may include medicines, counseling, eye movement desensitization and reprocessing therapy (EMDR), or a combination of therapies.  · Find a support group in your community.  · Get help right away if you have thoughts of hurting yourself or others.  This information is not intended to replace advice given to you by your health care provider. Make sure you discuss any questions you have with your health care provider.  Document Revised: 02/27/2020 Document Reviewed: 02/27/2020  Elsevier Patient Education © 2021 [employer]

## 2021-09-01 ENCOUNTER — TELEPHONE (OUTPATIENT)
Dept: CARDIOLOGY | Facility: CLINIC | Age: 61
End: 2021-09-01

## 2021-09-01 NOTE — TELEPHONE ENCOUNTER
Unable to reach pt.    ----- Message from Cuauhtemoc Kwon MD sent at 9/1/2021  8:25 AM EDT -----  Labs look okay

## 2021-09-02 RX ORDER — ONDANSETRON 4 MG/1
TABLET, FILM COATED ORAL
Qty: 45 TABLET | Refills: 0 | Status: SHIPPED | OUTPATIENT
Start: 2021-09-02 | End: 2021-09-30

## 2021-09-08 ENCOUNTER — OFFICE VISIT (OUTPATIENT)
Dept: CARDIOLOGY | Facility: CLINIC | Age: 61
End: 2021-09-08

## 2021-09-08 VITALS
BODY MASS INDEX: 35.34 KG/M2 | OXYGEN SATURATION: 94 % | DIASTOLIC BLOOD PRESSURE: 92 MMHG | SYSTOLIC BLOOD PRESSURE: 138 MMHG | WEIGHT: 206 LBS | HEART RATE: 66 BPM

## 2021-09-08 DIAGNOSIS — I20.8 STABLE ANGINA PECTORIS (HCC): Primary | ICD-10-CM

## 2021-09-08 DIAGNOSIS — E78.2 MIXED HYPERLIPIDEMIA: ICD-10-CM

## 2021-09-08 DIAGNOSIS — I10 ESSENTIAL HYPERTENSION: ICD-10-CM

## 2021-09-08 DIAGNOSIS — I50.32 CHRONIC DIASTOLIC CONGESTIVE HEART FAILURE (HCC): ICD-10-CM

## 2021-09-08 DIAGNOSIS — I25.10 CORONARY ARTERY DISEASE INVOLVING NATIVE CORONARY ARTERY OF NATIVE HEART WITHOUT ANGINA PECTORIS: ICD-10-CM

## 2021-09-08 PROCEDURE — 99214 OFFICE O/P EST MOD 30 MIN: CPT | Performed by: INTERNAL MEDICINE

## 2021-09-08 RX ORDER — LISINOPRIL 5 MG/1
5 TABLET ORAL 2 TIMES DAILY
Qty: 180 TABLET | Refills: 2 | Status: SHIPPED | COMMUNITY
Start: 2021-09-08 | End: 2021-10-22 | Stop reason: SDUPTHER

## 2021-09-08 NOTE — PROGRESS NOTES
Cardiology Office Visit      Encounter Date:  09/08/2021    Patient ID:   Jasmine Cerda is a 61 y.o. female.      Reason For Followup:  Shortness of breath  Coronary artery disease    Brief Clinical History:  Dear Lety Gillis APRN    I had the pleasure of seeing Jasmine Cerda today. As you are well aware, this is a 61 y.o. female  had a prior history of known coronary artery disease prior moderate disease in the LAD that is currently being treated medically and also history of congestive heart failure secondary to diastolic dysfunction.     Echocardiogram with  normal LV systolic function and evidence of diastolic dysfunction    Interpretation Summary        · Left ventricular ejection fraction is normal (Calculated EF = 60%).  · Myocardial perfusion imaging indicates a normal myocardial perfusion study with no evidence of ischemia.  · Impressions are consistent with a low risk study.  · Findings consistent with a normal ECG stress test.  · Small apical mild fixed perfusion defect most likely apical thinning artifact         Interval History:  Denies any further chest pain  Planing of significant shortness of breath which is progressively getting worse  Complaining of significant weight gain  Dyspnea on exertion significant functional limitation  Complaining of some orthopnea and PND  No significant lower extremity edema    Assessment & Plan    Impressions:  Coronary artery disease  Hypertension  Hyperlipidemia  Obesity  Worsening shortness of breath dyspnea on exertion and functional limitation    Recommendations:  Continue current medical therapy  Change lisinopril to 5 mg p.o. twice daily and see if that will help with labile hypertension but patient is having  Recent labs reviewed are optimal including normal proBNP  We will patient for an echocardiogram to assess LV systolic function rule out any structural heart disease or significant valve pathology contributing to patient's shortness  Schedule patient  for Lexiscan Cardiolite stress test for further stratification for symptoms of shortness of breath dyspnea on exertion that sounds like angina LiquiVent at this time  Continue close monitoring  Recheck renal function in 1 month  Follow-up in office in 3 months      Objective:    Vitals:  Vitals:    09/08/21 1006   BP: 138/92   Pulse: 66   SpO2: 94%   Weight: 93.4 kg (206 lb)       Physical Exam:    General: Alert, cooperative, no distress, appears stated age  Head:  Normocephalic, atraumatic, mucous membranes moist  Eyes:  Conjunctiva/corneas clear, EOM's intact     Neck:  Supple,  no adenopathy;      Lungs: Clear to auscultation bilaterally, no wheezes rhonchi rales are noted  Chest wall: No tenderness  Heart::  Regular rate and rhythm, S1 and S2 normal, no murmur, rub or gallop  Abdomen: Soft, non-tender, nondistended bowel sounds active  Extremities: No cyanosis, clubbing, or edema  Pulses: 2+ and symmetric all extremities  Skin:  No rashes or lesions  Neuro/psych: A&O x3. CN II through XII are grossly intact with appropriate affect      Allergies:  No Known Allergies    Medication Review:     Current Outpatient Medications:   •  albuterol (PROVENTIL) (2.5 MG/3ML) 0.083% nebulizer solution, INHALE 1 VIAL VIA NEBULIZER DAILY AS NEEDED FOR WHEEZING, Disp: 300 mL, Rfl: 1  •  ALPRAZolam (XANAX) 0.5 MG tablet, Take 1 tablet by mouth 3 (Three) Times a Day As Needed for Anxiety., Disp: 90 tablet, Rfl: 3  •  ARIPiprazole (ABILIFY) 10 MG tablet, Take 1 tablet by mouth Daily., Disp: 90 tablet, Rfl: 1  •  aspirin 81 MG EC tablet, ASPIRIN EC 81 MG TBEC, Disp: , Rfl:   •  budesonide-formoterol (Symbicort) 160-4.5 MCG/ACT inhaler, Inhale 2 puffs 2 (Two) Times a Day., Disp: 30.6 g, Rfl: 1  •  bumetanide (BUMEX) 2 MG tablet, Take 1 tablet by mouth 2 (Two) Times a Day., Disp: 180 tablet, Rfl: 0  •  carvedilol (COREG) 25 MG tablet, Take 1 tablet by mouth 2 (Two) Times a Day., Disp: 180 tablet, Rfl: 0  •  citalopram (CeleXA) 40 MG  "tablet, Take 1 tablet by mouth Every Morning., Disp: 90 tablet, Rfl: 1  •  cyanocobalamin 1000 MCG/ML injection, INJECT 1 ML INTO THE APPROPRIATE MUSCLE AS DIRECTED BY PRESCRIBER EVERY 28 (TWENTY-EIGHT) DAYS., Disp: 3 mL, Rfl: 1  •  gabapentin (NEURONTIN) 300 MG capsule, Take 1 capsule by mouth 3 (Three) Times a Day., Disp: 270 capsule, Rfl: 1  •  hydrOXYzine pamoate (VISTARIL) 25 MG capsule, Take 1 capsule by mouth 3 (Three) Times a Day As Needed for Anxiety., Disp: 270 capsule, Rfl: 1  •  L-Methylfolate (Elfolate) 15 MG tablet, Take 1 tablet by mouth Daily., Disp: 90 tablet, Rfl: 1  •  lisinopril (PRINIVIL,ZESTRIL) 5 MG tablet, Take 1 tablet by mouth 2 (two) times a day., Disp: 180 tablet, Rfl: 2  •  metoclopramide (REGLAN) 10 MG tablet, TAKE 1 TABLET BY MOUTH 3 (THREE) TIMES A DAY BEFORE MEALS., Disp: 90 tablet, Rfl: 2  •  Multiple Vitamins-Minerals (MULTIVITAMIN ADULT) tablet, Daily. With Omega XL, Disp: , Rfl:   •  nitroglycerin (NITROSTAT) 0.4 MG SL tablet, NITROSTAT 0.4 MG SUBL, Disp: , Rfl:   •  ondansetron (ZOFRAN) 4 MG tablet, TAKE 1 TABLET BY MOUTH EVERY 8 (EIGHT) HOURS AS NEEDED FOR NAUSEA OR VOMITING. MUST LAST 30 DAYS., Disp: 45 tablet, Rfl: 0  •  pantoprazole (PROTONIX) 40 MG EC tablet, Take 1 tablet by mouth Daily., Disp: 90 tablet, Rfl: 1  •  potassium chloride (KLOR-CON) 20 MEQ CR tablet, Take 1 tablet by mouth Daily., Disp: 180 tablet, Rfl: 0  •  risperiDONE (risperDAL) 1 MG tablet, Take 1 tablet by mouth every night at bedtime., Disp: 90 tablet, Rfl: 1  •  rosuvastatin (CRESTOR) 40 MG tablet, Take 1 tablet by mouth Every Evening., Disp: 90 tablet, Rfl: 0  •  Syringe/Needle, Disp, 23G X 1\" 3 ML misc, Use to inject B12 every 30 days intramuscularly, Disp: 12 each, Rfl: 0  •  tiotropium bromide monohydrate (Spiriva Respimat) 2.5 MCG/ACT aerosol solution inhaler, Inhale 1 puff Daily., Disp: 2 each, Rfl: 0  •  traZODone (DESYREL) 50 MG tablet, 1 or 2 tabs po QHS, Disp: 180 tablet, Rfl: 1  •  vitamin D " (ERGOCALCIFEROL) 1.25 MG (87164 UT) capsule capsule, Take 1 capsule by mouth 1 (One) Time Per Week., Disp: 15 capsule, Rfl: 3    Family History:  Family History   Problem Relation Age of Onset   • Colon cancer Mother    • Diabetes Father    • Pulmonary embolism Brother    • Heart failure Brother        Past Medical History:  Past Medical History:   Diagnosis Date   • Anxiety    • Breast cyst    • CHF (congestive heart failure) (CMS/HCC)    • Coronary heart disease    • Depression    • Hyperlipidemia    • Hypertension        Past surgical History:  Past Surgical History:   Procedure Laterality Date   • APPENDECTOMY     • BREAST CYST ASPIRATION     • CARDIAC CATHETERIZATION  2017    No Stents placed - [facility]   • CERVICAL FUSION      C6-C7   •  SECTION      x 2   • CHOLECYSTECTOMY     • HYSTERECTOMY         Social History:  Social History     Socioeconomic History   • Marital status:      Spouse name: Not on file   • Number of children: Not on file   • Years of education: Not on file   • Highest education level: Not on file   Tobacco Use   • Smoking status: Never Smoker   • Smokeless tobacco: Never Used   • Tobacco comment: Passive Smoke: N   Vaping Use   • Vaping Use: Never used   Substance and Sexual Activity   • Alcohol use: No   • Drug use: No   • Sexual activity: Defer       Review of Systems:  The following systems were reviewed as they relate to the cardiovascular system: Constitutional, Eyes, ENT, Cardiovascular, Respiratory, Gastrointestinal, Integumentary, Neurological, Psychiatric, Hematologic, Endocrine, Musculoskeletal, and Genitourinary. The pertinent cardiovascular findings are reported above with all other cardiovascular points within those systems being negative.    Diagnostic Study Review:     Current Electrocardiogram:  Procedures      NOTE: The following portions of the patient's history were reviewed and updated this visit as appropriate: allergies, current medications, past family  history, past medical history, past social history, past surgical history and problem list.

## 2021-09-17 ENCOUNTER — HOSPITAL ENCOUNTER (OUTPATIENT)
Dept: CARDIOLOGY | Facility: HOSPITAL | Age: 61
Discharge: HOME | End: 2021-09-17

## 2021-09-17 VITALS
WEIGHT: 200 LBS | HEIGHT: 63 IN | HEART RATE: 79 BPM | SYSTOLIC BLOOD PRESSURE: 156 MMHG | DIASTOLIC BLOOD PRESSURE: 90 MMHG | BODY MASS INDEX: 35.44 KG/M2

## 2021-09-17 DIAGNOSIS — I20.8 STABLE ANGINA PECTORIS (HCC): ICD-10-CM

## 2021-09-17 DIAGNOSIS — E78.2 MIXED HYPERLIPIDEMIA: ICD-10-CM

## 2021-09-17 DIAGNOSIS — I25.10 CORONARY ARTERY DISEASE INVOLVING NATIVE CORONARY ARTERY OF NATIVE HEART WITHOUT ANGINA PECTORIS: ICD-10-CM

## 2021-09-17 DIAGNOSIS — I50.32 CHRONIC DIASTOLIC CONGESTIVE HEART FAILURE (HCC): ICD-10-CM

## 2021-09-17 DIAGNOSIS — I10 ESSENTIAL HYPERTENSION: ICD-10-CM

## 2021-09-17 DIAGNOSIS — I25.118 CORONARY ARTERY DISEASE OF NATIVE ARTERY OF NATIVE HEART WITH STABLE ANGINA PECTORIS (HCC): ICD-10-CM

## 2021-09-17 LAB
ASCENDING AORTA: 3.5 CM
BH CV ECHO MEAS - ACS: 1.6 CM
BH CV ECHO MEAS - AI DEC SLOPE: 314.9 CM/SEC^2
BH CV ECHO MEAS - AI DEC TIME: 1.5 SEC
BH CV ECHO MEAS - AI MAX PG: 95.3 MMHG
BH CV ECHO MEAS - AI MAX VEL: 488.1 CM/SEC
BH CV ECHO MEAS - AI P1/2T: 453.9 MSEC
BH CV ECHO MEAS - AO MAX PG (FULL): 8.7 MMHG
BH CV ECHO MEAS - AO MAX PG: 12 MMHG
BH CV ECHO MEAS - AO MEAN PG (FULL): 3.5 MMHG
BH CV ECHO MEAS - AO MEAN PG: 5.4 MMHG
BH CV ECHO MEAS - AO ROOT AREA (BSA CORRECTED): 1.7
BH CV ECHO MEAS - AO ROOT AREA: 8.5 CM^2
BH CV ECHO MEAS - AO ROOT DIAM: 3.3 CM
BH CV ECHO MEAS - AO V2 MAX: 172.6 CM/SEC
BH CV ECHO MEAS - AO V2 MEAN: 107.1 CM/SEC
BH CV ECHO MEAS - AO V2 VTI: 31.8 CM
BH CV ECHO MEAS - ASC AORTA: 3.5 CM
BH CV ECHO MEAS - AVA(I,A): 2.6 CM^2
BH CV ECHO MEAS - AVA(I,D): 2.6 CM^2
BH CV ECHO MEAS - AVA(V,A): 2 CM^2
BH CV ECHO MEAS - AVA(V,D): 2 CM^2
BH CV ECHO MEAS - BSA(HAYCOCK): 2 M^2
BH CV ECHO MEAS - BSA: 1.9 M^2
BH CV ECHO MEAS - BZI_BMI: 35.4 KILOGRAMS/M^2
BH CV ECHO MEAS - BZI_METRIC_HEIGHT: 160 CM
BH CV ECHO MEAS - BZI_METRIC_WEIGHT: 90.7 KG
BH CV ECHO MEAS - EDV(CUBED): 119.9 ML
BH CV ECHO MEAS - EDV(MOD-SP2): 80.2 ML
BH CV ECHO MEAS - EDV(MOD-SP4): 88.9 ML
BH CV ECHO MEAS - EDV(TEICH): 114.5 ML
BH CV ECHO MEAS - EF(CUBED): 67.6 %
BH CV ECHO MEAS - EF(MOD-BP): 56 %
BH CV ECHO MEAS - EF(MOD-SP2): 56.9 %
BH CV ECHO MEAS - EF(MOD-SP4): 54.9 %
BH CV ECHO MEAS - EF(TEICH): 58.9 %
BH CV ECHO MEAS - ESV(CUBED): 38.9 ML
BH CV ECHO MEAS - ESV(MOD-SP2): 34.6 ML
BH CV ECHO MEAS - ESV(MOD-SP4): 40.1 ML
BH CV ECHO MEAS - ESV(TEICH): 47 ML
BH CV ECHO MEAS - FS: 31.3 %
BH CV ECHO MEAS - IVS/LVPW: 1.1
BH CV ECHO MEAS - IVSD: 1.4 CM
BH CV ECHO MEAS - LA DIMENSION(2D): 3.9 CM
BH CV ECHO MEAS - LA DIMENSION: 3.8 CM
BH CV ECHO MEAS - LA/AO: 1.2
BH CV ECHO MEAS - LAT PEAK E' VEL: 6 CM/SEC
BH CV ECHO MEAS - LV DIASTOLIC VOL/BSA (35-75): 46 ML/M^2
BH CV ECHO MEAS - LV IVRT: 0.09 SEC
BH CV ECHO MEAS - LV MASS(C)D: 252.2 GRAMS
BH CV ECHO MEAS - LV MASS(C)DI: 130.4 GRAMS/M^2
BH CV ECHO MEAS - LV MAX PG: 3.3 MMHG
BH CV ECHO MEAS - LV MEAN PG: 1.9 MMHG
BH CV ECHO MEAS - LV SYSTOLIC VOL/BSA (12-30): 20.7 ML/M^2
BH CV ECHO MEAS - LV V1 MAX: 90.6 CM/SEC
BH CV ECHO MEAS - LV V1 MEAN: 65.8 CM/SEC
BH CV ECHO MEAS - LV V1 VTI: 21.4 CM
BH CV ECHO MEAS - LVIDD: 4.9 CM
BH CV ECHO MEAS - LVIDS: 3.4 CM
BH CV ECHO MEAS - LVOT AREA: 3.8 CM^2
BH CV ECHO MEAS - LVOT DIAM: 2.2 CM
BH CV ECHO MEAS - LVPWD: 1.2 CM
BH CV ECHO MEAS - MED PEAK E' VEL: 3 CM/SEC
BH CV ECHO MEAS - MV A MAX VEL: 106 CM/SEC
BH CV ECHO MEAS - MV DEC SLOPE: 546.5 CM/SEC^2
BH CV ECHO MEAS - MV DEC TIME: 0.16 SEC
BH CV ECHO MEAS - MV E MAX VEL: 85.9 CM/SEC
BH CV ECHO MEAS - MV E/A: 0.81
BH CV ECHO MEAS - MV MAX PG: 7.2 MMHG
BH CV ECHO MEAS - MV MEAN PG: 2.4 MMHG
BH CV ECHO MEAS - MV P1/2T: 53 MSEC
BH CV ECHO MEAS - MV V2 MAX: 134.5 CM/SEC
BH CV ECHO MEAS - MV V2 MEAN: 69.8 CM/SEC
BH CV ECHO MEAS - MV V2 VTI: 24.5 CM
BH CV ECHO MEAS - MVA(P1/2T): 4.1 CM2
BH CV ECHO MEAS - MVA(VTI): 3.3 CM^2
BH CV ECHO MEAS - PA ACC SLOPE: 553 CM/SEC2
BH CV ECHO MEAS - PA ACC TIME: 0.13 SEC
BH CV ECHO MEAS - PA MAX PG (FULL): 3 MMHG
BH CV ECHO MEAS - PA MAX PG: 5.2 MMHG
BH CV ECHO MEAS - PA MEAN PG (FULL): 1.2 MMHG
BH CV ECHO MEAS - PA MEAN PG: 2.3 MMHG
BH CV ECHO MEAS - PA PR(ACCEL): 22.4 MMHG
BH CV ECHO MEAS - PA V2 MAX: 114.4 CM/SEC
BH CV ECHO MEAS - PA V2 MEAN: 70.2 CM/SEC
BH CV ECHO MEAS - PA V2 VTI: 22.5 CM
BH CV ECHO MEAS - PAPD(PI EDV): 12 MMHG
BH CV ECHO MEAS - PI END-D VEL: 102.5 CM/SEC
BH CV ECHO MEAS - PULM A REVS DUR: 0.08 SEC
BH CV ECHO MEAS - PULM A REVS VEL: 29.8 CM/SEC
BH CV ECHO MEAS - PULM DIAS VEL: 40.6 CM/SEC
BH CV ECHO MEAS - PULM S/D: 1.9
BH CV ECHO MEAS - PULM SYS VEL: 78.7 CM/SEC
BH CV ECHO MEAS - RAP SYSTOLE: 8 MMHG
BH CV ECHO MEAS - RV MAX PG: 2.2 MMHG
BH CV ECHO MEAS - RV MEAN PG: 1 MMHG
BH CV ECHO MEAS - RV V1 MAX: 74 CM/SEC
BH CV ECHO MEAS - RV V1 MEAN: 48.5 CM/SEC
BH CV ECHO MEAS - RV V1 VTI: 17.6 CM
BH CV ECHO MEAS - RVSP: 34.1 MMHG
BH CV ECHO MEAS - SI(AO): 140.6 ML/M^2
BH CV ECHO MEAS - SI(CUBED): 41.9 ML/M^2
BH CV ECHO MEAS - SI(LVOT): 42.1 ML/M^2
BH CV ECHO MEAS - SI(MOD-SP2): 23.6 ML/M^2
BH CV ECHO MEAS - SI(MOD-SP4): 25.3 ML/M^2
BH CV ECHO MEAS - SI(TEICH): 34.9 ML/M^2
BH CV ECHO MEAS - SUP REN AO DIAM: 2.6 CM
BH CV ECHO MEAS - SV(AO): 271.9 ML
BH CV ECHO MEAS - SV(CUBED): 81 ML
BH CV ECHO MEAS - SV(LVOT): 81.5 ML
BH CV ECHO MEAS - SV(MOD-SP2): 45.7 ML
BH CV ECHO MEAS - SV(MOD-SP4): 48.9 ML
BH CV ECHO MEAS - SV(TEICH): 67.5 ML
BH CV ECHO MEAS - TAPSE (>1.6): 2.2 CM
BH CV ECHO MEAS - TR MAX PG: 26 MMHG
BH CV ECHO MEAS - TR MAX VEL: 255.2 CM/SEC
BH CV ECHO MEASUREMENTS AVERAGE E/E' RATIO: 19.09
BH CV REST NUCLEAR ISOTOPE DOSE: 8.8 MCI
BH CV STRESS BP STAGE 1: NORMAL
BH CV STRESS COMMENTS STAGE 1: NORMAL
BH CV STRESS DOSE REGADENOSON STAGE 1: 0.4
BH CV STRESS DURATION MIN STAGE 1: 0
BH CV STRESS DURATION SEC STAGE 1: 10
BH CV STRESS HR STAGE 1: 96
BH CV STRESS NUCLEAR ISOTOPE DOSE: 35.9 MCI
BH CV STRESS PROTOCOL 1: NORMAL
BH CV STRESS RECOVERY BP: NORMAL MMHG
BH CV STRESS RECOVERY HR: 85 BPM
BH CV STRESS STAGE 1: 1
BH CV VAS BP LEFT ARM: NORMAL MMHG
BH CV XLRA - RV BASE: 2.9 CM
BH CV XLRA - RV MID: 2.8 CM
BH CV XLRA - TDI S': 15 CM/SEC
IVRT: 88 MSEC
LEFT ATRIUM VOLUME INDEX: 29 ML/M2
LEFT ATRIUM VOLUME: 56 CM3
LV EF 2D ECHO EST: 55 %
LV EF NUC BP: 70 %
MAXIMAL PREDICTED HEART RATE: 157 BPM
PERCENT MAX PREDICTED HR: 61.15 %
STRESS BASELINE BP: NORMAL MMHG
STRESS BASELINE HR: 73 BPM
STRESS PERCENT HR: 72 %
STRESS POST PEAK BP: NORMAL MMHG
STRESS POST PEAK HR: 96 BPM
STRESS TARGET HR: 133 BPM

## 2021-09-17 PROCEDURE — 93306 TTE W/DOPPLER COMPLETE: CPT | Performed by: INTERNAL MEDICINE

## 2021-09-17 PROCEDURE — A9502 TC99M TETROFOSMIN: HCPCS | Performed by: INTERNAL MEDICINE

## 2021-09-17 PROCEDURE — 93306 TTE W/DOPPLER COMPLETE: CPT

## 2021-09-17 PROCEDURE — 93018 CV STRESS TEST I&R ONLY: CPT | Performed by: INTERNAL MEDICINE

## 2021-09-17 PROCEDURE — 78452 HT MUSCLE IMAGE SPECT MULT: CPT

## 2021-09-17 PROCEDURE — 25010000002 REGADENOSON 0.4 MG/5ML SOLUTION: Performed by: INTERNAL MEDICINE

## 2021-09-17 PROCEDURE — 78452 HT MUSCLE IMAGE SPECT MULT: CPT | Performed by: INTERNAL MEDICINE

## 2021-09-17 PROCEDURE — 93016 CV STRESS TEST SUPVJ ONLY: CPT | Performed by: INTERNAL MEDICINE

## 2021-09-17 PROCEDURE — 0 TECHNETIUM TETROFOSMIN KIT: Performed by: INTERNAL MEDICINE

## 2021-09-17 PROCEDURE — 93017 CV STRESS TEST TRACING ONLY: CPT

## 2021-09-17 RX ADMIN — REGADENOSON 0.4 MG: 0.08 INJECTION, SOLUTION INTRAVENOUS at 09:35

## 2021-09-17 RX ADMIN — TETROFOSMIN 1 DOSE: 1.38 INJECTION, POWDER, LYOPHILIZED, FOR SOLUTION INTRAVENOUS at 09:35

## 2021-09-17 RX ADMIN — TETROFOSMIN 1 DOSE: 1.38 INJECTION, POWDER, LYOPHILIZED, FOR SOLUTION INTRAVENOUS at 08:00

## 2021-09-20 RX ORDER — ROSUVASTATIN CALCIUM 40 MG/1
TABLET, COATED ORAL
Qty: 90 TABLET | Refills: 0 | Status: SHIPPED | OUTPATIENT
Start: 2021-09-20 | End: 2021-09-27

## 2021-09-20 RX ORDER — CARVEDILOL 25 MG/1
TABLET ORAL
Qty: 180 TABLET | Refills: 0 | Status: SHIPPED | OUTPATIENT
Start: 2021-09-20 | End: 2021-11-16

## 2021-09-20 RX ORDER — PANTOPRAZOLE SODIUM 40 MG/1
TABLET, DELAYED RELEASE ORAL
Qty: 90 TABLET | Refills: 1 | Status: SHIPPED | OUTPATIENT
Start: 2021-09-20 | End: 2021-10-13 | Stop reason: SDUPTHER

## 2021-09-21 ENCOUNTER — TELEPHONE (OUTPATIENT)
Dept: CARDIOLOGY | Facility: CLINIC | Age: 61
End: 2021-09-21

## 2021-09-21 NOTE — TELEPHONE ENCOUNTER
----- Message from Cuauhtemoc Kwon MD sent at 9/17/2021  7:24 PM EDT -----  Stress test looks okay

## 2021-09-24 DIAGNOSIS — F33.2 SEVERE EPISODE OF RECURRENT MAJOR DEPRESSIVE DISORDER, WITHOUT PSYCHOTIC FEATURES (HCC): Chronic | ICD-10-CM

## 2021-09-24 DIAGNOSIS — F43.12 CHRONIC POST-TRAUMATIC STRESS DISORDER (PTSD): Chronic | ICD-10-CM

## 2021-09-24 RX ORDER — CITALOPRAM 40 MG/1
TABLET ORAL
Qty: 90 TABLET | Refills: 1 | OUTPATIENT
Start: 2021-09-24

## 2021-09-27 RX ORDER — ROSUVASTATIN CALCIUM 40 MG/1
TABLET, COATED ORAL
Qty: 90 TABLET | Refills: 0 | Status: SHIPPED | OUTPATIENT
Start: 2021-09-27 | End: 2022-02-01

## 2021-09-27 RX ORDER — BUMETANIDE 2 MG/1
TABLET ORAL
Qty: 180 TABLET | Refills: 0 | Status: SHIPPED | OUTPATIENT
Start: 2021-09-27 | End: 2021-11-29

## 2021-09-27 RX ORDER — GABAPENTIN 300 MG/1
CAPSULE ORAL
Qty: 270 CAPSULE | Refills: 1 | Status: SHIPPED | OUTPATIENT
Start: 2021-09-27 | End: 2022-01-04 | Stop reason: SDUPTHER

## 2021-09-30 RX ORDER — ONDANSETRON 4 MG/1
TABLET, FILM COATED ORAL
Qty: 45 TABLET | Refills: 0 | Status: SHIPPED | OUTPATIENT
Start: 2021-09-30 | End: 2021-11-05

## 2021-10-07 DIAGNOSIS — I10 ESSENTIAL HYPERTENSION: ICD-10-CM

## 2021-10-07 RX ORDER — LISINOPRIL 10 MG/1
TABLET ORAL
Qty: 90 TABLET | Refills: 0 | OUTPATIENT
Start: 2021-10-07

## 2021-10-11 DIAGNOSIS — I10 ESSENTIAL HYPERTENSION: ICD-10-CM

## 2021-10-11 RX ORDER — LISINOPRIL 10 MG/1
TABLET ORAL
Qty: 90 TABLET | Refills: 0 | OUTPATIENT
Start: 2021-10-11

## 2021-10-13 ENCOUNTER — OFFICE VISIT (OUTPATIENT)
Dept: FAMILY MEDICINE CLINIC | Facility: CLINIC | Age: 61
End: 2021-10-13

## 2021-10-13 VITALS
HEIGHT: 63 IN | HEART RATE: 68 BPM | SYSTOLIC BLOOD PRESSURE: 124 MMHG | OXYGEN SATURATION: 98 % | DIASTOLIC BLOOD PRESSURE: 85 MMHG | WEIGHT: 207.8 LBS | BODY MASS INDEX: 36.82 KG/M2 | TEMPERATURE: 98.4 F

## 2021-10-13 DIAGNOSIS — Z23 NEED FOR INFLUENZA VACCINATION: ICD-10-CM

## 2021-10-13 DIAGNOSIS — G89.29 CHRONIC PAIN OF LEFT ANKLE: ICD-10-CM

## 2021-10-13 DIAGNOSIS — E53.8 B12 DEFICIENCY: ICD-10-CM

## 2021-10-13 DIAGNOSIS — I10 ESSENTIAL HYPERTENSION: ICD-10-CM

## 2021-10-13 DIAGNOSIS — J44.9 CHRONIC OBSTRUCTIVE PULMONARY DISEASE, UNSPECIFIED COPD TYPE (HCC): Primary | ICD-10-CM

## 2021-10-13 DIAGNOSIS — M25.572 CHRONIC PAIN OF LEFT ANKLE: ICD-10-CM

## 2021-10-13 DIAGNOSIS — K21.9 GASTROESOPHAGEAL REFLUX DISEASE WITHOUT ESOPHAGITIS: ICD-10-CM

## 2021-10-13 DIAGNOSIS — Z00.00 MEDICARE ANNUAL WELLNESS VISIT, SUBSEQUENT: ICD-10-CM

## 2021-10-13 DIAGNOSIS — Z12.31 BREAST CANCER SCREENING BY MAMMOGRAM: ICD-10-CM

## 2021-10-13 PROCEDURE — G0008 ADMIN INFLUENZA VIRUS VAC: HCPCS | Performed by: NURSE PRACTITIONER

## 2021-10-13 PROCEDURE — 1170F FXNL STATUS ASSESSED: CPT | Performed by: NURSE PRACTITIONER

## 2021-10-13 PROCEDURE — 1125F AMNT PAIN NOTED PAIN PRSNT: CPT | Performed by: NURSE PRACTITIONER

## 2021-10-13 PROCEDURE — 96160 PT-FOCUSED HLTH RISK ASSMT: CPT | Performed by: NURSE PRACTITIONER

## 2021-10-13 PROCEDURE — 99213 OFFICE O/P EST LOW 20 MIN: CPT | Performed by: NURSE PRACTITIONER

## 2021-10-13 PROCEDURE — G0439 PPPS, SUBSEQ VISIT: HCPCS | Performed by: NURSE PRACTITIONER

## 2021-10-13 PROCEDURE — 90686 IIV4 VACC NO PRSV 0.5 ML IM: CPT | Performed by: NURSE PRACTITIONER

## 2021-10-13 PROCEDURE — 1160F RVW MEDS BY RX/DR IN RCRD: CPT | Performed by: NURSE PRACTITIONER

## 2021-10-13 RX ORDER — POTASSIUM CHLORIDE 20 MEQ/1
20 TABLET, EXTENDED RELEASE ORAL DAILY
Qty: 90 TABLET | Refills: 3 | Status: SHIPPED | OUTPATIENT
Start: 2021-10-13 | End: 2022-04-14 | Stop reason: SDUPTHER

## 2021-10-13 RX ORDER — CYANOCOBALAMIN 1000 UG/ML
1000 INJECTION, SOLUTION INTRAMUSCULAR; SUBCUTANEOUS
Qty: 3 ML | Refills: 3 | Status: SHIPPED | OUTPATIENT
Start: 2021-10-13 | End: 2021-12-07

## 2021-10-13 RX ORDER — PANTOPRAZOLE SODIUM 40 MG/1
40 TABLET, DELAYED RELEASE ORAL DAILY
Qty: 90 TABLET | Refills: 3 | Status: SHIPPED | OUTPATIENT
Start: 2021-10-13 | End: 2022-10-17 | Stop reason: SDUPTHER

## 2021-10-13 RX ORDER — METOCLOPRAMIDE 10 MG/1
10 TABLET ORAL
Qty: 90 TABLET | Refills: 3 | Status: SHIPPED | OUTPATIENT
Start: 2021-10-13 | End: 2022-05-02

## 2021-10-13 RX ORDER — TIOTROPIUM BROMIDE INHALATION SPRAY 3.12 UG/1
1 SPRAY, METERED RESPIRATORY (INHALATION)
Qty: 1 EACH | Refills: 11 | Status: SHIPPED | OUTPATIENT
Start: 2021-10-13 | End: 2022-10-17

## 2021-10-13 NOTE — PROGRESS NOTES
The ABCs of the Annual Wellness Visit  Subsequent Medicare Wellness Visit    Chief Complaint   Patient presents with   • Medicare Wellness-subsequent   • Ankle Problem     pt is expc pain, but is seeing someone for it next week.     Subjective    History of Present Illness:  Jasmine Cerda is a 61 y.o. female who presents for a Subsequent Medicare Wellness Visit and follow up.    GERD, pt sts ran out of pantoprazole 2 wks ago. Now with nausea 2-3 days/week, denies vomiting, constipation, abdominal pain and diarrhea.    L foot/ankle pain, throbbing constantly, has appt with Dr. Richardson     b12 deficiency, on monthly b12 injections.      The following portions of the patient's history were reviewed and   updated as appropriate: allergies, current medications, past family history, past medical history, past social history, past surgical history and problem list.    Compared to one year ago, the patient feels her physical   health is worse.    Compared to one year ago, the patient feels her mental   health is the same.    Recent Hospitalizations:  This patient has had a [facility] admission record on file within the last 365 days.    Current Medical Providers:  Patient Care Team:  Eleni Miranda APRN as PCP - General (Nurse Practitioner)    Outpatient Medications Prior to Visit   Medication Sig Dispense Refill   • albuterol (PROVENTIL) (2.5 MG/3ML) 0.083% nebulizer solution INHALE 1 VIAL VIA NEBULIZER DAILY AS NEEDED FOR WHEEZING 300 mL 1   • ALPRAZolam (XANAX) 0.5 MG tablet Take 1 tablet by mouth 3 (Three) Times a Day As Needed for Anxiety. 90 tablet 3   • ARIPiprazole (ABILIFY) 10 MG tablet Take 1 tablet by mouth Daily. 90 tablet 1   • aspirin 81 MG EC tablet ASPIRIN EC 81 MG TBEC     • budesonide-formoterol (Symbicort) 160-4.5 MCG/ACT inhaler Inhale 2 puffs 2 (Two) Times a Day. 30.6 g 1   • bumetanide (BUMEX) 2 MG tablet TAKE 1 TABLET TWICE DAILY (NEED MD APPOINTMENT FOR REFILLS) 180 tablet 0   • carvedilol  "(COREG) 25 MG tablet TAKE 1 TABLET TWICE DAILY 180 tablet 0   • citalopram (CeleXA) 40 MG tablet Take 1 tablet by mouth Every Morning. 90 tablet 1   • gabapentin (NEURONTIN) 300 MG capsule TAKE 1 CAPSULE THREE TIMES DAILY 270 capsule 1   • hydrOXYzine pamoate (VISTARIL) 25 MG capsule Take 1 capsule by mouth 3 (Three) Times a Day As Needed for Anxiety. 270 capsule 1   • L-Methylfolate (Elfolate) 15 MG tablet Take 1 tablet by mouth Daily. 90 tablet 1   • lisinopril (PRINIVIL,ZESTRIL) 5 MG tablet Take 1 tablet by mouth 2 (two) times a day. 180 tablet 2   • Multiple Vitamins-Minerals (MULTIVITAMIN ADULT) tablet Daily. With Omega XL     • nitroglycerin (NITROSTAT) 0.4 MG SL tablet NITROSTAT 0.4 MG SUBL     • NON FORMULARY Omega XL OTC     • ondansetron (ZOFRAN) 4 MG tablet TAKE 1 TABLET BY MOUTH EVERY 8 (EIGHT) HOURS AS NEEDED FOR NAUSEA OR VOMITING. MUST LAST 30 DAYS. 45 tablet 0   • risperiDONE (risperDAL) 1 MG tablet Take 1 tablet by mouth every night at bedtime. 90 tablet 1   • rosuvastatin (CRESTOR) 40 MG tablet TAKE 1 TABLET EVERY EVENING 90 tablet 0   • Syringe/Needle, Disp, 23G X 1\" 3 ML misc Use to inject B12 every 30 days intramuscularly 12 each 0   • traZODone (DESYREL) 50 MG tablet 1 or 2 tabs po  tablet 1   • vitamin D (ERGOCALCIFEROL) 1.25 MG (94861 UT) capsule capsule Take 1 capsule by mouth 1 (One) Time Per Week. 15 capsule 3   • cyanocobalamin 1000 MCG/ML injection INJECT 1 ML INTO THE APPROPRIATE MUSCLE AS DIRECTED BY PRESCRIBER EVERY 28 (TWENTY-EIGHT) DAYS. 3 mL 1   • metoclopramide (REGLAN) 10 MG tablet TAKE 1 TABLET BY MOUTH 3 (THREE) TIMES A DAY BEFORE MEALS. 90 tablet 2   • pantoprazole (PROTONIX) 40 MG EC tablet TAKE 1 TABLET BY MOUTH EVERY DAY 90 tablet 1   • potassium chloride (KLOR-CON) 20 MEQ CR tablet Take 1 tablet by mouth Daily. 180 tablet 0   • tiotropium bromide monohydrate (Spiriva Respimat) 2.5 MCG/ACT aerosol solution inhaler Inhale 1 puff Daily. 2 each 0     No " "facility-administered medications prior to visit.       No opioid medication identified on active medication list. I have reviewed chart for other potential  high risk medication/s and harmful drug interactions in the elderly.          Aspirin is on active medication list. Aspirin use is indicated based on review of current medical condition/s. Pros and cons of this therapy have been discussed today. Benefits of this medication outweigh potential harm.  Patient has been encouraged to continue taking this medication.  .      Patient Active Problem List   Diagnosis   • Chronic post-traumatic stress disorder (PTSD)   • Severe episode of recurrent major depressive disorder (HCC)   • Asthma   • Chronic low back pain   • Congestive heart failure (HCC)   • Coronary heart disease   • Degeneration of intervertebral disc of lumbosacral region   • Headache, temporal   • Psychophysiological insomnia   • Hyperlipidemia   • Hypertension   • Vitamin D deficiency   • Other fatigue   • Preventative health care   • Hyperglycemia, unspecified    • Nausea   • Stable angina pectoris (HCC)     Advance Care Planning  Advance Directive is not on file.  ACP discussion was held with the patient during this visit. Patient does not have an advance directive, information provided.          Objective    Vitals:    10/13/21 1451   BP: 124/85   BP Location: Left arm   Patient Position: Sitting   Cuff Size: Adult   Pulse: 68   Temp: 98.4 °F (36.9 °C)   TempSrc: Infrared   SpO2: 98%   Weight: 94.3 kg (207 lb 12.8 oz)   Height: 160 cm (62.99\")   PainSc:   8   PainLoc: Ankle     BMI Readings from Last 1 Encounters:   10/13/21 36.82 kg/m²   BMI is above normal parameters. Recommendations include: educational material and nutrition counseling    Does the patient have evidence of cognitive impairment? No    Physical Exam  Vitals and nursing note reviewed.   Constitutional:       General: She is not in acute distress.     Appearance: She is " well-developed. She is not diaphoretic.   Eyes:      Pupils: Pupils are equal, round, and reactive to light.   Neck:      Thyroid: No thyromegaly.      Vascular: No JVD.   Cardiovascular:      Rate and Rhythm: Normal rate and regular rhythm.      Heart sounds: Normal heart sounds. No murmur heard.      Pulmonary:      Effort: Pulmonary effort is normal. No respiratory distress.      Breath sounds: Normal breath sounds.   Abdominal:      General: Bowel sounds are normal. There is no distension.      Palpations: Abdomen is soft.      Tenderness: There is no abdominal tenderness.   Musculoskeletal:         General: Tenderness (acute on chronic L ankle pain, mild boggs rom. ) present. Normal range of motion.      Cervical back: Normal range of motion and neck supple.   Skin:     General: Skin is warm and dry.   Neurological:      Mental Status: She is alert and oriented to person, place, and time.      Sensory: No sensory deficit.   Psychiatric:         Behavior: Behavior normal.         Thought Content: Thought content normal.         Judgment: Judgment normal.       Lab Results   Component Value Date    TRIG 176 (H) 08/31/2021    HDL 57 08/31/2021    LDL 65 08/31/2021    VLDL 29 08/31/2021            HEALTH RISK ASSESSMENT    Smoking Status:  Social History     Tobacco Use   Smoking Status Never Smoker   Smokeless Tobacco Never Used   Tobacco Comment    Passive Smoke: N     Alcohol Consumption:  Social History     Substance and Sexual Activity   Alcohol Use No     Fall Risk Screen:    JAGADI Fall Risk Assessment was completed, and patient is at LOW risk for falls.Assessment completed on:10/13/2021    Depression Screening:  PHQ-2/PHQ-9 Depression Screening 10/13/2021   Little interest or pleasure in doing things 0   Feeling down, depressed, or hopeless 0   Trouble falling or staying asleep, or sleeping too much 0   Feeling tired or having little energy 0   Poor appetite or overeating 0   Feeling bad about yourself - or  that you are a failure or have let yourself or your family down 0   Trouble concentrating on things, such as reading the newspaper or watching television 0   Moving or speaking so slowly that other people could have noticed. Or the opposite - being so fidgety or restless that you have been moving around a lot more than usual 0   Thoughts that you would be better off dead, or of hurting yourself in some way 0   Total Score 0   If you checked off any problems, how difficult have these problems made it for you to do your work, take care of things at home, or get along with other people? Not difficult at all     ATTENTION  What is the year: correct  What is the month of the year: correct  What is the day of the week?: correct  What is the date?: correct  MEMORY  Repeat address three times, only score third attempt: Arun Kaufman [address] [place] [address]: 6  HOW MANY ANIMALS DID THE PATIENT NAME  Verbal Fluency -- Animal Names (0-25): 17-21  CLOCK DRAWING  Clock Drawing: All Correct  MEMORY RECALL  Tell me what you remember about that name and address we were repeating at the beginnin  ACE TOTAL SCORE  Total ACE Score - <25/30 strongly suggests cognitive impairment; <21/30 almost certainly shows dementia: 28        Health Habits and Functional and Cognitive Screening:  Functional & Cognitive Status 10/13/2021   Do you have difficulty preparing food and eating? No   Do you have difficulty bathing yourself, getting dressed or grooming yourself? No   Do you have difficulty using the toilet? No   Do you have difficulty moving around from place to place? No   Do you have trouble with steps or getting out of a bed or a chair? No   Do you need help using the phone?  No   Are you deaf or do you have serious difficulty hearing?  No   Do you need help with transportation? No   Do you need help shopping? No   Do you need help preparing meals?  No   Do you need help with housework?  No   Do you need help with laundry?  No   Do you need help taking your medications? No   Do you need help managing money? No   Do you ever drive or ride in a car without wearing a seat belt? No   Have you felt unusual stress, anger or loneliness in the last month? Yes   Who do you live with? Spouse   If you need help, do you have trouble finding someone available to you? No   Have you been bothered in the last four weeks by sexual problems? No   Do you have difficulty concentrating, remembering or making decisions? No       Age-appropriate Screening Schedule:  Refer to the list below for future screening recommendations based on patient's age, sex and/or medical conditions. Orders for these recommended tests are listed in the plan section. The patient has been provided with a written plan.    Health Maintenance   Topic Date Due   • TDAP/TD VACCINES (1 - Tdap) Never done   • ZOSTER VACCINE (1 of 2) Never done   • MAMMOGRAM  08/28/2022   • LIPID PANEL  08/31/2022   • INFLUENZA VACCINE  Completed   • PAP SMEAR  Discontinued              Assessment/Plan   CMS Preventative Services Quick Reference  Risk Factors Identified During Encounter  Chronic Pain   Immunizations Discussed/Encouraged (specific Immunizations; Tdap, Influenza and Shingrix  Inactivity/Sedentary  Obesity/Overweight   The above risks/problems have been discussed with the patient.  Follow up actions/plans if indicated are seen below in the Assessment/Plan Section.  Pertinent information has been shared with the patient in the After Visit Summary.    Diagnoses and all orders for this visit:    1. Chronic obstructive pulmonary disease, unspecified COPD type (HCC) (Primary)  Comments:  cond improved, cont symbicort and albuterol, spiriva effective, cont and rf.   Orders:  -     tiotropium bromide monohydrate (Spiriva Respimat) 2.5 MCG/ACT aerosol solution inhaler; Inhale 1 puff Daily.  Dispense: 1 each; Refill: 11    2. Medicare annual wellness visit, subsequent    3. Breast cancer screening by  mammogram  -     Mammo Screening Digital Tomosynthesis Bilateral With CAD; Future    4. Essential hypertension  Comments:  BP stable, continue lisinopril, bumex and kcl. refill med needed today  Orders:  -     potassium chloride (KLOR-CON) 20 MEQ CR tablet; Take 1 tablet by mouth Daily.  Dispense: 90 tablet; Refill: 3    5. Need for influenza vaccination  -     FluLaval/Fluarix/Fluzone >6 Months    6. Gastroesophageal reflux disease without esophagitis  Comments:  resume pantoprazole, rf available, cont at hs due to a.m. nausea.  Continue Reglan and Zofran as needed, consider twice daily dosing  Orders:  -     metoclopramide (REGLAN) 10 MG tablet; Take 1 tablet by mouth 3 (Three) Times a Day Before Meals.  Dispense: 90 tablet; Refill: 3    7. B12 deficiency  Comments:  cont monthly IM injections    8. Chronic pain of left ankle    Other orders  -     pantoprazole (PROTONIX) 40 MG EC tablet; Take 1 tablet by mouth Daily.  Dispense: 90 tablet; Refill: 3  -     cyanocobalamin 1000 MCG/ML injection; Inject 1 mL into the appropriate muscle as directed by prescriber Every 28 (Twenty-Eight) Days.  Dispense: 3 mL; Refill: 3      Age appropriate preventative counseling provided, including healthy lifestyle modifications and exercise    At first of year will switch to breztri if still preferred on formulary d/t cost.       Follow Up:   Return in about 6 months (around 4/13/2022) for htn, copd, b12 def. B12, vit d, HTN panel prior to appt. .     An After Visit Summary and PPPS were made available to the patient.        I spent 30 minutes caring for Jasmine on this date of service. This time includes time spent by me in the following activities:preparing for the visit, performing a medically appropriate examination and/or evaluation , counseling and educating the patient/family/caregiver and documenting information in the medical record       EMR Dragon transcription disclaimer:  Some of this encounter note is an electronic  transcription translation of spoken language to printed text. The electronic translation of spoken language may permit erroneous, or at times, nonsensical words or phrases to be inadvertently transcribed; Although I have reviewed the note for such errors some may still exist.

## 2021-10-19 DIAGNOSIS — I10 ESSENTIAL HYPERTENSION: ICD-10-CM

## 2021-10-19 RX ORDER — LISINOPRIL 10 MG/1
TABLET ORAL
Qty: 90 TABLET | Refills: 0 | OUTPATIENT
Start: 2021-10-19

## 2021-10-20 RX ORDER — LISINOPRIL 10 MG/1
TABLET ORAL
Qty: 90 TABLET | Refills: 0 | OUTPATIENT
Start: 2021-10-20

## 2021-10-21 ENCOUNTER — OFFICE VISIT (OUTPATIENT)
Dept: PODIATRY | Facility: CLINIC | Age: 61
End: 2021-10-21

## 2021-10-21 VITALS
BODY MASS INDEX: 36.68 KG/M2 | WEIGHT: 207 LBS | SYSTOLIC BLOOD PRESSURE: 152 MMHG | HEART RATE: 71 BPM | DIASTOLIC BLOOD PRESSURE: 91 MMHG | HEIGHT: 63 IN

## 2021-10-21 DIAGNOSIS — M76.72 PERONEAL TENDINITIS OF LEFT LOWER LEG: ICD-10-CM

## 2021-10-21 DIAGNOSIS — M25.572 CHRONIC PAIN OF LEFT ANKLE: Primary | ICD-10-CM

## 2021-10-21 DIAGNOSIS — I10 ESSENTIAL HYPERTENSION: ICD-10-CM

## 2021-10-21 DIAGNOSIS — M25.372 ANKLE INSTABILITY, LEFT: ICD-10-CM

## 2021-10-21 DIAGNOSIS — G89.29 CHRONIC PAIN OF LEFT ANKLE: Primary | ICD-10-CM

## 2021-10-21 PROCEDURE — 99203 OFFICE O/P NEW LOW 30 MIN: CPT | Performed by: PODIATRIST

## 2021-10-21 RX ORDER — LISINOPRIL 10 MG/1
TABLET ORAL
Qty: 90 TABLET | Refills: 0 | OUTPATIENT
Start: 2021-10-21

## 2021-10-21 NOTE — PATIENT INSTRUCTIONS
Chronic Ankle Instability  Chronic ankle instability is a condition that makes the ankle weak and more likely to give way. The condition is common among athletes, especially those with prior ankle ligament injury. Ligaments are strong tissues that connect bones to each other.  What are the causes?    This condition is caused by multiple ankle sprains that have not healed properly, leaving the ankle ligaments loose or damaged.  What increases the risk?  This condition is more likely to develop in people who participate in sports in which there is a risk of spraining an ankle. These sports include:  · Cross-country trail running.  · Basketball.  · Baseball.  · Tennis.  · Football.  · Soccer.  What are the signs or symptoms?  Symptoms of this condition include:  · Rolling your ankle repeatedly.  · Swelling.  · Pain.  · Bruising.  · Tenderness.  · Feeling wobbly or unsteady on your foot.  · Difficulty walking on uneven surfaces or in the dark.  How is this diagnosed?  This condition may be diagnosed based on:  · Your symptoms.  · Your medical history.  · A physical exam. Your health care provider will check your balance, strength, and range of motion. He or she will also check your injured ankle against your healthy ankle.  · Imaging tests, such as:  ? An X-ray.  ? A CT scan.  ? An MRI.  ? An ultrasound.  How is this treated?  Treatment for this condition may include:  · Wearing a removable boot, brace, or splint.  · Wearing supportive shoes or shoe inserts.  · Applying ice to the ankle to reduce swelling.  · Taking anti-inflammatory pain medicine.  · Doing exercises (physical therapy).  · Not putting any body weight, or putting only limited body weight, on your ankle for several days.  · Gradually returning to full activity.  · Surgery to repair damaged ligaments.  Usually, surgery is needed only if the condition is severe or if other treatments do not work.  Follow these instructions at home:  If you have a boot,  brace, or splint:  · Wear it as told by your health care provider. Remove it only as told by your health care provider.  · Loosen it if your toes tingle, become numb, or turn cold and blue.  · Keep it clean.  · If it is not waterproof:  ? Do not let it get wet.  ? Cover it with a watertight covering when you take a bath or a shower.  · Ask your health care provider when it is safe to drive with a boot, brace, or splint on your foot.  Managing pain, stiffness, and swelling    · If directed, put ice on the injured area.  ? If you have a removable boot, brace, or splint, remove it as told by your health care provider.  ? Put ice in a plastic bag.  ? Place a towel between your skin and the bag.  ? Leave the ice on for 20 minutes, 2-3 times a day.  · Move your toes, foot, and ankle often to reduce stiffness and swelling.  · Raise (elevate) the injured area above the level of your heart while you are sitting or lying down.    Activity  · Return to your normal activities as told by your health care provider. Ask your health care provider what activities are safe for you.  · Do not put your full body weight on your ankle until your health care provider says that you can.  · Do not do any activities that make pain or swelling worse.  · Do exercises as told by your health care provider.  General instructions  · Take over-the-counter and prescription medicines only as told by your health care provider.  · Wear supportive shoes or inserts as told by your health care provider.  · Keep all follow-up visits as told by your health care provider. This is important.  How is this prevented?  · Wear supportive footwear that is appropriate for your athletic activity.  · Avoid athletic activities that cause pain or swelling in your ankle.  · See your health care provider if you have an ankle sprain that causes pain and swelling for more than 2-4 weeks.  · Do ankle range-of-motion and strengthening exercises as told by your health care  provider before beginning any athletic activity.  · If you start a new athletic activity, start gradually to build up your strength and flexibility.  Contact a health care provider if:  · Your condition is not getting better after 2-4 weeks of treatment.  · You cannot put body weight on your ankle without feeling more pain.  Summary  · Chronic ankle instability is a condition that makes the ankle weak and more likely to give way.  · This condition is caused by multiple ankle sprains that have not healed properly, leaving the ankle ligaments loose or damaged.  · Treatment includes wearing a boot, brace, or splint, taking medicines for pain and inflammation, and using ice on the affected area.  · Follow your health care provider's instructions for caring for your ankle during recovery.  · Contact your health care provider if your ankle does not get better in 2-4 weeks, or if you cannot put weight on your ankle without feeling more pain.  This information is not intended to replace advice given to you by your health care provider. Make sure you discuss any questions you have with your health care provider.  Document Revised: 10/01/2019 Document Reviewed: 10/01/2019  [employer] Patient Education © 2021 [employer]

## 2021-10-21 NOTE — PROGRESS NOTES
10/21/2021  Foot and Ankle Surgery - New Patient   Provider: Dr. Yaniv Richardson DPM  Location: [facility]    Subjective:  Jasmine Cerda is a 61 y.o. female.     Chief Complaint   Patient presents with   • Left Ankle - Pain, Edema   • Left Lower Leg - Pain, Edema   • Establish Care     last pcp 10/13/2021       HPI: Patient is a 61-year-old female that presents with pain involving her left ankle.  She states that these issues have been ongoing for several weeks.  Patient had an injury approximately 2 months ago causing pain and limitation to the left foot and ankle.  She states that she reported to the urgent care center approximately 2 weeks after the injury where imaging was performed showing no obvious fracture or dislocation.  She has been treated conservatively and limited activity but continues to have significant pain.  She localizes the majority of pain to the anterior lateral aspect of the ankle.  She has been ambulating in a cam boot.  No use issues or concerns.    No Known Allergies    Past Medical History:   Diagnosis Date   • Anxiety    • Breast cyst    • CHF (congestive heart failure) (HCC)    • Coronary heart disease    • Depression    • Hyperlipidemia    • Hypertension        Past Surgical History:   Procedure Laterality Date   • APPENDECTOMY     • BREAST CYST ASPIRATION     • CARDIAC CATHETERIZATION  2017    No Stents placed - [facility]   • CERVICAL FUSION      C6-C7   •  SECTION      x 2   • CHOLECYSTECTOMY     • HYSTERECTOMY      partial       Family History   Problem Relation Age of Onset   • Colon cancer Mother    • Diabetes Father    • Pulmonary embolism Brother    • Heart failure Brother        Social History     Socioeconomic History   • Marital status:    Tobacco Use   • Smoking status: Never Smoker   • Smokeless tobacco: Never Used   • Tobacco comment: Passive Smoke: N   Vaping Use   • Vaping Use: Never used   Substance and Sexual Activity   • Alcohol use: No   •  Drug use: No   • Sexual activity: Defer        Current Outpatient Medications on File Prior to Visit   Medication Sig Dispense Refill   • albuterol (PROVENTIL) (2.5 MG/3ML) 0.083% nebulizer solution INHALE 1 VIAL VIA NEBULIZER DAILY AS NEEDED FOR WHEEZING 300 mL 1   • ALPRAZolam (XANAX) 0.5 MG tablet Take 1 tablet by mouth 3 (Three) Times a Day As Needed for Anxiety. 90 tablet 3   • ARIPiprazole (ABILIFY) 10 MG tablet Take 1 tablet by mouth Daily. 90 tablet 1   • aspirin 81 MG EC tablet ASPIRIN EC 81 MG TBEC     • budesonide-formoterol (Symbicort) 160-4.5 MCG/ACT inhaler Inhale 2 puffs 2 (Two) Times a Day. 30.6 g 1   • bumetanide (BUMEX) 2 MG tablet TAKE 1 TABLET TWICE DAILY (NEED MD APPOINTMENT FOR REFILLS) 180 tablet 0   • carvedilol (COREG) 25 MG tablet TAKE 1 TABLET TWICE DAILY 180 tablet 0   • citalopram (CeleXA) 40 MG tablet Take 1 tablet by mouth Every Morning. 90 tablet 1   • cyanocobalamin 1000 MCG/ML injection Inject 1 mL into the appropriate muscle as directed by prescriber Every 28 (Twenty-Eight) Days. 3 mL 3   • gabapentin (NEURONTIN) 300 MG capsule TAKE 1 CAPSULE THREE TIMES DAILY 270 capsule 1   • hydrOXYzine pamoate (VISTARIL) 25 MG capsule Take 1 capsule by mouth 3 (Three) Times a Day As Needed for Anxiety. 270 capsule 1   • L-Methylfolate (Elfolate) 15 MG tablet Take 1 tablet by mouth Daily. 90 tablet 1   • lisinopril (PRINIVIL,ZESTRIL) 5 MG tablet Take 1 tablet by mouth 2 (two) times a day. 180 tablet 2   • metoclopramide (REGLAN) 10 MG tablet Take 1 tablet by mouth 3 (Three) Times a Day Before Meals. 90 tablet 3   • Multiple Vitamins-Minerals (MULTIVITAMIN ADULT) tablet Daily. With Omega XL     • nitroglycerin (NITROSTAT) 0.4 MG SL tablet NITROSTAT 0.4 MG SUBL     • NON FORMULARY Omega XL OTC     • ondansetron (ZOFRAN) 4 MG tablet TAKE 1 TABLET BY MOUTH EVERY 8 (EIGHT) HOURS AS NEEDED FOR NAUSEA OR VOMITING. MUST LAST 30 DAYS. 45 tablet 0   • pantoprazole (PROTONIX) 40 MG EC tablet Take 1 tablet  "by mouth Daily. 90 tablet 3   • potassium chloride (KLOR-CON) 20 MEQ CR tablet Take 1 tablet by mouth Daily. 90 tablet 3   • risperiDONE (risperDAL) 1 MG tablet Take 1 tablet by mouth every night at bedtime. 90 tablet 1   • rosuvastatin (CRESTOR) 40 MG tablet TAKE 1 TABLET EVERY EVENING 90 tablet 0   • Syringe/Needle, Disp, 23G X 1\" 3 ML misc Use to inject B12 every 30 days intramuscularly 12 each 0   • tiotropium bromide monohydrate (Spiriva Respimat) 2.5 MCG/ACT aerosol solution inhaler Inhale 1 puff Daily. 1 each 11   • traZODone (DESYREL) 50 MG tablet 1 or 2 tabs po  tablet 1   • vitamin D (ERGOCALCIFEROL) 1.25 MG (54240 UT) capsule capsule Take 1 capsule by mouth 1 (One) Time Per Week. 15 capsule 3     No current facility-administered medications on file prior to visit.       Review of Systems:  General: Denies fever, chills, fatigue, and weakness.  Eyes: Denies vision loss, blurry vision, and excessive redness.  ENT: Denies hearing issues and difficulty swallowing.  Cardiovascular: Denies palpitations, chest pain, or syncopal episodes.  Respiratory: Denies shortness of breath, wheezing, and coughing.  GI: Denies abdominal pain, nausea, and vomiting.   : Denies frequency, hematuria, and urgency.  Musculoskeletal: + Left ankle pain  Derm: Denies rash, open wounds, or suspicious lesions.  Neuro: Denies headaches, numbness, loss of coordination, and tremors.  Psych: Denies anxiety and depression.  Endocrine: Denies temperature intolerance and changes in appetite.  Heme: Denies bleeding disorders or abnormal bruising.     Objective   /91   Pulse 71   Ht 160 cm (62.99\")   Wt 93.9 kg (207 lb)   BMI 36.68 kg/m²     Foot/Ankle Exam:       General:   Appearance: appears stated age and healthy    Orientation: AAOx3    Affect: appropriate    Gait: antalgic      VASCULAR      Left Foot Vascularity   Normal vascular exam    Dorsalis pedis:  2+  Posterior tibial:  2+  Skin Temperature: warm    Edema " Grading:  None  CFT:  < 3 seconds  Pedal Hair Growth:  Absent      NEUROLOGIC     Left Foot Neurologic   Light touch sensation:  Normal  Hot/cold sensation: normal    Achilles reflex:  2+     MUSCULOSKELETAL      Left Foot Musculoskeletal   Ecchymosis:  None  Tenderness: lateral malleolus, peroneal tendon and anterior tibiotalar joint line    Arch:  Normal     MUSCLE STRENGTH     Left Foot Muscle Strength   Normal strength    Foot dorsiflexion:  5  Foot plantar flexion:  5  Foot inversion:  5  Foot eversion:  5     DERMATOLOGIC     Left Foot Dermatologic   Skin: skin intact       TESTS     Left Foot Tests   Anterior drawer: positive    Varus tilt: positive        Left Foot Additional Comments: Instability noted with anterior drawer and talar tilt testing.  Moderate discomfort to the anterior lateral aspect of the ankle.  No significant discomfort at the syndesmosis.  No proximal fibular pain.  Mild swelling along the peroneal tendons.      Assessment/Plan   Diagnoses and all orders for this visit:    1. Chronic pain of left ankle (Primary)  -     MRI Ankle Left Without Contrast; Future    2. Ankle instability, left    3. Peroneal tendinitis of left lower leg      Patient presents with pain involving the lateral aspect of the left ankle for the last 2 months.  Patient did have injury and has not noticed any significant improvement.  Previous imaging was independently reviewed showing no obvious fractures but she does have gapping noted to the lateral tibiotalar joint likely consistent with ankle ligament disruption.  I did discuss the imaging, diagnoses, and treatment options with patient at length.  Clinically, she does have pain involving the lateral ankle and peroneal tendon course.  I do feel at this time we should proceed with a lace up ankle brace for added stability.  We did review proper use and effects.  I have also recommended that we proceed with an MRI for further evaluation of the soft tissue structures.   Patient understands and agrees.  I have asked that she see me in 2 to 3 weeks for reevaluation and further discussion.  Greater than 30 minutes was spent before, during, and after evaluation for patient care    Orders Placed This Encounter   Procedures   • MRI Ankle Left Without Contrast     Standing Status:   Future     Standing Expiration Date:   10/21/2022     Scheduling Instructions:      Ankle instability, peroneal tendonitis        Note is dictated utilizing voice recognition software. Unfortunately this leads to occasional typographical errors. I apologize in advance if the situation occurs. If questions occur please do not hesitate to call our office.

## 2021-10-22 DIAGNOSIS — I10 ESSENTIAL HYPERTENSION: ICD-10-CM

## 2021-10-22 RX ORDER — LISINOPRIL 5 MG/1
5 TABLET ORAL 2 TIMES DAILY
Qty: 180 TABLET | Refills: 2 | Status: SHIPPED | OUTPATIENT
Start: 2021-10-22 | End: 2022-06-30

## 2021-10-25 RX ORDER — LISINOPRIL 10 MG/1
TABLET ORAL
Qty: 90 TABLET | Refills: 0 | OUTPATIENT
Start: 2021-10-25

## 2021-10-26 ENCOUNTER — APPOINTMENT (OUTPATIENT)
Dept: MAMMOGRAPHY | Facility: HOSPITAL | Age: 61
End: 2021-10-26

## 2021-11-05 RX ORDER — ONDANSETRON 4 MG/1
TABLET, FILM COATED ORAL
Qty: 45 TABLET | Refills: 0 | Status: SHIPPED | OUTPATIENT
Start: 2021-11-05 | End: 2021-12-07

## 2021-11-08 ENCOUNTER — APPOINTMENT (OUTPATIENT)
Dept: MAMMOGRAPHY | Facility: HOSPITAL | Age: 61
End: 2021-11-08

## 2021-11-16 RX ORDER — CARVEDILOL 25 MG/1
TABLET ORAL
Qty: 180 TABLET | Refills: 1 | Status: SHIPPED | OUTPATIENT
Start: 2021-11-16 | End: 2022-05-13

## 2021-11-18 ENCOUNTER — HOSPITAL ENCOUNTER (OUTPATIENT)
Dept: MRI IMAGING | Facility: HOSPITAL | Age: 61
Discharge: HOME | End: 2021-11-18
Admitting: PODIATRIST

## 2021-11-18 DIAGNOSIS — M25.572 CHRONIC PAIN OF LEFT ANKLE: ICD-10-CM

## 2021-11-18 DIAGNOSIS — G89.29 CHRONIC PAIN OF LEFT ANKLE: ICD-10-CM

## 2021-11-18 PROCEDURE — 73721 MRI JNT OF LWR EXTRE W/O DYE: CPT

## 2021-11-22 ENCOUNTER — OFFICE VISIT (OUTPATIENT)
Dept: PODIATRY | Facility: CLINIC | Age: 61
End: 2021-11-22

## 2021-11-22 VITALS — BODY MASS INDEX: 36.68 KG/M2 | WEIGHT: 207 LBS | HEIGHT: 63 IN

## 2021-11-22 DIAGNOSIS — M76.72 PERONEAL TENDINITIS OF LEFT LOWER LEG: ICD-10-CM

## 2021-11-22 DIAGNOSIS — M25.572 CHRONIC PAIN OF LEFT ANKLE: Primary | ICD-10-CM

## 2021-11-22 DIAGNOSIS — M25.372 ANKLE INSTABILITY, LEFT: ICD-10-CM

## 2021-11-22 DIAGNOSIS — G89.29 CHRONIC PAIN OF LEFT ANKLE: Primary | ICD-10-CM

## 2021-11-22 PROCEDURE — 99213 OFFICE O/P EST LOW 20 MIN: CPT | Performed by: PODIATRIST

## 2021-11-23 NOTE — PROGRESS NOTES
11/22/2021  Foot and Ankle Surgery - Established Patient/Follow-up  Provider: Dr. Yaniv Richardson DPM  Location: [facility]    Subjective:  Jasmine Cerda is a 61 y.o. female.     Chief Complaint   Patient presents with   • Left Ankle - Pain     Mri results today   • Follow-up     last pcp appt 8/1/2021       HPI: Patient returns for follow-up regarding left ankle pain.  She states that the symptoms are much improved.  She did obtain an MRI.  She has been wearing the cam boot.  She denies any new issues    No Known Allergies    Current Outpatient Medications on File Prior to Visit   Medication Sig Dispense Refill   • albuterol (PROVENTIL) (2.5 MG/3ML) 0.083% nebulizer solution INHALE 1 VIAL VIA NEBULIZER DAILY AS NEEDED FOR WHEEZING 300 mL 1   • ALPRAZolam (XANAX) 0.5 MG tablet Take 1 tablet by mouth 3 (Three) Times a Day As Needed for Anxiety. 90 tablet 3   • ARIPiprazole (ABILIFY) 10 MG tablet Take 1 tablet by mouth Daily. 90 tablet 1   • aspirin 81 MG EC tablet ASPIRIN EC 81 MG TBEC     • budesonide-formoterol (Symbicort) 160-4.5 MCG/ACT inhaler Inhale 2 puffs 2 (Two) Times a Day. 30.6 g 1   • bumetanide (BUMEX) 2 MG tablet TAKE 1 TABLET TWICE DAILY (NEED MD APPOINTMENT FOR REFILLS) 180 tablet 0   • carvedilol (COREG) 25 MG tablet TAKE 1 TABLET TWICE DAILY 180 tablet 1   • citalopram (CeleXA) 40 MG tablet Take 1 tablet by mouth Every Morning. 90 tablet 1   • cyanocobalamin 1000 MCG/ML injection Inject 1 mL into the appropriate muscle as directed by prescriber Every 28 (Twenty-Eight) Days. 3 mL 3   • gabapentin (NEURONTIN) 300 MG capsule TAKE 1 CAPSULE THREE TIMES DAILY 270 capsule 1   • hydrOXYzine pamoate (VISTARIL) 25 MG capsule Take 1 capsule by mouth 3 (Three) Times a Day As Needed for Anxiety. 270 capsule 1   • L-Methylfolate (Elfolate) 15 MG tablet Take 1 tablet by mouth Daily. 90 tablet 1   • lisinopril (PRINIVIL,ZESTRIL) 5 MG tablet Take 1 tablet by mouth 2 (two) times a day. 180 tablet 2   •  "metoclopramide (REGLAN) 10 MG tablet Take 1 tablet by mouth 3 (Three) Times a Day Before Meals. 90 tablet 3   • Multiple Vitamins-Minerals (MULTIVITAMIN ADULT) tablet Daily. With Omega XL     • nitroglycerin (NITROSTAT) 0.4 MG SL tablet NITROSTAT 0.4 MG SUBL     • NON FORMULARY Omega XL OTC     • ondansetron (ZOFRAN) 4 MG tablet TAKE 1 TABLET BY MOUTH EVERY 8 (EIGHT) HOURS AS NEEDED FOR NAUSEA OR VOMITING. MUST LAST 30 DAYS. 45 tablet 0   • pantoprazole (PROTONIX) 40 MG EC tablet Take 1 tablet by mouth Daily. 90 tablet 3   • potassium chloride (KLOR-CON) 20 MEQ CR tablet Take 1 tablet by mouth Daily. 90 tablet 3   • risperiDONE (risperDAL) 1 MG tablet Take 1 tablet by mouth every night at bedtime. 90 tablet 1   • rosuvastatin (CRESTOR) 40 MG tablet TAKE 1 TABLET EVERY EVENING 90 tablet 0   • Syringe/Needle, Disp, 23G X 1\" 3 ML misc Use to inject B12 every 30 days intramuscularly 12 each 0   • tiotropium bromide monohydrate (Spiriva Respimat) 2.5 MCG/ACT aerosol solution inhaler Inhale 1 puff Daily. 1 each 11   • traZODone (DESYREL) 50 MG tablet 1 or 2 tabs po  tablet 1   • vitamin D (ERGOCALCIFEROL) 1.25 MG (59544 UT) capsule capsule Take 1 capsule by mouth 1 (One) Time Per Week. 15 capsule 3     No current facility-administered medications on file prior to visit.       Objective   Ht 160 cm (62.99\")   Wt 93.9 kg (207 lb)   BMI 36.68 kg/m²     General:   Appearance: appears stated age and healthy    Orientation: AAOx3    Affect: appropriate    Gait: antalgic       VASCULAR       Left Foot Vascularity   Normal vascular exam    Dorsalis pedis:  2+  Posterior tibial:  2+  Skin Temperature: warm    Edema Grading:  None  CFT:  < 3 seconds  Pedal Hair Growth:  Absent      NEUROLOGIC      Left Foot Neurologic   Light touch sensation:  Normal  Hot/cold sensation: normal    Achilles reflex:  2+      MUSCULOSKELETAL       Left Foot Musculoskeletal   Ecchymosis:  None  Tenderness: lateral malleolus, peroneal tendon " and anterior tibiotalar joint line    Arch:  Normal      MUSCLE STRENGTH      Left Foot Muscle Strength   Normal strength    Foot dorsiflexion:  5  Foot plantar flexion:  5  Foot inversion:  5  Foot eversion:  5      DERMATOLOGIC      Left Foot Dermatologic   Skin: skin intact        TESTS      Left Foot Tests   Anterior drawer: positive    Varus tilt: positive      Assessment/Plan   Diagnoses and all orders for this visit:    1. Chronic pain of left ankle (Primary)    2. Ankle instability, left    3. Peroneal tendinitis of left lower leg      Patient returns and has noticed significant improvement in her discomfort.  She has been wearing the cam boot intermittently.  MRI was independently reviewed showing findings consistent with peroneal tendinopathy.  I did review the findings and further treatment options.  I have recommended that she gradually discontinue the cam boot and start wearing regular shoes.  I would like her to obtain over-the-counter arch supports for added stability.  We did discuss the importance of continued at home exercises and I did recommend that she proceed with formal physical therapy.  Patient does not want to consider at this time.  I have asked that she monitor her symptoms and return in approximately 6 weeks for reevaluation.  She is to call with any additional issues or concerns.  Greater than 20 minutes was spent before, during, and after evaluation for patient care    No orders of the defined types were placed in this encounter.         Note is dictated utilizing voice recognition software. Unfortunately this leads to occasional typographical errors. I apologize in advance if the situation occurs. If questions occur please do not hesitate to call our office.

## 2021-11-29 RX ORDER — BUMETANIDE 2 MG/1
TABLET ORAL
Qty: 180 TABLET | Refills: 0 | Status: SHIPPED | OUTPATIENT
Start: 2021-11-29 | End: 2022-02-08

## 2021-12-04 DIAGNOSIS — I10 ESSENTIAL HYPERTENSION: ICD-10-CM

## 2021-12-04 DIAGNOSIS — F33.2 SEVERE EPISODE OF RECURRENT MAJOR DEPRESSIVE DISORDER, WITHOUT PSYCHOTIC FEATURES (HCC): Chronic | ICD-10-CM

## 2021-12-05 RX ORDER — ARIPIPRAZOLE 5 MG/1
TABLET ORAL
Qty: 90 TABLET | Refills: 0 | Status: SHIPPED | OUTPATIENT
Start: 2021-12-05 | End: 2022-01-04 | Stop reason: SDUPTHER

## 2021-12-07 RX ORDER — ONDANSETRON 4 MG/1
TABLET, FILM COATED ORAL
Qty: 45 TABLET | Refills: 0 | OUTPATIENT
Start: 2021-12-07

## 2021-12-07 RX ORDER — ONDANSETRON 4 MG/1
TABLET, FILM COATED ORAL
Qty: 45 TABLET | Refills: 0 | Status: SHIPPED | OUTPATIENT
Start: 2021-12-07 | End: 2022-01-05

## 2021-12-07 RX ORDER — LISINOPRIL 10 MG/1
TABLET ORAL
Qty: 90 TABLET | Refills: 0 | OUTPATIENT
Start: 2021-12-07

## 2021-12-07 RX ORDER — CYANOCOBALAMIN 1000 UG/ML
1000 INJECTION, SOLUTION INTRAMUSCULAR; SUBCUTANEOUS
Qty: 3 ML | Refills: 1 | Status: SHIPPED | OUTPATIENT
Start: 2021-12-07 | End: 2022-04-14 | Stop reason: SDUPTHER

## 2022-01-02 DIAGNOSIS — I10 ESSENTIAL HYPERTENSION: ICD-10-CM

## 2022-01-02 DIAGNOSIS — F33.2 SEVERE EPISODE OF RECURRENT MAJOR DEPRESSIVE DISORDER, WITHOUT PSYCHOTIC FEATURES: Chronic | ICD-10-CM

## 2022-01-03 RX ORDER — ARIPIPRAZOLE 5 MG/1
TABLET ORAL
Qty: 90 TABLET | Refills: 0 | OUTPATIENT
Start: 2022-01-03

## 2022-01-04 ENCOUNTER — OFFICE VISIT (OUTPATIENT)
Dept: PSYCHIATRY | Facility: CLINIC | Age: 62
End: 2022-01-04

## 2022-01-04 DIAGNOSIS — F33.2 SEVERE EPISODE OF RECURRENT MAJOR DEPRESSIVE DISORDER, WITHOUT PSYCHOTIC FEATURES: Primary | Chronic | ICD-10-CM

## 2022-01-04 DIAGNOSIS — F43.12 CHRONIC POST-TRAUMATIC STRESS DISORDER (PTSD): Chronic | ICD-10-CM

## 2022-01-04 DIAGNOSIS — J44.9 CHRONIC OBSTRUCTIVE PULMONARY DISEASE, UNSPECIFIED COPD TYPE: ICD-10-CM

## 2022-01-04 DIAGNOSIS — F51.04 PSYCHOPHYSIOLOGICAL INSOMNIA: Chronic | ICD-10-CM

## 2022-01-04 PROCEDURE — 99214 OFFICE O/P EST MOD 30 MIN: CPT | Performed by: PSYCHIATRY & NEUROLOGY

## 2022-01-04 RX ORDER — ALPRAZOLAM 0.5 MG/1
0.5 TABLET ORAL 3 TIMES DAILY PRN
Qty: 90 TABLET | Refills: 3 | Status: SHIPPED | OUTPATIENT
Start: 2022-01-04 | End: 2022-05-04 | Stop reason: SDUPTHER

## 2022-01-04 RX ORDER — HYDROXYZINE PAMOATE 25 MG/1
25 CAPSULE ORAL 3 TIMES DAILY PRN
Qty: 270 CAPSULE | Refills: 1 | Status: SHIPPED | OUTPATIENT
Start: 2022-01-04 | End: 2022-05-04 | Stop reason: SDUPTHER

## 2022-01-04 RX ORDER — GABAPENTIN 300 MG/1
300 CAPSULE ORAL 3 TIMES DAILY
Qty: 270 CAPSULE | Refills: 1 | Status: SHIPPED | OUTPATIENT
Start: 2022-01-04 | End: 2022-05-04 | Stop reason: SDUPTHER

## 2022-01-04 RX ORDER — RISPERIDONE 1 MG/1
1 TABLET ORAL
Qty: 90 TABLET | Refills: 1 | Status: SHIPPED | OUTPATIENT
Start: 2022-01-04 | End: 2022-05-04

## 2022-01-04 RX ORDER — ARIPIPRAZOLE 5 MG/1
5 TABLET ORAL DAILY
Qty: 90 TABLET | Refills: 1 | Status: SHIPPED | OUTPATIENT
Start: 2022-01-04 | End: 2022-05-04

## 2022-01-04 RX ORDER — CITALOPRAM 40 MG/1
40 TABLET ORAL EVERY MORNING
Qty: 90 TABLET | Refills: 1 | Status: SHIPPED | OUTPATIENT
Start: 2022-01-04 | End: 2022-05-04 | Stop reason: SDUPTHER

## 2022-01-04 NOTE — PATIENT INSTRUCTIONS
"http://APA.org/depression-guideline\"> https://clinicalkey.com\"> http://point-of-care.[place]/skills/\"> http://point-of-care.[place].com\">   Managing Depression, Adult  Depression is a mental health condition that affects your thoughts, feelings, and actions. Being diagnosed with depression can bring you relief if you did not know why you have felt or behaved a certain way. It could also leave you feeling overwhelmed with uncertainty about your future. Preparing yourself to manage your symptoms can help you feel more positive about your future.  How to manage lifestyle changes  Managing stress    Stress is your body's reaction to life changes and events, both good and bad. Stress can add to your feelings of depression. Learning to manage your stress can help lessen your feelings of depression.  Try some of the following approaches to reducing your stress (stress reduction techniques):  · Listen to music that you enjoy and that inspires you.  · Try using a meditation chad or take a meditation class.  · Develop a practice that helps you connect with your spiritual self. Walk in nature, pray, or go to a place of [place].  · Do some deep breathing. To do this, inhale slowly through your nose. Pause at the top of your inhale for a few seconds and then exhale slowly, letting your muscles relax.  · Practice yoga to help relax and work your muscles.  Choose a stress reduction technique that suits your lifestyle and personality. These techniques take time and practice to develop. Set aside 5-15 minutes a day to do them. Therapists can offer training in these techniques. Other things you can do to manage stress include:  · Keeping a stress diary.  · Knowing your limits and saying no when you think something is too much.  · Paying attention to how you react to certain situations. You may not be able to control everything, but you can change your reaction.  · Adding humor to your life by " watching funny films or TV shows.  · Making time for activities that you enjoy and that relax you.    Medicines  Medicines, such as antidepressants, are often a part of treatment for depression.  · Talk with your pharmacist or health care provider about all the medicines, supplements, and herbal products that you take, their possible side effects, and what medicines and other products are safe to take together.  · Make sure to report any side effects you may have to your health care provider.  Relationships  Your health care provider may suggest family therapy, couples therapy, or individual therapy as part of your treatment.  How to recognize changes  Everyone responds differently to treatment for depression. As you recover from depression, you may start to:  · Have more interest in doing activities.  · Feel less hopeless.  · Have more energy.  · Overeat less often, or have a better appetite.  · Have better mental focus.  It is important to recognize if your depression is not getting better or is getting worse. The symptoms you had in the beginning may return, such as:  · Tiredness (fatigue) or low energy.  · Eating too much or too little.  · Sleeping too much or too little.  · Feeling restless, agitated, or hopeless.  · Trouble focusing or making decisions.  · Unexplained physical complaints.  · Feeling irritable, angry, or aggressive.  If you or your family members notice these symptoms coming back, let your health care provider know right away.  Follow these instructions at home:  Activity    · Try to get some form of exercise each day, such as walking, biking, swimming, or lifting weights.  · Practice stress reduction techniques.  · Engage your mind by taking a class or doing some volunteer work.    Lifestyle  · Get the right amount and quality of sleep.  · Cut down on using caffeine, tobacco, alcohol, and other potentially harmful substances.  · Eat a healthy diet that includes plenty of vegetables, fruits,  whole grains, low-fat dairy products, and lean protein. Do not eat a lot of foods that are high in solid fats, added sugars, or salt (sodium).  General instructions  · Take over-the-counter and prescription medicines only as told by your health care provider.  · Keep all follow-up visits as told by your health care provider. This is important.  Where to find support  Talking to others    Friends and family members can be sources of support and guidance. Talk to trusted friends or family members about your condition. Explain your symptoms to them, and let them know that you are working with a health care provider to treat your depression. Tell friends and family members how they also can be helpful.  Finances  · Find appropriate mental health providers that fit with your financial situation.  · Talk with your health care provider about options to get reduced prices on your medicines.  Where to find more information  You can find support in your area from:  · Anxiety and Depression Association of [place] (ADAA): www.adaa.org  · Mental Health [place]: www.mentalhealth[place].net  · National [place] on Mental Illness: www.[place].org  Contact a health care provider if:  · You stop taking your antidepressant medicines, and you have any of these symptoms:  ? Nausea.  ? Headache.  ? Light-headedness.  ? Chills and body aches.  ? Not being able to sleep (insomnia).  · You or your friends and family think your depression is getting worse.  Get help right away if:  · You have thoughts of hurting yourself or others.  If you ever feel like you may hurt yourself or others, or have thoughts about taking your own life, get help right away. Go to your nearest emergency department or:  · Call your local emergency services (123 in the [place]  · Call a suicide crisis helpline, such as the National Suicide Prevention Lifeline at 1-509.802.4599. This is open 24 hours a day in the [place]  · Text the Crisis Text Line at 886057 (in the  [place]  Summary  · If you are diagnosed with depression, preparing yourself to manage your symptoms is a good way to feel positive about your future.  · Work with your health care provider on a management plan that includes stress reduction techniques, medicines (if applicable), therapy, and healthy lifestyle habits.  · Keep talking with your health care provider about how your treatment is working.  · If you have thoughts about taking your own life, call a suicide crisis helpline or text a crisis text line.  This information is not intended to replace advice given to you by your health care provider. Make sure you discuss any questions you have with your health care provider.  Document Revised: 10/28/2020 Document Reviewed: 10/28/2020  Elsevier Patient Education © 2021 [employer]
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
## 2022-01-05 RX ORDER — ONDANSETRON 4 MG/1
TABLET, FILM COATED ORAL
Qty: 45 TABLET | Refills: 0 | Status: SHIPPED | OUTPATIENT
Start: 2022-01-05 | End: 2022-02-02

## 2022-01-05 RX ORDER — LISINOPRIL 10 MG/1
TABLET ORAL
Qty: 90 TABLET | Refills: 0 | OUTPATIENT
Start: 2022-01-05

## 2022-02-01 RX ORDER — ROSUVASTATIN CALCIUM 40 MG/1
TABLET, COATED ORAL
Qty: 90 TABLET | Refills: 0 | Status: SHIPPED | OUTPATIENT
Start: 2022-02-01 | End: 2022-04-14 | Stop reason: SDUPTHER

## 2022-02-02 RX ORDER — ONDANSETRON 4 MG/1
TABLET, FILM COATED ORAL
Qty: 45 TABLET | Refills: 0 | Status: SHIPPED | OUTPATIENT
Start: 2022-02-02 | End: 2022-03-02

## 2022-02-08 RX ORDER — BUMETANIDE 2 MG/1
TABLET ORAL
Qty: 180 TABLET | Refills: 0 | Status: SHIPPED | OUTPATIENT
Start: 2022-02-08 | End: 2022-04-14 | Stop reason: SDUPTHER

## 2022-03-02 RX ORDER — ALBUTEROL SULFATE 2.5 MG/3ML
SOLUTION RESPIRATORY (INHALATION)
Qty: 300 ML | Refills: 1 | Status: SHIPPED | OUTPATIENT
Start: 2022-03-02

## 2022-03-02 RX ORDER — ONDANSETRON 4 MG/1
TABLET, FILM COATED ORAL
Qty: 45 TABLET | Refills: 0 | Status: SHIPPED | OUTPATIENT
Start: 2022-03-02 | End: 2022-04-04

## 2022-03-31 DIAGNOSIS — F33.2 SEVERE EPISODE OF RECURRENT MAJOR DEPRESSIVE DISORDER, WITHOUT PSYCHOTIC FEATURES: Chronic | ICD-10-CM

## 2022-03-31 RX ORDER — ARIPIPRAZOLE 10 MG/1
TABLET ORAL
Qty: 90 TABLET | Refills: 1 | Status: SHIPPED | OUTPATIENT
Start: 2022-03-31 | End: 2022-04-14 | Stop reason: DRUGHIGH

## 2022-04-04 RX ORDER — ONDANSETRON 4 MG/1
TABLET, FILM COATED ORAL
Qty: 45 TABLET | Refills: 0 | Status: SHIPPED | OUTPATIENT
Start: 2022-04-04 | End: 2022-05-02

## 2022-04-07 ENCOUNTER — LAB (OUTPATIENT)
Dept: FAMILY MEDICINE CLINIC | Facility: CLINIC | Age: 62
End: 2022-04-07

## 2022-04-07 DIAGNOSIS — E78.2 MIXED HYPERLIPIDEMIA: ICD-10-CM

## 2022-04-07 DIAGNOSIS — I10 PRIMARY HYPERTENSION: ICD-10-CM

## 2022-04-07 DIAGNOSIS — E53.8 B12 DEFICIENCY: ICD-10-CM

## 2022-04-07 DIAGNOSIS — E55.9 VITAMIN D DEFICIENCY: ICD-10-CM

## 2022-04-07 DIAGNOSIS — I10 ESSENTIAL HYPERTENSION: Primary | ICD-10-CM

## 2022-04-07 LAB
DEPRECATED RDW RBC AUTO: 42.6 FL (ref 37–54)
ERYTHROCYTE [DISTWIDTH] IN BLOOD BY AUTOMATED COUNT: 13.2 % (ref 12.3–15.4)
HCT VFR BLD AUTO: 43.1 % (ref 34–46.6)
HGB BLD-MCNC: 14.2 G/DL (ref 12–15.9)
MCH RBC QN AUTO: 28.9 PG (ref 26.6–33)
MCHC RBC AUTO-ENTMCNC: 32.9 G/DL (ref 31.5–35.7)
MCV RBC AUTO: 87.8 FL (ref 79–97)
PLATELET # BLD AUTO: 242 10*3/MM3 (ref 140–450)
PMV BLD AUTO: 10.8 FL (ref 6–12)
RBC # BLD AUTO: 4.91 10*6/MM3 (ref 3.77–5.28)
WBC NRBC COR # BLD: 6.84 10*3/MM3 (ref 3.4–10.8)

## 2022-04-07 PROCEDURE — 85027 COMPLETE CBC AUTOMATED: CPT | Performed by: NURSE PRACTITIONER

## 2022-04-07 PROCEDURE — 80061 LIPID PANEL: CPT | Performed by: NURSE PRACTITIONER

## 2022-04-07 PROCEDURE — 82607 VITAMIN B-12: CPT | Performed by: NURSE PRACTITIONER

## 2022-04-07 PROCEDURE — 84443 ASSAY THYROID STIM HORMONE: CPT | Performed by: NURSE PRACTITIONER

## 2022-04-07 PROCEDURE — 36415 COLL VENOUS BLD VENIPUNCTURE: CPT | Performed by: NURSE PRACTITIONER

## 2022-04-07 PROCEDURE — 80053 COMPREHEN METABOLIC PANEL: CPT | Performed by: NURSE PRACTITIONER

## 2022-04-07 PROCEDURE — 82306 VITAMIN D 25 HYDROXY: CPT | Performed by: NURSE PRACTITIONER

## 2022-04-08 LAB
25(OH)D3 SERPL-MCNC: 56.6 NG/ML (ref 30–100)
ALBUMIN SERPL-MCNC: 4.7 G/DL (ref 3.5–5.2)
ALBUMIN/GLOB SERPL: 1.7 G/DL
ALP SERPL-CCNC: 61 U/L (ref 39–117)
ALT SERPL W P-5'-P-CCNC: 17 U/L (ref 1–33)
ANION GAP SERPL CALCULATED.3IONS-SCNC: 13.5 MMOL/L (ref 5–15)
AST SERPL-CCNC: 17 U/L (ref 1–32)
BILIRUB SERPL-MCNC: 0.5 MG/DL (ref 0–1.2)
BUN SERPL-MCNC: 18 MG/DL (ref 8–23)
BUN/CREAT SERPL: 14.8 (ref 7–25)
CALCIUM SPEC-SCNC: 10.4 MG/DL (ref 8.6–10.5)
CHLORIDE SERPL-SCNC: 99 MMOL/L (ref 98–107)
CHOLEST SERPL-MCNC: 153 MG/DL (ref 0–200)
CO2 SERPL-SCNC: 29.5 MMOL/L (ref 22–29)
CREAT SERPL-MCNC: 1.22 MG/DL (ref 0.57–1)
EGFRCR SERPLBLD CKD-EPI 2021: 50 ML/MIN/1.73
GLOBULIN UR ELPH-MCNC: 2.7 GM/DL
GLUCOSE SERPL-MCNC: 101 MG/DL (ref 65–99)
HDLC SERPL-MCNC: 57 MG/DL (ref 40–60)
LDLC SERPL CALC-MCNC: 66 MG/DL (ref 0–100)
LDLC/HDLC SERPL: 1.05 {RATIO}
POTASSIUM SERPL-SCNC: 3.4 MMOL/L (ref 3.5–5.2)
PROT SERPL-MCNC: 7.4 G/DL (ref 6–8.5)
SODIUM SERPL-SCNC: 142 MMOL/L (ref 136–145)
TRIGL SERPL-MCNC: 181 MG/DL (ref 0–150)
TSH SERPL DL<=0.05 MIU/L-ACNC: 2.88 UIU/ML (ref 0.27–4.2)
VIT B12 BLD-MCNC: 802 PG/ML (ref 211–946)
VLDLC SERPL-MCNC: 30 MG/DL (ref 5–40)

## 2022-04-14 ENCOUNTER — OFFICE VISIT (OUTPATIENT)
Dept: FAMILY MEDICINE CLINIC | Facility: CLINIC | Age: 62
End: 2022-04-14

## 2022-04-14 VITALS
HEIGHT: 63 IN | HEART RATE: 65 BPM | DIASTOLIC BLOOD PRESSURE: 82 MMHG | SYSTOLIC BLOOD PRESSURE: 130 MMHG | BODY MASS INDEX: 35.19 KG/M2 | OXYGEN SATURATION: 95 % | WEIGHT: 198.6 LBS

## 2022-04-14 DIAGNOSIS — J44.9 CHRONIC OBSTRUCTIVE PULMONARY DISEASE, UNSPECIFIED COPD TYPE: Primary | ICD-10-CM

## 2022-04-14 DIAGNOSIS — G89.29 CHRONIC PAIN OF LEFT ANKLE: ICD-10-CM

## 2022-04-14 DIAGNOSIS — K21.9 GASTROESOPHAGEAL REFLUX DISEASE WITHOUT ESOPHAGITIS: ICD-10-CM

## 2022-04-14 DIAGNOSIS — I10 ESSENTIAL HYPERTENSION: ICD-10-CM

## 2022-04-14 DIAGNOSIS — E53.8 B12 DEFICIENCY: ICD-10-CM

## 2022-04-14 DIAGNOSIS — E55.9 VITAMIN D DEFICIENCY: ICD-10-CM

## 2022-04-14 DIAGNOSIS — M25.572 CHRONIC PAIN OF LEFT ANKLE: ICD-10-CM

## 2022-04-14 DIAGNOSIS — I50.32 CHRONIC DIASTOLIC CONGESTIVE HEART FAILURE: ICD-10-CM

## 2022-04-14 DIAGNOSIS — E78.2 MIXED HYPERLIPIDEMIA: ICD-10-CM

## 2022-04-14 PROCEDURE — 99214 OFFICE O/P EST MOD 30 MIN: CPT | Performed by: NURSE PRACTITIONER

## 2022-04-14 RX ORDER — ERGOCALCIFEROL 1.25 MG/1
50000 CAPSULE ORAL WEEKLY
Qty: 15 CAPSULE | Refills: 3 | Status: SHIPPED | OUTPATIENT
Start: 2022-04-14 | End: 2022-08-22 | Stop reason: SDUPTHER

## 2022-04-14 RX ORDER — POTASSIUM CHLORIDE 20 MEQ/1
20 TABLET, EXTENDED RELEASE ORAL DAILY
Qty: 90 TABLET | Refills: 1 | Status: SHIPPED | OUTPATIENT
Start: 2022-04-14 | End: 2022-07-06 | Stop reason: SDUPTHER

## 2022-04-14 RX ORDER — BUMETANIDE 2 MG/1
2 TABLET ORAL 2 TIMES DAILY
Qty: 180 TABLET | Refills: 1 | Status: SHIPPED | OUTPATIENT
Start: 2022-04-14 | End: 2022-05-13

## 2022-04-14 RX ORDER — BUDESONIDE, GLYCOPYRROLATE, AND FORMOTEROL FUMARATE 160; 9; 4.8 UG/1; UG/1; UG/1
2 AEROSOL, METERED RESPIRATORY (INHALATION) 2 TIMES DAILY
Qty: 1 EACH | Refills: 11 | Status: SHIPPED | OUTPATIENT
Start: 2022-04-14

## 2022-04-14 RX ORDER — CYANOCOBALAMIN 1000 UG/ML
1000 INJECTION, SOLUTION INTRAMUSCULAR; SUBCUTANEOUS
Qty: 3 ML | Refills: 3 | Status: SHIPPED | OUTPATIENT
Start: 2022-04-14 | End: 2022-11-08

## 2022-04-14 RX ORDER — ROSUVASTATIN CALCIUM 40 MG/1
40 TABLET, COATED ORAL EVERY EVENING
Qty: 90 TABLET | Refills: 1 | Status: SHIPPED | OUTPATIENT
Start: 2022-04-14 | End: 2022-05-13

## 2022-04-14 NOTE — PROGRESS NOTES
Chief Complaint  Hypertension, COPD, Follow-up (6 mo), Results (4/7/22 labs), and Fatigue    Subjective          Jasmine Cerda presents to [facility] for   History of Present Illness     COPD: The patient has been previously diagnosed with COPD. Symptoms include dyspnea, non-productive cough and wheezing, especially with exertion.   Symptoms have been stable since onset.  The pt is using Spiriva and albuterol inhalers as directed.  She was on Symbicort but it was costing her over $200 per month, she felt better and breathed better when on Symbicort    B12 deficiency, on monthly injections, patient complains of fatigue    HTN, stable on meds and takes as directed, denies chest pain, headache, shortness of air, palpitations and swelling of extremities.     Coronary artery disease, moderate disease of LAD being treated medically per Dr. Montana and history of congestive heart failure secondary to diastolic dysfunction. 9/17/22 Stress test/echo showed EF 60%, normal myocardial perfusion study.  She is on Bumex and the pharmacy stopped giving her potassium so she has been taking OTC. She denies swelling of the extremities, shortness of air.     Anxiety, patient denies significant weight loss/gain, insomnia, hypersomnia, psychomotor agitation, psychomotor retardation, fatigue (loss of energy), feelings of worthlessness (guilt), impaired concentration (indecisiveness), thoughts of death or suicide.       GERD, stable on medication, denies nausea, vomiting, constipation, abdominal pain and diarrhea.    Chronic pain of left ankle, follows with Dr. Richardson.  She also fractured the right second toe and sprained the right ankle in December.     Here to review labs      The following portions of the patient's history were reviewed and updated as appropriate: allergies, current medications, past family history, past medical history, past social history, past surgical history and problem list.    Past  Medical History:   Diagnosis Date   • Anxiety    • Breast cyst    • CHF (congestive heart failure) (HCC)    • COPD (chronic obstructive pulmonary disease) (HCC)    • Coronary heart disease    • Depression    • Hyperlipidemia    • Hypertension      Past Surgical History:   Procedure Laterality Date   • APPENDECTOMY     • BREAST CYST ASPIRATION     • CARDIAC CATHETERIZATION  2017    No Stents placed - [facility]   • CERVICAL FUSION      C6-C7   •  SECTION      x 2   • CHOLECYSTECTOMY     • HYSTERECTOMY      partial     Family History   Problem Relation Age of Onset   • Colon cancer Mother    • Diabetes Father    • Pulmonary embolism Brother    • Heart failure Brother      Social History     Tobacco Use   • Smoking status: Never Smoker   • Smokeless tobacco: Never Used   • Tobacco comment: Passive Smoke: N   Substance Use Topics   • Alcohol use: No       Current Outpatient Medications:   •  albuterol (PROVENTIL) (2.5 MG/3ML) 0.083% nebulizer solution, INHALE 1 VIAL VIA NEBULIZER DAILY AS NEEDED FOR WHEEZING, Disp: 300 mL, Rfl: 1  •  ALPRAZolam (XANAX) 0.5 MG tablet, Take 1 tablet by mouth 3 (Three) Times a Day As Needed for Anxiety., Disp: 90 tablet, Rfl: 3  •  ARIPiprazole (ABILIFY) 5 MG tablet, Take 1 tablet by mouth Daily., Disp: 90 tablet, Rfl: 1  •  aspirin 81 MG EC tablet, ASPIRIN EC 81 MG TBEC, Disp: , Rfl:   •  bumetanide (BUMEX) 2 MG tablet, Take 1 tablet by mouth 2 (Two) Times a Day., Disp: 180 tablet, Rfl: 1  •  carvedilol (COREG) 25 MG tablet, TAKE 1 TABLET TWICE DAILY, Disp: 180 tablet, Rfl: 1  •  citalopram (CeleXA) 40 MG tablet, Take 1 tablet by mouth Every Morning., Disp: 90 tablet, Rfl: 1  •  cyanocobalamin 1000 MCG/ML injection, Inject 1 mL into the appropriate muscle as directed by prescriber Every 28 (Twenty-Eight) Days., Disp: 3 mL, Rfl: 3  •  gabapentin (NEURONTIN) 300 MG capsule, Take 1 capsule by mouth 3 (Three) Times a Day., Disp: 270 capsule, Rfl: 1  •  hydrOXYzine pamoate (VISTARIL) 25 MG  "capsule, Take 1 capsule by mouth 3 (Three) Times a Day As Needed for Anxiety., Disp: 270 capsule, Rfl: 1  •  lisinopril (PRINIVIL,ZESTRIL) 5 MG tablet, Take 1 tablet by mouth 2 (two) times a day., Disp: 180 tablet, Rfl: 2  •  metoclopramide (REGLAN) 10 MG tablet, Take 1 tablet by mouth 3 (Three) Times a Day Before Meals., Disp: 90 tablet, Rfl: 3  •  Multiple Vitamins-Minerals (MULTIVITAMIN ADULT) tablet, Daily. With Omega XL, Disp: , Rfl:   •  nitroglycerin (NITROSTAT) 0.4 MG SL tablet, NITROSTAT 0.4 MG SUBL, Disp: , Rfl:   •  NON FORMULARY, Omega XL OTC, Disp: , Rfl:   •  ondansetron (ZOFRAN) 4 MG tablet, TAKE 1 TABLET BY MOUTH EVERY 8 (EIGHT) HOURS AS NEEDED FOR NAUSEA OR VOMITING. MUST LAST 30 DAYS., Disp: 45 tablet, Rfl: 0  •  pantoprazole (PROTONIX) 40 MG EC tablet, Take 1 tablet by mouth Daily., Disp: 90 tablet, Rfl: 3  •  potassium chloride (KLOR-CON) 20 MEQ CR tablet, Take 1 tablet by mouth Daily., Disp: 90 tablet, Rfl: 1  •  risperiDONE (risperDAL) 1 MG tablet, Take 1 tablet by mouth every night at bedtime., Disp: 90 tablet, Rfl: 1  •  rosuvastatin (CRESTOR) 40 MG tablet, Take 1 tablet by mouth Every Evening., Disp: 90 tablet, Rfl: 1  •  Syringe/Needle, Disp, 23G X 1\" 3 ML misc, Use to inject B12 every 30 days intramuscularly, Disp: 12 each, Rfl: 0  •  tiotropium bromide monohydrate (Spiriva Respimat) 2.5 MCG/ACT aerosol solution inhaler, Inhale 1 puff Daily., Disp: 1 each, Rfl: 11  •  traZODone (DESYREL) 50 MG tablet, 1 or 2 tabs po QHS, Disp: 180 tablet, Rfl: 1  •  vitamin D (ERGOCALCIFEROL) 1.25 MG (47910 UT) capsule capsule, Take 1 capsule by mouth 1 (One) Time Per Week., Disp: 15 capsule, Rfl: 3  •  Budeson-Glycopyrrol-Formoterol (Breztri Aerosphere) 160-9-4.8 MCG/ACT aerosol inhaler, Inhale 2 puffs 2 (Two) Times a Day., Disp: 1 each, Rfl: 11    Objective   Vital Signs:   /82 (BP Location: Left arm, Patient Position: Sitting, Cuff Size: Adult)   Pulse 65   Ht 160 cm (62.99\")   Wt 90.1 kg (198 " lb 9.6 oz)   SpO2 95%   BMI 35.19 kg/m²       Physical Exam  Vitals and nursing note reviewed.   Constitutional:       General: She is not in acute distress.     Appearance: She is well-developed. She is not diaphoretic.   Eyes:      Pupils: Pupils are equal, round, and reactive to light.   Neck:      Thyroid: No thyromegaly.      Vascular: No JVD.   Cardiovascular:      Rate and Rhythm: Normal rate and regular rhythm.      Heart sounds: Normal heart sounds. No murmur heard.  Pulmonary:      Effort: Pulmonary effort is normal. No respiratory distress.      Breath sounds: Normal breath sounds. No wheezing or rhonchi.      Comments: Prolonged expiration  Abdominal:      General: Bowel sounds are normal. There is no distension.      Palpations: Abdomen is soft.      Tenderness: There is no abdominal tenderness.   Musculoskeletal:         General: No tenderness. Normal range of motion.      Cervical back: Normal range of motion and neck supple.   Skin:     General: Skin is warm and dry.   Neurological:      Mental Status: She is alert and oriented to person, place, and time.      Sensory: No sensory deficit.   Psychiatric:         Behavior: Behavior normal.         Thought Content: Thought content normal.         Judgment: Judgment normal.          Result Review :     No visits with results within 7 Day(s) from this visit.   Latest known visit with results is:   Lab on 04/07/2022   Component Date Value Ref Range Status   • WBC 04/07/2022 6.84  3.40 - 10.80 10*3/mm3 Final   • RBC 04/07/2022 4.91  3.77 - 5.28 10*6/mm3 Final   • Hemoglobin 04/07/2022 14.2  12.0 - 15.9 g/dL Final   • Hematocrit 04/07/2022 43.1  34.0 - 46.6 % Final   • MCV 04/07/2022 87.8  79.0 - 97.0 fL Final   • MCH 04/07/2022 28.9  26.6 - 33.0 pg Final   • MCHC 04/07/2022 32.9  31.5 - 35.7 g/dL Final   • RDW 04/07/2022 13.2  12.3 - 15.4 % Final   • RDW-SD 04/07/2022 42.6  37.0 - 54.0 fl Final   • MPV 04/07/2022 10.8  6.0 - 12.0 fL Final   • Platelets  04/07/2022 242  140 - 450 10*3/mm3 Final   • Glucose 04/07/2022 101 (A) 65 - 99 mg/dL Final   • BUN 04/07/2022 18  8 - 23 mg/dL Final   • Creatinine 04/07/2022 1.22 (A) 0.57 - 1.00 mg/dL Final   • Sodium 04/07/2022 142  136 - 145 mmol/L Final   • Potassium 04/07/2022 3.4 (A) 3.5 - 5.2 mmol/L Final   • Chloride 04/07/2022 99  98 - 107 mmol/L Final   • CO2 04/07/2022 29.5 (A) 22.0 - 29.0 mmol/L Final   • Calcium 04/07/2022 10.4  8.6 - 10.5 mg/dL Final   • Total Protein 04/07/2022 7.4  6.0 - 8.5 g/dL Final   • Albumin 04/07/2022 4.70  3.50 - 5.20 g/dL Final   • ALT (SGPT) 04/07/2022 17  1 - 33 U/L Final   • AST (SGOT) 04/07/2022 17  1 - 32 U/L Final   • Alkaline Phosphatase 04/07/2022 61  39 - 117 U/L Final   • Total Bilirubin 04/07/2022 0.5  0.0 - 1.2 mg/dL Final   • Globulin 04/07/2022 2.7  gm/dL Final   • A/G Ratio 04/07/2022 1.7  g/dL Final   • BUN/Creatinine Ratio 04/07/2022 14.8  7.0 - 25.0 Final   • Anion Gap 04/07/2022 13.5  5.0 - 15.0 mmol/L Final   • eGFR 04/07/2022 50.0 (A) >60.0 mL/min/1.73 Final    National Kidney Foundation and American Society of Nephrology (ASN) Task Force recommended calculation based on the Chronic Kidney Disease Epidemiology Collaboration (CKD-EPI) equation refit without adjustment for race.   • Total Cholesterol 04/07/2022 153  0 - 200 mg/dL Final   • Triglycerides 04/07/2022 181 (A) 0 - 150 mg/dL Final   • HDL Cholesterol 04/07/2022 57  40 - 60 mg/dL Final   • LDL Cholesterol  04/07/2022 66  0 - 100 mg/dL Final   • VLDL Cholesterol 04/07/2022 30  5 - 40 mg/dL Final   • LDL/HDL Ratio 04/07/2022 1.05   Final   • TSH 04/07/2022 2.880  0.270 - 4.200 uIU/mL Final   • Vitamin B-12 04/07/2022 802  211 - 946 pg/mL Final   • 25 Hydroxy, Vitamin D 04/07/2022 56.6  30.0 - 100.0 ng/ml Final                       Assessment and Plan    Diagnoses and all orders for this visit:    1. Chronic obstructive pulmonary disease, unspecified COPD type (HCC) (Primary)  -     Budeson-Glycopyrrol-Formoterol  (Breztri Aerosphere) 160-9-4.8 MCG/ACT aerosol inhaler; Inhale 2 puffs 2 (Two) Times a Day.  Dispense: 1 each; Refill: 11    2. Essential hypertension  Comments:  BP stable, continue lisinopril, bumex and kcl. refill med needed today    3. Vitamin D deficiency  Comments:  Continue/refill weekly vitamin D, check level prior to next follow-up appointment in 3 months  Orders:  -     vitamin D (ERGOCALCIFEROL) 1.25 MG (20169 UT) capsule capsule; Take 1 capsule by mouth 1 (One) Time Per Week.  Dispense: 15 capsule; Refill: 3    4. B12 deficiency  -     cyanocobalamin 1000 MCG/ML injection; Inject 1 mL into the appropriate muscle as directed by prescriber Every 28 (Twenty-Eight) Days.  Dispense: 3 mL; Refill: 3    5. Chronic pain of left ankle    6. Chronic diastolic congestive heart failure (HCC)  -     potassium chloride (KLOR-CON) 20 MEQ CR tablet; Take 1 tablet by mouth Daily.  Dispense: 90 tablet; Refill: 1  -     bumetanide (BUMEX) 2 MG tablet; Take 1 tablet by mouth 2 (Two) Times a Day.  Dispense: 180 tablet; Refill: 1    7. Mixed hyperlipidemia  -     rosuvastatin (CRESTOR) 40 MG tablet; Take 1 tablet by mouth Every Evening.  Dispense: 90 tablet; Refill: 1    8. Gastroesophageal reflux disease without esophagitis    1.  Reviewed labs with patient mostly stable  2.  Low potassium, patient is currently on OTC, refilled KCl 20 mEq daily.   3.  Continue to refill Bumex BID  4.  B12 level in good range, continue monthly B12 injections  5.  Vitamin D in good range, continue weekly vitamin D  6. Will consider referral to nephrology if renal function changes  7.  Recommend trial of Breztri, patient provided sample inhaler d/t cost of Symbicort.  Stop Spiriva once starts Breztri.   8. Cont/refill all other medications  9.  Keep follow-ups with Dr. Montana, Dr. Ken, and Dr. Richardson as directed  10.  Discussed sleep study due to fatigue, patient declines for now  11.  Mammogram overdue, patient provided [facility] radiology number to  call and schedule      I spent 30 minutes caring for Jasmine Cerda on this date of service. This time includes time spent by me in the following activities: preparing for the visit, reviewing tests, performing a medically appropriate examination and/or evaluation , counseling and educating the patient/family/caregiver, ordering medications, tests, or procedures and documenting information in the medical record        Follow Up     Return in about 6 months (around 10/14/2022) for Recheck, HTN, COPD, fatigue, GERD, B12. HTN panel, b12, vit d prior.  Patient was given instructions and counseling regarding her condition or for health maintenance advice. Please see specific information pulled into the AVS if appropriate.        Part of this note may be an electronic transcription/translation of spoken language to printed text using the Dragon Dictation System

## 2022-04-18 ENCOUNTER — TELEPHONE (OUTPATIENT)
Dept: FAMILY MEDICINE CLINIC | Facility: CLINIC | Age: 62
End: 2022-04-18

## 2022-04-29 ENCOUNTER — TELEPHONE (OUTPATIENT)
Dept: FAMILY MEDICINE CLINIC | Facility: CLINIC | Age: 62
End: 2022-04-29

## 2022-05-02 DIAGNOSIS — K21.9 GASTROESOPHAGEAL REFLUX DISEASE WITHOUT ESOPHAGITIS: ICD-10-CM

## 2022-05-02 RX ORDER — METOCLOPRAMIDE 10 MG/1
10 TABLET ORAL
Qty: 90 TABLET | Refills: 3 | Status: SHIPPED | OUTPATIENT
Start: 2022-05-02 | End: 2022-09-14

## 2022-05-02 RX ORDER — ONDANSETRON 4 MG/1
TABLET, FILM COATED ORAL
Qty: 45 TABLET | Refills: 0 | Status: SHIPPED | OUTPATIENT
Start: 2022-05-02 | End: 2022-06-07

## 2022-05-04 ENCOUNTER — OFFICE VISIT (OUTPATIENT)
Dept: PSYCHIATRY | Facility: CLINIC | Age: 62
End: 2022-05-04

## 2022-05-04 DIAGNOSIS — F51.04 PSYCHOPHYSIOLOGICAL INSOMNIA: Chronic | ICD-10-CM

## 2022-05-04 DIAGNOSIS — F33.2 SEVERE EPISODE OF RECURRENT MAJOR DEPRESSIVE DISORDER, WITHOUT PSYCHOTIC FEATURES: Primary | Chronic | ICD-10-CM

## 2022-05-04 DIAGNOSIS — F43.12 CHRONIC POST-TRAUMATIC STRESS DISORDER (PTSD): Chronic | ICD-10-CM

## 2022-05-04 PROCEDURE — 99214 OFFICE O/P EST MOD 30 MIN: CPT | Performed by: PSYCHIATRY & NEUROLOGY

## 2022-05-04 RX ORDER — ARIPIPRAZOLE 10 MG/1
10 TABLET ORAL DAILY
Qty: 30 TABLET | Refills: 3 | Status: SHIPPED | OUTPATIENT
Start: 2022-05-04 | End: 2022-08-20 | Stop reason: SDUPTHER

## 2022-05-04 RX ORDER — HYDROXYZINE PAMOATE 25 MG/1
25 CAPSULE ORAL 3 TIMES DAILY PRN
Qty: 270 CAPSULE | Refills: 1 | Status: SHIPPED | OUTPATIENT
Start: 2022-05-04 | End: 2022-07-12

## 2022-05-04 RX ORDER — GABAPENTIN 300 MG/1
300 CAPSULE ORAL 3 TIMES DAILY
Qty: 270 CAPSULE | Refills: 1 | Status: SHIPPED | OUTPATIENT
Start: 2022-05-04 | End: 2022-09-07 | Stop reason: SDUPTHER

## 2022-05-04 RX ORDER — TRAZODONE HYDROCHLORIDE 100 MG/1
100 TABLET ORAL NIGHTLY
Qty: 90 TABLET | Refills: 1 | Status: SHIPPED | OUTPATIENT
Start: 2022-05-04 | End: 2022-06-10

## 2022-05-04 RX ORDER — CITALOPRAM 40 MG/1
40 TABLET ORAL EVERY MORNING
Qty: 90 TABLET | Refills: 1 | Status: SHIPPED | OUTPATIENT
Start: 2022-05-04 | End: 2022-09-07 | Stop reason: SDUPTHER

## 2022-05-04 RX ORDER — ALPRAZOLAM 0.5 MG/1
0.5 TABLET ORAL 3 TIMES DAILY PRN
Qty: 90 TABLET | Refills: 3 | Status: SHIPPED | OUTPATIENT
Start: 2022-05-04 | End: 2022-09-01
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
## 2022-05-13 DIAGNOSIS — I50.32 CHRONIC DIASTOLIC CONGESTIVE HEART FAILURE: ICD-10-CM

## 2022-05-13 DIAGNOSIS — E78.2 MIXED HYPERLIPIDEMIA: ICD-10-CM

## 2022-05-13 RX ORDER — CARVEDILOL 25 MG/1
TABLET ORAL
Qty: 180 TABLET | Refills: 1 | Status: SHIPPED | OUTPATIENT
Start: 2022-05-13 | End: 2022-12-19

## 2022-05-13 RX ORDER — BUMETANIDE 2 MG/1
TABLET ORAL
Qty: 180 TABLET | Refills: 1 | Status: SHIPPED | OUTPATIENT
Start: 2022-05-13 | End: 2023-02-16

## 2022-05-13 RX ORDER — ROSUVASTATIN CALCIUM 40 MG/1
TABLET, COATED ORAL
Qty: 90 TABLET | Refills: 1 | Status: SHIPPED | OUTPATIENT
Start: 2022-05-13 | End: 2023-01-16

## 2022-06-06 ENCOUNTER — TRANSCRIBE ORDERS (OUTPATIENT)
Dept: ADMINISTRATIVE | Facility: HOSPITAL | Age: 62
End: 2022-06-06

## 2022-06-06 DIAGNOSIS — Z12.31 SCREENING MAMMOGRAM FOR BREAST CANCER: Primary | ICD-10-CM

## 2022-06-07 RX ORDER — ONDANSETRON 4 MG/1
TABLET, FILM COATED ORAL
Qty: 45 TABLET | Refills: 0 | Status: SHIPPED | OUTPATIENT
Start: 2022-06-07 | End: 2022-06-30

## 2022-06-10 ENCOUNTER — HOSPITAL ENCOUNTER (OUTPATIENT)
Dept: MAMMOGRAPHY | Facility: HOSPITAL | Age: 62
Discharge: HOME | End: 2022-06-10
Admitting: NURSE PRACTITIONER

## 2022-06-10 DIAGNOSIS — Z12.31 SCREENING MAMMOGRAM FOR BREAST CANCER: ICD-10-CM

## 2022-06-10 DIAGNOSIS — F51.04 PSYCHOPHYSIOLOGICAL INSOMNIA: Chronic | ICD-10-CM

## 2022-06-10 PROCEDURE — 77063 BREAST TOMOSYNTHESIS BI: CPT

## 2022-06-10 PROCEDURE — 77067 SCR MAMMO BI INCL CAD: CPT

## 2022-06-10 RX ORDER — TRAZODONE HYDROCHLORIDE 100 MG/1
TABLET ORAL
Qty: 90 TABLET | Refills: 1 | Status: SHIPPED | OUTPATIENT
Start: 2022-06-10 | End: 2022-08-20

## 2022-06-30 ENCOUNTER — TELEPHONE (OUTPATIENT)
Dept: FAMILY MEDICINE CLINIC | Facility: CLINIC | Age: 62
End: 2022-06-30

## 2022-06-30 DIAGNOSIS — I10 ESSENTIAL HYPERTENSION: ICD-10-CM

## 2022-06-30 RX ORDER — LISINOPRIL 10 MG/1
TABLET ORAL
Qty: 90 TABLET | Refills: 0 | OUTPATIENT
Start: 2022-06-30

## 2022-06-30 RX ORDER — LISINOPRIL 5 MG/1
TABLET ORAL
Qty: 180 TABLET | Refills: 2 | Status: SHIPPED | OUTPATIENT
Start: 2022-06-30 | End: 2022-08-22 | Stop reason: SDUPTHER

## 2022-06-30 RX ORDER — ONDANSETRON 4 MG/1
TABLET, FILM COATED ORAL
Qty: 45 TABLET | Refills: 0 | Status: SHIPPED | OUTPATIENT
Start: 2022-06-30 | End: 2022-07-29

## 2022-07-02 DIAGNOSIS — F51.04 PSYCHOPHYSIOLOGICAL INSOMNIA: Chronic | ICD-10-CM

## 2022-07-02 RX ORDER — TRAZODONE HYDROCHLORIDE 50 MG/1
TABLET ORAL
Qty: 180 TABLET | Refills: 1 | Status: SHIPPED | OUTPATIENT
Start: 2022-07-02 | End: 2022-07-06 | Stop reason: DRUGHIGH

## 2022-07-06 ENCOUNTER — OFFICE VISIT (OUTPATIENT)
Dept: FAMILY MEDICINE CLINIC | Facility: CLINIC | Age: 62
End: 2022-07-06

## 2022-07-06 VITALS
HEART RATE: 71 BPM | OXYGEN SATURATION: 93 % | DIASTOLIC BLOOD PRESSURE: 80 MMHG | BODY MASS INDEX: 35.05 KG/M2 | SYSTOLIC BLOOD PRESSURE: 118 MMHG | WEIGHT: 197.8 LBS | HEIGHT: 63 IN

## 2022-07-06 DIAGNOSIS — M62.838 MUSCLE SPASM: ICD-10-CM

## 2022-07-06 DIAGNOSIS — I50.32 CHRONIC DIASTOLIC CONGESTIVE HEART FAILURE: ICD-10-CM

## 2022-07-06 DIAGNOSIS — I10 ESSENTIAL HYPERTENSION: ICD-10-CM
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
## 2022-07-06 DIAGNOSIS — R25.1 TREMOR: Primary | ICD-10-CM

## 2022-07-06 PROCEDURE — 80053 COMPREHEN METABOLIC PANEL: CPT | Performed by: NURSE PRACTITIONER

## 2022-07-06 PROCEDURE — 99214 OFFICE O/P EST MOD 30 MIN: CPT | Performed by: NURSE PRACTITIONER

## 2022-07-06 PROCEDURE — 85027 COMPLETE CBC AUTOMATED: CPT | Performed by: NURSE PRACTITIONER

## 2022-07-06 PROCEDURE — 83735 ASSAY OF MAGNESIUM: CPT | Performed by: NURSE PRACTITIONER

## 2022-07-06 PROCEDURE — 36415 COLL VENOUS BLD VENIPUNCTURE: CPT | Performed by: NURSE PRACTITIONER

## 2022-07-06 PROCEDURE — 83880 ASSAY OF NATRIURETIC PEPTIDE: CPT | Performed by: NURSE PRACTITIONER

## 2022-07-06 RX ORDER — POTASSIUM CHLORIDE 20 MEQ/1
20 TABLET, EXTENDED RELEASE ORAL 2 TIMES DAILY
Qty: 180 TABLET | Refills: 1 | Status: SHIPPED | OUTPATIENT
Start: 2022-07-06 | End: 2022-08-23

## 2022-07-06 NOTE — PROGRESS NOTES
Venipuncture Blood Specimen Collection  Venipuncture performed in rt arm by Holly Henderson MA with good hemostasis. Patient tolerated the procedure well without complications.   07/06/22   Holly Henderson MA

## 2022-07-06 NOTE — PROGRESS NOTES
"Chief Complaint  Chief Complaint   Patient presents with   • Fall     Started a couple of weeks ago, reports it happens several times a day.  She will have spasms and blackout, then fall.  She reports that she is taking klor-con, reports that the diuretic she was taking was \"flushing out\" what potassium she was taking, so dose was increased.  Pt reports she took otc potassium for a while, but went back to rx 2 mo ago bc potassium was still low.  Pt reports that she feels like she has no control of her body.           Subjective          Jasmine Cerda presents to [facility] for   History of Present Illness     Patient presents today for evaluation of frequent falls as mentioned in chief complaint.  She reports these episodes start as a full body muscle spasm with tremor, she cannot control her body and falls.  She does report having a new tremor of the upper extremities that has been present for about 2 months as well.  She denies loss of consciousness or confusion, the episodes last about 15 seconds, she then feels weak following.   -Klor-Con once daily was resumed in April, she continues Bumex 2 mg twice daily  -She follows with Dr. Montana for CHF with diastolic dysfunction, coronary artery disease moderate disease in the LAD that is currently being treated medically    The following portions of the patient's history were reviewed and updated as appropriate: allergies, current medications, past family history, past medical history, past social history, past surgical history and problem list.    Past Medical History:   Diagnosis Date   • Anxiety    • Breast cyst    • CHF (congestive heart failure) (HCC)    • COPD (chronic obstructive pulmonary disease) (HCC)    • Coronary heart disease    • Depression    • Hyperlipidemia    • Hypertension      Past Surgical History:   Procedure Laterality Date   • APPENDECTOMY     • BREAST CYST ASPIRATION     • CARDIAC CATHETERIZATION  07/2017    No " Stents placed - [facility]   • CERVICAL FUSION      C6-C7   •  SECTION      x 2   • CHOLECYSTECTOMY     • HYSTERECTOMY      partial     Family History   Problem Relation Age of Onset   • Colon cancer Mother    • Diabetes Father    • Pulmonary embolism Brother    • Heart failure Brother    • Breast cancer Maternal Aunt      Social History     Tobacco Use   • Smoking status: Never Smoker   • Smokeless tobacco: Never Used   • Tobacco comment: Passive Smoke: N   Substance Use Topics   • Alcohol use: No       Current Outpatient Medications:   •  albuterol (PROVENTIL) (2.5 MG/3ML) 0.083% nebulizer solution, INHALE 1 VIAL VIA NEBULIZER DAILY AS NEEDED FOR WHEEZING, Disp: 300 mL, Rfl: 1  •  ALPRAZolam (XANAX) 0.5 MG tablet, Take 1 tablet by mouth 3 (Three) Times a Day As Needed for Anxiety., Disp: 90 tablet, Rfl: 3  •  ARIPiprazole (ABILIFY) 10 MG tablet, Take 1 tablet by mouth Daily., Disp: 30 tablet, Rfl: 3  •  aspirin 81 MG EC tablet, ASPIRIN EC 81 MG TBEC, Disp: , Rfl:   •  Budeson-Glycopyrrol-Formoterol (Breztri Aerosphere) 160-9-4.8 MCG/ACT aerosol inhaler, Inhale 2 puffs 2 (Two) Times a Day., Disp: 1 each, Rfl: 11  •  bumetanide (BUMEX) 2 MG tablet, TAKE 1 TABLET TWICE DAILY (NEED MD APPOINTMENT FOR REFILLS), Disp: 180 tablet, Rfl: 1  •  carvedilol (COREG) 25 MG tablet, TAKE 1 TABLET TWICE DAILY, Disp: 180 tablet, Rfl: 1  •  citalopram (CeleXA) 40 MG tablet, Take 1 tablet by mouth Every Morning., Disp: 90 tablet, Rfl: 1  •  cyanocobalamin 1000 MCG/ML injection, Inject 1 mL into the appropriate muscle as directed by prescriber Every 28 (Twenty-Eight) Days., Disp: 3 mL, Rfl: 3  •  gabapentin (NEURONTIN) 300 MG capsule, Take 1 capsule by mouth 3 (Three) Times a Day., Disp: 270 capsule, Rfl: 1  •  hydrOXYzine pamoate (VISTARIL) 25 MG capsule, Take 1 capsule by mouth 3 (Three) Times a Day As Needed for Anxiety., Disp: 270 capsule, Rfl: 1  •  lisinopril (PRINIVIL,ZESTRIL) 5 MG tablet, TAKE 1 TABLET BY MOUTH TWICE A DAY,  "Disp: 180 tablet, Rfl: 2  •  metoclopramide (REGLAN) 10 MG tablet, TAKE 1 TABLET BY MOUTH 3 (THREE) TIMES A DAY BEFORE MEALS., Disp: 90 tablet, Rfl: 3  •  Multiple Vitamins-Minerals (MULTIVITAMIN ADULT) tablet, Daily. With Omega XL, Disp: , Rfl:   •  nitroglycerin (NITROSTAT) 0.4 MG SL tablet, NITROSTAT 0.4 MG SUBL, Disp: , Rfl:   •  NON FORMULARY, Omega XL OTC, Disp: , Rfl:   •  ondansetron (ZOFRAN) 4 MG tablet, TAKE 1 TABLET BY MOUTH EVERY 8 HOURS AS NEEDED FOR NAUSEA OR VOMITING. MUST LAST 30 DAYS, Disp: 45 tablet, Rfl: 0  •  pantoprazole (PROTONIX) 40 MG EC tablet, Take 1 tablet by mouth Daily., Disp: 90 tablet, Rfl: 3  •  potassium chloride (KLOR-CON) 20 MEQ CR tablet, Take 1 tablet by mouth 2 (Two) Times a Day., Disp: 180 tablet, Rfl: 1  •  rosuvastatin (CRESTOR) 40 MG tablet, TAKE 1 TABLET EVERY EVENING, Disp: 90 tablet, Rfl: 1  •  Syringe/Needle, Disp, 23G X 1\" 3 ML misc, Use to inject B12 every 30 days intramuscularly, Disp: 12 each, Rfl: 0  •  tiotropium bromide monohydrate (Spiriva Respimat) 2.5 MCG/ACT aerosol solution inhaler, Inhale 1 puff Daily., Disp: 1 each, Rfl: 11  •  traZODone (DESYREL) 100 MG tablet, TAKE 1 OR 2 TABLETS BY MOUTH AT BEDTIME, Disp: 90 tablet, Rfl: 1  •  vitamin D (ERGOCALCIFEROL) 1.25 MG (54806 UT) capsule capsule, Take 1 capsule by mouth 1 (One) Time Per Week., Disp: 15 capsule, Rfl: 3    Objective   Vital Signs:   /80 (BP Location: Left arm, Patient Position: Sitting, Cuff Size: Adult)   Pulse 71   Ht 160 cm (62.99\")   Wt 89.7 kg (197 lb 12.8 oz)   SpO2 93%   BMI 35.05 kg/m²           Physical Exam  Vitals and nursing note reviewed.   Constitutional:       General: She is not in acute distress.     Appearance: She is well-developed. She is not diaphoretic.   Eyes:      Pupils: Pupils are equal, round, and reactive to light.   Neck:      Thyroid: No thyromegaly.      Vascular: No JVD.   Cardiovascular:      Rate and Rhythm: Normal rate and regular rhythm.      Heart " sounds: Murmur heard.   Pulmonary:      Effort: Pulmonary effort is normal. No respiratory distress.      Breath sounds: Normal breath sounds. No wheezing or rhonchi.      Comments: Prolonged expiration  Abdominal:      General: Bowel sounds are normal. There is no distension.      Palpations: Abdomen is soft.      Tenderness: There is no abdominal tenderness.   Musculoskeletal:         General: No tenderness. Normal range of motion.      Cervical back: Normal range of motion and neck supple.   Skin:     General: Skin is warm and dry.   Neurological:      Mental Status: She is alert and oriented to person, place, and time.      Sensory: No sensory deficit.      Motor: Tremor present.   Psychiatric:         Behavior: Behavior normal.         Thought Content: Thought content normal.         Judgment: Judgment normal.          Result Review :     No visits with results within 7 Day(s) from this visit.   Latest known visit with results is:   Lab on 04/07/2022   Component Date Value Ref Range Status   • WBC 04/07/2022 6.84  3.40 - 10.80 10*3/mm3 Final   • RBC 04/07/2022 4.91  3.77 - 5.28 10*6/mm3 Final   • Hemoglobin 04/07/2022 14.2  12.0 - 15.9 g/dL Final   • Hematocrit 04/07/2022 43.1  34.0 - 46.6 % Final   • MCV 04/07/2022 87.8  79.0 - 97.0 fL Final   • MCH 04/07/2022 28.9  26.6 - 33.0 pg Final   • MCHC 04/07/2022 32.9  31.5 - 35.7 g/dL Final   • RDW 04/07/2022 13.2  12.3 - 15.4 % Final   • RDW-SD 04/07/2022 42.6  37.0 - 54.0 fl Final   • MPV 04/07/2022 10.8  6.0 - 12.0 fL Final   • Platelets 04/07/2022 242  140 - 450 10*3/mm3 Final   • Glucose 04/07/2022 101 (A) 65 - 99 mg/dL Final   • BUN 04/07/2022 18  8 - 23 mg/dL Final   • Creatinine 04/07/2022 1.22 (A) 0.57 - 1.00 mg/dL Final   • Sodium 04/07/2022 142  136 - 145 mmol/L Final   • Potassium 04/07/2022 3.4 (A) 3.5 - 5.2 mmol/L Final   • Chloride 04/07/2022 99  98 - 107 mmol/L Final   • CO2 04/07/2022 29.5 (A) 22.0 - 29.0 mmol/L Final   • Calcium 04/07/2022 10.4   8.6 - 10.5 mg/dL Final   • Total Protein 04/07/2022 7.4  6.0 - 8.5 g/dL Final   • Albumin 04/07/2022 4.70  3.50 - 5.20 g/dL Final   • ALT (SGPT) 04/07/2022 17  1 - 33 U/L Final   • AST (SGOT) 04/07/2022 17  1 - 32 U/L Final   • Alkaline Phosphatase 04/07/2022 61  39 - 117 U/L Final   • Total Bilirubin 04/07/2022 0.5  0.0 - 1.2 mg/dL Final   • Globulin 04/07/2022 2.7  gm/dL Final   • A/G Ratio 04/07/2022 1.7  g/dL Final   • BUN/Creatinine Ratio 04/07/2022 14.8  7.0 - 25.0 Final   • Anion Gap 04/07/2022 13.5  5.0 - 15.0 mmol/L Final   • eGFR 04/07/2022 50.0 (A) >60.0 mL/min/1.73 Final    National Kidney Foundation and American Society of Nephrology (ASN) Task Force recommended calculation based on the Chronic Kidney Disease Epidemiology Collaboration (CKD-EPI) equation refit without adjustment for race.   • Total Cholesterol 04/07/2022 153  0 - 200 mg/dL Final   • Triglycerides 04/07/2022 181 (A) 0 - 150 mg/dL Final   • HDL Cholesterol 04/07/2022 57  40 - 60 mg/dL Final   • LDL Cholesterol  04/07/2022 66  0 - 100 mg/dL Final   • VLDL Cholesterol 04/07/2022 30  5 - 40 mg/dL Final   • LDL/HDL Ratio 04/07/2022 1.05   Final   • TSH 04/07/2022 2.880  0.270 - 4.200 uIU/mL Final   • Vitamin B-12 04/07/2022 802  211 - 946 pg/mL Final   • 25 Hydroxy, Vitamin D 04/07/2022 56.6  30.0 - 100.0 ng/ml Final                  Class 2 Severe Obesity (BMI >=35 and <=39.9). Obesity-related health conditions include the following: hypertension and coronary heart disease. Obesity is unchanged. BMI is is above average; BMI management plan is completed. We discussed portion control and increasing exercise.           Assessment and Plan    Diagnoses and all orders for this visit:    1. Tremor (Primary)  -     Magnesium    2. Chronic diastolic congestive heart failure (HCC)  Comments:  start taking potassium bid, cont bumex bid. labs today.   Orders:  -     Comprehensive Metabolic Panel  -     CBC (No Diff)  -     potassium chloride (KLOR-CON)  20 MEQ CR tablet; Take 1 tablet by mouth 2 (Two) Times a Day.  Dispense: 180 tablet; Refill: 1  -     Magnesium    3. Muscle spasm  -     Magnesium    4. Accidental fall, initial encounter    5. Essential hypertension  -     BNP      -If labs return within normal limits, will then order CT of the brain and carotid ultrasound, consider neurology referral.  Patient to return to office in 2 weeks to reevaluate  -Declines PT at this time  -Follow-up with cardiology as directed      I spent 30 minutes caring for Jasmine Cerda on this date of service. This time includes time spent by me in the following activities: preparing for the visit, reviewing tests, performing a medically appropriate examination and/or evaluation , counseling and educating the patient/family/caregiver, ordering medications, tests, or procedures and documenting information in the medical record        Follow Up     Return in about 2 weeks (around 7/20/2022) for Recheck.  Patient was given instructions and counseling regarding her condition or for health maintenance advice. Please see specific information pulled into the AVS if appropriate.        Part of this note may be an electronic transcription/translation of spoken language to printed text using the Dragon Dictation System

## 2022-07-07 LAB
ALBUMIN SERPL-MCNC: 4.8 G/DL (ref 3.5–5.2)
ALBUMIN/GLOB SERPL: 1.9 G/DL
ALP SERPL-CCNC: 54 U/L (ref 39–117)
ALT SERPL W P-5'-P-CCNC: 14 U/L (ref 1–33)
ANION GAP SERPL CALCULATED.3IONS-SCNC: 12 MMOL/L (ref 5–15)
AST SERPL-CCNC: 14 U/L (ref 1–32)
BILIRUB SERPL-MCNC: 0.3 MG/DL (ref 0–1.2)
BUN SERPL-MCNC: 37 MG/DL (ref 8–23)
BUN/CREAT SERPL: 14.8 (ref 7–25)
CALCIUM SPEC-SCNC: 11.1 MG/DL (ref 8.6–10.5)
CHLORIDE SERPL-SCNC: 99 MMOL/L (ref 98–107)
CO2 SERPL-SCNC: 26 MMOL/L (ref 22–29)
CREAT SERPL-MCNC: 2.5 MG/DL (ref 0.57–1)
DEPRECATED RDW RBC AUTO: 39.9 FL (ref 37–54)
EGFRCR SERPLBLD CKD-EPI 2021: 21 ML/MIN/1.73
ERYTHROCYTE [DISTWIDTH] IN BLOOD BY AUTOMATED COUNT: 13 % (ref 12.3–15.4)
GLOBULIN UR ELPH-MCNC: 2.5 GM/DL
GLUCOSE SERPL-MCNC: 106 MG/DL (ref 65–99)
HCT VFR BLD AUTO: 40.6 % (ref 34–46.6)
HGB BLD-MCNC: 13.6 G/DL (ref 12–15.9)
MAGNESIUM SERPL-MCNC: 2.5 MG/DL (ref 1.6–2.4)
MCH RBC QN AUTO: 29 PG (ref 26.6–33)
MCHC RBC AUTO-ENTMCNC: 33.5 G/DL (ref 31.5–35.7)
MCV RBC AUTO: 86.6 FL (ref 79–97)
NT-PROBNP SERPL-MCNC: 37.4 PG/ML (ref 0–900)
PLATELET # BLD AUTO: 236 10*3/MM3 (ref 140–450)
PMV BLD AUTO: 10.1 FL (ref 6–12)
POTASSIUM SERPL-SCNC: 5.6 MMOL/L (ref 3.5–5.2)
PROT SERPL-MCNC: 7.3 G/DL (ref 6–8.5)
RBC # BLD AUTO: 4.69 10*6/MM3 (ref 3.77–5.28)
SODIUM SERPL-SCNC: 137 MMOL/L (ref 136–145)
WBC NRBC COR # BLD: 8.16 10*3/MM3 (ref 3.4–10.8)

## 2022-07-09 DIAGNOSIS — F43.12 CHRONIC POST-TRAUMATIC STRESS DISORDER (PTSD): Chronic | ICD-10-CM

## 2022-07-11 ENCOUNTER — LAB (OUTPATIENT)
Dept: FAMILY MEDICINE CLINIC | Facility: CLINIC | Age: 62
End: 2022-07-11

## 2022-07-11 DIAGNOSIS — E87.5 HYPERKALEMIA: Primary | ICD-10-CM

## 2022-07-11 PROCEDURE — 36415 COLL VENOUS BLD VENIPUNCTURE: CPT | Performed by: NURSE PRACTITIONER

## 2022-07-11 PROCEDURE — 80048 BASIC METABOLIC PNL TOTAL CA: CPT | Performed by: NURSE PRACTITIONER

## 2022-07-12 LAB
ANION GAP SERPL CALCULATED.3IONS-SCNC: 11.1 MMOL/L (ref 5–15)
BUN SERPL-MCNC: 31 MG/DL (ref 8–23)
BUN/CREAT SERPL: 17.2 (ref 7–25)
CALCIUM SPEC-SCNC: 10.3 MG/DL (ref 8.6–10.5)
CHLORIDE SERPL-SCNC: 100 MMOL/L (ref 98–107)
CO2 SERPL-SCNC: 25.9 MMOL/L (ref 22–29)
CREAT SERPL-MCNC: 1.8 MG/DL (ref 0.57–1)
EGFRCR SERPLBLD CKD-EPI 2021: 31.1 ML/MIN/1.73
GLUCOSE SERPL-MCNC: 94 MG/DL (ref 65–99)
POTASSIUM SERPL-SCNC: 4.7 MMOL/L (ref 3.5–5.2)
SODIUM SERPL-SCNC: 137 MMOL/L (ref 136–145)

## 2022-07-12 RX ORDER — HYDROXYZINE PAMOATE 25 MG/1
25 CAPSULE ORAL 3 TIMES DAILY PRN
Qty: 270 CAPSULE | Refills: 1 | Status: SHIPPED | OUTPATIENT
Start: 2022-07-12 | End: 2023-01-17

## 2022-07-14 ENCOUNTER — TELEPHONE (OUTPATIENT)
Dept: FAMILY MEDICINE CLINIC | Facility: CLINIC | Age: 62
End: 2022-07-14

## 2022-07-14 NOTE — TELEPHONE ENCOUNTER
"Hub ok to share:  \"Renal function improved and potassium is down in normal range. Continue current medication regimen. Notify me if symptoms she had at visit continue to persist\"  "

## 2022-07-25 ENCOUNTER — OFFICE VISIT (OUTPATIENT)
Dept: FAMILY MEDICINE CLINIC | Facility: CLINIC | Age: 62
End: 2022-07-25

## 2022-07-25 VITALS
HEIGHT: 63 IN | DIASTOLIC BLOOD PRESSURE: 70 MMHG | HEART RATE: 59 BPM | BODY MASS INDEX: 34.34 KG/M2 | WEIGHT: 193.8 LBS | TEMPERATURE: 98.3 F | OXYGEN SATURATION: 93 % | SYSTOLIC BLOOD PRESSURE: 116 MMHG

## 2022-07-25 DIAGNOSIS — M62.838 MUSCLE SPASM: ICD-10-CM

## 2022-07-25 DIAGNOSIS — R05.9 COUGH: ICD-10-CM

## 2022-07-25 DIAGNOSIS — J44.1 CHRONIC OBSTRUCTIVE PULMONARY DISEASE WITH ACUTE EXACERBATION: ICD-10-CM

## 2022-07-25 DIAGNOSIS — N18.32 STAGE 3B CHRONIC KIDNEY DISEASE: ICD-10-CM

## 2022-07-25 DIAGNOSIS — R25.1 TREMOR: Primary | ICD-10-CM
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
## 2022-07-25 DIAGNOSIS — R52 BODY ACHES: ICD-10-CM

## 2022-07-25 LAB
EXPIRATION DATE: NORMAL
INTERNAL CONTROL: NORMAL
Lab: NORMAL
SARS-COV-2 AG UPPER RESP QL IA.RAPID: NOT DETECTED

## 2022-07-25 PROCEDURE — 99214 OFFICE O/P EST MOD 30 MIN: CPT | Performed by: NURSE PRACTITIONER

## 2022-07-25 PROCEDURE — 36415 COLL VENOUS BLD VENIPUNCTURE: CPT | Performed by: NURSE PRACTITIONER

## 2022-07-25 PROCEDURE — 87426 SARSCOV CORONAVIRUS AG IA: CPT | Performed by: NURSE PRACTITIONER

## 2022-07-25 PROCEDURE — 80048 BASIC METABOLIC PNL TOTAL CA: CPT | Performed by: NURSE PRACTITIONER

## 2022-07-25 RX ORDER — CEFDINIR 300 MG/1
300 CAPSULE ORAL 2 TIMES DAILY
Qty: 14 CAPSULE | Refills: 0 | Status: SHIPPED | OUTPATIENT
Start: 2022-07-25 | End: 2022-08-01

## 2022-07-25 NOTE — PROGRESS NOTES
"Chief Complaint  Chief Complaint   Patient presents with   • Tremors   • Fall   • Spasms   • Follow-up     2 wk   • Cough     Productive, green mucus, started about a week ago.  With body aches, wheezing, loss of taste (still has smell), body aches. Pt denies fever.  Pt is taking theraflu for symptoms.           Subjective          Jasmine Cerda presents to [facility] for   History of Present Illness    Patient presents today for 2-week follow-up on tremors, falls and muscle spasms.  Labs on 7/6/2022 showed an increased creatinine of 2.5 and GFR of 21, potassium 5.6 and calcium 11.1.  Bumex decreased to once daily, potassium discontinued and lisinopril was cut back to 5 mg.  She returned on 7/11 for repeat BMP with improvement in creatinine still with a GFR of 31.1.     Overall, she reports no improvement in tremor, falls or muscle spasms.  Symptoms have been present for approximately 2 months.  She reports her muscles tensed up and she fell last week, sensation is uncontrollable, she denies syncope or loss of consciousness but reports she does \"zone out\" she reportedly has chronic L sided weakness that is unchanged from her baseline.   -She denies any family history of Parkinson's but does have an alcoholic brother who occasionally has seizures    She also complains of a cough with productive of green mucus, body aches, wheezing loss of taste that started 1 week ago.  She denies any recent ill contacts.  She has been using Breztri inhaler 2 puffs twice daily and albuterol nebulizer 3 times a day      The following portions of the patient's history were reviewed and updated as appropriate: allergies, current medications, past family history, past medical history, past social history, past surgical history and problem list.    Past Medical History:   Diagnosis Date   • Anxiety    • Breast cyst    • CHF (congestive heart failure) (HCC)    • COPD (chronic obstructive pulmonary disease) " (HCC)    • Coronary heart disease    • Depression    • Hyperlipidemia    • Hypertension      Past Surgical History:   Procedure Laterality Date   • APPENDECTOMY     • BREAST CYST ASPIRATION     • CARDIAC CATHETERIZATION  2017    No Stents placed - [facility]   • CERVICAL FUSION      C6-C7   •  SECTION      x 2   • CHOLECYSTECTOMY     • HYSTERECTOMY      partial     Family History   Problem Relation Age of Onset   • Colon cancer Mother    • Diabetes Father    • Pulmonary embolism Brother    • Heart failure Brother    • Breast cancer Maternal Aunt      Social History     Tobacco Use   • Smoking status: Never Smoker   • Smokeless tobacco: Never Used   • Tobacco comment: Passive Smoke: N   Substance Use Topics   • Alcohol use: No       Current Outpatient Medications:   •  albuterol (PROVENTIL) (2.5 MG/3ML) 0.083% nebulizer solution, INHALE 1 VIAL VIA NEBULIZER DAILY AS NEEDED FOR WHEEZING, Disp: 300 mL, Rfl: 1  •  ALPRAZolam (XANAX) 0.5 MG tablet, Take 1 tablet by mouth 3 (Three) Times a Day As Needed for Anxiety., Disp: 90 tablet, Rfl: 3  •  ARIPiprazole (ABILIFY) 10 MG tablet, Take 1 tablet by mouth Daily., Disp: 30 tablet, Rfl: 3  •  aspirin 81 MG EC tablet, ASPIRIN EC 81 MG TBEC, Disp: , Rfl:   •  Budeson-Glycopyrrol-Formoterol (Breztri Aerosphere) 160-9-4.8 MCG/ACT aerosol inhaler, Inhale 2 puffs 2 (Two) Times a Day., Disp: 1 each, Rfl: 11  •  bumetanide (BUMEX) 2 MG tablet, TAKE 1 TABLET TWICE DAILY (NEED MD APPOINTMENT FOR REFILLS), Disp: 180 tablet, Rfl: 1  •  carvedilol (COREG) 25 MG tablet, TAKE 1 TABLET TWICE DAILY, Disp: 180 tablet, Rfl: 1  •  citalopram (CeleXA) 40 MG tablet, Take 1 tablet by mouth Every Morning., Disp: 90 tablet, Rfl: 1  •  cyanocobalamin 1000 MCG/ML injection, Inject 1 mL into the appropriate muscle as directed by prescriber Every 28 (Twenty-Eight) Days., Disp: 3 mL, Rfl: 3  •  gabapentin (NEURONTIN) 300 MG capsule, Take 1 capsule by mouth 3 (Three) Times a Day., Disp: 270 capsule,  "Rfl: 1  •  hydrOXYzine pamoate (VISTARIL) 25 MG capsule, TAKE 1 CAPSULE BY MOUTH 3 (THREE) TIMES A DAY AS NEEDED FOR ANXIETY., Disp: 270 capsule, Rfl: 1  •  lisinopril (PRINIVIL,ZESTRIL) 5 MG tablet, TAKE 1 TABLET BY MOUTH TWICE A DAY, Disp: 180 tablet, Rfl: 2  •  metoclopramide (REGLAN) 10 MG tablet, TAKE 1 TABLET BY MOUTH 3 (THREE) TIMES A DAY BEFORE MEALS., Disp: 90 tablet, Rfl: 3  •  Multiple Vitamins-Minerals (MULTIVITAMIN ADULT) tablet, Daily. With Omega XL, Disp: , Rfl:   •  nitroglycerin (NITROSTAT) 0.4 MG SL tablet, NITROSTAT 0.4 MG SUBL, Disp: , Rfl:   •  NON FORMULARY, Omega XL OTC, Disp: , Rfl:   •  ondansetron (ZOFRAN) 4 MG tablet, TAKE 1 TABLET BY MOUTH EVERY 8 HOURS AS NEEDED FOR NAUSEA OR VOMITING. MUST LAST 30 DAYS, Disp: 45 tablet, Rfl: 0  •  pantoprazole (PROTONIX) 40 MG EC tablet, Take 1 tablet by mouth Daily., Disp: 90 tablet, Rfl: 3  •  potassium chloride (KLOR-CON) 20 MEQ CR tablet, Take 1 tablet by mouth 2 (Two) Times a Day., Disp: 180 tablet, Rfl: 1  •  rosuvastatin (CRESTOR) 40 MG tablet, TAKE 1 TABLET EVERY EVENING, Disp: 90 tablet, Rfl: 1  •  Syringe/Needle, Disp, 23G X 1\" 3 ML misc, Use to inject B12 every 30 days intramuscularly, Disp: 12 each, Rfl: 0  •  tiotropium bromide monohydrate (Spiriva Respimat) 2.5 MCG/ACT aerosol solution inhaler, Inhale 1 puff Daily., Disp: 1 each, Rfl: 11  •  traZODone (DESYREL) 100 MG tablet, TAKE 1 OR 2 TABLETS BY MOUTH AT BEDTIME, Disp: 90 tablet, Rfl: 1  •  vitamin D (ERGOCALCIFEROL) 1.25 MG (03765 UT) capsule capsule, Take 1 capsule by mouth 1 (One) Time Per Week., Disp: 15 capsule, Rfl: 3  •  carbidopa-levodopa (Sinemet)  MG per tablet, Take 1 tablet by mouth 2 (Two) Times a Day., Disp: 60 tablet, Rfl: 2  •  cefdinir (OMNICEF) 300 MG capsule, Take 1 capsule by mouth 2 (Two) Times a Day for 7 days., Disp: 14 capsule, Rfl: 0    Objective   Vital Signs:   /70 (BP Location: Left arm, Patient Position: Sitting, Cuff Size: Adult)   Pulse 59   " "Temp 98.3 °F (36.8 °C) (Oral)   Ht 160 cm (62.99\")   Wt 87.9 kg (193 lb 12.8 oz)   SpO2 93%   BMI 34.34 kg/m²         Physical Exam  Vitals and nursing note reviewed.   Constitutional:       General: She is not in acute distress.     Appearance: She is well-developed. She is ill-appearing. She is not diaphoretic.   HENT:      Head: Normocephalic.      Nose: Congestion present.   Eyes:      Pupils: Pupils are equal, round, and reactive to light.   Neck:      Thyroid: No thyromegaly.      Vascular: No JVD.   Cardiovascular:      Rate and Rhythm: Normal rate and regular rhythm.      Heart sounds: Normal heart sounds. No murmur heard.  Pulmonary:      Effort: Pulmonary effort is normal. No respiratory distress.      Breath sounds: Wheezing (minimal scattered) and rhonchi present.   Abdominal:      General: Bowel sounds are normal. There is no distension.      Palpations: Abdomen is soft.      Tenderness: There is no abdominal tenderness.   Musculoskeletal:         General: No swelling or tenderness. Normal range of motion.      Cervical back: Normal range of motion and neck supple.   Skin:     General: Skin is warm and dry.   Neurological:      Mental Status: She is alert and oriented to person, place, and time.      Sensory: No sensory deficit.      Motor: Tremor (BUE) present. No weakness.      Gait: Gait normal.   Psychiatric:         Behavior: Behavior normal.         Thought Content: Thought content normal.         Judgment: Judgment normal.          Result Review :     Office Visit on 07/25/2022   Component Date Value Ref Range Status   • Glucose 07/25/2022 85  65 - 99 mg/dL Final   • BUN 07/25/2022 30 (A) 8 - 23 mg/dL Final   • Creatinine 07/25/2022 1.86 (A) 0.57 - 1.00 mg/dL Final   • Sodium 07/25/2022 142  136 - 145 mmol/L Final   • Potassium 07/25/2022 4.1  3.5 - 5.2 mmol/L Final   • Chloride 07/25/2022 105  98 - 107 mmol/L Final   • CO2 07/25/2022 25.5  22.0 - 29.0 mmol/L Final   • Calcium 07/25/2022 10.8 " (A) 8.6 - 10.5 mg/dL Final   • BUN/Creatinine Ratio 07/25/2022 16.1  7.0 - 25.0 Final   • Anion Gap 07/25/2022 11.5  5.0 - 15.0 mmol/L Final   • eGFR 07/25/2022 29.9 (A) >60.0 mL/min/1.73 Final    National Kidney Foundation and American Society of Nephrology (ASN) Task Force recommended calculation based on the Chronic Kidney Disease Epidemiology Collaboration (CKD-EPI) equation refit without adjustment for race.   • SARS Antigen 07/25/2022 Not Detected  Not Detected, Presumptive Negative Final   • Internal Control 07/25/2022 Passed  Passed Final   • Lot Number 07/25/2022 [id number]   Final   • Expiration Date 07/25/2022 101,722   Final                  BMI is >= 30 and <35. (Class 1 Obesity). The following options were offered after discussion;: exercise counseling/recommendations and nutrition counseling/recommendations           Assessment and Plan    Diagnoses and all orders for this visit:    1. Tremor (Primary)  -     CT Head Without Contrast; Future  -     Ambulatory Referral to Physical Therapy Evaluate and treat, Neuro; Desensitization; Strengthening, Stretching, ROM; Full weight bearing (as tolerated)  -     Ambulatory Referral to Neurology    2. Muscle spasm  -     CT Head Without Contrast; Future  -     Ambulatory Referral to Physical Therapy Evaluate and treat, Neuro; Desensitization; Strengthening, Stretching, ROM; Full weight bearing (as tolerated)  -     Ambulatory Referral to Neurology    3. Accidental fall, initial encounter  -     CT Head Without Contrast; Future  -     Ambulatory Referral to Physical Therapy Evaluate and treat, Neuro; Desensitization; Strengthening, Stretching, ROM; Full weight bearing (as tolerated)  -     Ambulatory Referral to Neurology    4. Chronic obstructive pulmonary disease with acute exacerbation (HCC)    5. Stage 3b chronic kidney disease (HCC)  -     Basic Metabolic Panel    6. Cough  -     Cancel: SARS-CoV-2 Antibodies, Nucleocapsid (Natural Immunity)  -     POCT  VERITOR SARS-CoV-2 Antigen    7. Body aches  -     Cancel: SARS-CoV-2 Antibodies, Nucleocapsid (Natural Immunity)  -     POCT VERITOR SARS-CoV-2 Antigen    Other orders  -     carbidopa-levodopa (Sinemet)  MG per tablet; Take 1 tablet by mouth 2 (Two) Times a Day.  Dispense: 60 tablet; Refill: 2  -     cefdinir (OMNICEF) 300 MG capsule; Take 1 capsule by mouth 2 (Two) Times a Day for 7 days.  Dispense: 14 capsule; Refill: 0    1.  We will check CT of the brain, referral to neurology and start physical therapy  2.  Start Sinemet twice daily, will titrate/adjust as needed  3.  BMP today, will notify results  4.  Start cefdinir, continue Breztri and albuterol nebulizers as needed, consider Medrol Dosepak if s/s wonb in 3 to 4 days  5.  We will follow back up in 4 weeks    I spent 30 minutes caring for Jasmine Cerda on this date of service. This time includes time spent by me in the following activities: preparing for the visit, reviewing tests, performing a medically appropriate examination and/or evaluation , counseling and educating the patient/family/caregiver, ordering medications, tests, or procedures and documenting information in the medical record        Follow Up     Return in about 4 weeks (around 8/22/2022), or if symptoms worsen or fail to improve, for Recheck, BMP prior to appt.  Patient was given instructions and counseling regarding her condition or for health maintenance advice. Please see specific information pulled into the AVS if appropriate.        Part of this note may be an electronic transcription/translation of spoken language to printed text using the Dragon Dictation System

## 2022-07-26 LAB
ANION GAP SERPL CALCULATED.3IONS-SCNC: 11.5 MMOL/L (ref 5–15)
BUN SERPL-MCNC: 30 MG/DL (ref 8–23)
BUN/CREAT SERPL: 16.1 (ref 7–25)
CALCIUM SPEC-SCNC: 10.8 MG/DL (ref 8.6–10.5)
CHLORIDE SERPL-SCNC: 105 MMOL/L (ref 98–107)
CO2 SERPL-SCNC: 25.5 MMOL/L (ref 22–29)
CREAT SERPL-MCNC: 1.86 MG/DL (ref 0.57–1)
EGFRCR SERPLBLD CKD-EPI 2021: 29.9 ML/MIN/1.73
GLUCOSE SERPL-MCNC: 85 MG/DL (ref 65–99)
POTASSIUM SERPL-SCNC: 4.1 MMOL/L (ref 3.5–5.2)
SODIUM SERPL-SCNC: 142 MMOL/L (ref 136–145)

## 2022-07-27 DIAGNOSIS — R25.1 TREMOR: Primary | ICD-10-CM
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
## 2022-07-27 DIAGNOSIS — M62.838 MUSCLE SPASM: ICD-10-CM

## 2022-07-27 NOTE — PROGRESS NOTES
Pt reports that she is unable to make it to PT d/t gas prices and she can't go during the time that she has access to a vehicle (her  works and uses the one vehicle they have).  Are there any other options for patient?

## 2022-07-28 ENCOUNTER — HOME HEALTH ADMISSION (OUTPATIENT)
Dept: HOME HEALTH SERVICES | Facility: HOME HEALTHCARE | Age: 62
End: 2022-07-28

## 2022-07-29 RX ORDER — ONDANSETRON 4 MG/1
TABLET, FILM COATED ORAL
Qty: 45 TABLET | Refills: 0 | Status: SHIPPED | OUTPATIENT
Start: 2022-07-29 | End: 2022-08-31

## 2022-07-29 NOTE — TELEPHONE ENCOUNTER
Karoline from  home health called in regarding referral:  They are needing a diagnosis code for referral.

## 2022-08-03 DIAGNOSIS — M62.838 MUSCLE SPASM: ICD-10-CM

## 2022-08-03 DIAGNOSIS — R25.1 TREMOR: ICD-10-CM

## 2022-08-03 DIAGNOSIS — J44.9 CHRONIC OBSTRUCTIVE PULMONARY DISEASE, UNSPECIFIED COPD TYPE: ICD-10-CM
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
## 2022-08-03 DIAGNOSIS — J44.1 CHRONIC OBSTRUCTIVE PULMONARY DISEASE WITH ACUTE EXACERBATION: ICD-10-CM

## 2022-08-04 ENCOUNTER — HOME CARE VISIT (OUTPATIENT)
Dept: HOME HEALTH SERVICES | Facility: HOME HEALTHCARE | Age: 62
End: 2022-08-04

## 2022-08-04 VITALS
OXYGEN SATURATION: 94 % | TEMPERATURE: 97.5 F | HEART RATE: 66 BPM | DIASTOLIC BLOOD PRESSURE: 78 MMHG | SYSTOLIC BLOOD PRESSURE: 114 MMHG

## 2022-08-04 PROCEDURE — G0151 HHCP-SERV OF PT,EA 15 MIN: HCPCS

## 2022-08-04 NOTE — HOME HEALTH
Pt referred for home health PT and OT from PCP related to recent COPD exacerbation. Skilled PT for strengthening, transfer training, balance and gait training with focus of care COPD.    Environment Lives in a single story house with one step to get in and out. Spouse works during the day but available in evenings to assist. Son assists patient with meds. House is clutter free.    Plan for next visit Strengthening, transfer training balance and gait training.

## 2022-08-04 NOTE — CASE COMMUNICATION
Per Home Care guidelines we are required to report drug to drug interactions on review of this patient medications a major interaction was noted as listed below.     Pt historically on these medications without adverse reaction.     Drug-Drug: metoclopramide and ARIPiprazole   Coadministration of metoclopramide with ARIPiprazole may increase the risk of extrapyramidal reactions and neuroleptic malignant syndrome. Coadministration of metoclopramide with other agents associated with the development of extrapyramidal reactions (eg, ARIPiprazole) should be avoided according to metoclopramide prescribing information.   Details Major   metoclopramide (REGLAN) 10 MG tablet     ARIPiprazole (ABILIFY) 10 MG tablet Drug-Drug     Drug-Drug: citalopram and traZODone   Additive serotonergic effects may occur during coadministration of selective serotonin reuptake inhibitors (SSRIs) and traZODone, and the risk of developing serotonin syndrome may be increased.   Details Major

## 2022-08-05 ENCOUNTER — HOSPITAL ENCOUNTER (OUTPATIENT)
Dept: CT IMAGING | Facility: HOSPITAL | Age: 62
Discharge: HOME | End: 2022-08-05
Admitting: NURSE PRACTITIONER

## 2022-08-05 DIAGNOSIS — M62.838 MUSCLE SPASM: ICD-10-CM
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
## 2022-08-05 DIAGNOSIS — R25.1 TREMOR: ICD-10-CM

## 2022-08-05 PROCEDURE — 70450 CT HEAD/BRAIN W/O DYE: CPT

## 2022-08-08 ENCOUNTER — HOME CARE VISIT (OUTPATIENT)
Dept: HOME HEALTH SERVICES | Facility: HOME HEALTHCARE | Age: 62
End: 2022-08-08

## 2022-08-08 VITALS
TEMPERATURE: 98.3 F | SYSTOLIC BLOOD PRESSURE: 100 MMHG | DIASTOLIC BLOOD PRESSURE: 60 MMHG | OXYGEN SATURATION: 95 % | HEART RATE: 62 BPM

## 2022-08-08 PROCEDURE — G0152 HHCP-SERV OF OT,EA 15 MIN: HCPCS

## 2022-08-08 NOTE — HOME HEALTH
Pt is a 63 yo female who lives in a trailer home with spouse.  Pt referred to  therapy secondary to COPD exacerbation.        PLOF pt independent with all ADLs      Currently pt independent with feeding grooming and toileting.  Pt requires MIN assist with bathing and dressing and total assist with IADLs      Skilled OT for ther ex energy conservation ther ex and adl training.  Pt and therapist in agreement with goals and poc.      Next visit ther ex      Pt denies any med changes or falls

## 2022-08-09 ENCOUNTER — HOME CARE VISIT (OUTPATIENT)
Dept: HOME HEALTH SERVICES | Facility: HOME HEALTHCARE | Age: 62
End: 2022-08-09

## 2022-08-09 VITALS
OXYGEN SATURATION: 96 % | HEART RATE: 78 BPM | DIASTOLIC BLOOD PRESSURE: 64 MMHG | TEMPERATURE: 97.4 F | RESPIRATION RATE: 17 BRPM | SYSTOLIC BLOOD PRESSURE: 136 MMHG

## 2022-08-09 PROCEDURE — G0157 HHC PT ASSISTANT EA 15: HCPCS

## 2022-08-09 PROCEDURE — G0180 MD CERTIFICATION HHA PATIENT: HCPCS | Performed by: NURSE PRACTITIONER

## 2022-08-09 NOTE — HOME HEALTH
pt up and is prepared for PT session,  pt states she has been performing hep as tolerated, admits limitations and needs instruction to perform correctly.     no med changes or complications reported today- visually assessed.      pt was challenged today with weakness noted during hep review and gait trg.   pt needed instruction to properly perform all ther ex with cues given to fully contract/hold.     pt was also cued to deep plb for energy conservation.       work/rest ratio today was 60/40.    R cam boot donned and pt was guarded of the RLE.    pt was educated today on proper lumbar stab techniques and to avoid forward flexed posture as able    during gait trg, pt was cued to properly improve hip flexion and to widen ashley for turns

## 2022-08-12 ENCOUNTER — HOME CARE VISIT (OUTPATIENT)
Dept: HOME HEALTH SERVICES | Facility: HOME HEALTHCARE | Age: 62
End: 2022-08-12

## 2022-08-12 PROCEDURE — G0157 HHC PT ASSISTANT EA 15: HCPCS

## 2022-08-14 VITALS
OXYGEN SATURATION: 95 % | HEART RATE: 72 BPM | TEMPERATURE: 97.6 F | RESPIRATION RATE: 15 BRPM | SYSTOLIC BLOOD PRESSURE: 128 MMHG | DIASTOLIC BLOOD PRESSURE: 62 MMHG

## 2022-08-15 ENCOUNTER — LAB (OUTPATIENT)
Dept: FAMILY MEDICINE CLINIC | Facility: CLINIC | Age: 62
End: 2022-08-15

## 2022-08-15 DIAGNOSIS — N18.32 STAGE 3B CHRONIC KIDNEY DISEASE: Primary | ICD-10-CM

## 2022-08-15 PROCEDURE — 36415 COLL VENOUS BLD VENIPUNCTURE: CPT | Performed by: NURSE PRACTITIONER

## 2022-08-15 PROCEDURE — 80048 BASIC METABOLIC PNL TOTAL CA: CPT | Performed by: NURSE PRACTITIONER

## 2022-08-15 NOTE — HOME HEALTH
Lab during the day was not collected d/t pt \"pt refused.\" Called Lab to collect CBC STAT as ordered.   pt up and is prepared for PT session,  pt states she feels fair today but with limitations.   pt states she has had no med changes, visually assessed.    no falls reported.       pt reporting she is slowly improving with PT intervention but progress has been limited to day.        pt was compliant throughout PT session but struggled with weakness and limitations.  pt was minimally unsteady upon standing but was able to self correct.   pt was challenged today to ambulate long distance but tolerance was improved, cues given at times for posture and hip flexion and to deep plb

## 2022-08-16 LAB
ANION GAP SERPL CALCULATED.3IONS-SCNC: 14 MMOL/L (ref 5–15)
BUN SERPL-MCNC: 21 MG/DL (ref 8–23)
BUN/CREAT SERPL: 18.1 (ref 7–25)
CALCIUM SPEC-SCNC: 9.9 MG/DL (ref 8.6–10.5)
CHLORIDE SERPL-SCNC: 102 MMOL/L (ref 98–107)
CO2 SERPL-SCNC: 26 MMOL/L (ref 22–29)
CREAT SERPL-MCNC: 1.16 MG/DL (ref 0.57–1)
EGFRCR SERPLBLD CKD-EPI 2021: 52.8 ML/MIN/1.73
GLUCOSE SERPL-MCNC: 80 MG/DL (ref 65–99)
POTASSIUM SERPL-SCNC: 3.5 MMOL/L (ref 3.5–5.2)
SODIUM SERPL-SCNC: 142 MMOL/L (ref 136–145)

## 2022-08-17 ENCOUNTER — HOME CARE VISIT (OUTPATIENT)
Dept: HOME HEALTH SERVICES | Facility: HOME HEALTHCARE | Age: 62
End: 2022-08-17

## 2022-08-17 PROCEDURE — G0157 HHC PT ASSISTANT EA 15: HCPCS

## 2022-08-18 VITALS
SYSTOLIC BLOOD PRESSURE: 132 MMHG | DIASTOLIC BLOOD PRESSURE: 80 MMHG | TEMPERATURE: 97.7 F | HEART RATE: 63 BPM | OXYGEN SATURATION: 99 %

## 2022-08-19 ENCOUNTER — HOME CARE VISIT (OUTPATIENT)
Dept: HOME HEALTH SERVICES | Facility: HOME HEALTHCARE | Age: 62
End: 2022-08-19

## 2022-08-19 PROCEDURE — G0152 HHCP-SERV OF OT,EA 15 MIN: HCPCS

## 2022-08-20 VITALS
SYSTOLIC BLOOD PRESSURE: 110 MMHG | TEMPERATURE: 97.8 F | HEART RATE: 64 BPM | DIASTOLIC BLOOD PRESSURE: 50 MMHG | OXYGEN SATURATION: 93 %

## 2022-08-20 RX ORDER — ARIPIPRAZOLE 10 MG/1
10 TABLET ORAL DAILY
Qty: 30 TABLET | Refills: 3 | Status: SHIPPED | OUTPATIENT
Start: 2022-08-20 | End: 2022-09-07 | Stop reason: SDUPTHER

## 2022-08-20 NOTE — HOME HEALTH
"Pt answered the door without AD for fx mob. Pt reports she is out of ABilify medication and Vit D  and needs to pick them up tomorrow.  No falls reported.  Pt reports she still shakes when she she walks and feels like her knees may give out. Pt reports she has fallen \"due to knees giving out\" 5-6 x in the last year, two outside, luckily no injuries.She reports the last two yeras been taking more prescribtions and wonders if they are causes the shakes or tremors.All medicaitons reviewed and updates made.     Pt tolerated well with improved 02 satuartion with BUE's ex's, [employer] and [facility] for shower     Next visit review ex's and PLB and self monitoring of 02 levels with kitchen mobility using stool with back  as needed to avoid BLE fatigues"

## 2022-08-22 RX ORDER — LISINOPRIL 5 MG/1
5 TABLET ORAL 2 TIMES DAILY
Qty: 180 TABLET | Refills: 2 | Status: SHIPPED | OUTPATIENT
Start: 2022-08-22

## 2022-08-22 RX ORDER — ERGOCALCIFEROL 1.25 MG/1
50000 CAPSULE ORAL WEEKLY
Qty: 15 CAPSULE | Refills: 3 | Status: SHIPPED | OUTPATIENT
Start: 2022-08-22

## 2022-08-23 ENCOUNTER — OFFICE VISIT (OUTPATIENT)
Dept: FAMILY MEDICINE CLINIC | Facility: CLINIC | Age: 62
End: 2022-08-23

## 2022-08-23 VITALS
OXYGEN SATURATION: 94 % | TEMPERATURE: 98.2 F | HEART RATE: 60 BPM | HEIGHT: 63 IN | DIASTOLIC BLOOD PRESSURE: 76 MMHG | WEIGHT: 194.6 LBS | SYSTOLIC BLOOD PRESSURE: 112 MMHG | BODY MASS INDEX: 34.48 KG/M2

## 2022-08-23 DIAGNOSIS — N18.31 STAGE 3A CHRONIC KIDNEY DISEASE: Primary | ICD-10-CM

## 2022-08-23 DIAGNOSIS — R25.1 TREMOR: ICD-10-CM

## 2022-08-23 DIAGNOSIS — M62.838 MUSCLE SPASM: ICD-10-CM

## 2022-08-23 DIAGNOSIS — J44.9 CHRONIC OBSTRUCTIVE PULMONARY DISEASE, UNSPECIFIED COPD TYPE: ICD-10-CM

## 2022-08-23 PROCEDURE — 99214 OFFICE O/P EST MOD 30 MIN: CPT | Performed by: NURSE PRACTITIONER

## 2022-08-23 NOTE — PROGRESS NOTES
Chief Complaint  Chief Complaint   Patient presents with   • Tremors   • Spasms   • COPD   • Follow-up     4 wk   • Results     Bmp results           Subjective          Jasmine Cerda presents to [facility] for   History of Present Illness     Patient presents today to follow-up on tremor and muscle spasms, started on Sinemet-tremor has improved but still present slightly.  She has been referred to neurology and physical therapy, CT of the brain was normal.  She does report improvement of muscle spasms, body aches with physical therapy    COPD, with recent exacerbation, patient is on Breztri inhaler.  Completed cefdinir, denies shortness of air, cough and wheeze.    CKD, taking bumex now once daily and no potassium, low-dose lisinoprilshe continues       The following portions of the patient's history were reviewed and updated as appropriate: allergies, current medications, past family history, past medical history, past social history, past surgical history and problem list.    Past Medical History:   Diagnosis Date   • Anxiety    • Breast cyst    • CHF (congestive heart failure) (HCC)    • COPD (chronic obstructive pulmonary disease) (HCC)    • Coronary heart disease    • Depression    • Hyperlipidemia    • Hypertension      Past Surgical History:   Procedure Laterality Date   • APPENDECTOMY     • BREAST CYST ASPIRATION     • CARDIAC CATHETERIZATION  2017    No Stents placed - [facility]   • CERVICAL FUSION      C6-C7   •  SECTION      x 2   • CHOLECYSTECTOMY     • HYSTERECTOMY      partial     Family History   Problem Relation Age of Onset   • Colon cancer Mother    • Diabetes Father    • Pulmonary embolism Brother    • Heart failure Brother    • Breast cancer Maternal Aunt      Social History     Tobacco Use   • Smoking status: Never Smoker   • Smokeless tobacco: Never Used   • Tobacco comment: Passive Smoke: N   Substance Use Topics   • Alcohol use: No       Current  Outpatient Medications:   •  albuterol (PROVENTIL) (2.5 MG/3ML) 0.083% nebulizer solution, INHALE 1 VIAL VIA NEBULIZER DAILY AS NEEDED FOR WHEEZING, Disp: 300 mL, Rfl: 1  •  ALPRAZolam (XANAX) 0.5 MG tablet, Take 1 tablet by mouth 3 (Three) Times a Day As Needed for Anxiety., Disp: 90 tablet, Rfl: 3  •  ARIPiprazole (ABILIFY) 10 MG tablet, Take 1 tablet by mouth Daily. OUt of medication, will pic up tomorrow, Disp: 30 tablet, Rfl: 3  •  aspirin 81 MG EC tablet, ASPIRIN EC 81 MG TBEC daily, Disp: , Rfl:   •  Budeson-Glycopyrrol-Formoterol (Breztri Aerosphere) 160-9-4.8 MCG/ACT aerosol inhaler, Inhale 2 puffs 2 (Two) Times a Day., Disp: 1 each, Rfl: 11  •  budesonide-formoterol (SYMBICORT) 160-4.5 MCG/ACT inhaler, Inhale 2 puffs 2 (Two) Times a Day., Disp: , Rfl:   •  bumetanide (BUMEX) 2 MG tablet, TAKE 1 TABLET TWICE DAILY (NEED MD APPOINTMENT FOR REFILLS), Disp: 180 tablet, Rfl: 1  •  carbidopa-levodopa (SINEMET)  MG per tablet, TAKE 1 TABLET BY MOUTH TWICE A DAY, Disp: 60 tablet, Rfl: 2  •  carvedilol (COREG) 25 MG tablet, TAKE 1 TABLET TWICE DAILY, Disp: 180 tablet, Rfl: 1  •  citalopram (CeleXA) 40 MG tablet, Take 1 tablet by mouth Every Morning., Disp: 90 tablet, Rfl: 1  •  cyanocobalamin 1000 MCG/ML injection, Inject 1 mL into the appropriate muscle as directed by prescriber Every 28 (Twenty-Eight) Days., Disp: 3 mL, Rfl: 3  •  gabapentin (NEURONTIN) 300 MG capsule, Take 1 capsule by mouth 3 (Three) Times a Day., Disp: 270 capsule, Rfl: 1  •  hydrOXYzine pamoate (VISTARIL) 25 MG capsule, TAKE 1 CAPSULE BY MOUTH 3 (THREE) TIMES A DAY AS NEEDED FOR ANXIETY., Disp: 270 capsule, Rfl: 1  •  lisinopril (PRINIVIL,ZESTRIL) 5 MG tablet, Take 1 tablet by mouth 2 (Two) Times a Day., Disp: 180 tablet, Rfl: 2  •  metoclopramide (REGLAN) 10 MG tablet, TAKE 1 TABLET BY MOUTH 3 (THREE) TIMES A DAY BEFORE MEALS., Disp: 90 tablet, Rfl: 3  •  Multiple Vitamins-Minerals (MULTIVITAMIN ADULT) tablet, Take 1 tablet by mouth  "Daily. With Omega XL, Disp: , Rfl:   •  nitroglycerin (NITROSTAT) 0.4 MG SL tablet, NITROSTAT 0.4 MG SUBL AS NEEDED CHEST PAIN, Disp: , Rfl:   •  NON FORMULARY, Take  by mouth Daily. Omega XL OTC 1 TAB, Disp: , Rfl:   •  ondansetron (ZOFRAN) 4 MG tablet, TAKE 1 TABLET BY MOUTH EVERY 8 HOURS AS NEEDED FOR NAUSEA OR VOMITING. MUST LAST 30 DAYS, Disp: 45 tablet, Rfl: 0  •  pantoprazole (PROTONIX) 40 MG EC tablet, Take 1 tablet by mouth Daily., Disp: 90 tablet, Rfl: 3  •  rosuvastatin (CRESTOR) 40 MG tablet, TAKE 1 TABLET EVERY EVENING, Disp: 90 tablet, Rfl: 1  •  Syringe/Needle, Disp, 23G X 1\" 3 ML misc, Use to inject B12 every 30 days intramuscularly, Disp: 12 each, Rfl: 0  •  tiotropium bromide monohydrate (Spiriva Respimat) 2.5 MCG/ACT aerosol solution inhaler, Inhale 1 puff Daily., Disp: 1 each, Rfl: 11  •  vitamin D (ERGOCALCIFEROL) 1.25 MG (70715 UT) capsule capsule, Take 1 capsule by mouth 1 (One) Time Per Week. Pt is out of this, picking it up tomorrow, Disp: 15 capsule, Rfl: 3    Objective   Vital Signs:   /76 (BP Location: Left arm, Patient Position: Sitting, Cuff Size: Adult)   Pulse 60   Temp 98.2 °F (36.8 °C) (Skin)   Ht 160 cm (62.99\")   Wt 88.3 kg (194 lb 9.6 oz)   SpO2 94%   BMI 34.48 kg/m²           Physical Exam  Vitals and nursing note reviewed.   Constitutional:       General: She is not in acute distress.     Appearance: She is well-developed. She is not diaphoretic.   Neck:      Thyroid: No thyromegaly.      Vascular: No JVD.   Pulmonary:      Effort: Pulmonary effort is normal.   Musculoskeletal:         General: No tenderness. Normal range of motion.   Skin:     General: Skin is warm and dry.   Neurological:      Mental Status: She is alert and oriented to person, place, and time.      Sensory: No sensory deficit.      Motor: Tremor (Mild BUE, improved from previous) present.   Psychiatric:         Behavior: Behavior normal.         Thought Content: Thought content normal.         " Judgment: Judgment normal.          Result Review :     No visits with results within 7 Day(s) from this visit.   Latest known visit with results is:   Lab on 08/15/2022   Component Date Value Ref Range Status   • Glucose 08/15/2022 80  65 - 99 mg/dL Final   • BUN 08/15/2022 21  8 - 23 mg/dL Final   • Creatinine 08/15/2022 1.16 (A) 0.57 - 1.00 mg/dL Final   • Sodium 08/15/2022 142  136 - 145 mmol/L Final   • Potassium 08/15/2022 3.5  3.5 - 5.2 mmol/L Final   • Chloride 08/15/2022 102  98 - 107 mmol/L Final   • CO2 08/15/2022 26.0  22.0 - 29.0 mmol/L Final   • Calcium 08/15/2022 9.9  8.6 - 10.5 mg/dL Final   • BUN/Creatinine Ratio 08/15/2022 18.1  7.0 - 25.0 Final   • Anion Gap 08/15/2022 14.0  5.0 - 15.0 mmol/L Final   • eGFR 08/15/2022 52.8 (A) >60.0 mL/min/1.73 Final    National Kidney Foundation and American Society of Nephrology (ASN) Task Force recommended calculation based on the Chronic Kidney Disease Epidemiology Collaboration (CKD-EPI) equation refit without adjustment for race.                             Assessment and Plan    Diagnoses and all orders for this visit:    1. Stage 3a chronic kidney disease (HCC) (Primary)  Comments:  RFT improved, cont bumex, no k at this time, continue low dose lisinopril.  Recheck BMP in 4 weeks      2. Tremor  Comments:  CT of the brain normal.  Tremor improved, continue sinemet same, consider titration in future if needed.  Continue PT/OT, see neurology when scheduled    3. Chronic obstructive pulmonary disease, unspecified COPD type (HCC)  Comments:  Exacerbation resolved, continue Breztri inhaler    4. Muscle spasm  Comments:  Symptoms improving, continue PT/OT        I spent 30 minutes caring for Jasmine Cerda on this date of service. This time includes time spent by me in the following activities: preparing for the visit, reviewing tests, performing a medically appropriate examination and/or evaluation , counseling and educating the patient/family/caregiver,  ordering medications, tests, or procedures and documenting information in the medical record        Follow Up     Return if symptoms worsen or fail to improve, for Next scheduled follow up. BMP in 4 weeks. .  Patient was given instructions and counseling regarding her condition or for health maintenance advice. Please see specific information pulled into the AVS if appropriate.        Part of this note may be an electronic transcription/translation of spoken language to printed text using the Dragon Dictation System

## 2022-08-24 ENCOUNTER — HOME CARE VISIT (OUTPATIENT)
Dept: HOME HEALTH SERVICES | Facility: HOME HEALTHCARE | Age: 62
End: 2022-08-24

## 2022-08-24 PROCEDURE — G0157 HHC PT ASSISTANT EA 15: HCPCS

## 2022-08-25 VITALS — OXYGEN SATURATION: 95 % | RESPIRATION RATE: 14 BRPM | HEART RATE: 82 BPM

## 2022-08-25 NOTE — HOME HEALTH
pt states she feels well, improving and slowly returning to plf.  pt is motivated to participate ands has been attempting hep review as she can tolerate.   no med changes-visually assessed.   pt reporting no falls and no new complications     pt was challenged today to ambulate long distance with continued deficits in strength and endurance.      pt was challenged today to perform ther ex with cues to fully contract/hold at end rom.  pts work/rest was 60/40 with cues needed and given to properly pace activities and to deep plb for energy conservation.  pt was minimally unsteady upon standing but was mostly able to self correct.     pt admitted she was fatigued to end PT session but was pleased to have participated

## 2022-08-26 ENCOUNTER — HOME CARE VISIT (OUTPATIENT)
Dept: HOME HEALTH SERVICES | Facility: HOME HEALTHCARE | Age: 62
End: 2022-08-26

## 2022-08-26 PROCEDURE — G0152 HHCP-SERV OF OT,EA 15 MIN: HCPCS

## 2022-08-28 VITALS — OXYGEN SATURATION: 92 % | HEART RATE: 74 BPM

## 2022-08-29 DIAGNOSIS — F43.12 CHRONIC POST-TRAUMATIC STRESS DISORDER (PTSD): Chronic | ICD-10-CM

## 2022-08-29 NOTE — HOME HEALTH
Pt requesting dc from OT this date as pt has met all goals.  Pt and therapist in agreement with dc.        OT reviewed meds with no changes and pt denies any falls

## 2022-08-31 ENCOUNTER — HOME CARE VISIT (OUTPATIENT)
Dept: HOME HEALTH SERVICES | Facility: HOME HEALTHCARE | Age: 62
End: 2022-08-31

## 2022-08-31 VITALS
OXYGEN SATURATION: 97 % | TEMPERATURE: 96.7 F | SYSTOLIC BLOOD PRESSURE: 114 MMHG | DIASTOLIC BLOOD PRESSURE: 70 MMHG | RESPIRATION RATE: 18 BRPM | HEART RATE: 77 BPM

## 2022-08-31 PROCEDURE — G0151 HHCP-SERV OF PT,EA 15 MIN: HCPCS

## 2022-08-31 RX ORDER — ONDANSETRON 4 MG/1
TABLET, FILM COATED ORAL
Qty: 45 TABLET | Refills: 0 | Status: SHIPPED | OUTPATIENT
Start: 2022-08-31 | End: 2022-09-14

## 2022-08-31 NOTE — HOME HEALTH
PT Discharge Summary: The patient is a 62 year old female admitted to home health services by PT on 8/4/2022 after being referred for home health PT and OT from PCP related to recent COPD exacerbation.     The patient accepted the interventions of strengthening, transfer training, balance and gait training with focus of care COPD.    The patient made very good progress meeting all established goals.    Patient discharged from home health services by PT after session this day (8/31/2022) per patient request and due to all goals met.    Session Notes: The patient asserts that she as return to her prior level and no longer requires home health. She does consent to PT assessment this day followed by discharge.    Returns to [facility] on 10/17/2022.  Returns to Behavioral Therapist on 9/7/2022.

## 2022-09-01 RX ORDER — ALPRAZOLAM 0.5 MG/1
TABLET ORAL
Qty: 90 TABLET | Refills: 0 | Status: SHIPPED | OUTPATIENT
Start: 2022-09-01 | End: 2022-09-07 | Stop reason: SDUPTHER

## 2022-09-02 DIAGNOSIS — J44.9 CHRONIC OBSTRUCTIVE PULMONARY DISEASE, UNSPECIFIED COPD TYPE: ICD-10-CM

## 2022-09-02 RX ORDER — RISPERIDONE 1 MG/1
TABLET ORAL
Qty: 90 TABLET | Refills: 1 | Status: SHIPPED | OUTPATIENT
Start: 2022-09-02 | End: 2022-09-07 | Stop reason: SINTOL

## 2022-09-07 ENCOUNTER — OFFICE VISIT (OUTPATIENT)
Dept: PSYCHIATRY | Facility: CLINIC | Age: 62
End: 2022-09-07

## 2022-09-07 DIAGNOSIS — F51.04 PSYCHOPHYSIOLOGICAL INSOMNIA: Chronic | ICD-10-CM

## 2022-09-07 DIAGNOSIS — F43.12 CHRONIC POST-TRAUMATIC STRESS DISORDER (PTSD): Chronic | ICD-10-CM

## 2022-09-07 DIAGNOSIS — F33.2 SEVERE EPISODE OF RECURRENT MAJOR DEPRESSIVE DISORDER, WITHOUT PSYCHOTIC FEATURES: Primary | Chronic | ICD-10-CM

## 2022-09-07 PROCEDURE — 99214 OFFICE O/P EST MOD 30 MIN: CPT | Performed by: PSYCHIATRY & NEUROLOGY

## 2022-09-07 RX ORDER — GABAPENTIN 300 MG/1
300 CAPSULE ORAL 3 TIMES DAILY
Qty: 270 CAPSULE | Refills: 1 | Status: SHIPPED | OUTPATIENT
Start: 2022-09-07 | End: 2023-01-05 | Stop reason: SDUPTHER

## 2022-09-07 RX ORDER — ALPRAZOLAM 0.5 MG/1
0.5 TABLET ORAL 3 TIMES DAILY PRN
Qty: 90 TABLET | Refills: 3 | Status: SHIPPED | OUTPATIENT
Start: 2022-09-07 | End: 2023-01-05 | Stop reason: SDUPTHER

## 2022-09-07 RX ORDER — CITALOPRAM 40 MG/1
40 TABLET ORAL EVERY MORNING
Qty: 90 TABLET | Refills: 1 | Status: SHIPPED | OUTPATIENT
Start: 2022-09-07 | End: 2022-09-27

## 2022-09-07 RX ORDER — ARIPIPRAZOLE 10 MG/1
10 TABLET ORAL DAILY
Qty: 30 TABLET | Refills: 3 | Status: SHIPPED | OUTPATIENT
Start: 2022-09-07 | End: 2023-01-05 | Stop reason: SDUPTHER

## 2022-09-07 NOTE — PROGRESS NOTES
Subjective   Jasmine Cerda is a 62 y.o. female who presents today for follow up     Chief Complaint:   Depression     History of Present Illness:   The pt suffers from depression for a long time, was on multiple meds    Today the pt reported feeling better, her son got  and she knows now that he is not alone and there is someone who cares about him  Depression is rated as 7/10, enjoys time spending with grand daughter   When depressed -  decreased E level. Poor motivations, low drives   denied AVH/SI/HI   anxiety is pretty intense and persistent, unable to relax,       The pt was started on carbidopa /levadopa for tremor and balance issues     Sleep - difficult to fall asleep and stay asleep     Precipitating and Ameliorating Factors: relationship problems   Alleviating factors - to spend time with her grand daughter         PAST PSYCHIATRIC HISTORY      Previous Psychiatric Diagnoses   Axis I: Anxiety/Panic Disorder     Past Hospitalizations or Residential Treatment   Locations\Providers: none      Past Outpatient Treatment   Diagnosis Treated: Anxiety/Panic Dis.  Treatment Type: Medication Management  Location: with PCP, Dr Manning      Prior Psychiatric Medications   Comments: xanax - effective      celexa- not effective   zoloft - not effective  cymbalta - not effective      Prozac - GI sxs   ambien - side effects   Risperidone - not effective   Consequences of Mental Disorder   Consequences: emotional distress     SOCIAL HISTORY     Number of children: 2  Number of grandkids: 1  Current Relationship is: supportive  Family of Origin is: supportive  Comments: Patient has never smoked.  Passive Smoke: N  Alcohol Use: N  Drug Use: N  HIV/High Risk: N        Current Living Situation   Lives with: spouse     Education   Level: high school graduate     Employment   Job Status: disabled     Hobbies and Leisure Activities   Activity Type: quiet activities     Smoking History   Smoking Hx: Never  smoker     Exercise   Exercise sessions (per wk): 1     Illicit Drug Use   Illicit Drugs used: no     FAMILY HISTORY OF MENTAL DISORDERS   fh Grandparents: Non-contributory  fh Mother: Non-contributory  fh Father: Non-contributory  fh Siblings: Non-contributory  fh Other: Non-contributory  The following portions of the patient's history were reviewed and updated as appropriate: allergies, current medications, past family history, past medical history, past social history, past surgical history and problem list.            Interval History  Some improvement        Side Effects  Tremor       Past Medical History:  Past Medical History:   Diagnosis Date   • Anxiety    • Breast cyst    • CHF (congestive heart failure) ([facility]ly [facility])    • COPD (chronic obstructive pulmonary disease) ([facility])    • Coronary heart disease    • Depression    • Hyperlipidemia    • Hypertension        Social History:  Social History     Socioeconomic History   • Marital status:    Tobacco Use   • Smoking status: Never Smoker   • Smokeless tobacco: Never Used   • Tobacco comment: Passive Smoke: N   Vaping Use   • Vaping Use: Never used   Substance and Sexual Activity   • Alcohol use: No   • Drug use: No   • Sexual activity: Defer       Family History:  Family History   Problem Relation Age of Onset   • Colon cancer Mother    • Diabetes Father    • Pulmonary embolism Brother    • Heart failure Brother    • Breast cancer Maternal Aunt        Past Surgical History:  Past Surgical History:   Procedure Laterality Date   • APPENDECTOMY     • BREAST CYST ASPIRATION     • CARDIAC CATHETERIZATION  2017    No Stents placed - [facility]   • CERVICAL FUSION      C6-C7   •  SECTION      x 2   • CHOLECYSTECTOMY     • HYSTERECTOMY      partial       Problem List:  Patient Active Problem List   Diagnosis   • Chronic post-traumatic stress disorder (PTSD)   • Severe episode of recurrent major depressive disorder (HCC)   • Asthma   • Chronic low back pain   •  Congestive heart failure (HCC)   • Coronary heart disease   • Degeneration of intervertebral disc of lumbosacral region   • Headache, temporal   • Psychophysiological insomnia   • Hyperlipidemia   • Hypertension   • Vitamin D deficiency   • Other fatigue   • Preventative health care   • Hyperglycemia, unspecified    • Nausea   • Stable angina pectoris (HCC)       Allergy:   No Known Allergies     Discontinued Medications:  Medications Discontinued During This Encounter   Medication Reason   • risperiDONE (risperDAL) 1 MG tablet Side effects   • gabapentin (NEURONTIN) 300 MG capsule Reorder   • citalopram (CeleXA) 40 MG tablet Reorder   • ARIPiprazole (ABILIFY) 10 MG tablet Reorder   • ALPRAZolam (XANAX) 0.5 MG tablet Reorder       Current Medications:   Current Outpatient Medications   Medication Sig Dispense Refill   • ALPRAZolam (XANAX) 0.5 MG tablet Take 1 tablet by mouth 3 (Three) Times a Day As Needed for Anxiety. 90 tablet 3   • ARIPiprazole (ABILIFY) 10 MG tablet Take 1 tablet by mouth Daily. OUt of medication, will pic up tomorrow 30 tablet 3   • citalopram (CeleXA) 40 MG tablet Take 1 tablet by mouth Every Morning. 90 tablet 1   • gabapentin (NEURONTIN) 300 MG capsule Take 1 capsule by mouth 3 (Three) Times a Day. 270 capsule 1   • albuterol (PROVENTIL) (2.5 MG/3ML) 0.083% nebulizer solution INHALE 1 VIAL VIA NEBULIZER DAILY AS NEEDED FOR WHEEZING 300 mL 1   • aspirin 81 MG EC tablet ASPIRIN EC 81 MG TBEC daily     • Budeson-Glycopyrrol-Formoterol (Breztri Aerosphere) 160-9-4.8 MCG/ACT aerosol inhaler Inhale 2 puffs 2 (Two) Times a Day. 1 each 11   • budesonide-formoterol (SYMBICORT) 160-4.5 MCG/ACT inhaler Inhale 2 puffs 2 (Two) Times a Day.     • bumetanide (BUMEX) 2 MG tablet TAKE 1 TABLET TWICE DAILY (NEED MD APPOINTMENT FOR REFILLS) 180 tablet 1   • carbidopa-levodopa (SINEMET)  MG per tablet TAKE 1 TABLET BY MOUTH TWICE A DAY 60 tablet 2   • carvedilol (COREG) 25 MG tablet TAKE 1 TABLET TWICE  "DAILY 180 tablet 1   • cyanocobalamin 1000 MCG/ML injection Inject 1 mL into the appropriate muscle as directed by prescriber Every 28 (Twenty-Eight) Days. 3 mL 3   • hydrOXYzine pamoate (VISTARIL) 25 MG capsule TAKE 1 CAPSULE BY MOUTH 3 (THREE) TIMES A DAY AS NEEDED FOR ANXIETY. 270 capsule 1   • lisinopril (PRINIVIL,ZESTRIL) 5 MG tablet Take 1 tablet by mouth 2 (Two) Times a Day. 180 tablet 2   • metoclopramide (REGLAN) 10 MG tablet TAKE 1 TABLET BY MOUTH 3 (THREE) TIMES A DAY BEFORE MEALS. 90 tablet 3   • Multiple Vitamins-Minerals (MULTIVITAMIN ADULT) tablet Take 1 tablet by mouth Daily. With Omega XL     • nitroglycerin (NITROSTAT) 0.4 MG SL tablet NITROSTAT 0.4 MG SUBL AS NEEDED CHEST PAIN     • NON FORMULARY Take  by mouth Daily. Omega XL OTC 1 TAB     • ondansetron (ZOFRAN) 4 MG tablet TAKE 1 TABLET BY MOUTH EVERY 8 HOURS AS NEEDED FOR NAUSEA OR VOMITING. MUST LAST 30 DAYS 45 tablet 0   • pantoprazole (PROTONIX) 40 MG EC tablet Take 1 tablet by mouth Daily. 90 tablet 3   • rosuvastatin (CRESTOR) 40 MG tablet TAKE 1 TABLET EVERY EVENING 90 tablet 1   • Syringe/Needle, Disp, 23G X 1\" 3 ML misc Use to inject B12 every 30 days intramuscularly 12 each 0   • tiotropium bromide monohydrate (Spiriva Respimat) 2.5 MCG/ACT aerosol solution inhaler Inhale 1 puff Daily. 1 each 11   • vitamin D (ERGOCALCIFEROL) 1.25 MG (79193 UT) capsule capsule Take 1 capsule by mouth 1 (One) Time Per Week. Pt is out of this, picking it up tomorrow 15 capsule 3     No current facility-administered medications for this visit.         Review of Symptoms:    Psychiatric/Behavioral: Negative for agitation, behavioral problems, confusion, decreased concentration, dysphoric mood, hallucinations, self-injury, sleep disturbance and suicidal ideas.   The patient is  more  depressed,  Still very  nervous/anxious and is not hyperactive.        Physical Exam:   There were no vitals taken for this visit.    Mental Status Exam:   Hygiene:   " good  Cooperation:  Cooperative  Eye Contact:  Good  Psychomotor Behavior:  Appropriate  Affect:  Full range and Appropriate  Mood: depressed,  Anxious    Hopelessness: Denies  Speech:  Normal  Thought Process:  Goal directed and Linear  Thought Content:  Normal  Suicidal:  None  Homicidal:  None  Hallucinations:  None  Delusion:  None  Memory:  Intact  Orientation:  Person, Place, Time and Situation  Reliability:  fair  Insight:  Good  Judgement:  Good  Impulse Control:  Good  Physical/Medical Issues:  Yes GI issues        MSE from 5/4/2022  reviewed and accepted without changes     PHQ-9 Depression Screening  Little interest or pleasure in doing things? 2-->more than half the days   Feeling down, depressed, or hopeless? 2-->more than half the days   Trouble falling or staying asleep, or sleeping too much? 0-->not at all   Feeling tired or having little energy? 2-->more than half the days   Poor appetite or overeating? 1-->several days   Feeling bad about yourself - or that you are a failure or have let yourself or your family down? 2-->more than half the days   Trouble concentrating on things, such as reading the newspaper or watching television? 1-->several days   Moving or speaking so slowly that other people could have noticed? Or the opposite - being so fidgety or restless that you have been moving around a lot more than usual? 2-->more than half the days   Thoughts that you would be better off dead, or of hurting yourself in some way? 0-->not at all   PHQ-9 Total Score 12   If you checked off any problems, how difficult have these problems made it for you to do your work, take care of things at home, or get along with other people? very difficult       Never smoker    I advised Jasmine of the risks of tobacco use.     Lab Results:   Lab on 08/15/2022   Component Date Value Ref Range Status   • Glucose 08/15/2022 80  65 - 99 mg/dL Final   • BUN 08/15/2022 21  8 - 23 mg/dL Final   • Creatinine 08/15/2022 1.16 (A)  0.57 - 1.00 mg/dL Final   • Sodium 08/15/2022 142  136 - 145 mmol/L Final   • Potassium 08/15/2022 3.5  3.5 - 5.2 mmol/L Final   • Chloride 08/15/2022 102  98 - 107 mmol/L Final   • CO2 08/15/2022 26.0  22.0 - 29.0 mmol/L Final   • Calcium 08/15/2022 9.9  8.6 - 10.5 mg/dL Final   • BUN/Creatinine Ratio 08/15/2022 18.1  7.0 - 25.0 Final   • Anion Gap 08/15/2022 14.0  5.0 - 15.0 mmol/L Final   • eGFR 08/15/2022 52.8 (A) >60.0 mL/min/1.73 Final    National Kidney Foundation and American Society of Nephrology (ASN) Task Force recommended calculation based on the Chronic Kidney Disease Epidemiology Collaboration (CKD-EPI) equation refit without adjustment for race.   Office Visit on 07/25/2022   Component Date Value Ref Range Status   • Glucose 07/25/2022 85  65 - 99 mg/dL Final   • BUN 07/25/2022 30 (A) 8 - 23 mg/dL Final   • Creatinine 07/25/2022 1.86 (A) 0.57 - 1.00 mg/dL Final   • Sodium 07/25/2022 142  136 - 145 mmol/L Final   • Potassium 07/25/2022 4.1  3.5 - 5.2 mmol/L Final   • Chloride 07/25/2022 105  98 - 107 mmol/L Final   • CO2 07/25/2022 25.5  22.0 - 29.0 mmol/L Final   • Calcium 07/25/2022 10.8 (A) 8.6 - 10.5 mg/dL Final   • BUN/Creatinine Ratio 07/25/2022 16.1  7.0 - 25.0 Final   • Anion Gap 07/25/2022 11.5  5.0 - 15.0 mmol/L Final   • eGFR 07/25/2022 29.9 (A) >60.0 mL/min/1.73 Final    National Kidney Foundation and American Society of Nephrology (ASN) Task Force recommended calculation based on the Chronic Kidney Disease Epidemiology Collaboration (CKD-EPI) equation refit without adjustment for race.   • SARS Antigen 07/25/2022 Not Detected  Not Detected, Presumptive Negative Final   • Internal Control 07/25/2022 Passed  Passed Final   • Lot Number 07/25/2022 [id number]   Final   • Expiration Date 07/25/2022 [id number]   Final   Lab on 07/11/2022   Component Date Value Ref Range Status   • Glucose 07/11/2022 94  65 - 99 mg/dL Final   • BUN 07/11/2022 31 (A) 8 - 23 mg/dL Final   • Creatinine 07/11/2022 1.80  (A) 0.57 - 1.00 mg/dL Final   • Sodium 07/11/2022 137  136 - 145 mmol/L Final   • Potassium 07/11/2022 4.7  3.5 - 5.2 mmol/L Final   • Chloride 07/11/2022 100  98 - 107 mmol/L Final   • CO2 07/11/2022 25.9  22.0 - 29.0 mmol/L Final   • Calcium 07/11/2022 10.3  8.6 - 10.5 mg/dL Final   • BUN/Creatinine Ratio 07/11/2022 17.2  7.0 - 25.0 Final   • Anion Gap 07/11/2022 11.1  5.0 - 15.0 mmol/L Final   • eGFR 07/11/2022 31.1 (A) >60.0 mL/min/1.73 Final    National Kidney Foundation and American Society of Nephrology (ASN) Task Force recommended calculation based on the Chronic Kidney Disease Epidemiology Collaboration (CKD-EPI) equation refit without adjustment for race.   Office Visit on 07/06/2022   Component Date Value Ref Range Status   • Glucose 07/06/2022 106 (A) 65 - 99 mg/dL Final   • BUN 07/06/2022 37 (A) 8 - 23 mg/dL Final   • Creatinine 07/06/2022 2.50 (A) 0.57 - 1.00 mg/dL Final   • Sodium 07/06/2022 137  136 - 145 mmol/L Final   • Potassium 07/06/2022 5.6 (A) 3.5 - 5.2 mmol/L Final   • Chloride 07/06/2022 99  98 - 107 mmol/L Final   • CO2 07/06/2022 26.0  22.0 - 29.0 mmol/L Final   • Calcium 07/06/2022 11.1 (A) 8.6 - 10.5 mg/dL Final   • Total Protein 07/06/2022 7.3  6.0 - 8.5 g/dL Final   • Albumin 07/06/2022 4.80  3.50 - 5.20 g/dL Final   • ALT (SGPT) 07/06/2022 14  1 - 33 U/L Final   • AST (SGOT) 07/06/2022 14  1 - 32 U/L Final   • Alkaline Phosphatase 07/06/2022 54  39 - 117 U/L Final   • Total Bilirubin 07/06/2022 0.3  0.0 - 1.2 mg/dL Final   • Globulin 07/06/2022 2.5  gm/dL Final   • A/G Ratio 07/06/2022 1.9  g/dL Final   • BUN/Creatinine Ratio 07/06/2022 14.8  7.0 - 25.0 Final   • Anion Gap 07/06/2022 12.0  5.0 - 15.0 mmol/L Final   • eGFR 07/06/2022 21.0 (A) >60.0 mL/min/1.73 Final    National Kidney Foundation and American Society of Nephrology (ASN) Task Force recommended calculation based on the Chronic Kidney Disease Epidemiology Collaboration (CKD-EPI) equation refit without adjustment for race.    • WBC 07/06/2022 8.16  3.40 - 10.80 10*3/mm3 Final   • RBC 07/06/2022 4.69  3.77 - 5.28 10*6/mm3 Final   • Hemoglobin 07/06/2022 13.6  12.0 - 15.9 g/dL Final   • Hematocrit 07/06/2022 40.6  34.0 - 46.6 % Final   • MCV 07/06/2022 86.6  79.0 - 97.0 fL Final   • MCH 07/06/2022 29.0  26.6 - 33.0 pg Final   • MCHC 07/06/2022 33.5  31.5 - 35.7 g/dL Final   • RDW 07/06/2022 13.0  12.3 - 15.4 % Final   • RDW-SD 07/06/2022 39.9  37.0 - 54.0 fl Final   • MPV 07/06/2022 10.1  6.0 - 12.0 fL Final   • Platelets 07/06/2022 236  140 - 450 10*3/mm3 Final   • proBNP 07/06/2022 37.4  0.0 - 900.0 pg/mL Final   • Magnesium 07/06/2022 2.5 (A) 1.6 - 2.4 mg/dL Final       Assessment & Plan   Problems Addressed this Visit        Mental Health    Chronic post-traumatic stress disorder (PTSD) (Chronic)    Relevant Medications    citalopram (CeleXA) 40 MG tablet    ARIPiprazole (ABILIFY) 10 MG tablet    gabapentin (NEURONTIN) 300 MG capsule    ALPRAZolam (XANAX) 0.5 MG tablet    Severe episode of recurrent major depressive disorder (HCC) - Primary (Chronic)    Relevant Medications    citalopram (CeleXA) 40 MG tablet    ARIPiprazole (ABILIFY) 10 MG tablet    ALPRAZolam (XANAX) 0.5 MG tablet       Sleep    Psychophysiological insomnia (Chronic)      Diagnoses       Codes Comments    Severe episode of recurrent major depressive disorder, without psychotic features (HCC)    -  Primary ICD-10-CM: F33.2  ICD-9-CM: 296.33     Chronic post-traumatic stress disorder (PTSD)     ICD-10-CM: F43.12  ICD-9-CM: 309.81     Psychophysiological insomnia     ICD-10-CM: F51.04  ICD-9-CM: 307.42           Visit Diagnoses:    ICD-10-CM ICD-9-CM   1. Severe episode of recurrent major depressive disorder, without psychotic features (HCC)  F33.2 296.33   2. Chronic post-traumatic stress disorder (PTSD)  F43.12 309.81   3. Psychophysiological insomnia  F51.04 307.42       TREATMENT PLAN/GOALS: Continue supportive psychotherapy efforts and medications as indicated.  Treatment and medication options discussed during today's visit. Patient ackowledged and verbally consented to continue with current treatment plan and was educated on the importance of compliance with treatment and follow-up appointments.    MEDICATION ISSUES:  1. MDD - cont celexa 40 mg ,   Cont  abilify    10 mg, d/c risperidone - not effective  And has side effects   2. PTSD - cont celexa 40 mg , Xanax - low dose 0.5 mg TID, alternating with vistaril PRN , long term benzo use discussed again    3. Insomnia -  Cont  trazodone  100 mg  PRN     4. Long term therapeutic drug monitoring - UDS  8/31/2021 - consistent    Psychotherapy was recommended , the is reluctant to start now,   cont supportive therapy,     INSPECT reviewed as expected, last xanax refill was on 9/1/2022     Patient screened positive for depression based on a PHQ-9 score of 12 on 9/7/2022. Follow-up recommendations include: Prescribed antidepressant medication treatment.  PHQ scored 12 and indicated moderate depression   DWIGHT 7 scored 9      Discussed medication options and treatment plan of prescribed medication as well as the risks, benefits, and side effects including potential falls, possible impaired driving and metabolic adversities among others. Patient is agreeable to call the office with any worsening of symptoms or onset of side effects. Patient is agreeable to call 911 or go to the nearest ER should he/she begin having SI/HI. No medication side effects or related complaints today.     MEDS ORDERED DURING VISIT:  New Medications Ordered This Visit   Medications   • citalopram (CeleXA) 40 MG tablet     Sig: Take 1 tablet by mouth Every Morning.     Dispense:  90 tablet     Refill:  1   • ARIPiprazole (ABILIFY) 10 MG tablet     Sig: Take 1 tablet by mouth Daily. OUt of medication, will pic up tomorrow     Dispense:  30 tablet     Refill:  3   • gabapentin (NEURONTIN) 300 MG capsule     Sig: Take 1 capsule by mouth 3 (Three) Times a Day.      Dispense:  270 capsule     Refill:  1   • ALPRAZolam (XANAX) 0.5 MG tablet     Sig: Take 1 tablet by mouth 3 (Three) Times a Day As Needed for Anxiety.     Dispense:  90 tablet     Refill:  3     Please dispense on or after Sept 29, 2022       Return in about 4 months (around 1/7/2023).         This document has been electronically signed by Sivan Ken MD  September 7, 2022 09:16 EDT

## 2022-09-14 DIAGNOSIS — K21.9 GASTROESOPHAGEAL REFLUX DISEASE WITHOUT ESOPHAGITIS: ICD-10-CM

## 2022-09-14 DIAGNOSIS — F51.04 PSYCHOPHYSIOLOGICAL INSOMNIA: Chronic | ICD-10-CM

## 2022-09-14 RX ORDER — METOCLOPRAMIDE 10 MG/1
10 TABLET ORAL
Qty: 90 TABLET | Refills: 3 | Status: SHIPPED | OUTPATIENT
Start: 2022-09-14

## 2022-09-14 RX ORDER — ONDANSETRON 4 MG/1
TABLET, FILM COATED ORAL
Qty: 45 TABLET | Refills: 0 | Status: SHIPPED | OUTPATIENT
Start: 2022-09-14 | End: 2022-10-17 | Stop reason: SDUPTHER

## 2022-09-14 RX ORDER — TRAZODONE HYDROCHLORIDE 100 MG/1
TABLET ORAL
Qty: 180 TABLET | Refills: 1 | Status: SHIPPED | OUTPATIENT
Start: 2022-09-14 | End: 2023-01-05 | Stop reason: SDUPTHER

## 2022-09-21 ENCOUNTER — LAB (OUTPATIENT)
Dept: FAMILY MEDICINE CLINIC | Facility: CLINIC | Age: 62
End: 2022-09-21

## 2022-09-21 DIAGNOSIS — N18.31 STAGE 3A CHRONIC KIDNEY DISEASE: Primary | ICD-10-CM

## 2022-09-21 PROCEDURE — 80048 BASIC METABOLIC PNL TOTAL CA: CPT | Performed by: NURSE PRACTITIONER

## 2022-09-21 PROCEDURE — 36415 COLL VENOUS BLD VENIPUNCTURE: CPT | Performed by: NURSE PRACTITIONER

## 2022-09-22 LAB
ANION GAP SERPL CALCULATED.3IONS-SCNC: 12 MMOL/L (ref 5–15)
BUN SERPL-MCNC: 18 MG/DL (ref 8–23)
BUN/CREAT SERPL: 13.4 (ref 7–25)
CALCIUM SPEC-SCNC: 10.5 MG/DL (ref 8.6–10.5)
CHLORIDE SERPL-SCNC: 97 MMOL/L (ref 98–107)
CO2 SERPL-SCNC: 33 MMOL/L (ref 22–29)
CREAT SERPL-MCNC: 1.34 MG/DL (ref 0.57–1)
EGFRCR SERPLBLD CKD-EPI 2021: 44.4 ML/MIN/1.73
GLUCOSE SERPL-MCNC: 91 MG/DL (ref 65–99)
POTASSIUM SERPL-SCNC: 3 MMOL/L (ref 3.5–5.2)
SODIUM SERPL-SCNC: 142 MMOL/L (ref 136–145)

## 2022-09-22 RX ORDER — POTASSIUM CHLORIDE 750 MG/1
10 TABLET, FILM COATED, EXTENDED RELEASE ORAL DAILY
Qty: 90 TABLET | Refills: 1 | Status: SHIPPED | OUTPATIENT
Start: 2022-09-22

## 2022-09-25 DIAGNOSIS — F33.2 SEVERE EPISODE OF RECURRENT MAJOR DEPRESSIVE DISORDER, WITHOUT PSYCHOTIC FEATURES: Chronic | ICD-10-CM

## 2022-09-25 DIAGNOSIS — F43.12 CHRONIC POST-TRAUMATIC STRESS DISORDER (PTSD): Chronic | ICD-10-CM

## 2022-09-27 RX ORDER — CITALOPRAM 40 MG/1
TABLET ORAL
Qty: 90 TABLET | Refills: 1 | Status: SHIPPED | OUTPATIENT
Start: 2022-09-27 | End: 2023-01-05 | Stop reason: SDUPTHER

## 2022-10-07 ENCOUNTER — CLINICAL SUPPORT (OUTPATIENT)
Dept: FAMILY MEDICINE CLINIC | Facility: CLINIC | Age: 62
End: 2022-10-07

## 2022-10-07 DIAGNOSIS — E78.2 MIXED HYPERLIPIDEMIA: Primary | ICD-10-CM

## 2022-10-07 DIAGNOSIS — E55.9 VITAMIN D DEFICIENCY: ICD-10-CM

## 2022-10-07 DIAGNOSIS — I10 PRIMARY HYPERTENSION: ICD-10-CM

## 2022-10-07 DIAGNOSIS — R53.83 OTHER FATIGUE: ICD-10-CM

## 2022-10-07 PROCEDURE — 82306 VITAMIN D 25 HYDROXY: CPT | Performed by: NURSE PRACTITIONER

## 2022-10-07 PROCEDURE — 84443 ASSAY THYROID STIM HORMONE: CPT | Performed by: NURSE PRACTITIONER

## 2022-10-07 PROCEDURE — 85027 COMPLETE CBC AUTOMATED: CPT | Performed by: NURSE PRACTITIONER

## 2022-10-07 PROCEDURE — 36415 COLL VENOUS BLD VENIPUNCTURE: CPT | Performed by: NURSE PRACTITIONER

## 2022-10-07 PROCEDURE — 82607 VITAMIN B-12: CPT | Performed by: NURSE PRACTITIONER

## 2022-10-07 PROCEDURE — 80061 LIPID PANEL: CPT | Performed by: NURSE PRACTITIONER

## 2022-10-07 PROCEDURE — 80053 COMPREHEN METABOLIC PANEL: CPT | Performed by: NURSE PRACTITIONER

## 2022-10-07 NOTE — PROGRESS NOTES
Venipuncture Blood Specimen Collection  Venipuncture performed in the right arm by Marina Rogel MA with good hemostasis. Patient tolerated the procedure well without complications.   10/07/22   Marina Rogel MA

## 2022-10-08 LAB
25(OH)D3 SERPL-MCNC: 60.5 NG/ML (ref 30–100)
ALBUMIN SERPL-MCNC: 4.6 G/DL (ref 3.5–5.2)
ALBUMIN/GLOB SERPL: 2 G/DL
ALP SERPL-CCNC: 51 U/L (ref 39–117)
ALT SERPL W P-5'-P-CCNC: 17 U/L (ref 1–33)
ANION GAP SERPL CALCULATED.3IONS-SCNC: 12.6 MMOL/L (ref 5–15)
AST SERPL-CCNC: 25 U/L (ref 1–32)
BILIRUB SERPL-MCNC: 0.4 MG/DL (ref 0–1.2)
BUN SERPL-MCNC: 15 MG/DL (ref 8–23)
BUN/CREAT SERPL: 12.4 (ref 7–25)
CALCIUM SPEC-SCNC: 9.9 MG/DL (ref 8.6–10.5)
CHLORIDE SERPL-SCNC: 102 MMOL/L (ref 98–107)
CHOLEST SERPL-MCNC: 145 MG/DL (ref 0–200)
CO2 SERPL-SCNC: 29.4 MMOL/L (ref 22–29)
CREAT SERPL-MCNC: 1.21 MG/DL (ref 0.57–1)
DEPRECATED RDW RBC AUTO: 40.1 FL (ref 37–54)
EGFRCR SERPLBLD CKD-EPI 2021: 50.2 ML/MIN/1.73
ERYTHROCYTE [DISTWIDTH] IN BLOOD BY AUTOMATED COUNT: 13.1 % (ref 12.3–15.4)
GLOBULIN UR ELPH-MCNC: 2.3 GM/DL
GLUCOSE SERPL-MCNC: 88 MG/DL (ref 65–99)
HCT VFR BLD AUTO: 37.5 % (ref 34–46.6)
HDLC SERPL-MCNC: 62 MG/DL (ref 40–60)
HGB BLD-MCNC: 13.1 G/DL (ref 12–15.9)
LDLC SERPL CALC-MCNC: 58 MG/DL (ref 0–100)
LDLC/HDLC SERPL: 0.87 {RATIO}
MCH RBC QN AUTO: 29.5 PG (ref 26.6–33)
MCHC RBC AUTO-ENTMCNC: 34.9 G/DL (ref 31.5–35.7)
MCV RBC AUTO: 84.5 FL (ref 79–97)
PLATELET # BLD AUTO: 227 10*3/MM3 (ref 140–450)
PMV BLD AUTO: 10.5 FL (ref 6–12)
POTASSIUM SERPL-SCNC: 3.5 MMOL/L (ref 3.5–5.2)
PROT SERPL-MCNC: 6.9 G/DL (ref 6–8.5)
RBC # BLD AUTO: 4.44 10*6/MM3 (ref 3.77–5.28)
SODIUM SERPL-SCNC: 144 MMOL/L (ref 136–145)
TRIGL SERPL-MCNC: 146 MG/DL (ref 0–150)
TSH SERPL DL<=0.05 MIU/L-ACNC: 2.7 UIU/ML (ref 0.27–4.2)
VIT B12 BLD-MCNC: 1337 PG/ML (ref 211–946)
VLDLC SERPL-MCNC: 25 MG/DL (ref 5–40)
WBC NRBC COR # BLD: 6.33 10*3/MM3 (ref 3.4–10.8)

## 2022-10-17 ENCOUNTER — OFFICE VISIT (OUTPATIENT)
Dept: FAMILY MEDICINE CLINIC | Facility: CLINIC | Age: 62
End: 2022-10-17

## 2022-10-17 VITALS
TEMPERATURE: 97.8 F | DIASTOLIC BLOOD PRESSURE: 72 MMHG | HEART RATE: 61 BPM | BODY MASS INDEX: 34.55 KG/M2 | SYSTOLIC BLOOD PRESSURE: 116 MMHG | OXYGEN SATURATION: 95 % | WEIGHT: 195 LBS | HEIGHT: 63 IN

## 2022-10-17 DIAGNOSIS — R25.1 TREMOR: ICD-10-CM

## 2022-10-17 DIAGNOSIS — K21.9 GASTROESOPHAGEAL REFLUX DISEASE WITHOUT ESOPHAGITIS: ICD-10-CM

## 2022-10-17 DIAGNOSIS — R26.0 ATAXIC GAIT: ICD-10-CM

## 2022-10-17 DIAGNOSIS — I10 PRIMARY HYPERTENSION: ICD-10-CM

## 2022-10-17 DIAGNOSIS — N18.31 STAGE 3A CHRONIC KIDNEY DISEASE: ICD-10-CM

## 2022-10-17 DIAGNOSIS — J44.9 CHRONIC OBSTRUCTIVE PULMONARY DISEASE, UNSPECIFIED COPD TYPE: ICD-10-CM

## 2022-10-17 DIAGNOSIS — Z00.00 MEDICARE ANNUAL WELLNESS VISIT, SUBSEQUENT: Primary | ICD-10-CM

## 2022-10-17 DIAGNOSIS — E55.9 VITAMIN D DEFICIENCY: ICD-10-CM

## 2022-10-17 PROCEDURE — 1170F FXNL STATUS ASSESSED: CPT | Performed by: NURSE PRACTITIONER

## 2022-10-17 PROCEDURE — 1159F MED LIST DOCD IN RCRD: CPT | Performed by: NURSE PRACTITIONER

## 2022-10-17 PROCEDURE — 96160 PT-FOCUSED HLTH RISK ASSMT: CPT | Performed by: NURSE PRACTITIONER

## 2022-10-17 PROCEDURE — 99214 OFFICE O/P EST MOD 30 MIN: CPT | Performed by: NURSE PRACTITIONER

## 2022-10-17 PROCEDURE — G0439 PPPS, SUBSEQ VISIT: HCPCS | Performed by: NURSE PRACTITIONER

## 2022-10-17 RX ORDER — ONDANSETRON 4 MG/1
4 TABLET, FILM COATED ORAL EVERY 8 HOURS PRN
Qty: 45 TABLET | Refills: 2 | Status: SHIPPED | OUTPATIENT
Start: 2022-10-17 | End: 2023-01-16

## 2022-10-17 RX ORDER — INFLUENZA A VIRUS A/MICHIGAN/45/2015 X-275 (H1N1) ANTIGEN (FORMALDEHYDE INACTIVATED), INFLUENZA A VIRUS A/SINGAPORE/INFIMH-16-0019/2016 IVR-186 (H3N2) ANTIGEN (FORMALDEHYDE INACTIVATED), INFLUENZA B VIRUS B/PHUKET/3073/2013 ANTIGEN (FORMALDEHYDE INACTIVATED), AND INFLUENZA B VIRUS B/MARYLAND/15/2016 BX-69A ANTIGEN (FORMALDEHYDE INACTIVATED) 15; 15; 15; 15 UG/.5ML; UG/.5ML; UG/.5ML; UG/.5ML
INJECTION, SUSPENSION INTRAMUSCULAR
COMMUNITY
Start: 2022-09-30

## 2022-10-17 RX ORDER — PANTOPRAZOLE SODIUM 40 MG/1
40 TABLET, DELAYED RELEASE ORAL DAILY
Qty: 90 TABLET | Refills: 3 | Status: SHIPPED | OUTPATIENT
Start: 2022-10-17

## 2022-10-17 RX ORDER — POTASSIUM CHLORIDE 1500 MG/1
20 TABLET, EXTENDED RELEASE ORAL DAILY
COMMUNITY
Start: 2022-09-19 | End: 2022-10-17

## 2022-10-17 NOTE — PROGRESS NOTES
"The ABCs of the Annual Wellness Visit  Subsequent Medicare Wellness Visit    Chief Complaint   Patient presents with   • Medicare Wellness-subsequent     Subjective    History of Present Illness:  Jasmine Cerda is a 62 y.o. female who presents for a Subsequent Medicare Wellness Visit and to follow-up on chronic conditions    Tremor and muscle spasms, started on Sinemet-tremor has improved but still persists. She has been referred to neurology and completed physical therapy, CT of the brain was normal.  She does report improvement of muscle spasms, body aches with physical therapy. PT completed, now doing exercises at home. Lately she c/o she \"feels wobbly,\" her feet go to fast, and feels unsteady, she fell 2mo ago.      COPD, exacerbation resolved, patient is on Breztri inhaler and albuterol as needed.  Denies shortness of air, cough and wheeze.     CKD, taking bumex now once daily with potassium, low-dose lisinopril. Has eliminated NSAIDs.     Hyperlipidemia, The patient denies muscle aches, constipation, diarrhea, GI upset, fatigue, chest pain/pressure, exercise intolerance, dyspnea, palpitations, syncope and pedal edema.      HTN, stable on meds and takes as directed, denies chest pain, headache, shortness of air, palpitations and swelling of extremities.     Follows with psychiatry for PTSD, insomnia and MDD, stable on meds.     Here to review labs       The following portions of the patient's history were reviewed and   updated as appropriate: allergies, current medications, past family history, past medical history, past social history, past surgical history and problem list.    Compared to one year ago, the patient feels her physical   health is worse.    Compared to one year ago, the patient feels her mental   health is the same.    Recent Hospitalizations:  She was not admitted to the hospital during the last year.       Current Medical Providers:  Patient Care Team:  Eleni Miranda APRN as PCP - General " (Nurse Practitioner)  Sivan Ken MD as Consulting Physician (Psychiatry)  CHASTITY Richardson DPM as Consulting Physician (Podiatry)  Cuauhtemoc Kwon MD as Consulting Physician (Cardiology)    Outpatient Medications Prior to Visit   Medication Sig Dispense Refill   • albuterol (PROVENTIL) (2.5 MG/3ML) 0.083% nebulizer solution INHALE 1 VIAL VIA NEBULIZER DAILY AS NEEDED FOR WHEEZING 300 mL 1   • ALPRAZolam (XANAX) 0.5 MG tablet Take 1 tablet by mouth 3 (Three) Times a Day As Needed for Anxiety. 90 tablet 3   • ARIPiprazole (ABILIFY) 10 MG tablet Take 1 tablet by mouth Daily. OUt of medication, will pic up tomorrow 30 tablet 3   • aspirin 81 MG EC tablet ASPIRIN EC 81 MG TBEC daily     • Budeson-Glycopyrrol-Formoterol (Breztri Aerosphere) 160-9-4.8 MCG/ACT aerosol inhaler Inhale 2 puffs 2 (Two) Times a Day. 1 each 11   • bumetanide (BUMEX) 2 MG tablet TAKE 1 TABLET TWICE DAILY (NEED MD APPOINTMENT FOR REFILLS) 180 tablet 1   • carvedilol (COREG) 25 MG tablet TAKE 1 TABLET TWICE DAILY 180 tablet 1   • citalopram (CeleXA) 40 MG tablet TAKE 1 TABLET EVERY MORNING 90 tablet 1   • cyanocobalamin 1000 MCG/ML injection Inject 1 mL into the appropriate muscle as directed by prescriber Every 28 (Twenty-Eight) Days. 3 mL 3   • gabapentin (NEURONTIN) 300 MG capsule Take 1 capsule by mouth 3 (Three) Times a Day. 270 capsule 1   • hydrOXYzine pamoate (VISTARIL) 25 MG capsule TAKE 1 CAPSULE BY MOUTH 3 (THREE) TIMES A DAY AS NEEDED FOR ANXIETY. 270 capsule 1   • lisinopril (PRINIVIL,ZESTRIL) 5 MG tablet Take 1 tablet by mouth 2 (Two) Times a Day. 180 tablet 2   • metoclopramide (REGLAN) 10 MG tablet TAKE 1 TABLET BY MOUTH 3 (THREE) TIMES A DAY BEFORE MEALS. 90 tablet 3   • Multiple Vitamins-Minerals (MULTIVITAMIN ADULT) tablet Take 1 tablet by mouth Daily. With Omega XL     • nitroglycerin (NITROSTAT) 0.4 MG SL tablet NITROSTAT 0.4 MG SUBL AS NEEDED CHEST PAIN     • potassium chloride (KLOR-CON) 10 MEQ CR tablet Take 1  "tablet by mouth Daily. 90 tablet 1   • rosuvastatin (CRESTOR) 40 MG tablet TAKE 1 TABLET EVERY EVENING 90 tablet 1   • Syringe/Needle, Disp, 23G X 1\" 3 ML misc Use to inject B12 every 30 days intramuscularly 12 each 0   • traZODone (DESYREL) 100 MG tablet TAKE 1 TO 2 TABLETS BY MOUTH AT BEDTIME 180 tablet 1   • vitamin D (ERGOCALCIFEROL) 1.25 MG (69817 UT) capsule capsule Take 1 capsule by mouth 1 (One) Time Per Week. Pt is out of this, picking it up tomorrow 15 capsule 3   • budesonide-formoterol (SYMBICORT) 160-4.5 MCG/ACT inhaler Inhale 2 puffs 2 (Two) Times a Day.     • carbidopa-levodopa (SINEMET)  MG per tablet TAKE 1 TABLET BY MOUTH TWICE A DAY 60 tablet 2   • ondansetron (ZOFRAN) 4 MG tablet TAKE 1 TABLET BY MOUTH EVERY 8 HOURS AS NEEDED FOR NAUSEA OR VOMITING. MUST LAST 30 DAYS 45 tablet 0   • pantoprazole (PROTONIX) 40 MG EC tablet Take 1 tablet by mouth Daily. 90 tablet 3   • tiotropium bromide monohydrate (Spiriva Respimat) 2.5 MCG/ACT aerosol solution inhaler Inhale 1 puff Daily. 1 each 11   • Fluzone Quadrivalent 0.5 ML suspension prefilled syringe injection      • NON FORMULARY Take  by mouth Daily. Omega XL OTC 1 TAB     • KLOR-CON 20 MEQ CR tablet Take 1 tablet by mouth Daily.       No facility-administered medications prior to visit.       No opioid medication identified on active medication list. I have reviewed chart for other potential  high risk medication/s and harmful drug interactions in the elderly.          Aspirin is on active medication list. Aspirin use is indicated based on review of current medical condition/s. Pros and cons of this therapy have been discussed today. Benefits of this medication outweigh potential harm.  Patient has been encouraged to continue taking this medication.  .      Patient Active Problem List   Diagnosis   • Chronic post-traumatic stress disorder (PTSD)   • Severe episode of recurrent major depressive disorder (HCC)   • Asthma   • Chronic low back pain " "  • Congestive heart failure (HCC)   • Coronary heart disease   • Degeneration of intervertebral disc of lumbosacral region   • Headache, temporal   • Psychophysiological insomnia   • Hyperlipidemia   • Hypertension   • Vitamin D deficiency   • Other fatigue   • Preventative health care   • Hyperglycemia, unspecified    • Nausea   • Stable angina pectoris (HCC)     Advance Care Planning  Advance Directive is not on file.  ACP discussion was held with the patient during this visit. Patient does not have an advance directive, information provided.          Objective    Vitals:    10/17/22 0955   BP: 116/72   BP Location: Left arm   Patient Position: Sitting   Cuff Size: Adult   Pulse: 61   Temp: 97.8 °F (36.6 °C)   TempSrc: Temporal   SpO2: 95%   Weight: 88.5 kg (195 lb)   Height: 160 cm (62.99\")     Estimated body mass index is 34.55 kg/m² as calculated from the following:    Height as of this encounter: 160 cm (62.99\").    Weight as of this encounter: 88.5 kg (195 lb).    BMI is >= 30 and <35. (Class 1 Obesity). The following options were offered after discussion;: exercise counseling/recommendations and nutrition counseling/recommendations      Does the patient have evidence of cognitive impairment? No    Physical Exam  Vitals and nursing note reviewed.   Constitutional:       General: She is not in acute distress.     Appearance: She is well-developed. She is not diaphoretic.   Neck:      Thyroid: No thyromegaly.      Vascular: No JVD.   Pulmonary:      Effort: Pulmonary effort is normal.   Musculoskeletal:         General: No tenderness. Normal range of motion.   Skin:     General: Skin is warm and dry.   Neurological:      Mental Status: She is alert and oriented to person, place, and time.      Sensory: No sensory deficit.      Motor: Tremor (BUE, mild improvement from previous ) present.   Psychiatric:         Behavior: Behavior normal.         Thought Content: Thought content normal.         Judgment: " Judgment normal.       Lab Results   Component Value Date    TRIG 146 10/07/2022    HDL 62 (H) 10/07/2022    LDL 58 10/07/2022    VLDL 25 10/07/2022            HEALTH RISK ASSESSMENT    Smoking Status:  Social History     Tobacco Use   Smoking Status Never   Smokeless Tobacco Never   Tobacco Comments    Passive Smoke: N     Alcohol Consumption:  Social History     Substance and Sexual Activity   Alcohol Use No     Fall Risk Screen:    DIANE Fall Risk Assessment was completed, and patient is at MODERATE risk for falls. Assessment completed on:10/17/2022    Depression Screening:  PHQ-2/PHQ-9 Depression Screening 10/17/2022   Retired PHQ-9 Total Score -   Retired Total Score -   Little Interest or Pleasure in Doing Things 0-->not at all   Feeling Down, Depressed or Hopeless 0-->not at all   Trouble Falling or Staying Asleep, or Sleeping Too Much -   Feeling Tired or Having Little Energy -   Poor Appetite or Overeating -   Feeling Bad about Yourself - or that You are a Failure or Have Let Yourself or Your Family Down -   Trouble Concentrating on Things, Such as Reading the Newspaper or Watching Television -   Moving or Speaking So Slowly that Other People Could Have Noticed? Or the Opposite - Being So Fidgety -   Thoughts that You Would be Better Off Dead or of Hurting Yourself in Some Way -   PHQ-9: Brief Depression Severity Measure Score 0   If You Checked Off Any Problems, How Difficult Have These Problems Made It For You to Do Your Work, Take Care of Things at Home, or Get Along with Other People? -       Health Habits and Functional and Cognitive Screening:  Functional & Cognitive Status 10/13/2021   Do you have difficulty preparing food and eating? No   Do you have difficulty bathing yourself, getting dressed or grooming yourself? No   Do you have difficulty using the toilet? No   Do you have difficulty moving around from place to place? No   Do you have trouble with steps or getting out of a bed or a chair? No    Do you need help using the phone?  No   Are you deaf or do you have serious difficulty hearing?  No   Do you need help with transportation? No   Do you need help shopping? No   Do you need help preparing meals?  No   Do you need help with housework?  No   Do you need help with laundry? No   Do you need help taking your medications? No   Do you need help managing money? No   Do you ever drive or ride in a car without wearing a seat belt? No   Have you felt unusual stress, anger or loneliness in the last month? Yes   Who do you live with? Spouse   If you need help, do you have trouble finding someone available to you? No   Have you been bothered in the last four weeks by sexual problems? No   Do you have difficulty concentrating, remembering or making decisions? No     ATTENTION  What is the year: correct  What is the month of the year: correct  What is the day of the week?: correct  What is the date?: correct  MEMORY  Repeat address three times, only score third attempt: Arun Kaufman [address] [place] [address]: 7  HOW MANY ANIMALS DID THE PATIENT NAME  Verbal Fluency -- Animal Names (0-25): 17-21  CLOCK DRAWING  Clock Drawing: All Correct  MEMORY RECALL  Tell me what you remember about that name and address we were repeating at the beginnin  ACE TOTAL SCORE  Total ACE Score - <25/30 strongly suggests cognitive impairment; <21/30 almost certainly shows dementia: 26        Age-appropriate Screening Schedule:  Refer to the list below for future screening recommendations based on patient's age, sex and/or medical conditions. Orders for these recommended tests are listed in the plan section. The patient has been provided with a written plan.    Health Maintenance   Topic Date Due   • TDAP/TD VACCINES (1 - Tdap) Never done   • ZOSTER VACCINE (1 of 2) Never done   • LIPID PANEL  10/07/2023   • MAMMOGRAM  06/10/2024   • INFLUENZA VACCINE  Completed   • PAP SMEAR  Discontinued              Assessment & Plan    CMS Preventative Services Quick Reference  Risk Factors Identified During Encounter  Cardiovascular Disease  Immunizations Discussed/Encouraged (specific Immunizations; Tdap and Shingrix  Inadequate Social Support, Isolation, Loneliness, Lack of Transportation, Financial Difficulties, or Caregiver Stress   Obesity/Overweight   The above risks/problems have been discussed with the patient.  Follow up actions/plans if indicated are seen below in the Assessment/Plan Section.  Pertinent information has been shared with the patient in the After Visit Summary.    Diagnoses and all orders for this visit:    1. Medicare annual wellness visit, subsequent (Primary)  Comments:  flu and pneumococcal vaccines UTD  rec shingrix  mammo UTD  colonoscopy UTD, due 2030      2. Ataxic gait  Comments:  increase sinemet  cont exercises at home  completed PT    3. Tremor  Comments:  improved, but still present with gait problems.   increase sinemet.   see neuro later this mo    4. Stage 3a chronic kidney disease (HCC)  Comments:  RFT's slightly improved, cont bumex, kcl, no nsaids and increased water intake.     5. Primary hypertension  Comments:  bp stable, cont curent meds, rf when needed.     6. Gastroesophageal reflux disease without esophagitis  Comments:  stable, cont and refill pantop    7. Chronic obstructive pulmonary disease, unspecified COPD type (HCC)  Comments:  stable cont breztri and albuterol    8. Vitamin D deficiency  Comments:  cont vitamin d, level in good range    Other orders  -     carbidopa-levodopa (SINEMET)  MG per tablet; Take 1 tablet by mouth 2 (Two) Times a Day.  Dispense: 60 tablet; Refill: 2  -     ondansetron (ZOFRAN) 4 MG tablet; Take 1 tablet by mouth Every 8 (Eight) Hours As Needed for Nausea or Vomiting.  Dispense: 45 tablet; Refill: 2  -     pantoprazole (PROTONIX) 40 MG EC tablet; Take 1 tablet by mouth Daily.  Dispense: 90 tablet; Refill: 3      Age appropriate preventative counseling  provided, including healthy lifestyle modifications and exercise    Follow Up:   Return in about 3 months (around 1/17/2023) for Recheck, bmp and cbc prior to appt.     An After Visit Summary and PPPS were made available to the patient.          I spent 30 minutes caring for Jasmine on this date of service. This time includes time spent by me in the following activities:preparing for the visit, reviewing tests, performing a medically appropriate examination and/or evaluation , counseling and educating the patient/family/caregiver, ordering medications, tests, or procedures and documenting information in the medical record       EMR Dragon transcription disclaimer:  Part of this note may be an electronic transcription/translation of spoken language to printed text using the Dragon Dictation System.

## 2022-11-08 DIAGNOSIS — E53.8 B12 DEFICIENCY: ICD-10-CM

## 2022-11-08 RX ORDER — CYANOCOBALAMIN 1000 UG/ML
1000 INJECTION, SOLUTION INTRAMUSCULAR; SUBCUTANEOUS
Qty: 3 ML | Refills: 1 | Status: SHIPPED | OUTPATIENT
Start: 2022-11-08

## 2022-11-16 NOTE — PROGRESS NOTES
Subjective:  tremors  Patient ID: Jasmine Cerda is a 62 y.o. female.    CHIEF COMPLAINT: tremors, falls and muscle spasms    History of Present Illness Ms. Cerda presented today accompanied by her  Ceasar for a new patient appointment referred by Eleni TURNER for Tremors.   The patient states that she started having tremors approximately 1 year ago.  She reports at times she will shuffle.  She states the tremor is present in arms tongue legs and feet.  She states she has had several falls.  She reports she has had PT/OT and is told to use a walker but will not use the walker.  The patient was encouraged to use the walker anytime she is outside of the home or if she has to walk a long distance within the home.  She denies any trouble swallowing, she denies any freezing up.  She she does report some stiffness in her shoulders and difficulty putting on shirts.  She denies any numbness or tingling in her feet.  She has not had an MRI scan.              Complaint:tremors   Onset: 1 year  Location: all over mostly BLE  Quality: shaking allover, shuffles freezes up   Severity: Can be severe, can cause fall  Duration:   10-15 seconds  Frequency: Daily  Timing: evening worse  Context: sitting awhile/shakes at night   Modifying factors: walking around  Associated Signs and symptoms: Falls,   Current meds: carbidopa-levadopa  mg bid, which has decreased the tremor a little.        CT HEAD WO CONTRAST-   Date of Exam: 8/5/2022 12:08 PM   Indication: Dizziness, persistent/recurrent, cardiac or vascular cause  suspected.   Comparison: CT head without contrast 3/3/2018.   IMPRESSION:  Normal CT of the brain without contrast.   Electronically Signed By-Gillian Andrea MD On:8/5/2022 12:22 PM  This report was finalized on [id number] by  Gillian Andrea MD    The following portions of the patient's history were reviewed and updated as appropriate: allergies, current medications, past family history, past medical  history, past social history, past surgical history and problem list.      Family History   Problem Relation Age of Onset   • Colon cancer Mother    • Diabetes Father    • Pulmonary embolism Brother    • Heart failure Brother    • Breast cancer Maternal Aunt        Past Medical History:   Diagnosis Date   • Anxiety    • Breast cyst    • CHF (congestive heart failure) (HCC)    • COPD (chronic obstructive pulmonary disease) ([facility])    • Coronary heart disease    • Depression    • Hyperlipidemia    • Hypertension        Social History     Socioeconomic History   • Marital status:    Tobacco Use   • Smoking status: Never   • Smokeless tobacco: Never   • Tobacco comments:     Passive Smoke: N   Vaping Use   • Vaping Use: Never used   Substance and Sexual Activity   • Alcohol use: No   • Drug use: No   • Sexual activity: Defer         Current Outpatient Medications:   •  albuterol (PROVENTIL) (2.5 MG/3ML) 0.083% nebulizer solution, INHALE 1 VIAL VIA NEBULIZER DAILY AS NEEDED FOR WHEEZING, Disp: 300 mL, Rfl: 1  •  ALPRAZolam (XANAX) 0.5 MG tablet, Take 1 tablet by mouth 3 (Three) Times a Day As Needed for Anxiety., Disp: 90 tablet, Rfl: 3  •  ARIPiprazole (ABILIFY) 10 MG tablet, Take 1 tablet by mouth Daily. OUt of medication, will pic up tomorrow, Disp: 30 tablet, Rfl: 3  •  aspirin 81 MG EC tablet, ASPIRIN EC 81 MG TBEC daily, Disp: , Rfl:   •  Budeson-Glycopyrrol-Formoterol (Breztri Aerosphere) 160-9-4.8 MCG/ACT aerosol inhaler, Inhale 2 puffs 2 (Two) Times a Day., Disp: 1 each, Rfl: 11  •  bumetanide (BUMEX) 2 MG tablet, TAKE 1 TABLET TWICE DAILY (NEED MD APPOINTMENT FOR REFILLS), Disp: 180 tablet, Rfl: 1  •  carbidopa-levodopa (SINEMET)  MG per tablet, Take 1 tab at: 6 am, 10 am, 2pm, 7 pm, Disp: 120 tablet, Rfl: 6  •  carvedilol (COREG) 25 MG tablet, TAKE 1 TABLET TWICE DAILY, Disp: 180 tablet, Rfl: 1  •  citalopram (CeleXA) 40 MG tablet, TAKE 1 TABLET EVERY MORNING, Disp: 90 tablet, Rfl: 1  •   "cyanocobalamin 1000 MCG/ML injection, INJECT 1 ML INTO THE APPROPRIATE MUSCLE AS DIRECTED BY PRESCRIBER EVERY 28 (TWENTY-EIGHT) DAYS., Disp: 3 mL, Rfl: 1  •  Fluzone Quadrivalent 0.5 ML suspension prefilled syringe injection, , Disp: , Rfl:   •  gabapentin (NEURONTIN) 300 MG capsule, Take 1 capsule by mouth 3 (Three) Times a Day., Disp: 270 capsule, Rfl: 1  •  hydrOXYzine pamoate (VISTARIL) 25 MG capsule, TAKE 1 CAPSULE BY MOUTH 3 (THREE) TIMES A DAY AS NEEDED FOR ANXIETY., Disp: 270 capsule, Rfl: 1  •  lisinopril (PRINIVIL,ZESTRIL) 5 MG tablet, Take 1 tablet by mouth 2 (Two) Times a Day., Disp: 180 tablet, Rfl: 2  •  metoclopramide (REGLAN) 10 MG tablet, TAKE 1 TABLET BY MOUTH 3 (THREE) TIMES A DAY BEFORE MEALS., Disp: 90 tablet, Rfl: 3  •  Multiple Vitamins-Minerals (MULTIVITAMIN ADULT) tablet, Take 1 tablet by mouth Daily. With Omega XL, Disp: , Rfl:   •  nitroglycerin (NITROSTAT) 0.4 MG SL tablet, NITROSTAT 0.4 MG SUBL AS NEEDED CHEST PAIN, Disp: , Rfl:   •  NON FORMULARY, Take  by mouth Daily. Omega XL OTC 1 TAB, Disp: , Rfl:   •  ondansetron (ZOFRAN) 4 MG tablet, Take 1 tablet by mouth Every 8 (Eight) Hours As Needed for Nausea or Vomiting., Disp: 45 tablet, Rfl: 2  •  pantoprazole (PROTONIX) 40 MG EC tablet, Take 1 tablet by mouth Daily., Disp: 90 tablet, Rfl: 3  •  potassium chloride (KLOR-CON) 10 MEQ CR tablet, Take 1 tablet by mouth Daily., Disp: 90 tablet, Rfl: 1  •  rosuvastatin (CRESTOR) 40 MG tablet, TAKE 1 TABLET EVERY EVENING, Disp: 90 tablet, Rfl: 1  •  Syringe/Needle, Disp, 23G X 1\" 3 ML misc, Use to inject B12 every 30 days intramuscularly, Disp: 12 each, Rfl: 0  •  traZODone (DESYREL) 100 MG tablet, TAKE 1 TO 2 TABLETS BY MOUTH AT BEDTIME, Disp: 180 tablet, Rfl: 1  •  vitamin D (ERGOCALCIFEROL) 1.25 MG (49489 UT) capsule capsule, Take 1 capsule by mouth 1 (One) Time Per Week. Pt is out of this, picking it up tomorrow, Disp: 15 capsule, Rfl: 3    Review of Systems   Constitutional: Positive for " fatigue.   HENT: Positive for congestion.    Eyes: Negative.    Respiratory: Positive for cough, chest tightness and shortness of breath.    Cardiovascular: Positive for palpitations and leg swelling.   Endocrine: Negative.    Genitourinary: Negative.    Musculoskeletal: Positive for back pain, gait problem and neck stiffness.   Allergic/Immunologic: Negative.    Neurological: Positive for dizziness, tremors and light-headedness.   Psychiatric/Behavioral: The patient is nervous/anxious.         I have reviewed ROS completed by medical assistant.     Objective:    Neurologic Exam     Mental Status   Oriented to person, place, and time.   Speech: speech is normal     Cranial Nerves   Cranial nerves II through XII intact.     CN III, IV, VI   Pupils are equal, round, and reactive to light.    Gait, Coordination, and Reflexes     Coordination   Finger to nose coordination: normal  Tandem walking coordination: abnormal (Could walk toes heels but not tandem)    Reflexes   Right brachioradialis: 2+  Left brachioradialis: 3+  Right biceps: 2+  Left biceps: 3+  Right triceps: 2+  Left triceps: 3+  Right patellar: 2+  Left patellar: 2+  Right achilles: 2+  Left achilles: 2+      Physical Exam  Vitals and nursing note reviewed.   Constitutional:       Appearance: Normal appearance. She is well-developed and normal weight.   HENT:      Head: Normocephalic and atraumatic.      Right Ear: Hearing normal.      Left Ear: Hearing normal.      Nose: Nose normal.      Mouth/Throat:      Mouth: Mucous membranes are moist.     Eyes:      General: Lids are normal. No visual field deficit.     Extraocular Movements: Extraocular movements intact.      Right eye: Normal extraocular motion and no nystagmus.      Left eye: Normal extraocular motion and no nystagmus.      Pupils: Pupils are equal, round, and reactive to light.   Cardiovascular:      Rate and Rhythm: Normal rate and regular rhythm.      Pulses: Normal pulses.      Heart sounds:  Normal heart sounds, S1 normal and S2 normal.   Pulmonary:      Effort: Pulmonary effort is normal.      Breath sounds: Normal breath sounds and air entry.   Musculoskeletal:         General: Normal range of motion.      Cervical back: Full passive range of motion without pain and normal range of motion.      Comments: Right upper extremity: 5/5 including   Right lower extremity: 5/5 including dorsiflexion plantar flexion of feet    Left upper extremity: 4/5,  3/5  Left lower extremity: Hip flexors 4/5, distal leg 4/5  Dorsiflexion/plantar flexion of foot 5/5   Skin:     General: Skin is warm and dry.   Neurological:      Mental Status: She is alert and oriented to person, place, and time.      Cranial Nerves: Cranial nerves 2-12 are intact. No cranial nerve deficit, dysarthria or facial asymmetry.      Sensory: No sensory deficit.      Motor: Weakness (Left arm left leg, left hand weak), tremor (Primarily action, some resting in hands and feet, tremor present in time), atrophy (Left hand atrophy  APB muscle) and abnormal muscle tone (Increased tone bilateral shoulders no cogwheel rigidity) present. No seizure activity or pronator drift.      Coordination: Romberg sign negative. Coordination normal. Heel to Shin Test abnormal (Difficult with left leg). Finger-Nose-Finger Test normal. Rapid alternating movements normal.      Gait: Gait abnormal (No shuffle but cautious gait) and tandem walk abnormal (Could walk toes heels but not tandem).      Deep Tendon Reflexes: Babinski sign absent on the right side. Babinski sign absent on the left side.      Reflex Scores:       Tricep reflexes are 2+ on the right side and 3+ on the left side.       Bicep reflexes are 2+ on the right side and 3+ on the left side.       Brachioradialis reflexes are 2+ on the right side and 3+ on the left side.       Patellar reflexes are 2+ on the right side and 2+ on the left side.       Achilles reflexes are 2+ on the right side and 2+  on the left side.  Psychiatric:         Attention and Perception: Attention normal.         Mood and Affect: Mood is anxious.         Speech: Speech normal.         Behavior: Behavior normal. Behavior is cooperative.         Assessment/Plan:    Discussion: The patient may have an atypical parkinsonian type disorder.  She does not have classically presenting tremor in her hands and also has tremor in other areas.  This is also complicated by weakness in her left arm left leg and left hand which could have previously been a CVA.  Medications that can make tremor worse are albuterol.  Medications that can cause a tremor disorder: Abilify.  The patient's carbidopa levodopa will be increased to 3 times a day and was given at a specific schedule.  She was told to use her walker.  She will be sent for an MRI of the brain with and without contrast to evaluate for possible previous stroke.  She may need a DaTscan in the future.  Again today this seems to be a parkinsonian disorder but not clear Parkinson's disease.    Diagnoses and all orders for this visit:    1. Weakness of left side of body (Primary)  -     MRI Brain With & Without Contrast; Future    2. Tremor, unspecified  -     carbidopa-levodopa (SINEMET)  MG per tablet; Take 1 tab at: 6 am, 10 am, 2pm, 7 pm  Dispense: 120 tablet; Refill: 6        Return in about 3 months (around 2/21/2023) for Cassi Li .    I spent 42 minutes caring for Jasmine on this date of service. This time includes time spent by me in the following activities: reviewing tests, obtaining and/or reviewing a separately obtained history, performing a medically appropriate examination and/or evaluation, counseling and educating the patient/family/caregiver, ordering medications, tests, or procedures and documenting information in the medical record.      This document has been electronically signed by Cassi VICKERS on November 21, 2022 09:07 EST

## 2022-11-21 ENCOUNTER — OFFICE VISIT (OUTPATIENT)
Dept: NEUROLOGY | Facility: CLINIC | Age: 62
End: 2022-11-21

## 2022-11-21 VITALS
HEART RATE: 71 BPM | DIASTOLIC BLOOD PRESSURE: 73 MMHG | HEIGHT: 63 IN | WEIGHT: 197 LBS | BODY MASS INDEX: 34.91 KG/M2 | TEMPERATURE: 98.2 F | SYSTOLIC BLOOD PRESSURE: 114 MMHG

## 2022-11-21 DIAGNOSIS — R53.1 WEAKNESS OF LEFT SIDE OF BODY: Primary | ICD-10-CM

## 2022-11-21 DIAGNOSIS — R25.1 TREMOR, UNSPECIFIED: ICD-10-CM

## 2022-11-21 PROCEDURE — 99213 OFFICE O/P EST LOW 20 MIN: CPT | Performed by: NURSE PRACTITIONER

## 2022-12-15 DIAGNOSIS — I50.32 CHRONIC DIASTOLIC CONGESTIVE HEART FAILURE: ICD-10-CM

## 2022-12-15 RX ORDER — POTASSIUM CHLORIDE 1500 MG/1
TABLET, EXTENDED RELEASE ORAL
Qty: 90 TABLET | Refills: 1 | Status: SHIPPED | OUTPATIENT
Start: 2022-12-15 | End: 2023-01-17

## 2022-12-19 RX ORDER — CARVEDILOL 25 MG/1
TABLET ORAL
Qty: 180 TABLET | Refills: 1 | Status: SHIPPED | OUTPATIENT
Start: 2022-12-19

## 2022-12-22 ENCOUNTER — HOSPITAL ENCOUNTER (OUTPATIENT)
Dept: MRI IMAGING | Facility: HOSPITAL | Age: 62
Discharge: HOME | End: 2022-12-22
Admitting: NURSE PRACTITIONER

## 2022-12-22 DIAGNOSIS — R53.1 WEAKNESS OF LEFT SIDE OF BODY: ICD-10-CM

## 2022-12-22 LAB
CREAT BLDA-MCNC: 1.7 MG/DL (ref 0.6–1.3)
EGFRCR SERPLBLD CKD-EPI 2021: 33.3 ML/MIN/1.73

## 2022-12-22 PROCEDURE — A9579 GAD-BASE MR CONTRAST NOS,1ML: HCPCS | Performed by: NURSE PRACTITIONER

## 2022-12-22 PROCEDURE — 82565 ASSAY OF CREATININE: CPT

## 2022-12-22 PROCEDURE — 70553 MRI BRAIN STEM W/O & W/DYE: CPT

## 2022-12-22 PROCEDURE — 25010000002 GADOTERIDOL PER 1 ML: Performed by: NURSE PRACTITIONER

## 2022-12-22 RX ADMIN — GADOTERIDOL 18 ML: 279.3 INJECTION, SOLUTION INTRAVENOUS at 08:27

## 2022-12-27 ENCOUNTER — TELEPHONE (OUTPATIENT)
Dept: NEUROLOGY | Facility: CLINIC | Age: 62
End: 2022-12-27

## 2022-12-27 NOTE — PROGRESS NOTES
Please phone the patient and notify them that the results are normal.  Mild microvascular changes that are associated with history of HTN, Headaches, Diabetes.      MRI Brain  IMPRESSION:  1. No acute intracranial finding. No evidence of acute or subacute  infarct.  2. Mild chronic microvascular disease features.  Electronically Signed By-Gillian Andrea MD On:12/22/2022 9:05 AM  This report was finalized on [id number] by  Gillian Andrea MD

## 2022-12-27 NOTE — TELEPHONE ENCOUNTER
----- Message from YUE Dinh sent at 12/27/2022  7:27 AM EST -----  Please phone the patient and notify them that the results are normal.  Mild microvascular changes that are associated with history of HTN, Headaches, Diabetes.      MRI Brain  IMPRESSION:  1. No acute intracranial finding. No evidence of acute or subacute  infarct.  2. Mild chronic microvascular disease features.  Electronically Signed By-Gillian Andrea MD On:12/22/2022 9:05 AM  This report was finalized on [id number] by  Gillian Andrea MD

## 2023-01-05 ENCOUNTER — OFFICE VISIT (OUTPATIENT)
Dept: PSYCHIATRY | Facility: CLINIC | Age: 63
End: 2023-01-05
Payer: MEDICARE

## 2023-01-05 DIAGNOSIS — Z79.899 ENCOUNTER FOR LONG-TERM (CURRENT) USE OF OTHER MEDICATIONS: ICD-10-CM

## 2023-01-05 DIAGNOSIS — F51.04 PSYCHOPHYSIOLOGICAL INSOMNIA: Chronic | ICD-10-CM

## 2023-01-05 DIAGNOSIS — F43.12 CHRONIC POST-TRAUMATIC STRESS DISORDER (PTSD): Chronic | ICD-10-CM

## 2023-01-05 DIAGNOSIS — F33.2 SEVERE EPISODE OF RECURRENT MAJOR DEPRESSIVE DISORDER, WITHOUT PSYCHOTIC FEATURES: Primary | Chronic | ICD-10-CM

## 2023-01-05 PROCEDURE — 99214 OFFICE O/P EST MOD 30 MIN: CPT | Performed by: PSYCHIATRY & NEUROLOGY

## 2023-01-05 RX ORDER — TRAZODONE HYDROCHLORIDE 100 MG/1
100-200 TABLET ORAL
Qty: 180 TABLET | Refills: 1 | Status: SHIPPED | OUTPATIENT
Start: 2023-01-05

## 2023-01-05 RX ORDER — ALPRAZOLAM 0.5 MG/1
0.5 TABLET ORAL 3 TIMES DAILY PRN
Qty: 90 TABLET | Refills: 3 | Status: SHIPPED | OUTPATIENT
Start: 2023-01-05

## 2023-01-05 RX ORDER — ARIPIPRAZOLE 10 MG/1
10 TABLET ORAL DAILY
Qty: 30 TABLET | Refills: 3 | Status: SHIPPED | OUTPATIENT
Start: 2023-01-05

## 2023-01-05 RX ORDER — CITALOPRAM 40 MG/1
40 TABLET ORAL EVERY MORNING
Qty: 90 TABLET | Refills: 1 | Status: SHIPPED | OUTPATIENT
Start: 2023-01-05

## 2023-01-05 RX ORDER — GABAPENTIN 300 MG/1
300 CAPSULE ORAL 3 TIMES DAILY
Qty: 270 CAPSULE | Refills: 1 | Status: SHIPPED | OUTPATIENT
Start: 2023-01-05

## 2023-01-05 NOTE — PROGRESS NOTES
Subjective   Jasmine Cerda is a 62 y.o. female who presents today for follow up     Chief Complaint:   Depression     History of Present Illness:   The pt suffers from depression for a long time, was on multiple meds    Today the pt reported feeling somewhat  better, her son got  and she knows now that he is not alone and there is someone who cares about him, her  stopped drinking   Depression is rated as 8/10, she was dsd with parkinsons disease   The pt saw neurologist , was started on sinement , however she is on abilify and reglan that can cause tremor   Neurologist was ok with those meds      When depressed -  decreased E level. Poor motivations, low drives   denied AVH/SI/HI   anxiety is pretty intense and persistent, unable to relax,       The pt was started on carbidopa /levadopa for tremor and balance issues     Sleep - difficult to fall asleep and stay asleep     Precipitating and Ameliorating Factors: relationship problems   Alleviating factors - to spend time with her grand daughter         PAST PSYCHIATRIC HISTORY      Previous Psychiatric Diagnoses   Axis I: Anxiety/Panic Disorder     Past Hospitalizations or Residential Treatment   Locations\Providers: none      Past Outpatient Treatment   Diagnosis Treated: Anxiety/Panic Dis.  Treatment Type: Medication Management  Location: with PCP, Dr Manning      Prior Psychiatric Medications   Comments: xanax - effective      celexa- not effective   zoloft - not effective  cymbalta - not effective      Prozac - GI sxs   ambien - side effects   Risperidone - not effective and side effects      Consequences of Mental Disorder   Consequences: emotional distress     SOCIAL HISTORY     Number of children: 2  Number of grandkids: 1  Current Relationship is: supportive  Family of Origin is: supportive  Comments: Patient has never smoked.  Passive Smoke: N  Alcohol Use: N  Drug Use: N  HIV/High Risk: N        Current Living Situation   Lives with:  spouse     Education   Level: high school graduate     Employment   Job Status: disabled     Hobbies and Leisure Activities   Activity Type: quiet activities     Smoking History   Smoking Hx: Never smoker     Exercise   Exercise sessions (per wk): 1     Illicit Drug Use   Illicit Drugs used: no     FAMILY HISTORY OF MENTAL DISORDERS   fh Grandparents: Non-contributory  fh Mother: Non-contributory  fh Father: Non-contributory  fh Siblings: Non-contributory  fh Other: Non-contributory  The following portions of the patient's history were reviewed and updated as appropriate: allergies, current medications, past family history, past medical history, past social history, past surgical history and problem list.            Interval History  No changes        Side Effects  Tremor  ( abilify? And reglan)       Past Medical History:  Past Medical History:   Diagnosis Date   • Anxiety    • Breast cyst    • CHF (congestive heart failure) (HCC)    • COPD (chronic obstructive pulmonary disease) (HCC)    • Coronary heart disease    • Depression    • Hyperlipidemia    • Hypertension        Social History:  Social History     Socioeconomic History   • Marital status:    Tobacco Use   • Smoking status: Never   • Smokeless tobacco: Never   • Tobacco comments:     Passive Smoke: N   Vaping Use   • Vaping Use: Never used   Substance and Sexual Activity   • Alcohol use: No   • Drug use: No   • Sexual activity: Defer       Family History:  Family History   Problem Relation Age of Onset   • Colon cancer Mother    • Diabetes Father    • Pulmonary embolism Brother    • Heart failure Brother    • Breast cancer Maternal Aunt        Past Surgical History:  Past Surgical History:   Procedure Laterality Date   • APPENDECTOMY     • BREAST CYST ASPIRATION     • CARDIAC CATHETERIZATION  2017    No Stents placed - [facility]   • CERVICAL FUSION      C6-C7   •  SECTION      x 2   • CHOLECYSTECTOMY     • HYSTERECTOMY      partial        Problem List:  Patient Active Problem List   Diagnosis   • Chronic post-traumatic stress disorder (PTSD)   • Severe episode of recurrent major depressive disorder (HCC)   • Asthma   • Chronic low back pain   • Congestive heart failure (HCC)   • Coronary heart disease   • Degeneration of intervertebral disc of lumbosacral region   • Headache, temporal   • Psychophysiological insomnia   • Hyperlipidemia   • Hypertension   • Vitamin D deficiency   • Other fatigue   • Preventative health care   • Hyperglycemia, unspecified    • Nausea   • Stable angina pectoris (HCC)       Allergy:   No Known Allergies     Discontinued Medications:  Medications Discontinued During This Encounter   Medication Reason   • ARIPiprazole (ABILIFY) 10 MG tablet Reorder   • gabapentin (NEURONTIN) 300 MG capsule Reorder   • ALPRAZolam (XANAX) 0.5 MG tablet Reorder   • traZODone (DESYREL) 100 MG tablet Reorder   • citalopram (CeleXA) 40 MG tablet Reorder       Current Medications:   Current Outpatient Medications   Medication Sig Dispense Refill   • ALPRAZolam (XANAX) 0.5 MG tablet Take 1 tablet by mouth 3 (Three) Times a Day As Needed for Anxiety. 90 tablet 3   • ARIPiprazole (ABILIFY) 10 MG tablet Take 1 tablet by mouth Daily. OUt of medication, will pic up tomorrow 30 tablet 3   • citalopram (CeleXA) 40 MG tablet Take 1 tablet by mouth Every Morning. 90 tablet 1   • gabapentin (NEURONTIN) 300 MG capsule Take 1 capsule by mouth 3 (Three) Times a Day. 270 capsule 1   • traZODone (DESYREL) 100 MG tablet Take 1-2 tablets by mouth every night at bedtime. 180 tablet 1   • albuterol (PROVENTIL) (2.5 MG/3ML) 0.083% nebulizer solution INHALE 1 VIAL VIA NEBULIZER DAILY AS NEEDED FOR WHEEZING 300 mL 1   • aspirin 81 MG EC tablet ASPIRIN EC 81 MG TBEC daily     • Budeson-Glycopyrrol-Formoterol (Breztri Aerosphere) 160-9-4.8 MCG/ACT aerosol inhaler Inhale 2 puffs 2 (Two) Times a Day. 1 each 11   • bumetanide (BUMEX) 2 MG tablet TAKE 1 TABLET TWICE  DAILY (NEED MD APPOINTMENT FOR REFILLS) 180 tablet 1   • carbidopa-levodopa (SINEMET)  MG per tablet Take 1 tab at: 6 am, 10 am, 2pm, 7 pm 120 tablet 6   • carvedilol (COREG) 25 MG tablet TAKE 1 TABLET TWICE DAILY 180 tablet 1   • cyanocobalamin 1000 MCG/ML injection INJECT 1 ML INTO THE APPROPRIATE MUSCLE AS DIRECTED BY PRESCRIBER EVERY 28 (TWENTY-EIGHT) DAYS. 3 mL 1   • Fluzone Quadrivalent 0.5 ML suspension prefilled syringe injection      • hydrOXYzine pamoate (VISTARIL) 25 MG capsule TAKE 1 CAPSULE BY MOUTH 3 (THREE) TIMES A DAY AS NEEDED FOR ANXIETY. 270 capsule 1   • KLOR-CON 20 MEQ CR tablet TAKE 1 TABLET BY MOUTH EVERY DAY 90 tablet 1   • lisinopril (PRINIVIL,ZESTRIL) 5 MG tablet Take 1 tablet by mouth 2 (Two) Times a Day. 180 tablet 2   • metoclopramide (REGLAN) 10 MG tablet TAKE 1 TABLET BY MOUTH 3 (THREE) TIMES A DAY BEFORE MEALS. 90 tablet 3   • Multiple Vitamins-Minerals (MULTIVITAMIN ADULT) tablet Take 1 tablet by mouth Daily. With Omega XL     • nitroglycerin (NITROSTAT) 0.4 MG SL tablet NITROSTAT 0.4 MG SUBL AS NEEDED CHEST PAIN     • NON FORMULARY Take  by mouth Daily. Omega XL OTC 1 TAB     • ondansetron (ZOFRAN) 4 MG tablet Take 1 tablet by mouth Every 8 (Eight) Hours As Needed for Nausea or Vomiting. 45 tablet 2   • pantoprazole (PROTONIX) 40 MG EC tablet Take 1 tablet by mouth Daily. 90 tablet 3   • potassium chloride (KLOR-CON) 10 MEQ CR tablet Take 1 tablet by mouth Daily. 90 tablet 1   • rosuvastatin (CRESTOR) 40 MG tablet TAKE 1 TABLET EVERY EVENING 90 tablet 1   • Syringe/Needle, Disp, 23G X 1\" 3 ML misc Use to inject B12 every 30 days intramuscularly 12 each 0   • vitamin D (ERGOCALCIFEROL) 1.25 MG (34300 UT) capsule capsule Take 1 capsule by mouth 1 (One) Time Per Week. Pt is out of this, picking it up tomorrow 15 capsule 3     No current facility-administered medications for this visit.         Review of Symptoms:    Psychiatric/Behavioral: Negative for agitation, behavioral  problems, confusion, decreased concentration, dysphoric mood, hallucinations, self-injury, sleep disturbance and suicidal ideas.   The patient is  Still   depressed,  Still very  nervous/anxious and is not hyperactive.        Physical Exam:   There were no vitals taken for this visit.    Mental Status Exam:   Hygiene:   good  Cooperation:  Cooperative  Eye Contact:  Good  Psychomotor Behavior:  Appropriate  Affect:  Full range and Appropriate  Mood: fluctates,  Anxious    Hopelessness: Denies  Speech:  Normal  Thought Process:  Goal directed and Linear  Thought Content:  Normal  Suicidal:  None  Homicidal:  None  Hallucinations:  None  Delusion:  None  Memory:  Intact  Orientation:  Person, Place, Time and Situation  Reliability:  fair  Insight:  Good  Judgement:  Good  Impulse Control:  Good  Physical/Medical Issues:  Yes GI issues        MSE from 9/7/2022  reviewed and accepted with changes     PHQ-9 Depression Screening  Little interest or pleasure in doing things? 3-->nearly every day   Feeling down, depressed, or hopeless? 2-->more than half the days   Trouble falling or staying asleep, or sleeping too much? 2-->more than half the days   Feeling tired or having little energy? 2-->more than half the days   Poor appetite or overeating? 2-->more than half the days   Feeling bad about yourself - or that you are a failure or have let yourself or your family down? 2-->more than half the days   Trouble concentrating on things, such as reading the newspaper or watching television? 1-->several days   Moving or speaking so slowly that other people could have noticed? Or the opposite - being so fidgety or restless that you have been moving around a lot more than usual? 1-->several days   Thoughts that you would be better off dead, or of hurting yourself in some way? 0-->not at all   PHQ-9 Total Score 15   If you checked off any problems, how difficult have these problems made it for you to do your work, take care of  things at home, or get along with other people? very difficult       Never smoker    I advised Jasmine of the risks of tobacco use.     Lab Results:   Hospital Outpatient Visit on 12/22/2022   Component Date Value Ref Range Status   • Creatinine 12/22/2022 1.70 (H)  0.60 - 1.30 mg/dL Final    Serial Number: [id number]:  [id number]   • eGFR 12/22/2022 33.3 (L)  >60.0 mL/min/1.73 Final   Clinical Support on 10/07/2022   Component Date Value Ref Range Status   • WBC 10/07/2022 6.33  3.40 - 10.80 10*3/mm3 Final   • RBC 10/07/2022 4.44  3.77 - 5.28 10*6/mm3 Final   • Hemoglobin 10/07/2022 13.1  12.0 - 15.9 g/dL Final   • Hematocrit 10/07/2022 37.5  34.0 - 46.6 % Final   • MCV 10/07/2022 84.5  79.0 - 97.0 fL Final   • MCH 10/07/2022 29.5  26.6 - 33.0 pg Final   • MCHC 10/07/2022 34.9  31.5 - 35.7 g/dL Final   • RDW 10/07/2022 13.1  12.3 - 15.4 % Final   • RDW-SD 10/07/2022 40.1  37.0 - 54.0 fl Final   • MPV 10/07/2022 10.5  6.0 - 12.0 fL Final   • Platelets 10/07/2022 227  140 - 450 10*3/mm3 Final   • Glucose 10/07/2022 88  65 - 99 mg/dL Final   • BUN 10/07/2022 15  8 - 23 mg/dL Final   • Creatinine 10/07/2022 1.21 (H)  0.57 - 1.00 mg/dL Final   • Sodium 10/07/2022 144  136 - 145 mmol/L Final   • Potassium 10/07/2022 3.5  3.5 - 5.2 mmol/L Final   • Chloride 10/07/2022 102  98 - 107 mmol/L Final   • CO2 10/07/2022 29.4 (H)  22.0 - 29.0 mmol/L Final   • Calcium 10/07/2022 9.9  8.6 - 10.5 mg/dL Final   • Total Protein 10/07/2022 6.9  6.0 - 8.5 g/dL Final   • Albumin 10/07/2022 4.60  3.50 - 5.20 g/dL Final   • ALT (SGPT) 10/07/2022 17  1 - 33 U/L Final   • AST (SGOT) 10/07/2022 25  1 - 32 U/L Final   • Alkaline Phosphatase 10/07/2022 51  39 - 117 U/L Final   • Total Bilirubin 10/07/2022 0.4  0.0 - 1.2 mg/dL Final   • Globulin 10/07/2022 2.3  gm/dL Final   • A/G Ratio 10/07/2022 2.0  g/dL Final   • BUN/Creatinine Ratio 10/07/2022 12.4  7.0 - 25.0 Final   • Anion Gap 10/07/2022 12.6  5.0 - 15.0 mmol/L Final   • eGFR 10/07/2022  50.2 (L)  >60.0 mL/min/1.73 Final    National Kidney Foundation and American Society of Nephrology (ASN) Task Force recommended calculation based on the Chronic Kidney Disease Epidemiology Collaboration (CKD-EPI) equation refit without adjustment for race.   • Total Cholesterol 10/07/2022 145  0 - 200 mg/dL Final   • Triglycerides 10/07/2022 146  0 - 150 mg/dL Final   • HDL Cholesterol 10/07/2022 62 (H)  40 - 60 mg/dL Final   • LDL Cholesterol  10/07/2022 58  0 - 100 mg/dL Final   • VLDL Cholesterol 10/07/2022 25  5 - 40 mg/dL Final   • LDL/HDL Ratio 10/07/2022 0.87   Final   • TSH 10/07/2022 2.700  0.270 - 4.200 uIU/mL Final   • Vitamin B-12 10/07/2022 1,337 (H)  211 - 946 pg/mL Final   • 25 Hydroxy, Vitamin D 10/07/2022 60.5  30.0 - 100.0 ng/ml Final       Assessment & Plan   Problems Addressed this Visit        Mental Health    Chronic post-traumatic stress disorder (PTSD) (Chronic)    Relevant Medications    citalopram (CeleXA) 40 MG tablet    traZODone (DESYREL) 100 MG tablet    ALPRAZolam (XANAX) 0.5 MG tablet    ARIPiprazole (ABILIFY) 10 MG tablet    gabapentin (NEURONTIN) 300 MG capsule    Other Relevant Orders    [facility] Full Urine Drug Screen -    Severe episode of recurrent major depressive disorder (HCC) - Primary (Chronic)    Relevant Medications    citalopram (CeleXA) 40 MG tablet    traZODone (DESYREL) 100 MG tablet    ALPRAZolam (XANAX) 0.5 MG tablet    ARIPiprazole (ABILIFY) 10 MG tablet       Sleep    Psychophysiological insomnia (Chronic)    Relevant Medications    citalopram (CeleXA) 40 MG tablet    traZODone (DESYREL) 100 MG tablet    ALPRAZolam (XANAX) 0.5 MG tablet    ARIPiprazole (ABILIFY) 10 MG tablet    Other Relevant Orders    [facility] Full Urine Drug Screen -   Other Visit Diagnoses     Encounter for long-term (current) use of other medications        Relevant Orders    [facility] Full Urine Drug Screen -      Diagnoses       Codes Comments    Severe episode of recurrent major depressive  disorder, without psychotic features (HCC)    -  Primary ICD-10-CM: F33.2  ICD-9-CM: 296.33     Chronic post-traumatic stress disorder (PTSD)     ICD-10-CM: F43.12  ICD-9-CM: 309.81     Psychophysiological insomnia     ICD-10-CM: F51.04  ICD-9-CM: 307.42     Encounter for long-term (current) use of other medications     ICD-10-CM: Z79.899  ICD-9-CM: V58.69           Visit Diagnoses:    ICD-10-CM ICD-9-CM   1. Severe episode of recurrent major depressive disorder, without psychotic features (HCC)  F33.2 296.33   2. Chronic post-traumatic stress disorder (PTSD)  F43.12 309.81   3. Psychophysiological insomnia  F51.04 307.42   4. Encounter for long-term (current) use of other medications  Z79.899 V58.69       TREATMENT PLAN/GOALS: Continue supportive psychotherapy efforts and medications as indicated. Treatment and medication options discussed during today's visit. Patient ackowledged and verbally consented to continue with current treatment plan and was educated on the importance of compliance with treatment and follow-up appointments.    MEDICATION ISSUES:  1. MDD - cont celexa 40 mg ,   Cont  abilify    10 mg for now   The stated it is effective and depression is more important than tremor   The pt is also on reglan TID, was suggested to discuss with PCP if possible to change meds   Neurology was ok with her meds     2. PTSD - cont celexa 40 mg , Xanax - low dose 0.5 mg TID, alternating with vistaril PRN , long term benzo use discussed again    3. Insomnia -  Cont  trazodone  100 mg  PRN     4. Long term therapeutic drug monitoring - UDS  8/31/2021 - consistent , will repeat today     Psychotherapy was recommended , the is reluctant to start now,   cont supportive therapy,     INSPECT reviewed as expected, last xanax refill was on 12/23/2022     Patient screened positive for depression based on a PHQ-9 score of 15 on 1/5/2023. Follow-up recommendations include: Prescribed antidepressant medication treatment.  PHQ  scored 15 and indicated moderate depression   DWIGHT 7 scored 16      Discussed medication options and treatment plan of prescribed medication as well as the risks, benefits, and side effects including potential falls, possible impaired driving and metabolic adversities among others. Patient is agreeable to call the office with any worsening of symptoms or onset of side effects. Patient is agreeable to call 911 or go to the nearest ER should he/she begin having SI/HI. No medication side effects or related complaints today.     MEDS ORDERED DURING VISIT:  New Medications Ordered This Visit   Medications   • citalopram (CeleXA) 40 MG tablet     Sig: Take 1 tablet by mouth Every Morning.     Dispense:  90 tablet     Refill:  1   • traZODone (DESYREL) 100 MG tablet     Sig: Take 1-2 tablets by mouth every night at bedtime.     Dispense:  180 tablet     Refill:  1   • ALPRAZolam (XANAX) 0.5 MG tablet     Sig: Take 1 tablet by mouth 3 (Three) Times a Day As Needed for Anxiety.     Dispense:  90 tablet     Refill:  3     Please dispense when it is due   • ARIPiprazole (ABILIFY) 10 MG tablet     Sig: Take 1 tablet by mouth Daily. OUt of medication, will pic up tomorrow     Dispense:  30 tablet     Refill:  3   • gabapentin (NEURONTIN) 300 MG capsule     Sig: Take 1 capsule by mouth 3 (Three) Times a Day.     Dispense:  270 capsule     Refill:  1       Return in about 4 months (around 5/5/2023).         This document has been electronically signed by Sivan Ken MD  January 5, 2023 09:52 EST

## 2023-01-10 ENCOUNTER — CLINICAL SUPPORT (OUTPATIENT)
Dept: FAMILY MEDICINE CLINIC | Facility: CLINIC | Age: 63
End: 2023-01-10
Payer: MEDICARE

## 2023-01-10 DIAGNOSIS — I10 PRIMARY HYPERTENSION: Primary | ICD-10-CM

## 2023-01-10 PROCEDURE — 80048 BASIC METABOLIC PNL TOTAL CA: CPT | Performed by: NURSE PRACTITIONER

## 2023-01-10 PROCEDURE — 36415 COLL VENOUS BLD VENIPUNCTURE: CPT | Performed by: NURSE PRACTITIONER

## 2023-01-10 PROCEDURE — 85027 COMPLETE CBC AUTOMATED: CPT | Performed by: NURSE PRACTITIONER

## 2023-01-10 NOTE — PROGRESS NOTES
Venipuncture Blood Specimen Collection  Venipuncture performed in the right arm by Marina Rogel MA with good hemostasis. Patient tolerated the procedure well without complications.   01/10/23   Marina Rogel MA

## 2023-01-11 LAB
ANION GAP SERPL CALCULATED.3IONS-SCNC: 11.2 MMOL/L (ref 5–15)
BUN SERPL-MCNC: 21 MG/DL (ref 8–23)
BUN/CREAT SERPL: 17.5 (ref 7–25)
CALCIUM SPEC-SCNC: 10.6 MG/DL (ref 8.6–10.5)
CHLORIDE SERPL-SCNC: 100 MMOL/L (ref 98–107)
CO2 SERPL-SCNC: 27.8 MMOL/L (ref 22–29)
CREAT SERPL-MCNC: 1.2 MG/DL (ref 0.57–1)
DEPRECATED RDW RBC AUTO: 41.3 FL (ref 37–54)
EGFRCR SERPLBLD CKD-EPI 2021: 50.7 ML/MIN/1.73
ERYTHROCYTE [DISTWIDTH] IN BLOOD BY AUTOMATED COUNT: 13.5 % (ref 12.3–15.4)
GLUCOSE SERPL-MCNC: 86 MG/DL (ref 65–99)
HCT VFR BLD AUTO: 42.3 % (ref 34–46.6)
HGB BLD-MCNC: 14.5 G/DL (ref 12–15.9)
MCH RBC QN AUTO: 28.9 PG (ref 26.6–33)
MCHC RBC AUTO-ENTMCNC: 34.3 G/DL (ref 31.5–35.7)
MCV RBC AUTO: 84.3 FL (ref 79–97)
PLATELET # BLD AUTO: 279 10*3/MM3 (ref 140–450)
PMV BLD AUTO: 10.3 FL (ref 6–12)
POTASSIUM SERPL-SCNC: 3.7 MMOL/L (ref 3.5–5.2)
RBC # BLD AUTO: 5.02 10*6/MM3 (ref 3.77–5.28)
SODIUM SERPL-SCNC: 139 MMOL/L (ref 136–145)
WBC NRBC COR # BLD: 8.37 10*3/MM3 (ref 3.4–10.8)

## 2023-01-13 DIAGNOSIS — E78.2 MIXED HYPERLIPIDEMIA: ICD-10-CM

## 2023-01-16 RX ORDER — ROSUVASTATIN CALCIUM 40 MG/1
TABLET, COATED ORAL
Qty: 90 TABLET | Refills: 1 | Status: SHIPPED | OUTPATIENT
Start: 2023-01-16

## 2023-01-16 RX ORDER — ONDANSETRON 4 MG/1
TABLET, FILM COATED ORAL
Qty: 45 TABLET | Refills: 2 | Status: SHIPPED | OUTPATIENT
Start: 2023-01-16

## 2023-01-17 ENCOUNTER — OFFICE VISIT (OUTPATIENT)
Dept: FAMILY MEDICINE CLINIC | Facility: CLINIC | Age: 63
End: 2023-01-17
Payer: MEDICARE

## 2023-01-17 VITALS
OXYGEN SATURATION: 95 % | HEART RATE: 95 BPM | BODY MASS INDEX: 34.62 KG/M2 | DIASTOLIC BLOOD PRESSURE: 76 MMHG | WEIGHT: 195.4 LBS | SYSTOLIC BLOOD PRESSURE: 112 MMHG | HEIGHT: 63 IN

## 2023-01-17 DIAGNOSIS — R25.1 TREMOR: ICD-10-CM

## 2023-01-17 DIAGNOSIS — N18.31 STAGE 3A CHRONIC KIDNEY DISEASE: Primary | ICD-10-CM

## 2023-01-17 DIAGNOSIS — E53.8 B12 DEFICIENCY: ICD-10-CM

## 2023-01-17 DIAGNOSIS — G44.211 INTRACTABLE EPISODIC TENSION-TYPE HEADACHE: ICD-10-CM

## 2023-01-17 DIAGNOSIS — J44.9 CHRONIC OBSTRUCTIVE PULMONARY DISEASE, UNSPECIFIED COPD TYPE: ICD-10-CM

## 2023-01-17 PROCEDURE — 99214 OFFICE O/P EST MOD 30 MIN: CPT | Performed by: NURSE PRACTITIONER

## 2023-01-17 NOTE — PROGRESS NOTES
Chief Complaint  Chief Complaint   Patient presents with   • Follow-up     3 month medication follow up and discuss blood work from last week    • Headache     Pt has had a headache for the last couple days. Pt has taken tylenol and headache has not improved            Subjective          Jasmine Cerda presents to [facility] for   History of Present Illness     CKD stg 3, monitoring labs on Bumex 2mg once daily with KCl 10meq, has eliminated NSAIDs and increased water intake.     Tremor, patient is now following with neurology on Sinemet 4 times per day, MRI of the brain was normal with mild chronic microvascular disease features 2022. She reports minimal improvement in tremor    COPD stable on breztri and albuterol    Headache, present for the last 2 days, she reports being under increased amount of stress lately, took 4 tylenol yesterday without improvement      The following portions of the patient's history were reviewed and updated as appropriate: allergies, current medications, past family history, past medical history, past social history, past surgical history and problem list.    Past Medical History:   Diagnosis Date   • Anxiety    • Breast cyst    • CHF (congestive heart failure) (HCC)    • COPD (chronic obstructive pulmonary disease) (HCC)    • Coronary heart disease    • Depression    • Hyperlipidemia    • Hypertension      Past Surgical History:   Procedure Laterality Date   • APPENDECTOMY     • BREAST CYST ASPIRATION     • CARDIAC CATHETERIZATION  2017    No Stents placed - [facility]   • CERVICAL FUSION      C6-C7   •  SECTION      x 2   • CHOLECYSTECTOMY     • HYSTERECTOMY      partial     Family History   Problem Relation Age of Onset   • Colon cancer Mother    • Diabetes Father    • Pulmonary embolism Brother    • Heart failure Brother    • Breast cancer Maternal Aunt      Social History     Tobacco Use   • Smoking status: Never   • Smokeless tobacco:  Never   • Tobacco comments:     Passive Smoke: N   Substance Use Topics   • Alcohol use: No       Current Outpatient Medications:   •  albuterol (PROVENTIL) (2.5 MG/3ML) 0.083% nebulizer solution, INHALE 1 VIAL VIA NEBULIZER DAILY AS NEEDED FOR WHEEZING, Disp: 300 mL, Rfl: 1  •  ALPRAZolam (XANAX) 0.5 MG tablet, Take 1 tablet by mouth 3 (Three) Times a Day As Needed for Anxiety., Disp: 90 tablet, Rfl: 3  •  ARIPiprazole (ABILIFY) 10 MG tablet, Take 1 tablet by mouth Daily. OUt of medication, will pic up tomorrow, Disp: 30 tablet, Rfl: 3  •  aspirin 81 MG EC tablet, ASPIRIN EC 81 MG TBEC daily, Disp: , Rfl:   •  Budeson-Glycopyrrol-Formoterol (Breztri Aerosphere) 160-9-4.8 MCG/ACT aerosol inhaler, Inhale 2 puffs 2 (Two) Times a Day., Disp: 1 each, Rfl: 11  •  bumetanide (BUMEX) 2 MG tablet, TAKE 1 TABLET TWICE DAILY (NEED MD APPOINTMENT FOR REFILLS), Disp: 180 tablet, Rfl: 1  •  carbidopa-levodopa (SINEMET)  MG per tablet, Take 1 tab at: 6 am, 10 am, 2pm, 7 pm, Disp: 120 tablet, Rfl: 6  •  carvedilol (COREG) 25 MG tablet, TAKE 1 TABLET TWICE DAILY, Disp: 180 tablet, Rfl: 1  •  citalopram (CeleXA) 40 MG tablet, Take 1 tablet by mouth Every Morning., Disp: 90 tablet, Rfl: 1  •  cyanocobalamin 1000 MCG/ML injection, INJECT 1 ML INTO THE APPROPRIATE MUSCLE AS DIRECTED BY PRESCRIBER EVERY 28 (TWENTY-EIGHT) DAYS., Disp: 3 mL, Rfl: 1  •  Fluzone Quadrivalent 0.5 ML suspension prefilled syringe injection, , Disp: , Rfl:   •  gabapentin (NEURONTIN) 300 MG capsule, Take 1 capsule by mouth 3 (Three) Times a Day., Disp: 270 capsule, Rfl: 1  •  lisinopril (PRINIVIL,ZESTRIL) 5 MG tablet, Take 1 tablet by mouth 2 (Two) Times a Day., Disp: 180 tablet, Rfl: 2  •  metoclopramide (REGLAN) 10 MG tablet, TAKE 1 TABLET BY MOUTH 3 (THREE) TIMES A DAY BEFORE MEALS., Disp: 90 tablet, Rfl: 3  •  Multiple Vitamins-Minerals (MULTIVITAMIN ADULT) tablet, Take 1 tablet by mouth Daily. With Omega XL, Disp: , Rfl:   •  nitroglycerin  "(NITROSTAT) 0.4 MG SL tablet, NITROSTAT 0.4 MG SUBL AS NEEDED CHEST PAIN, Disp: , Rfl:   •  ondansetron (ZOFRAN) 4 MG tablet, TAKE 1 TABLET BY MOUTH EVERY 8 HOURS AS NEEDED FOR NAUSEA OR VOMITING., Disp: 45 tablet, Rfl: 2  •  pantoprazole (PROTONIX) 40 MG EC tablet, Take 1 tablet by mouth Daily., Disp: 90 tablet, Rfl: 3  •  potassium chloride (KLOR-CON) 10 MEQ CR tablet, Take 1 tablet by mouth Daily., Disp: 90 tablet, Rfl: 1  •  rosuvastatin (CRESTOR) 40 MG tablet, TAKE 1 TABLET BY MOUTH EVERY DAY IN THE EVENING, Disp: 90 tablet, Rfl: 1  •  Syringe/Needle, Disp, 23G X 1\" 3 ML misc, Use to inject B12 every 30 days intramuscularly, Disp: 12 each, Rfl: 0  •  traZODone (DESYREL) 100 MG tablet, Take 1-2 tablets by mouth every night at bedtime., Disp: 180 tablet, Rfl: 1  •  vitamin D (ERGOCALCIFEROL) 1.25 MG (67581 UT) capsule capsule, Take 1 capsule by mouth 1 (One) Time Per Week. Pt is out of this, picking it up tomorrow, Disp: 15 capsule, Rfl: 3    Objective   Vital Signs:   /76 (BP Location: Left arm, Patient Position: Sitting, Cuff Size: Adult)   Pulse 95   Ht 160 cm (63\")   Wt 88.6 kg (195 lb 6.4 oz)   SpO2 95%   BMI 34.61 kg/m²           Physical Exam  Vitals and nursing note reviewed.   Constitutional:       General: She is not in acute distress.     Appearance: She is well-developed. She is not diaphoretic.   Eyes:      Pupils: Pupils are equal, round, and reactive to light.   Neck:      Thyroid: No thyromegaly.      Vascular: No JVD.   Cardiovascular:      Rate and Rhythm: Normal rate and regular rhythm.      Heart sounds: Normal heart sounds. No murmur heard.  Pulmonary:      Effort: Pulmonary effort is normal. No respiratory distress.      Breath sounds: Normal breath sounds.   Abdominal:      General: Bowel sounds are normal. There is no distension.      Palpations: Abdomen is soft.      Tenderness: There is no abdominal tenderness.   Musculoskeletal:         General: No tenderness. Normal range of " motion.      Cervical back: Normal range of motion and neck supple.   Skin:     General: Skin is warm and dry.   Neurological:      Mental Status: She is alert and oriented to person, place, and time.      Sensory: No sensory deficit.      Motor: Tremor (BUE and tongue ) present.   Psychiatric:         Behavior: Behavior normal.         Thought Content: Thought content normal.         Judgment: Judgment normal.            Result Review :     No visits with results within 7 Day(s) from this visit.   Latest known visit with results is:   Clinical Support on 01/10/2023   Component Date Value Ref Range Status   • Glucose 01/10/2023 86  65 - 99 mg/dL Final   • BUN 01/10/2023 21  8 - 23 mg/dL Final   • Creatinine 01/10/2023 1.20 (H)  0.57 - 1.00 mg/dL Final   • Sodium 01/10/2023 139  136 - 145 mmol/L Final   • Potassium 01/10/2023 3.7  3.5 - 5.2 mmol/L Final   • Chloride 01/10/2023 100  98 - 107 mmol/L Final   • CO2 01/10/2023 27.8  22.0 - 29.0 mmol/L Final   • Calcium 01/10/2023 10.6 (H)  8.6 - 10.5 mg/dL Final   • BUN/Creatinine Ratio 01/10/2023 17.5  7.0 - 25.0 Final   • Anion Gap 01/10/2023 11.2  5.0 - 15.0 mmol/L Final   • eGFR 01/10/2023 50.7 (L)  >60.0 mL/min/1.73 Final    National Kidney Foundation and American Society of Nephrology (ASN) Task Force recommended calculation based on the Chronic Kidney Disease Epidemiology Collaboration (CKD-EPI) equation refit without adjustment for race.   • WBC 01/10/2023 8.37  3.40 - 10.80 10*3/mm3 Final   • RBC 01/10/2023 5.02  3.77 - 5.28 10*6/mm3 Final   • Hemoglobin 01/10/2023 14.5  12.0 - 15.9 g/dL Final   • Hematocrit 01/10/2023 42.3  34.0 - 46.6 % Final   • MCV 01/10/2023 84.3  79.0 - 97.0 fL Final   • MCH 01/10/2023 28.9  26.6 - 33.0 pg Final   • MCHC 01/10/2023 34.3  31.5 - 35.7 g/dL Final   • RDW 01/10/2023 13.5  12.3 - 15.4 % Final   • RDW-SD 01/10/2023 41.3  37.0 - 54.0 fl Final   • MPV 01/10/2023 10.3  6.0 - 12.0 fL Final   • Platelets 01/10/2023 279  140 - 450  "10*3/mm3 Final                  BMI is >= 30 and <35. (Class 1 Obesity). The following options were offered after discussion;: exercise counseling/recommendations and nutrition counseling/recommendations           Assessment and Plan    Diagnoses and all orders for this visit:    1. Stage 3a chronic kidney disease (HCC) (Primary)  Comments:  Labs reviewed with pt, RFT's stable with GFR 50.7  cont No Nsaid's and  increase water intake  Will recheck again in 3 months  Consider nephrology referral    2. Chronic obstructive pulmonary disease, unspecified COPD type (HCC)  Comments:  Stable and improved, continue Breztri BID and albuterol prn    3. Tremor  Comments:  Minimal improvement on Sinemet 4 times daily  MRI reviewed and mostly normal  Keep follow-ups with neurology as directed    4. Intractable episodic tension-type headache  Comments:  rec trial of excedrin prn, ok to cont tylenol  try stress relieving measures, deep breathing, meditation    5. B12 deficiency  Comments:  Continue monthly B12 injections at home per her son   refill syringes      Other orders  -     Syringe/Needle, Disp, 23G X 1\" 3 ML misc; Use to inject B12 every 30 days intramuscularly  Dispense: 12 each; Refill: 0        I spent 30 minutes caring for Jasmine Cerda on this date of service. This time includes time spent by me in the following activities: preparing for the visit, reviewing tests, performing a medically appropriate examination and/or evaluation , counseling and educating the patient/family/caregiver, ordering medications, tests, or procedures and documenting information in the medical record        Follow Up     Return in about 3 months (around 4/17/2023) for Recheck CKD, tremor, b12, vit d. HTN panel, vit d, b12 prior to appt.  Patient was given instructions and counseling regarding her condition or for health maintenance advice. Please see specific information pulled into the AVS if appropriate.        Part of this note may be " an electronic transcription/translation of spoken language to printed text using the Dragon Dictation System

## 2023-02-15 DIAGNOSIS — I50.32 CHRONIC DIASTOLIC CONGESTIVE HEART FAILURE: ICD-10-CM

## 2023-02-16 RX ORDER — BUMETANIDE 2 MG/1
TABLET ORAL
Qty: 180 TABLET | Refills: 1 | Status: SHIPPED | OUTPATIENT
Start: 2023-02-16

## 2023-02-21 RX ORDER — CARVEDILOL 25 MG/1
25 TABLET ORAL 2 TIMES DAILY
Qty: 180 TABLET | Refills: 1 | OUTPATIENT
Start: 2023-02-21

## 2023-02-21 NOTE — TELEPHONE ENCOUNTER
Caller: Jasmine Cerda    Relationship: Self    Best call back number: [phone]    Requested Prescriptions:   Requested Prescriptions     Pending Prescriptions Disp Refills   • carvedilol (COREG) 25 MG tablet 180 tablet 1     Sig: Take 1 tablet by mouth 2 (Two) Times a Day.        Pharmacy where request should be sent: [facility] [id number] [place] [facility] [address] [place] [address] [phone] [facility] [phone]      Additional details provided by patient: PATIENT IS OUT     Does the patient have less than a 3 day supply:  [x] Yes  [] No    Would you like a call back once the refill request has been completed: [] Yes [x] No    If the office needs to give you a call back, can they leave a voicemail: [x] Yes [] No    Kailash Ortiz Rep   02/21/23 11:04 EST

## 2023-03-13 DIAGNOSIS — F43.12 CHRONIC POST-TRAUMATIC STRESS DISORDER (PTSD): Chronic | ICD-10-CM

## 2023-03-14 RX ORDER — HYDROXYZINE PAMOATE 25 MG/1
25 CAPSULE ORAL 3 TIMES DAILY PRN
Qty: 270 CAPSULE | Refills: 1 | Status: SHIPPED | OUTPATIENT
Start: 2023-03-14

## 2023-04-11 ENCOUNTER — CLINICAL SUPPORT (OUTPATIENT)
Dept: FAMILY MEDICINE CLINIC | Facility: CLINIC | Age: 63
End: 2023-04-11
Payer: MEDICARE

## 2023-04-11 DIAGNOSIS — E53.8 B12 DEFICIENCY: ICD-10-CM

## 2023-04-11 DIAGNOSIS — E55.9 VITAMIN D DEFICIENCY: ICD-10-CM

## 2023-04-11 DIAGNOSIS — I10 PRIMARY HYPERTENSION: ICD-10-CM

## 2023-04-11 DIAGNOSIS — E78.2 MIXED HYPERLIPIDEMIA: Primary | ICD-10-CM

## 2023-04-11 PROCEDURE — 82306 VITAMIN D 25 HYDROXY: CPT | Performed by: NURSE PRACTITIONER

## 2023-04-11 PROCEDURE — 85027 COMPLETE CBC AUTOMATED: CPT | Performed by: NURSE PRACTITIONER

## 2023-04-11 PROCEDURE — 82607 VITAMIN B-12: CPT | Performed by: NURSE PRACTITIONER

## 2023-04-11 PROCEDURE — 36415 COLL VENOUS BLD VENIPUNCTURE: CPT | Performed by: NURSE PRACTITIONER

## 2023-04-11 PROCEDURE — 80061 LIPID PANEL: CPT | Performed by: NURSE PRACTITIONER

## 2023-04-11 PROCEDURE — 84443 ASSAY THYROID STIM HORMONE: CPT | Performed by: NURSE PRACTITIONER

## 2023-04-11 PROCEDURE — 80053 COMPREHEN METABOLIC PANEL: CPT | Performed by: NURSE PRACTITIONER

## 2023-04-11 NOTE — PROGRESS NOTES
Venipuncture Blood Specimen Collection  Venipuncture performed in the right arm by Marina Rogel MA with good hemostasis. Patient tolerated the procedure well without complications.   04/11/23   Marina Rogel MA

## 2023-04-12 LAB
25(OH)D3 SERPL-MCNC: 85.4 NG/ML (ref 30–100)
ALBUMIN SERPL-MCNC: 4.5 G/DL (ref 3.5–5.2)
ALBUMIN/GLOB SERPL: 1.8 G/DL
ALP SERPL-CCNC: 59 U/L (ref 39–117)
ALT SERPL W P-5'-P-CCNC: 5 U/L (ref 1–33)
ANION GAP SERPL CALCULATED.3IONS-SCNC: 9 MMOL/L (ref 5–15)
AST SERPL-CCNC: 13 U/L (ref 1–32)
BILIRUB SERPL-MCNC: 0.3 MG/DL (ref 0–1.2)
BUN SERPL-MCNC: 24 MG/DL (ref 8–23)
BUN/CREAT SERPL: 15.3 (ref 7–25)
CALCIUM SPEC-SCNC: 10.3 MG/DL (ref 8.6–10.5)
CHLORIDE SERPL-SCNC: 103 MMOL/L (ref 98–107)
CHOLEST SERPL-MCNC: 213 MG/DL (ref 0–200)
CO2 SERPL-SCNC: 25 MMOL/L (ref 22–29)
CREAT SERPL-MCNC: 1.57 MG/DL (ref 0.57–1)
DEPRECATED RDW RBC AUTO: 39.3 FL (ref 37–54)
EGFRCR SERPLBLD CKD-EPI 2021: 36.7 ML/MIN/1.73
ERYTHROCYTE [DISTWIDTH] IN BLOOD BY AUTOMATED COUNT: 12.6 % (ref 12.3–15.4)
GLOBULIN UR ELPH-MCNC: 2.5 GM/DL
GLUCOSE SERPL-MCNC: 95 MG/DL (ref 65–99)
HCT VFR BLD AUTO: 38.9 % (ref 34–46.6)
HDLC SERPL-MCNC: 45 MG/DL (ref 40–60)
HGB BLD-MCNC: 12.8 G/DL (ref 12–15.9)
LDLC SERPL CALC-MCNC: 131 MG/DL (ref 0–100)
LDLC/HDLC SERPL: 2.82 {RATIO}
MCH RBC QN AUTO: 28.7 PG (ref 26.6–33)
MCHC RBC AUTO-ENTMCNC: 32.9 G/DL (ref 31.5–35.7)
MCV RBC AUTO: 87.2 FL (ref 79–97)
PLATELET # BLD AUTO: 244 10*3/MM3 (ref 140–450)
PMV BLD AUTO: 10.4 FL (ref 6–12)
POTASSIUM SERPL-SCNC: 4.8 MMOL/L (ref 3.5–5.2)
PROT SERPL-MCNC: 7 G/DL (ref 6–8.5)
RBC # BLD AUTO: 4.46 10*6/MM3 (ref 3.77–5.28)
SODIUM SERPL-SCNC: 137 MMOL/L (ref 136–145)
TRIGL SERPL-MCNC: 206 MG/DL (ref 0–150)
TSH SERPL DL<=0.05 MIU/L-ACNC: 2.22 UIU/ML (ref 0.27–4.2)
VIT B12 BLD-MCNC: 1996 PG/ML (ref 211–946)
VLDLC SERPL-MCNC: 37 MG/DL (ref 5–40)
WBC NRBC COR # BLD: 5.86 10*3/MM3 (ref 3.4–10.8)

## 2023-04-18 ENCOUNTER — OFFICE VISIT (OUTPATIENT)
Dept: FAMILY MEDICINE CLINIC | Facility: CLINIC | Age: 63
End: 2023-04-18
Payer: MEDICARE

## 2023-04-18 VITALS
HEIGHT: 63 IN | HEART RATE: 63 BPM | WEIGHT: 187.4 LBS | BODY MASS INDEX: 33.2 KG/M2 | OXYGEN SATURATION: 96 % | DIASTOLIC BLOOD PRESSURE: 74 MMHG | SYSTOLIC BLOOD PRESSURE: 112 MMHG

## 2023-04-18 DIAGNOSIS — E78.2 MIXED HYPERLIPIDEMIA: ICD-10-CM
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
## 2023-04-18 DIAGNOSIS — E53.8 B12 DEFICIENCY: ICD-10-CM

## 2023-04-18 DIAGNOSIS — I50.32 CHRONIC DIASTOLIC CONGESTIVE HEART FAILURE: ICD-10-CM

## 2023-04-18 DIAGNOSIS — R25.1 TREMOR: ICD-10-CM

## 2023-04-18 DIAGNOSIS — I10 PRIMARY HYPERTENSION: ICD-10-CM

## 2023-04-18 DIAGNOSIS — M54.42 ACUTE BILATERAL LOW BACK PAIN WITH BILATERAL SCIATICA: ICD-10-CM

## 2023-04-18 DIAGNOSIS — J44.9 CHRONIC OBSTRUCTIVE PULMONARY DISEASE, UNSPECIFIED COPD TYPE: ICD-10-CM

## 2023-04-18 DIAGNOSIS — N18.32 STAGE 3B CHRONIC KIDNEY DISEASE: Primary | ICD-10-CM

## 2023-04-18 DIAGNOSIS — M54.41 ACUTE BILATERAL LOW BACK PAIN WITH BILATERAL SCIATICA: ICD-10-CM

## 2023-04-18 DIAGNOSIS — E55.9 VITAMIN D DEFICIENCY: ICD-10-CM

## 2023-04-18 RX ORDER — AMLODIPINE BESYLATE 5 MG/1
5 TABLET ORAL DAILY
Qty: 90 TABLET | Refills: 1 | Status: SHIPPED | OUTPATIENT
Start: 2023-04-18

## 2023-04-18 RX ORDER — BUDESONIDE, GLYCOPYRROLATE, AND FORMOTEROL FUMARATE 160; 9; 4.8 UG/1; UG/1; UG/1
2 AEROSOL, METERED RESPIRATORY (INHALATION) 2 TIMES DAILY
Qty: 1 EACH | Refills: 11 | Status: SHIPPED | OUTPATIENT
Start: 2023-04-18

## 2023-04-18 RX ORDER — ERGOCALCIFEROL 1.25 MG/1
50000 CAPSULE ORAL WEEKLY
Qty: 15 CAPSULE | Refills: 3 | Status: SHIPPED | OUTPATIENT
Start: 2023-04-18

## 2023-04-18 RX ORDER — METHYLPREDNISOLONE 4 MG/1
TABLET ORAL
Qty: 21 TABLET | Refills: 0 | Status: SHIPPED | OUTPATIENT
Start: 2023-04-18

## 2023-04-18 RX ORDER — POTASSIUM CHLORIDE 750 MG/1
TABLET, FILM COATED, EXTENDED RELEASE ORAL
COMMUNITY
Start: 2022-12-26

## 2023-04-18 RX ORDER — ONDANSETRON 4 MG/1
4 TABLET, FILM COATED ORAL EVERY 8 HOURS PRN
Qty: 45 TABLET | Refills: 2 | Status: SHIPPED | OUTPATIENT
Start: 2023-04-18

## 2023-04-18 RX ORDER — POTASSIUM CHLORIDE 1500 MG/1
1 TABLET, EXTENDED RELEASE ORAL DAILY
COMMUNITY
Start: 2023-03-14 | End: 2023-04-18 | Stop reason: SDUPTHER

## 2023-04-18 NOTE — PROGRESS NOTES
Chief Complaint  Chief Complaint   Patient presents with   • Follow-up     3 month follow up    • Back Pain     Pt says a few days ago she started having lower back pain. Pt says she did not pull a muscle or injure her back, she says she woke up and her back was throbbing            Subjective          Jasmine Cerda presents to [facility] for   History of Present Illness     CKD stg 3, have been monitoring labs, she is on Bumex 2mg once daily with KCl 10meq, has eliminated NSAIDs and only drinks water intake.      Tremor, patient following with neurology on Sinemet 4 times per day, MRI of the brain was normal with mild chronic microvascular disease features 12/22/2022. She reports minimal improvement in tremor     COPD stable on breztri and albuterol, insurance does not cover Breztri well and she is asking for samples. She denies BAY, wheezing, shortness of air, does have occasional cough    Backache, low back, no known injury as mentioned in cc. She reports she swept the floor the day prior, has only been taking Tylenol with minimal relief.  The pain is a dull ache across the entire low back and radiating into both legs.  She does report a known injury fall several weeks ago, she uses a walker occasionally for long distance    Here to review labs collected 4/11/2023      The following portions of the patient's history were reviewed and updated as appropriate: allergies, current medications, past family history, past medical history, past social history, past surgical history and problem list.    Past Medical History:   Diagnosis Date   • Anxiety    • Breast cyst    • CHF (congestive heart failure)    • COPD (chronic obstructive pulmonary disease)    • Coronary heart disease    • Depression    • Hyperlipidemia    • Hypertension      Past Surgical History:   Procedure Laterality Date   • APPENDECTOMY     • BREAST CYST ASPIRATION     • CARDIAC CATHETERIZATION  07/2017    No Stents  placed - [facility]   • CERVICAL FUSION      C6-C7   •  SECTION      x 2   • CHOLECYSTECTOMY     • HYSTERECTOMY      partial     Family History   Problem Relation Age of Onset   • Colon cancer Mother    • Diabetes Father    • Pulmonary embolism Brother    • Heart failure Brother    • Breast cancer Maternal Aunt      Social History     Tobacco Use   • Smoking status: Never   • Smokeless tobacco: Never   • Tobacco comments:     Passive Smoke: N   Substance Use Topics   • Alcohol use: No       Current Outpatient Medications:   •  albuterol (PROVENTIL) (2.5 MG/3ML) 0.083% nebulizer solution, INHALE 1 VIAL VIA NEBULIZER DAILY AS NEEDED FOR WHEEZING, Disp: 300 mL, Rfl: 1  •  ALPRAZolam (XANAX) 0.5 MG tablet, Take 1 tablet by mouth 3 (Three) Times a Day As Needed for Anxiety., Disp: 90 tablet, Rfl: 3  •  ARIPiprazole (ABILIFY) 10 MG tablet, Take 1 tablet by mouth Daily. OUt of medication, will pic up tomorrow, Disp: 30 tablet, Rfl: 3  •  aspirin 81 MG EC tablet, ASPIRIN EC 81 MG TBEC daily, Disp: , Rfl:   •  Budeson-Glycopyrrol-Formoterol (Breztri Aerosphere) 160-9-4.8 MCG/ACT aerosol inhaler, Inhale 2 puffs 2 (Two) Times a Day., Disp: 1 each, Rfl: 11  •  bumetanide (BUMEX) 2 MG tablet, TAKE 1 TABLET TWICE DAILY (NEED MD APPOINTMENT FOR REFILLS), Disp: 180 tablet, Rfl: 1  •  carbidopa-levodopa (SINEMET)  MG per tablet, Take 1 tab at: 6 am, 10 am, 2pm, 7 pm, Disp: 120 tablet, Rfl: 6  •  carvedilol (COREG) 25 MG tablet, TAKE 1 TABLET TWICE DAILY, Disp: 180 tablet, Rfl: 1  •  citalopram (CeleXA) 40 MG tablet, Take 1 tablet by mouth Every Morning., Disp: 90 tablet, Rfl: 1  •  cyanocobalamin 1000 MCG/ML injection, INJECT 1 ML INTO THE APPROPRIATE MUSCLE AS DIRECTED BY PRESCRIBER EVERY 28 (TWENTY-EIGHT) DAYS., Disp: 3 mL, Rfl: 1  •  Fluzone Quadrivalent 0.5 ML suspension prefilled syringe injection, , Disp: , Rfl:   •  gabapentin (NEURONTIN) 300 MG capsule, Take 1 capsule by mouth 3 (Three) Times a Day., Disp: 270  "capsule, Rfl: 1  •  hydrOXYzine pamoate (VISTARIL) 25 MG capsule, TAKE 1 CAPSULE BY MOUTH 3 (THREE) TIMES A DAY AS NEEDED FOR ANXIETY., Disp: 270 capsule, Rfl: 1  •  metoclopramide (REGLAN) 10 MG tablet, TAKE 1 TABLET BY MOUTH 3 (THREE) TIMES A DAY BEFORE MEALS., Disp: 90 tablet, Rfl: 3  •  Multiple Vitamins-Minerals (MULTIVITAMIN ADULT) tablet, Take 1 tablet by mouth Daily. With Omega XL, Disp: , Rfl:   •  nitroglycerin (NITROSTAT) 0.4 MG SL tablet, NITROSTAT 0.4 MG SUBL AS NEEDED CHEST PAIN, Disp: , Rfl:   •  ondansetron (ZOFRAN) 4 MG tablet, Take 1 tablet by mouth Every 8 (Eight) Hours As Needed for Nausea or Vomiting., Disp: 45 tablet, Rfl: 2  •  pantoprazole (PROTONIX) 40 MG EC tablet, Take 1 tablet by mouth Daily., Disp: 90 tablet, Rfl: 3  •  potassium chloride 10 MEQ CR tablet, , Disp: , Rfl:   •  rosuvastatin (CRESTOR) 40 MG tablet, TAKE 1 TABLET BY MOUTH EVERY DAY IN THE EVENING, Disp: 90 tablet, Rfl: 1  •  Syringe/Needle, Disp, 23G X 1\" 3 ML misc, Use to inject B12 every 30 days intramuscularly, Disp: 12 each, Rfl: 0  •  traZODone (DESYREL) 100 MG tablet, Take 1-2 tablets by mouth every night at bedtime., Disp: 180 tablet, Rfl: 1  •  vitamin D (ERGOCALCIFEROL) 1.25 MG (27613 UT) capsule capsule, Take 1 capsule by mouth 1 (One) Time Per Week. Pt is out of this, picking it up tomorrow, Disp: 15 capsule, Rfl: 3  •  amLODIPine (NORVASC) 5 MG tablet, Take 1 tablet by mouth Daily., Disp: 90 tablet, Rfl: 1  •  methylPREDNISolone (MEDROL) 4 MG dose pack, Take as directed on package instructions., Disp: 21 tablet, Rfl: 0    Objective   Vital Signs:   /74 (BP Location: Left arm, Patient Position: Sitting, Cuff Size: Adult)   Pulse 63   Ht 160 cm (62.99\")   Wt 85 kg (187 lb 6.4 oz)   SpO2 96%   BMI 33.20 kg/m²           Physical Exam  Vitals and nursing note reviewed.   Constitutional:       General: She is not in acute distress.     Appearance: Normal appearance. She is well-developed. She is obese. She " is not diaphoretic.   Eyes:      Pupils: Pupils are equal, round, and reactive to light.   Neck:      Thyroid: No thyromegaly.      Vascular: No JVD.   Cardiovascular:      Rate and Rhythm: Normal rate and regular rhythm.      Heart sounds: Normal heart sounds. No murmur heard.  Pulmonary:      Effort: Pulmonary effort is normal. No respiratory distress.      Breath sounds: Normal breath sounds.   Abdominal:      General: Bowel sounds are normal. There is no distension.      Palpations: Abdomen is soft.      Tenderness: There is no abdominal tenderness.   Musculoskeletal:         General: Tenderness (Low back, mild TTP, mild boggs rom) present. No swelling. Normal range of motion.      Cervical back: Normal range of motion and neck supple.   Skin:     General: Skin is warm and dry.   Neurological:      General: No focal deficit present.      Mental Status: She is alert and oriented to person, place, and time. Mental status is at baseline.      Sensory: No sensory deficit.      Motor: Tremor (BUE and tongue ) present.   Psychiatric:         Mood and Affect: Mood normal.         Behavior: Behavior normal.         Thought Content: Thought content normal.         Judgment: Judgment normal.          Result Review :     No visits with results within 7 Day(s) from this visit.   Latest known visit with results is:   Clinical Support on 04/11/2023   Component Date Value Ref Range Status   • WBC 04/11/2023 5.86  3.40 - 10.80 10*3/mm3 Final   • RBC 04/11/2023 4.46  3.77 - 5.28 10*6/mm3 Final   • Hemoglobin 04/11/2023 12.8  12.0 - 15.9 g/dL Final   • Hematocrit 04/11/2023 38.9  34.0 - 46.6 % Final   • MCV 04/11/2023 87.2  79.0 - 97.0 fL Final   • MCH 04/11/2023 28.7  26.6 - 33.0 pg Final   • MCHC 04/11/2023 32.9  31.5 - 35.7 g/dL Final   • RDW 04/11/2023 12.6  12.3 - 15.4 % Final   • RDW-SD 04/11/2023 39.3  37.0 - 54.0 fl Final   • MPV 04/11/2023 10.4  6.0 - 12.0 fL Final   • Platelets 04/11/2023 244  140 - 450 10*3/mm3 Final   •  Glucose 04/11/2023 95  65 - 99 mg/dL Final   • BUN 04/11/2023 24 (H)  8 - 23 mg/dL Final   • Creatinine 04/11/2023 1.57 (H)  0.57 - 1.00 mg/dL Final   • Sodium 04/11/2023 137  136 - 145 mmol/L Final   • Potassium 04/11/2023 4.8  3.5 - 5.2 mmol/L Final   • Chloride 04/11/2023 103  98 - 107 mmol/L Final   • CO2 04/11/2023 25.0  22.0 - 29.0 mmol/L Final   • Calcium 04/11/2023 10.3  8.6 - 10.5 mg/dL Final   • Total Protein 04/11/2023 7.0  6.0 - 8.5 g/dL Final   • Albumin 04/11/2023 4.5  3.5 - 5.2 g/dL Final   • ALT (SGPT) 04/11/2023 5  1 - 33 U/L Final   • AST (SGOT) 04/11/2023 13  1 - 32 U/L Final   • Alkaline Phosphatase 04/11/2023 59  39 - 117 U/L Final   • Total Bilirubin 04/11/2023 0.3  0.0 - 1.2 mg/dL Final   • Globulin 04/11/2023 2.5  gm/dL Final   • A/G Ratio 04/11/2023 1.8  g/dL Final   • BUN/Creatinine Ratio 04/11/2023 15.3  7.0 - 25.0 Final   • Anion Gap 04/11/2023 9.0  5.0 - 15.0 mmol/L Final   • eGFR 04/11/2023 36.7 (L)  >60.0 mL/min/1.73 Final   • Total Cholesterol 04/11/2023 213 (H)  0 - 200 mg/dL Final   • Triglycerides 04/11/2023 206 (H)  0 - 150 mg/dL Final   • HDL Cholesterol 04/11/2023 45  40 - 60 mg/dL Final   • LDL Cholesterol  04/11/2023 131 (H)  0 - 100 mg/dL Final   • VLDL Cholesterol 04/11/2023 37  5 - 40 mg/dL Final   • LDL/HDL Ratio 04/11/2023 2.82   Final   • TSH 04/11/2023 2.220  0.270 - 4.200 uIU/mL Final   • 25 Hydroxy, Vitamin D 04/11/2023 85.4  30.0 - 100.0 ng/ml Final   • Vitamin B-12 04/11/2023 1,996 (H)  211 - 946 pg/mL Final                  BMI is >= 30 and <35. (Class 1 Obesity). The following options were offered after discussion;: exercise counseling/recommendations and nutrition counseling/recommendations           Assessment and Plan    Diagnoses and all orders for this visit:    1. Stage 3b chronic kidney disease (Primary)  Comments:  stop lisinopril, start amlodopine  continue atenolol    2. Chronic obstructive pulmonary disease, unspecified COPD type  -      Budeson-Glycopyrrol-Formoterol (Breztri Aerosphere) 160-9-4.8 MCG/ACT aerosol inhaler; Inhale 2 puffs 2 (Two) Times a Day.  Dispense: 1 each; Refill: 11    3. B12 deficiency    4. Acute bilateral low back pain with bilateral sciatica    5. Mixed hyperlipidemia    6. Primary hypertension    7. Tremor    8. Vitamin D deficiency    9. Chronic diastolic congestive heart failure    10. Accidental fall, initial encounter    Other orders  -     amLODIPine (NORVASC) 5 MG tablet; Take 1 tablet by mouth Daily.  Dispense: 90 tablet; Refill: 1  -     vitamin D (ERGOCALCIFEROL) 1.25 MG (94120 UT) capsule capsule; Take 1 capsule by mouth 1 (One) Time Per Week. Pt is out of this, picking it up tomorrow  Dispense: 15 capsule; Refill: 3  -     ondansetron (ZOFRAN) 4 MG tablet; Take 1 tablet by mouth Every 8 (Eight) Hours As Needed for Nausea or Vomiting.  Dispense: 45 tablet; Refill: 2  -     methylPREDNISolone (MEDROL) 4 MG dose pack; Take as directed on package instructions.  Dispense: 21 tablet; Refill: 0      -Reviewed labs with patient, continued decline of renal function with GFR 36.7, will discontinue lisinopril and start amlodipine  -Recheck BMP in 2 weeks, consider referral to nephrology  -Refill medications as above, otherwise continue medication regimen  -Follow-up with neurology, psychiatry and cardiology as directed  -Start Medrol Dosepak for low backache, cont tylenol and rec heat prn  -Patient provided Breztri samples  -Continue B12 injections at home monthly      I spent 30 minutes caring for Jasmine Cerda on this date of service. This time includes time spent by me in the following activities: preparing for the visit, reviewing tests, performing a medically appropriate examination and/or evaluation , counseling and educating the patient/family/caregiver, ordering medications, tests, or procedures and documenting information in the medical record        Follow Up     Return in about 3 months (around 7/18/2023) for  Recheck. bmp in 2 weeks. bmp prior to next appt.  Patient was given instructions and counseling regarding her condition or for health maintenance advice. Please see specific information pulled into the AVS if appropriate.        Part of this note may be an electronic transcription/translation of spoken language to printed text using the Dragon Dictation System

## 2023-04-24 DIAGNOSIS — I50.32 CHRONIC DIASTOLIC CONGESTIVE HEART FAILURE: ICD-10-CM

## 2023-04-24 RX ORDER — BUMETANIDE 2 MG/1
TABLET ORAL
Qty: 180 TABLET | Refills: 1 | OUTPATIENT
Start: 2023-04-24

## 2023-04-24 RX ORDER — CARVEDILOL 25 MG/1
25 TABLET ORAL 2 TIMES DAILY
Qty: 180 TABLET | Refills: 1 | OUTPATIENT
Start: 2023-04-24

## 2023-04-24 NOTE — TELEPHONE ENCOUNTER
Caller: Jasmine Cerda    Relationship: Self    Best call back number: [phone] (Home)    Requested Prescriptions:   Requested Prescriptions     Pending Prescriptions Disp Refills   • carvedilol (COREG) 25 MG tablet 180 tablet 1     Sig: Take 1 tablet by mouth 2 (Two) Times a Day.        Pharmacy where request should be sent: [facility] [address] [place] [facility] [address] [facility] [phone] [facility] [phone]      Last office visit with prescribing clinician: 4/18/2023   Last telemedicine visit with prescribing clinician: 5/2/2023   Next office visit with prescribing clinician: 7/24/2023     Additional details provided by patient:     Does the patient have less than a 3 day supply:  [x] Yes  [] No    Would you like a call back once the refill request has been completed: [] Yes [] No    If the office needs to give you a call back, can they leave a voicemail: [] Yes [] No    Kailash Velasquez   04/24/23 12:04 EDT

## 2023-04-26 DIAGNOSIS — I50.32 CHRONIC DIASTOLIC CONGESTIVE HEART FAILURE: ICD-10-CM

## 2023-04-26 RX ORDER — CARVEDILOL 25 MG/1
25 TABLET ORAL 2 TIMES DAILY
Qty: 180 TABLET | Refills: 0 | Status: SHIPPED | OUTPATIENT
Start: 2023-04-26

## 2023-04-26 RX ORDER — BUMETANIDE 2 MG/1
2 TABLET ORAL 2 TIMES DAILY
Qty: 180 TABLET | Refills: 0 | Status: SHIPPED | OUTPATIENT
Start: 2023-04-26

## 2023-04-26 NOTE — TELEPHONE ENCOUNTER
Patient has been notified to continue with RX she needs an appt to see Dr Kwon, She verbalized understanding

## 2023-05-02 ENCOUNTER — CLINICAL SUPPORT (OUTPATIENT)
Dept: FAMILY MEDICINE CLINIC | Facility: CLINIC | Age: 63
End: 2023-05-02
Payer: MEDICARE

## 2023-05-02 DIAGNOSIS — N18.32 STAGE 3B CHRONIC KIDNEY DISEASE: Primary | ICD-10-CM

## 2023-05-02 PROCEDURE — 36415 COLL VENOUS BLD VENIPUNCTURE: CPT | Performed by: NURSE PRACTITIONER

## 2023-05-02 PROCEDURE — 80048 BASIC METABOLIC PNL TOTAL CA: CPT | Performed by: NURSE PRACTITIONER

## 2023-05-02 NOTE — PROGRESS NOTES
Venipuncture Blood Specimen Collection  Venipuncture performed in right arm by Marielena Astudillo MA with good hemostasis. Patient tolerated the procedure well without complications.   05/02/23   Marielena Astudillo MA

## 2023-05-03 LAB
ANION GAP SERPL CALCULATED.3IONS-SCNC: 7.4 MMOL/L (ref 5–15)
BUN SERPL-MCNC: 18 MG/DL (ref 8–23)
BUN/CREAT SERPL: 17.6 (ref 7–25)
CALCIUM SPEC-SCNC: 10.7 MG/DL (ref 8.6–10.5)
CHLORIDE SERPL-SCNC: 106 MMOL/L (ref 98–107)
CO2 SERPL-SCNC: 29.6 MMOL/L (ref 22–29)
CREAT SERPL-MCNC: 1.02 MG/DL (ref 0.57–1)
EGFRCR SERPLBLD CKD-EPI 2021: 61.6 ML/MIN/1.73
GLUCOSE SERPL-MCNC: 89 MG/DL (ref 65–99)
POTASSIUM SERPL-SCNC: 4.1 MMOL/L (ref 3.5–5.2)
SODIUM SERPL-SCNC: 143 MMOL/L (ref 136–145)

## 2023-05-04 ENCOUNTER — OFFICE VISIT (OUTPATIENT)
Dept: PSYCHIATRY | Facility: CLINIC | Age: 63
End: 2023-05-04
Payer: MEDICARE

## 2023-05-04 DIAGNOSIS — F33.2 SEVERE EPISODE OF RECURRENT MAJOR DEPRESSIVE DISORDER, WITHOUT PSYCHOTIC FEATURES: Primary | Chronic | ICD-10-CM

## 2023-05-04 DIAGNOSIS — F43.12 CHRONIC POST-TRAUMATIC STRESS DISORDER (PTSD): Chronic | ICD-10-CM

## 2023-05-04 DIAGNOSIS — F51.04 PSYCHOPHYSIOLOGICAL INSOMNIA: Chronic | ICD-10-CM

## 2023-05-04 PROCEDURE — 1159F MED LIST DOCD IN RCRD: CPT | Performed by: PSYCHIATRY & NEUROLOGY

## 2023-05-04 PROCEDURE — 1160F RVW MEDS BY RX/DR IN RCRD: CPT | Performed by: PSYCHIATRY & NEUROLOGY

## 2023-05-04 PROCEDURE — 99214 OFFICE O/P EST MOD 30 MIN: CPT | Performed by: PSYCHIATRY & NEUROLOGY

## 2023-05-04 RX ORDER — ARIPIPRAZOLE 10 MG/1
10 TABLET ORAL DAILY
Qty: 30 TABLET | Refills: 3 | Status: SHIPPED | OUTPATIENT
Start: 2023-05-04

## 2023-05-04 RX ORDER — CITALOPRAM 40 MG/1
40 TABLET ORAL EVERY MORNING
Qty: 90 TABLET | Refills: 1 | Status: SHIPPED | OUTPATIENT
Start: 2023-05-04

## 2023-05-04 RX ORDER — GABAPENTIN 300 MG/1
300 CAPSULE ORAL 3 TIMES DAILY
Qty: 270 CAPSULE | Refills: 1 | Status: SHIPPED | OUTPATIENT
Start: 2023-05-04

## 2023-05-04 RX ORDER — MIRTAZAPINE 15 MG/1
15 TABLET, FILM COATED ORAL NIGHTLY
Qty: 30 TABLET | Refills: 2 | Status: SHIPPED | OUTPATIENT
Start: 2023-05-04 | End: 2024-05-03

## 2023-05-04 RX ORDER — ALPRAZOLAM 0.5 MG/1
0.5 TABLET ORAL 3 TIMES DAILY PRN
Qty: 90 TABLET | Refills: 3 | Status: SHIPPED | OUTPATIENT
Start: 2023-05-04

## 2023-05-04 NOTE — PROGRESS NOTES
Subjective   Jasmine Cerda is a 62 y.o. female who presents today for follow up     Chief Complaint:   Increased Depression     History of Present Illness:   The pt suffers from depression for a long time, was on multiple meds    Today the pt reported feeling more depressed, no motivations , no desires to do anything, does not work in the garden, did not plant flowers, no E  Decreased appetite, 10 lbs weight loss    poor sleep , trazodone is not effective      Depression is rated as 8/10, she was dsd with parkinsons disease   The pt saw neurologist , was started on sinement , however she is on abilify and reglan that can cause tremor   Neurologist was ok with those meds      When depressed -  decreased E level. Poor motivations, low drives   denied AVH/SI/HI   anxiety is pretty intense and persistent, unable to relax,            Sleep - difficult to fall asleep and stay asleep     Precipitating and Ameliorating Factors: relationship problems   Alleviating factors - to spend time with her grand daughter         PAST PSYCHIATRIC HISTORY      Previous Psychiatric Diagnoses   Axis I: Anxiety/Panic Disorder     Past Hospitalizations or Residential Treatment   Locations\Providers: none      Past Outpatient Treatment   Diagnosis Treated: Anxiety/Panic Dis.  Treatment Type: Medication Management  Location: with PCP, Dr Manning      Prior Psychiatric Medications   Comments: xanax - effective      celexa- not effective   zoloft - not effective  cymbalta - not effective      Prozac - GI sxs   ambien - side effects   Risperidone - not effective and side effects      Consequences of Mental Disorder   Consequences: emotional distress     SOCIAL HISTORY     Number of children: 2  Number of grandkids: 1  Current Relationship is: supportive  Family of Origin is: supportive  Comments: Patient has never smoked.  Passive Smoke: N  Alcohol Use: N  Drug Use: N  HIV/High Risk: N        Current Living Situation   Lives with:  spouse     Education   Level: high school graduate     Employment   Job Status: disabled     Hobbies and Leisure Activities   Activity Type: quiet activities     Smoking History   Smoking Hx: Never smoker     Exercise   Exercise sessions (per wk): 1     Illicit Drug Use   Illicit Drugs used: no     FAMILY HISTORY OF MENTAL DISORDERS   fh Grandparents: Non-contributory  fh Mother: Non-contributory  fh Father: Non-contributory  fh Siblings: Non-contributory  fh Other: Non-contributory  The following portions of the patient's history were reviewed and updated as appropriate: allergies, current medications, past family history, past medical history, past social history, past surgical history and problem list.            Interval History  Depression - worse       Side Effects  Denied       Past Medical History:  Past Medical History:   Diagnosis Date   • Anxiety    • Breast cyst    • CHF (congestive heart failure)    • COPD (chronic obstructive pulmonary disease)    • Coronary heart disease    • Depression    • Hyperlipidemia    • Hypertension        Social History:  Social History     Socioeconomic History   • Marital status:    Tobacco Use   • Smoking status: Never   • Smokeless tobacco: Never   • Tobacco comments:     Passive Smoke: N   Vaping Use   • Vaping Use: Never used   Substance and Sexual Activity   • Alcohol use: No   • Drug use: No   • Sexual activity: Defer       Family History:  Family History   Problem Relation Age of Onset   • Colon cancer Mother    • Diabetes Father    • Pulmonary embolism Brother    • Heart failure Brother    • Breast cancer Maternal Aunt        Past Surgical History:  Past Surgical History:   Procedure Laterality Date   • APPENDECTOMY     • BREAST CYST ASPIRATION     • CARDIAC CATHETERIZATION  2017    No Stents placed - [facility]   • CERVICAL FUSION      C6-C7   •  SECTION      x 2   • CHOLECYSTECTOMY     • HYSTERECTOMY      partial       Problem List:  Patient Active  Problem List   Diagnosis   • Chronic post-traumatic stress disorder (PTSD)   • Severe episode of recurrent major depressive disorder   • Asthma   • Chronic low back pain   • Congestive heart failure   • Coronary heart disease   • Degeneration of intervertebral disc of lumbosacral region   • Headache, temporal   • Psychophysiological insomnia   • Hyperlipidemia   • Hypertension   • Vitamin D deficiency   • Other fatigue   • Preventative health care   • Hyperglycemia, unspecified    • Nausea   • Stable angina pectoris       Allergy:   No Known Allergies     Discontinued Medications:  Medications Discontinued During This Encounter   Medication Reason   • traZODone (DESYREL) 100 MG tablet Not Efficacious   • citalopram (CeleXA) 40 MG tablet Reorder   • ALPRAZolam (XANAX) 0.5 MG tablet Reorder   • ARIPiprazole (ABILIFY) 10 MG tablet Reorder   • gabapentin (NEURONTIN) 300 MG capsule Reorder       Current Medications:   Current Outpatient Medications   Medication Sig Dispense Refill   • ALPRAZolam (XANAX) 0.5 MG tablet Take 1 tablet by mouth 3 (Three) Times a Day As Needed for Anxiety. 90 tablet 3   • ARIPiprazole (ABILIFY) 10 MG tablet Take 1 tablet by mouth Daily. OUt of medication, will pic up tomorrow 30 tablet 3   • citalopram (CeleXA) 40 MG tablet Take 1 tablet by mouth Every Morning. 90 tablet 1   • gabapentin (NEURONTIN) 300 MG capsule Take 1 capsule by mouth 3 (Three) Times a Day. 270 capsule 1   • albuterol (PROVENTIL) (2.5 MG/3ML) 0.083% nebulizer solution INHALE 1 VIAL VIA NEBULIZER DAILY AS NEEDED FOR WHEEZING 300 mL 1   • amLODIPine (NORVASC) 5 MG tablet Take 1 tablet by mouth Daily. 90 tablet 1   • aspirin 81 MG EC tablet ASPIRIN EC 81 MG TBEC daily     • Budeson-Glycopyrrol-Formoterol (Breztri Aerosphere) 160-9-4.8 MCG/ACT aerosol inhaler Inhale 2 puffs 2 (Two) Times a Day. 1 each 11   • bumetanide (BUMEX) 2 MG tablet Take 1 tablet by mouth 2 (Two) Times a Day. 180 tablet 0   • carbidopa-levodopa (SINEMET)  " MG per tablet Take 1 tab at: 6 am, 10 am, 2pm, 7 pm 120 tablet 6   • carvedilol (COREG) 25 MG tablet Take 1 tablet by mouth 2 (Two) Times a Day. 180 tablet 0   • cyanocobalamin 1000 MCG/ML injection INJECT 1 ML INTO THE APPROPRIATE MUSCLE AS DIRECTED BY PRESCRIBER EVERY 28 (TWENTY-EIGHT) DAYS. 3 mL 1   • Fluzone Quadrivalent 0.5 ML suspension prefilled syringe injection      • hydrOXYzine pamoate (VISTARIL) 25 MG capsule TAKE 1 CAPSULE BY MOUTH 3 (THREE) TIMES A DAY AS NEEDED FOR ANXIETY. 270 capsule 1   • methylPREDNISolone (MEDROL) 4 MG dose pack Take as directed on package instructions. 21 tablet 0   • metoclopramide (REGLAN) 10 MG tablet TAKE 1 TABLET BY MOUTH 3 (THREE) TIMES A DAY BEFORE MEALS. 90 tablet 3   • mirtazapine (REMERON) 15 MG tablet Take 1 tablet by mouth Every Night. 30 tablet 2   • Multiple Vitamins-Minerals (MULTIVITAMIN ADULT) tablet Take 1 tablet by mouth Daily. With Omega XL     • nitroglycerin (NITROSTAT) 0.4 MG SL tablet NITROSTAT 0.4 MG SUBL AS NEEDED CHEST PAIN     • ondansetron (ZOFRAN) 4 MG tablet Take 1 tablet by mouth Every 8 (Eight) Hours As Needed for Nausea or Vomiting. 45 tablet 2   • pantoprazole (PROTONIX) 40 MG EC tablet Take 1 tablet by mouth Daily. 90 tablet 3   • potassium chloride 10 MEQ CR tablet      • rosuvastatin (CRESTOR) 40 MG tablet TAKE 1 TABLET BY MOUTH EVERY DAY IN THE EVENING 90 tablet 1   • Syringe/Needle, Disp, 23G X 1\" 3 ML misc Use to inject B12 every 30 days intramuscularly 12 each 0   • vitamin D (ERGOCALCIFEROL) 1.25 MG (77088 UT) capsule capsule Take 1 capsule by mouth 1 (One) Time Per Week. Pt is out of this, picking it up tomorrow 15 capsule 3     No current facility-administered medications for this visit.         Review of Symptoms:    Psychiatric/Behavioral: Negative for agitation, behavioral problems, confusion, decreased concentration, dysphoric mood, hallucinations, self-injury, sleep disturbance and suicidal ideas.   The patient is  Still   " depressed,  Still very  nervous/anxious and is not hyperactive.        Physical Exam:   There were no vitals taken for this visit.    Mental Status Exam:   Hygiene:   good  Cooperation:  Cooperative  Eye Contact:  Good  Psychomotor Behavior:  Appropriate  Affect:  Appropriate and Blunted  Mood: depressed and fluctates,  Anxious    Hopelessness: Denies  Speech:  Normal  Thought Process:  Goal directed and Linear  Thought Content:  Normal  Suicidal:  None  Homicidal:  None  Hallucinations:  None  Delusion:  None  Memory:  Intact  Orientation:  Person, Place, Time and Situation  Reliability:  fair  Insight:  Good  Judgement:  Good  Impulse Control:  Good  Physical/Medical Issues:  Yes GI issues        MSE from 1/5/23   reviewed and accepted with changes     PHQ-9 Depression Screening  Little interest or pleasure in doing things? 3-->nearly every day   Feeling down, depressed, or hopeless? 3-->nearly every day   Trouble falling or staying asleep, or sleeping too much? 2-->more than half the days   Feeling tired or having little energy? 2-->more than half the days   Poor appetite or overeating? 1-->several days   Feeling bad about yourself - or that you are a failure or have let yourself or your family down? 2-->more than half the days   Trouble concentrating on things, such as reading the newspaper or watching television? 1-->several days   Moving or speaking so slowly that other people could have noticed? Or the opposite - being so fidgety or restless that you have been moving around a lot more than usual? 0-->not at all   Thoughts that you would be better off dead, or of hurting yourself in some way? 0-->not at all   PHQ-9 Total Score 14   If you checked off any problems, how difficult have these problems made it for you to do your work, take care of things at home, or get along with other people? very difficult       Never smoker    I advised Jasmine of the risks of tobacco use.     Lab Results:   Clinical Support on  05/02/2023   Component Date Value Ref Range Status   • Glucose 05/02/2023 89  65 - 99 mg/dL Final   • BUN 05/02/2023 18  8 - 23 mg/dL Final   • Creatinine 05/02/2023 1.02 (H)  0.57 - 1.00 mg/dL Final   • Sodium 05/02/2023 143  136 - 145 mmol/L Final   • Potassium 05/02/2023 4.1  3.5 - 5.2 mmol/L Final   • Chloride 05/02/2023 106  98 - 107 mmol/L Final   • CO2 05/02/2023 29.6 (H)  22.0 - 29.0 mmol/L Final   • Calcium 05/02/2023 10.7 (H)  8.6 - 10.5 mg/dL Final   • BUN/Creatinine Ratio 05/02/2023 17.6  7.0 - 25.0 Final   • Anion Gap 05/02/2023 7.4  5.0 - 15.0 mmol/L Final   • eGFR 05/02/2023 61.6  >60.0 mL/min/1.73 Final   Clinical Support on 04/11/2023   Component Date Value Ref Range Status   • WBC 04/11/2023 5.86  3.40 - 10.80 10*3/mm3 Final   • RBC 04/11/2023 4.46  3.77 - 5.28 10*6/mm3 Final   • Hemoglobin 04/11/2023 12.8  12.0 - 15.9 g/dL Final   • Hematocrit 04/11/2023 38.9  34.0 - 46.6 % Final   • MCV 04/11/2023 87.2  79.0 - 97.0 fL Final   • MCH 04/11/2023 28.7  26.6 - 33.0 pg Final   • MCHC 04/11/2023 32.9  31.5 - 35.7 g/dL Final   • RDW 04/11/2023 12.6  12.3 - 15.4 % Final   • RDW-SD 04/11/2023 39.3  37.0 - 54.0 fl Final   • MPV 04/11/2023 10.4  6.0 - 12.0 fL Final   • Platelets 04/11/2023 244  140 - 450 10*3/mm3 Final   • Glucose 04/11/2023 95  65 - 99 mg/dL Final   • BUN 04/11/2023 24 (H)  8 - 23 mg/dL Final   • Creatinine 04/11/2023 1.57 (H)  0.57 - 1.00 mg/dL Final   • Sodium 04/11/2023 137  136 - 145 mmol/L Final   • Potassium 04/11/2023 4.8  3.5 - 5.2 mmol/L Final   • Chloride 04/11/2023 103  98 - 107 mmol/L Final   • CO2 04/11/2023 25.0  22.0 - 29.0 mmol/L Final   • Calcium 04/11/2023 10.3  8.6 - 10.5 mg/dL Final   • Total Protein 04/11/2023 7.0  6.0 - 8.5 g/dL Final   • Albumin 04/11/2023 4.5  3.5 - 5.2 g/dL Final   • ALT (SGPT) 04/11/2023 5  1 - 33 U/L Final   • AST (SGOT) 04/11/2023 13  1 - 32 U/L Final   • Alkaline Phosphatase 04/11/2023 59  39 - 117 U/L Final   • Total Bilirubin 04/11/2023 0.3   0.0 - 1.2 mg/dL Final   • Globulin 04/11/2023 2.5  gm/dL Final   • A/G Ratio 04/11/2023 1.8  g/dL Final   • BUN/Creatinine Ratio 04/11/2023 15.3  7.0 - 25.0 Final   • Anion Gap 04/11/2023 9.0  5.0 - 15.0 mmol/L Final   • eGFR 04/11/2023 36.7 (L)  >60.0 mL/min/1.73 Final   • Total Cholesterol 04/11/2023 213 (H)  0 - 200 mg/dL Final   • Triglycerides 04/11/2023 206 (H)  0 - 150 mg/dL Final   • HDL Cholesterol 04/11/2023 45  40 - 60 mg/dL Final   • LDL Cholesterol  04/11/2023 131 (H)  0 - 100 mg/dL Final   • VLDL Cholesterol 04/11/2023 37  5 - 40 mg/dL Final   • LDL/HDL Ratio 04/11/2023 2.82   Final   • TSH 04/11/2023 2.220  0.270 - 4.200 uIU/mL Final   • 25 Hydroxy, Vitamin D 04/11/2023 85.4  30.0 - 100.0 ng/ml Final   • Vitamin B-12 04/11/2023 1,996 (H)  211 - 946 pg/mL Final       Assessment & Plan   Problems Addressed this Visit        Mental Health    Chronic post-traumatic stress disorder (PTSD) (Chronic)    Relevant Medications    citalopram (CeleXA) 40 MG tablet    mirtazapine (REMERON) 15 MG tablet    gabapentin (NEURONTIN) 300 MG capsule    ARIPiprazole (ABILIFY) 10 MG tablet    ALPRAZolam (XANAX) 0.5 MG tablet    Severe episode of recurrent major depressive disorder - Primary (Chronic)    Relevant Medications    citalopram (CeleXA) 40 MG tablet    mirtazapine (REMERON) 15 MG tablet    ARIPiprazole (ABILIFY) 10 MG tablet    ALPRAZolam (XANAX) 0.5 MG tablet       Sleep    Psychophysiological insomnia (Chronic)    Relevant Medications    citalopram (CeleXA) 40 MG tablet    mirtazapine (REMERON) 15 MG tablet    ARIPiprazole (ABILIFY) 10 MG tablet    ALPRAZolam (XANAX) 0.5 MG tablet   Diagnoses       Codes Comments    Severe episode of recurrent major depressive disorder, without psychotic features    -  Primary ICD-10-CM: F33.2  ICD-9-CM: 296.33     Chronic post-traumatic stress disorder (PTSD)     ICD-10-CM: F43.12  ICD-9-CM: 309.81     Psychophysiological insomnia     ICD-10-CM: F51.04  ICD-9-CM: 307.42            Visit Diagnoses:    ICD-10-CM ICD-9-CM   1. Severe episode of recurrent major depressive disorder, without psychotic features  F33.2 296.33   2. Chronic post-traumatic stress disorder (PTSD)  F43.12 309.81   3. Psychophysiological insomnia  F51.04 307.42       TREATMENT PLAN/GOALS: Continue supportive psychotherapy efforts and medications as indicated. Treatment and medication options discussed during today's visit. Patient ackowledged and verbally consented to continue with current treatment plan and was educated on the importance of compliance with treatment and follow-up appointments.    MEDICATION ISSUES:  1. MDD - cont celexa 40 mg ,   Cont  abilify    10 mg for now   The stated it is effective and depression is more important than tremor   The pt is also on reglan TID, was suggested to discuss with PCP if possible to change meds   Neurology was ok with her meds   Add remeron 15 mg po QHS for depression and insomnia   2. PTSD - cont celexa 40 mg , Xanax - low dose 0.5 mg TID, no increase    alternate xanax  with vistaril PRN , long term benzo use discussed again    3. Insomnia -  D/c   trazodone - not effective  Start remeron 15 mg po QHS      4. Long term therapeutic drug monitoring - UDS  8/31/2021 - consistent  1/5/23 - consistent   LABORATORY - SCAN - [facility], [facility], 1/5/2023 (01/05/2023)     Psychotherapy was recommended , the is reluctant to start now,   cont supportive therapy,     INSPECT reviewed as expected, last xanax refill was on 4/22/23     Patient screened positive for depression based on a PHQ-9 score of 14 on 5/4/2023. Follow-up recommendations include: Prescribed antidepressant medication treatment.  PHQ scored 15 and indicated moderate depression   DWIGHT 7 scored 16      Discussed medication options and treatment plan of prescribed medication as well as the risks, benefits, and side effects including potential falls, possible impaired driving and metabolic adversities  among others. Patient is agreeable to call the office with any worsening of symptoms or onset of side effects. Patient is agreeable to call 911 or go to the nearest ER should he/she begin having SI/HI. No medication side effects or related complaints today.     MEDS ORDERED DURING VISIT:  New Medications Ordered This Visit   Medications   • citalopram (CeleXA) 40 MG tablet     Sig: Take 1 tablet by mouth Every Morning.     Dispense:  90 tablet     Refill:  1   • mirtazapine (REMERON) 15 MG tablet     Sig: Take 1 tablet by mouth Every Night.     Dispense:  30 tablet     Refill:  2   • gabapentin (NEURONTIN) 300 MG capsule     Sig: Take 1 capsule by mouth 3 (Three) Times a Day.     Dispense:  270 capsule     Refill:  1   • ARIPiprazole (ABILIFY) 10 MG tablet     Sig: Take 1 tablet by mouth Daily. OUt of medication, will pic up tomorrow     Dispense:  30 tablet     Refill:  3   • ALPRAZolam (XANAX) 0.5 MG tablet     Sig: Take 1 tablet by mouth 3 (Three) Times a Day As Needed for Anxiety.     Dispense:  90 tablet     Refill:  3     Please dispense when it is due       Return in about 3 months (around 8/4/2023).         This document has been electronically signed by Sivan Ken MD  May 4, 2023 09:10 EDT

## 2023-05-14 PROBLEM — I50.32 CHRONIC DIASTOLIC HEART FAILURE: Status: ACTIVE | Noted: 2019-01-24

## 2023-05-14 NOTE — PROGRESS NOTES
Cardiology Office Follow Up Visit      Primary Care Provider:  Eleni Miranda APRN    Reason for f/u:     F/U CAD, Heart Failure      Subjective       History of Present Illness       Jasmine Cerda is a 62 y.o. female seen in clinic today for continued cardiac care of chronic diastolic heart failure and moderate non-obstructive CAD of the LAD per cath in 2017.  Last nuclear stress test 2021 showed no ischemia.  Last 2D echo in 2021 showed an EF = 55% with grade 1 diastolic dysfunction and mild MR.  PMH includes labile HTN, HLD, and CKD stage 3.        Patient c/o intermittent mid to left chest pain radiating to her left arm at rest accompanied by SOA.  Sometimes she has to lie down for 10 minutes before resolution of symptoms.  She has not taken any SL nitroglycerin.  She also c/o unusual SOA and fatigue with activities such her 1/2 mile twice a daily walks.  She c/o orthopnea, in which she has been sleeping propped up on the couch and a 12 lb weight gain in the last 2 months.  She was recently taken off of lisinopril d/t renal dysfunction.  Her statin therapy was also increased recently as her lipids were not at goal.  FLP 4/2023: , HDL 45, , .  BMP 5/2: Na 143, K 4.1, BUN 18, Cr 1.02.  TSH WNL.        ASSESSMENT/PLAN:      Diagnoses and all orders for this visit:    1. Chest pain, unspecified type (Primary)  -     Adult Transthoracic Echo Complete W/ Cont if Necessary Per Protocol; Future  -     Stress Test With Myocardial Perfusion One Day; Future    2. Other form of dyspnea  -     Adult Transthoracic Echo Complete W/ Cont if Necessary Per Protocol; Future  -     Stress Test With Myocardial Perfusion One Day; Future    3. Coronary artery disease involving native heart, unspecified vessel or lesion type, unspecified whether angina present  -     Stress Test With Myocardial Perfusion One Day; Future    4. Chronic diastolic congestive heart failure  -     bumetanide (BUMEX) 2 MG tablet; Take 1  tablet by mouth 2 (Two) Times a Day.  Dispense: 180 tablet; Refill: 3    Other orders  -     nitroglycerin (NITROSTAT) 0.4 MG SL tablet; Place 1 tablet under the tongue Every 5 (Five) Minutes As Needed for Chest Pain. Take no more than 3 doses in 15 minutes.  Dispense: 25 tablet; Refill: 2  -     carvedilol (COREG) 25 MG tablet; Take 1 tablet by mouth 2 (Two) Times a Day.  Dispense: 180 tablet; Refill: 3            MEDICAL DECISION MAKING:    Patient does not appear decompensated with respect to volume on exam.  Will repeat 2D echo and proceed with nuclear stress test.  I have refilled patient's SL nitroglycerin and educated her on the rule of 3 and when to go to the ER.      RTC in one month.      Past Medical History:   Diagnosis Date   • Anxiety    • Breast cyst    • CHF (congestive heart failure)    • COPD (chronic obstructive pulmonary disease)    • Coronary heart disease    • Depression    • Hyperlipidemia    • Hypertension        Past Surgical History:   Procedure Laterality Date   • APPENDECTOMY     • BREAST CYST ASPIRATION     • CARDIAC CATHETERIZATION  2017    No Stents placed - [facility]   • CERVICAL FUSION      C6-C7   •  SECTION      x 2   • CHOLECYSTECTOMY     • HYSTERECTOMY      partial         Current Outpatient Medications:   •  albuterol (PROVENTIL) (2.5 MG/3ML) 0.083% nebulizer solution, INHALE 1 VIAL VIA NEBULIZER DAILY AS NEEDED FOR WHEEZING, Disp: 300 mL, Rfl: 1  •  ALPRAZolam (XANAX) 0.5 MG tablet, Take 1 tablet by mouth 3 (Three) Times a Day As Needed for Anxiety., Disp: 90 tablet, Rfl: 3  •  amLODIPine (NORVASC) 5 MG tablet, Take 1 tablet by mouth Daily., Disp: 90 tablet, Rfl: 1  •  ARIPiprazole (ABILIFY) 10 MG tablet, Take 1 tablet by mouth Daily. OUt of medication, will pic up tomorrow, Disp: 30 tablet, Rfl: 3  •  aspirin 81 MG EC tablet, ASPIRIN EC 81 MG TBEC daily, Disp: , Rfl:   •  Budeson-Glycopyrrol-Formoterol (Breztri Aerosphere) 160-9-4.8 MCG/ACT aerosol inhaler, Inhale 2 puffs  2 (Two) Times a Day., Disp: 1 each, Rfl: 11  •  bumetanide (BUMEX) 2 MG tablet, Take 1 tablet by mouth 2 (Two) Times a Day., Disp: 180 tablet, Rfl: 3  •  carbidopa-levodopa (SINEMET)  MG per tablet, Take 1 tab at: 6 am, 10 am, 2pm, 7 pm, Disp: 120 tablet, Rfl: 6  •  carvedilol (COREG) 25 MG tablet, Take 1 tablet by mouth 2 (Two) Times a Day., Disp: 180 tablet, Rfl: 3  •  citalopram (CeleXA) 40 MG tablet, Take 1 tablet by mouth Every Morning., Disp: 90 tablet, Rfl: 1  •  cyanocobalamin 1000 MCG/ML injection, INJECT 1 ML INTO THE APPROPRIATE MUSCLE AS DIRECTED BY PRESCRIBER EVERY 28 (TWENTY-EIGHT) DAYS., Disp: 3 mL, Rfl: 1  •  Fluzone Quadrivalent 0.5 ML suspension prefilled syringe injection, , Disp: , Rfl:   •  gabapentin (NEURONTIN) 300 MG capsule, Take 1 capsule by mouth 3 (Three) Times a Day., Disp: 270 capsule, Rfl: 1  •  hydrOXYzine pamoate (VISTARIL) 25 MG capsule, TAKE 1 CAPSULE BY MOUTH 3 (THREE) TIMES A DAY AS NEEDED FOR ANXIETY., Disp: 270 capsule, Rfl: 1  •  methylPREDNISolone (MEDROL) 4 MG dose pack, Take as directed on package instructions., Disp: 21 tablet, Rfl: 0  •  metoclopramide (REGLAN) 10 MG tablet, TAKE 1 TABLET BY MOUTH 3 (THREE) TIMES A DAY BEFORE MEALS., Disp: 90 tablet, Rfl: 3  •  mirtazapine (REMERON) 15 MG tablet, Take 1 tablet by mouth Every Night., Disp: 30 tablet, Rfl: 2  •  Multiple Vitamins-Minerals (MULTIVITAMIN ADULT) tablet, Take 1 tablet by mouth Daily. With Omega XL, Disp: , Rfl:   •  nitroglycerin (NITROSTAT) 0.4 MG SL tablet, Place 1 tablet under the tongue Every 5 (Five) Minutes As Needed for Chest Pain. Take no more than 3 doses in 15 minutes., Disp: 25 tablet, Rfl: 2  •  ondansetron (ZOFRAN) 4 MG tablet, Take 1 tablet by mouth Every 8 (Eight) Hours As Needed for Nausea or Vomiting., Disp: 45 tablet, Rfl: 2  •  pantoprazole (PROTONIX) 40 MG EC tablet, Take 1 tablet by mouth Daily., Disp: 90 tablet, Rfl: 3  •  potassium chloride 10 MEQ CR tablet, Take 1 tablet by mouth  "Daily., Disp: , Rfl:   •  rosuvastatin (CRESTOR) 40 MG tablet, TAKE 1 TABLET BY MOUTH EVERY DAY IN THE EVENING, Disp: 90 tablet, Rfl: 1  •  Syringe/Needle, Disp, 23G X 1\" 3 ML misc, Use to inject B12 every 30 days intramuscularly, Disp: 12 each, Rfl: 0  •  vitamin D (ERGOCALCIFEROL) 1.25 MG (42782 UT) capsule capsule, Take 1 capsule by mouth 1 (One) Time Per Week. Pt is out of this, picking it up tomorrow, Disp: 15 capsule, Rfl: 3    Social History     Socioeconomic History   • Marital status:    Tobacco Use   • Smoking status: Never   • Smokeless tobacco: Never   • Tobacco comments:     Passive Smoke: N   Vaping Use   • Vaping Use: Never used   Substance and Sexual Activity   • Alcohol use: No   • Drug use: No   • Sexual activity: Defer       Family History   Problem Relation Age of Onset   • Colon cancer Mother    • Diabetes Father    • Pulmonary embolism Brother    • Heart failure Brother    • Breast cancer Maternal Aunt        The following portions of the patient's history were reviewed and updated as appropriate: allergies, current medications, past family history, past medical history, past social history, past surgical history and problem list.    ROS  /89   Pulse 69   Ht 157.5 cm (62\")   Wt 89.4 kg (197 lb)   SpO2 94%   BMI 36.03 kg/m² .  Objective     Physical Exam    Physical Exam:  Neuro:  CV:  Resp:  GI:  Ext:  Pysch: AAOx3, no gross deficits  S1S2 RRR, grade 2/6 systolic murmur  Non-labored, CTA  BS+, abd soft  Pedal pulses palp, trace pitting BLE edema  Calm and cooperative       Procedures    EKG ordered by and reviewed by me in office  EKG shows sinus rhythm with generalized low voltage.  There is no significant change from prior study.       "

## 2023-05-15 ENCOUNTER — OFFICE VISIT (OUTPATIENT)
Dept: CARDIOLOGY | Facility: CLINIC | Age: 63
End: 2023-05-15
Payer: MEDICARE

## 2023-05-15 VITALS
BODY MASS INDEX: 36.25 KG/M2 | HEART RATE: 69 BPM | HEIGHT: 62 IN | WEIGHT: 197 LBS | DIASTOLIC BLOOD PRESSURE: 89 MMHG | OXYGEN SATURATION: 94 % | SYSTOLIC BLOOD PRESSURE: 139 MMHG

## 2023-05-15 DIAGNOSIS — I25.10 CORONARY ARTERY DISEASE INVOLVING NATIVE HEART, UNSPECIFIED VESSEL OR LESION TYPE, UNSPECIFIED WHETHER ANGINA PRESENT: ICD-10-CM

## 2023-05-15 DIAGNOSIS — R06.09 OTHER FORM OF DYSPNEA: ICD-10-CM

## 2023-05-15 DIAGNOSIS — R07.9 CHEST PAIN, UNSPECIFIED TYPE: Primary | ICD-10-CM

## 2023-05-15 DIAGNOSIS — I50.32 CHRONIC DIASTOLIC CONGESTIVE HEART FAILURE: ICD-10-CM

## 2023-05-15 PROBLEM — R06.00 DYSPNEA: Status: ACTIVE | Noted: 2023-05-15

## 2023-05-15 PROCEDURE — 3075F SYST BP GE 130 - 139MM HG: CPT | Performed by: NURSE PRACTITIONER

## 2023-05-15 PROCEDURE — 3079F DIAST BP 80-89 MM HG: CPT | Performed by: NURSE PRACTITIONER

## 2023-05-15 PROCEDURE — 93000 ELECTROCARDIOGRAM COMPLETE: CPT | Performed by: NURSE PRACTITIONER

## 2023-05-15 PROCEDURE — 99214 OFFICE O/P EST MOD 30 MIN: CPT | Performed by: NURSE PRACTITIONER

## 2023-05-15 RX ORDER — NITROGLYCERIN 0.4 MG/1
0.4 TABLET SUBLINGUAL
Qty: 25 TABLET | Refills: 2 | Status: SHIPPED | OUTPATIENT
Start: 2023-05-15

## 2023-05-15 RX ORDER — CARVEDILOL 25 MG/1
25 TABLET ORAL 2 TIMES DAILY
Qty: 180 TABLET | Refills: 3 | Status: SHIPPED | OUTPATIENT
Start: 2023-05-15

## 2023-05-15 RX ORDER — BUMETANIDE 2 MG/1
2 TABLET ORAL 2 TIMES DAILY
Qty: 180 TABLET | Refills: 3 | Status: SHIPPED | OUTPATIENT
Start: 2023-05-15

## 2023-05-22 ENCOUNTER — TELEPHONE (OUTPATIENT)
Dept: CARDIOLOGY | Facility: CLINIC | Age: 63
End: 2023-05-22

## 2023-05-22 ENCOUNTER — TELEPHONE (OUTPATIENT)
Dept: FAMILY MEDICINE CLINIC | Facility: CLINIC | Age: 63
End: 2023-05-22

## 2023-05-22 NOTE — TELEPHONE ENCOUNTER
Caller: Jasmine Cerda    Relationship: Self    Best call back number: [phone]    Do you require a callback: YES-PATIENT STATES THE NEW BLOOD PRESSURE MEDICATION WAS NOT WORKING AND SWITCHED BACK TO LISINOPRIL AND NOW HAS LEG SWELLING. PLEASE CALL AND ADVISE.

## 2023-05-22 NOTE — TELEPHONE ENCOUNTER
Caller: Jasmine Cerda    Relationship: Self    Best call back number:     What is the best time to reach you: ANY    Who are you requesting to speak with (clinical staff, provider,  specific staff member): CLINICAL    What was the call regarding: PT CALLED IN TO ASK AND SEE IF SHE IS SUPPOSED TO TAKE HER MEDICATION BEFORE THE ECHO COMING UP TOMORROW FOR HER.    Do you require a callback: YES

## 2023-05-22 NOTE — TELEPHONE ENCOUNTER
Spoke to pt and she stopped amlodipine and started lisinopril again about 4 days ago. Pt says she was having trouble breathing, swelling in legs, and bp was running about 150/100. Pt states she is still having shortness of breath at times and some swelling in legs. Pt is seeing cardiology tomorrow for stress test and echo, please advise

## 2023-05-23 ENCOUNTER — HOSPITAL ENCOUNTER (OUTPATIENT)
Dept: CARDIOLOGY | Facility: HOSPITAL | Age: 63
Discharge: HOME | End: 2023-05-23
Payer: MEDICARE

## 2023-05-23 VITALS
HEIGHT: 62 IN | BODY MASS INDEX: 36.25 KG/M2 | HEART RATE: 86 BPM | DIASTOLIC BLOOD PRESSURE: 92 MMHG | SYSTOLIC BLOOD PRESSURE: 161 MMHG | WEIGHT: 197 LBS

## 2023-05-23 DIAGNOSIS — R07.9 CHEST PAIN, UNSPECIFIED TYPE: ICD-10-CM

## 2023-05-23 DIAGNOSIS — R06.09 OTHER FORM OF DYSPNEA: ICD-10-CM

## 2023-05-23 DIAGNOSIS — I25.10 CORONARY ARTERY DISEASE INVOLVING NATIVE HEART, UNSPECIFIED VESSEL OR LESION TYPE, UNSPECIFIED WHETHER ANGINA PRESENT: ICD-10-CM

## 2023-05-23 LAB
BH CV ECHO MEAS - ACS: 1.78 CM
BH CV ECHO MEAS - AI P1/2T: 442.7 MSEC
BH CV ECHO MEAS - AO MAX PG: 9 MMHG
BH CV ECHO MEAS - AO MEAN PG: 4.4 MMHG
BH CV ECHO MEAS - AO ROOT DIAM: 3.1 CM
BH CV ECHO MEAS - AO V2 MAX: 149.7 CM/SEC
BH CV ECHO MEAS - AO V2 VTI: 31.5 CM
BH CV ECHO MEAS - AVA(I,D): 2.18 CM2
BH CV ECHO MEAS - EDV(CUBED): 141.7 ML
BH CV ECHO MEAS - EDV(MOD-SP4): 72 ML
BH CV ECHO MEAS - EF(MOD-BP): 56.2 %
BH CV ECHO MEAS - EF(MOD-SP4): 56.2 %
BH CV ECHO MEAS - ESV(CUBED): 45.1 ML
BH CV ECHO MEAS - ESV(MOD-SP4): 31.5 ML
BH CV ECHO MEAS - FS: 31.7 %
BH CV ECHO MEAS - IVS/LVPW: 0.97 CM
BH CV ECHO MEAS - IVSD: 1.11 CM
BH CV ECHO MEAS - LA DIMENSION: 4 CM
BH CV ECHO MEAS - LV DIASTOLIC VOL/BSA (35-75): 37.9 CM2
BH CV ECHO MEAS - LV MASS(C)D: 228.1 GRAMS
BH CV ECHO MEAS - LV MAX PG: 3 MMHG
BH CV ECHO MEAS - LV MEAN PG: 1.59 MMHG
BH CV ECHO MEAS - LV SYSTOLIC VOL/BSA (12-30): 16.6 CM2
BH CV ECHO MEAS - LV V1 MAX: 86 CM/SEC
BH CV ECHO MEAS - LV V1 VTI: 21.9 CM
BH CV ECHO MEAS - LVIDD: 5.2 CM
BH CV ECHO MEAS - LVIDS: 3.6 CM
BH CV ECHO MEAS - LVOT AREA: 3.1 CM2
BH CV ECHO MEAS - LVOT DIAM: 2 CM
BH CV ECHO MEAS - LVPWD: 1.14 CM
BH CV ECHO MEAS - MR MAX PG: 137.2 MMHG
BH CV ECHO MEAS - MR MAX VEL: 584.7 CM/SEC
BH CV ECHO MEAS - MV A MAX VEL: 117.7 CM/SEC
BH CV ECHO MEAS - MV DEC SLOPE: 738 CM/SEC2
BH CV ECHO MEAS - MV DEC TIME: 0.15 MSEC
BH CV ECHO MEAS - MV E MAX VEL: 111.8 CM/SEC
BH CV ECHO MEAS - MV E/A: 0.95
BH CV ECHO MEAS - MV MAX PG: 7 MMHG
BH CV ECHO MEAS - MV MEAN PG: 3.3 MMHG
BH CV ECHO MEAS - MV V2 VTI: 24.6 CM
BH CV ECHO MEAS - MVA(VTI): 2.8 CM2
BH CV ECHO MEAS - PA ACC TIME: 0.06 SEC
BH CV ECHO MEAS - PA PR(ACCEL): 52.5 MMHG
BH CV ECHO MEAS - PA V2 MAX: 106 CM/SEC
BH CV ECHO MEAS - PI END-D VEL: 126.8 CM/SEC
BH CV ECHO MEAS - PULM A REVS DUR: 0.13 SEC
BH CV ECHO MEAS - PULM A REVS VEL: 37.6 CM/SEC
BH CV ECHO MEAS - PULM DIAS VEL: 39.5 CM/SEC
BH CV ECHO MEAS - PULM S/D: 1.95
BH CV ECHO MEAS - PULM SYS VEL: 77 CM/SEC
BH CV ECHO MEAS - RV MAX PG: 3.1 MMHG
BH CV ECHO MEAS - RV V1 MAX: 87.4 CM/SEC
BH CV ECHO MEAS - RV V1 VTI: 21.6 CM
BH CV ECHO MEAS - RVDD: 3.2 CM
BH CV ECHO MEAS - SI(MOD-SP4): 21.3 ML/M2
BH CV ECHO MEAS - SV(LVOT): 68.6 ML
BH CV ECHO MEAS - SV(MOD-SP4): 40.5 ML
BH CV ECHO MEAS - TR MAX PG: 29.5 MMHG
BH CV ECHO MEAS - TR MAX VEL: 271.6 CM/SEC
BH CV REST NUCLEAR ISOTOPE DOSE: 12 MCI
BH CV STRESS COMMENTS STAGE 1: NORMAL
BH CV STRESS DOSE REGADENOSON STAGE 1: 0.4
BH CV STRESS DURATION MIN STAGE 1: 0
BH CV STRESS DURATION SEC STAGE 1: 10
BH CV STRESS NUCLEAR ISOTOPE DOSE: 35.3 MCI
BH CV STRESS PROTOCOL 1: NORMAL
BH CV STRESS RECOVERY BP: NORMAL MMHG
BH CV STRESS RECOVERY HR: 87 BPM
BH CV STRESS STAGE 1: 1
LV EF NUC BP: 59 %
MAXIMAL PREDICTED HEART RATE: 156 BPM
MAXIMAL PREDICTED HEART RATE: 156 BPM
PERCENT MAX PREDICTED HR: 53.85 %
STRESS BASELINE BP: NORMAL MMHG
STRESS BASELINE HR: 55 BPM
STRESS PERCENT HR: 63 %
STRESS POST PEAK BP: NORMAL MMHG
STRESS POST PEAK HR: 84 BPM
STRESS TARGET HR: 133 BPM
STRESS TARGET HR: 133 BPM

## 2023-05-23 PROCEDURE — 93306 TTE W/DOPPLER COMPLETE: CPT | Performed by: INTERNAL MEDICINE

## 2023-05-23 PROCEDURE — 0 TECHNETIUM SESTAMIBI: Performed by: NURSE PRACTITIONER

## 2023-05-23 PROCEDURE — 78452 HT MUSCLE IMAGE SPECT MULT: CPT

## 2023-05-23 PROCEDURE — A9500 TC99M SESTAMIBI: HCPCS | Performed by: NURSE PRACTITIONER

## 2023-05-23 PROCEDURE — 93017 CV STRESS TEST TRACING ONLY: CPT

## 2023-05-23 PROCEDURE — 25010000002 REGADENOSON 0.4 MG/5ML SOLUTION: Performed by: NURSE PRACTITIONER

## 2023-05-23 PROCEDURE — 93306 TTE W/DOPPLER COMPLETE: CPT

## 2023-05-23 RX ORDER — REGADENOSON 0.08 MG/ML
0.4 INJECTION, SOLUTION INTRAVENOUS
Status: COMPLETED | OUTPATIENT
Start: 2023-05-23 | End: 2023-05-23

## 2023-05-23 RX ADMIN — REGADENOSON 0.4 MG: 0.08 INJECTION, SOLUTION INTRAVENOUS at 10:20

## 2023-05-23 RX ADMIN — TECHNETIUM TC 99M SESTAMIBI 1 DOSE: 1 INJECTION INTRAVENOUS at 10:20

## 2023-05-23 RX ADMIN — TECHNETIUM TC 99M SESTAMIBI 1 DOSE: 1 INJECTION INTRAVENOUS at 08:04

## 2023-05-26 ENCOUNTER — PREP FOR SURGERY (OUTPATIENT)
Dept: OTHER | Facility: HOSPITAL | Age: 63
End: 2023-05-26
Payer: MEDICARE

## 2023-05-26 DIAGNOSIS — R94.39 ABNORMAL NUCLEAR STRESS TEST: Primary | ICD-10-CM

## 2023-05-26 DIAGNOSIS — I50.32 CHRONIC DIASTOLIC (CONGESTIVE) HEART FAILURE: ICD-10-CM

## 2023-05-26 RX ORDER — SODIUM CHLORIDE 9 MG/ML
1-3 INJECTION, SOLUTION INTRAVENOUS CONTINUOUS
OUTPATIENT
Start: 2023-05-26

## 2023-05-26 RX ORDER — MIRTAZAPINE 15 MG/1
TABLET, FILM COATED ORAL
Qty: 30 TABLET | Refills: 2 | Status: SHIPPED | OUTPATIENT
Start: 2023-05-26

## 2023-06-01 PROBLEM — R94.39 ABNORMAL NUCLEAR STRESS TEST: Status: ACTIVE | Noted: 2023-06-01

## 2023-06-15 DIAGNOSIS — I50.32 CHRONIC DIASTOLIC CONGESTIVE HEART FAILURE: ICD-10-CM

## 2023-06-15 RX ORDER — POTASSIUM CHLORIDE 1500 MG/1
TABLET, EXTENDED RELEASE ORAL
Qty: 90 TABLET | Refills: 1 | OUTPATIENT
Start: 2023-06-15

## 2023-07-24 ENCOUNTER — OFFICE VISIT (OUTPATIENT)
Dept: FAMILY MEDICINE CLINIC | Facility: CLINIC | Age: 63
End: 2023-07-24
Payer: MEDICARE

## 2023-07-24 VITALS
WEIGHT: 192.6 LBS | DIASTOLIC BLOOD PRESSURE: 80 MMHG | HEIGHT: 62 IN | RESPIRATION RATE: 18 BRPM | BODY MASS INDEX: 35.44 KG/M2 | SYSTOLIC BLOOD PRESSURE: 128 MMHG | TEMPERATURE: 98.6 F | OXYGEN SATURATION: 97 % | HEART RATE: 68 BPM

## 2023-07-24 DIAGNOSIS — I10 PRIMARY HYPERTENSION: Primary | ICD-10-CM

## 2023-07-24 DIAGNOSIS — Z78.0 POSTMENOPAUSAL: ICD-10-CM

## 2023-07-24 DIAGNOSIS — Z12.31 BREAST CANCER SCREENING BY MAMMOGRAM: ICD-10-CM

## 2023-07-24 DIAGNOSIS — J44.9 CHRONIC OBSTRUCTIVE PULMONARY DISEASE, UNSPECIFIED COPD TYPE: ICD-10-CM

## 2023-07-24 DIAGNOSIS — F32.9 REACTIVE DEPRESSION: ICD-10-CM

## 2023-07-24 DIAGNOSIS — E55.9 VITAMIN D DEFICIENCY: ICD-10-CM

## 2023-07-24 DIAGNOSIS — E53.8 B12 DEFICIENCY: ICD-10-CM

## 2023-07-24 DIAGNOSIS — E78.2 MIXED HYPERLIPIDEMIA: ICD-10-CM

## 2023-07-24 DIAGNOSIS — I50.32 CHRONIC DIASTOLIC CONGESTIVE HEART FAILURE: ICD-10-CM

## 2023-07-24 DIAGNOSIS — R25.1 TREMOR: ICD-10-CM

## 2023-07-24 DIAGNOSIS — N18.31 STAGE 3A CHRONIC KIDNEY DISEASE: ICD-10-CM

## 2023-07-24 PROCEDURE — 1160F RVW MEDS BY RX/DR IN RCRD: CPT | Performed by: NURSE PRACTITIONER

## 2023-07-24 PROCEDURE — 1159F MED LIST DOCD IN RCRD: CPT | Performed by: NURSE PRACTITIONER

## 2023-07-24 PROCEDURE — 99214 OFFICE O/P EST MOD 30 MIN: CPT | Performed by: NURSE PRACTITIONER

## 2023-07-24 PROCEDURE — 3074F SYST BP LT 130 MM HG: CPT | Performed by: NURSE PRACTITIONER

## 2023-07-24 PROCEDURE — 3079F DIAST BP 80-89 MM HG: CPT | Performed by: NURSE PRACTITIONER

## 2023-07-24 RX ORDER — BUDESONIDE, GLYCOPYRROLATE, AND FORMOTEROL FUMARATE 160; 9; 4.8 UG/1; UG/1; UG/1
2 AEROSOL, METERED RESPIRATORY (INHALATION) 2 TIMES DAILY
Qty: 2 EACH | Refills: 0 | COMMUNITY
Start: 2023-07-24

## 2023-07-24 RX ORDER — TRAZODONE HYDROCHLORIDE 100 MG/1
100-200 TABLET ORAL
COMMUNITY
Start: 2023-06-16

## 2023-07-24 RX ORDER — ROSUVASTATIN CALCIUM 40 MG/1
40 TABLET, COATED ORAL EVERY EVENING
Qty: 90 TABLET | Refills: 1 | Status: SHIPPED | OUTPATIENT
Start: 2023-07-24

## 2023-07-24 NOTE — PROGRESS NOTES
Chief Complaint  Chief Complaint   Patient presents with    Chronic Kidney Disease    COPD    Depression     Having concerns with son. Onset 1 week.            Subjective          Jasmine Cerda presents to [facility] for   History of Present Illness    Patient had abnormal stress test, underwent cardiac catheterization with minimal coronary artery stenosis, recommended to continue medical management and primary prevention with statin, aspirin    CKD stg 3, have been monitoring labs, she is on Bumex and KCl, has eliminated NSAIDs and only drinks water      Tremor, patient following with neurology on Sinemet 4 times per day, MRI of the brain was normal with mild chronic microvascular disease features 12/22/2022. She reports improvement in tremor, now some days w/o tremor.      COPD stable on breztri and albuterol, insurance does not cover Breztri well and she is asking for samples. She denies BAY, wheezing, shortness of air, does have occasional cough    Anxiety/depression, on citalopram, abilify, remeron, hydroxyzine and Xanax as needed, she follows with Dr. Ken. Reports symptoms of depression are much worse, her son is a meth addict and recently attempted to commit suicide, she has tried to help him and always relapses.     Hyperlipidemia, the patient denies muscle aches, constipation, diarrhea, GI upset, fatigue, chest pain/pressure, exercise intolerance, dyspnea, palpitations, syncope and pedal edema.      Vitamin D deficiency, on weekly vitamin D    Insomnia, takes trazodone nightly    GERD, stable on medication, denies nausea, vomiting, constipation, abdominal pain and diarrhea.     Here to review labs       The following portions of the patient's history were reviewed and updated as appropriate: allergies, current medications, past family history, past medical history, past social history, past surgical history and problem list.    Past Medical History:   Diagnosis Date     Anxiety     Breast cyst     CHF (congestive heart failure)     COPD (chronic obstructive pulmonary disease)     Coronary heart disease     Depression     Hyperlipidemia     Hypertension      Past Surgical History:   Procedure Laterality Date    APPENDECTOMY      BREAST CYST ASPIRATION      CARDIAC CATHETERIZATION  2017    No Stents placed - [facility]    CARDIAC CATHETERIZATION N/A 2023    Procedure: Left Heart Cath possible PCI;  Surgeon: Cuauhtemoc Kwon MD;  Location: [facility];  Service: Cardiovascular;  Laterality: N/A;    CERVICAL FUSION      C6-C7     SECTION      x 2    CHOLECYSTECTOMY      HYSTERECTOMY      partial     Family History   Problem Relation Age of Onset    Colon cancer Mother     Diabetes Father     Pulmonary embolism Brother     Heart failure Brother     Breast cancer Maternal Aunt      Social History     Tobacco Use    Smoking status: Never    Smokeless tobacco: Never    Tobacco comments:     Passive Smoke: N   Substance Use Topics    Alcohol use: No       Current Outpatient Medications:     albuterol (PROVENTIL) (2.5 MG/3ML) 0.083% nebulizer solution, INHALE 1 VIAL VIA NEBULIZER DAILY AS NEEDED FOR WHEEZING, Disp: 300 mL, Rfl: 1    ALPRAZolam (XANAX) 0.5 MG tablet, Take 1 tablet by mouth 3 (Three) Times a Day As Needed for Anxiety., Disp: 90 tablet, Rfl: 3    ARIPiprazole (ABILIFY) 10 MG tablet, Take 1 tablet by mouth Daily. OUt of medication, will pic up tomorrow, Disp: 30 tablet, Rfl: 3    aspirin 81 MG EC tablet, ASPIRIN EC 81 MG TBEC daily, Disp: , Rfl:     Budeson-Glycopyrrol-Formoterol (Breztri Aerosphere) 160-9-4.8 MCG/ACT aerosol inhaler, Inhale 2 puffs 2 (Two) Times a Day., Disp: 2 each, Rfl: 0    bumetanide (BUMEX) 2 MG tablet, Take 1 tablet by mouth 2 (Two) Times a Day., Disp: 180 tablet, Rfl: 3    carbidopa-levodopa (SINEMET)  MG per tablet, Take 1 tab at: 6 am, 10 am, 2pm, 7 pm, Disp: 120 tablet, Rfl: 6    carvedilol (COREG) 25 MG tablet,  "Take 1 tablet by mouth 2 (Two) Times a Day., Disp: 180 tablet, Rfl: 3    citalopram (CeleXA) 40 MG tablet, Take 1 tablet by mouth Every Morning., Disp: 90 tablet, Rfl: 1    cyanocobalamin 1000 MCG/ML injection, INJECT 1 ML INTO THE APPROPRIATE MUSCLE AS DIRECTED BY PRESCRIBER EVERY 28 (TWENTY-EIGHT) DAYS., Disp: 3 mL, Rfl: 1    gabapentin (NEURONTIN) 300 MG capsule, Take 1 capsule by mouth 3 (Three) Times a Day., Disp: 270 capsule, Rfl: 1    hydrOXYzine pamoate (VISTARIL) 25 MG capsule, TAKE 1 CAPSULE BY MOUTH 3 (THREE) TIMES A DAY AS NEEDED FOR ANXIETY., Disp: 270 capsule, Rfl: 1    KLOR-CON 20 MEQ CR tablet, Take 1 tablet by mouth Daily., Disp: 180 tablet, Rfl: 0    lisinopril (PRINIVIL,ZESTRIL) 10 MG tablet, Take 1 tablet by mouth 2 (Two) Times a Day., Disp: 180 tablet, Rfl: 3    metoclopramide (REGLAN) 10 MG tablet, TAKE 1 TABLET BY MOUTH 3 (THREE) TIMES A DAY BEFORE MEALS., Disp: 90 tablet, Rfl: 3    mirtazapine (REMERON) 15 MG tablet, TAKE 1 TABLET BY MOUTH EVERY DAY AT NIGHT, Disp: 30 tablet, Rfl: 2    Multiple Vitamins-Minerals (MULTIVITAMIN ADULT) tablet, Take 1 tablet by mouth Daily. With Omega XL, Disp: , Rfl:     nitroglycerin (NITROSTAT) 0.4 MG SL tablet, Place 1 tablet under the tongue Every 5 (Five) Minutes As Needed for Chest Pain. Take no more than 3 doses in 15 minutes., Disp: 25 tablet, Rfl: 2    ondansetron (ZOFRAN) 4 MG tablet, TAKE 1 TABLET BY MOUTH EVERY 8 HOURS AS NEEDED FOR NAUSEA OR VOMITING., Disp: 45 tablet, Rfl: 2    pantoprazole (PROTONIX) 40 MG EC tablet, Take 1 tablet by mouth Daily., Disp: 90 tablet, Rfl: 3    rosuvastatin (CRESTOR) 40 MG tablet, Take 1 tablet by mouth Every Evening., Disp: 90 tablet, Rfl: 1    Syringe/Needle, Disp, 23G X 1\" 3 ML misc, Use to inject B12 every 30 days intramuscularly, Disp: 12 each, Rfl: 0    traZODone (DESYREL) 100 MG tablet, Take 1-2 tablets by mouth every night at bedtime., Disp: , Rfl:     vitamin D (ERGOCALCIFEROL) 1.25 MG (75885 UT) capsule " "capsule, Take 1 capsule by mouth 1 (One) Time Per Week. Pt is out of this, picking it up tomorrow, Disp: 15 capsule, Rfl: 3    Objective   Vital Signs:   /80 (BP Location: Left arm, Patient Position: Sitting, Cuff Size: Large Adult)   Pulse 68   Temp 98.6 °F (37 °C) (Oral)   Resp 18   Ht 157.5 cm (62\")   Wt 87.4 kg (192 lb 9.6 oz)   SpO2 97% Comment: Room air  BMI 35.23 kg/m²           Physical Exam  Constitutional:       General: She is not in acute distress.     Appearance: Normal appearance. She is well-developed. She is not ill-appearing or diaphoretic.   HENT:      Head: Normocephalic.   Eyes:      Conjunctiva/sclera: Conjunctivae normal.      Pupils: Pupils are equal, round, and reactive to light.   Neck:      Thyroid: No thyromegaly.      Vascular: No JVD.   Cardiovascular:      Rate and Rhythm: Normal rate and regular rhythm.      Heart sounds: Normal heart sounds. No murmur heard.  Pulmonary:      Effort: Pulmonary effort is normal. No respiratory distress.      Breath sounds: Normal breath sounds. No wheezing or rhonchi.   Abdominal:      General: Bowel sounds are normal. There is no distension.      Palpations: Abdomen is soft.      Tenderness: There is no abdominal tenderness.   Musculoskeletal:         General: No swelling or tenderness. Normal range of motion.      Cervical back: Normal range of motion and neck supple. No tenderness.   Lymphadenopathy:      Cervical: No cervical adenopathy.   Skin:     General: Skin is warm and dry.      Coloration: Skin is not jaundiced.      Findings: No erythema or rash.   Neurological:      General: No focal deficit present.      Mental Status: She is alert and oriented to person, place, and time. Mental status is at baseline.      Sensory: No sensory deficit.   Psychiatric:         Attention and Perception: Attention normal.         Mood and Affect: Mood normal. Affect is tearful.         Speech: Speech normal.         Behavior: Behavior normal. " "Behavior is cooperative.         Thought Content: Thought content normal.         Judgment: Judgment normal.        Result Review :     No visits with results within 7 Day(s) from this visit.   Latest known visit with results is:   Orders Only on 07/13/2023   Component Date Value Ref Range Status    Glucose 07/17/2023 107 (H)  65 - 99 mg/dL Final    BUN 07/17/2023 10  8 - 23 mg/dL Final    Creatinine 07/17/2023 1.16 (H)  0.57 - 1.00 mg/dL Final    Sodium 07/17/2023 139  136 - 145 mmol/L Final    Potassium 07/17/2023 3.9  3.5 - 5.2 mmol/L Final    Chloride 07/17/2023 100  98 - 107 mmol/L Final    CO2 07/17/2023 26.0  22.0 - 29.0 mmol/L Final    Calcium 07/17/2023 11.0 (H)  8.6 - 10.5 mg/dL Final    BUN/Creatinine Ratio 07/17/2023 8.6  7.0 - 25.0 Final    Anion Gap 07/17/2023 13.0  5.0 - 15.0 mmol/L Final    eGFR 07/17/2023 52.4 (L)  >60.0 mL/min/1.73 Final                              Assessment and Plan    Diagnoses and all orders for this visit:    1. Primary hypertension (Primary)    2. Reactive depression    3. Mixed hyperlipidemia  -     rosuvastatin (CRESTOR) 40 MG tablet; Take 1 tablet by mouth Every Evening.  Dispense: 90 tablet; Refill: 1    4. Stage 3a chronic kidney disease    5. Chronic diastolic congestive heart failure    6. Chronic obstructive pulmonary disease, unspecified COPD type  -     Budeson-Glycopyrrol-Formoterol (Breztri Aerosphere) 160-9-4.8 MCG/ACT aerosol inhaler; Inhale 2 puffs 2 (Two) Times a Day.  Dispense: 2 each; Refill: 0    7. B12 deficiency    8. Vitamin D deficiency    9. Tremor    10. Breast cancer screening by mammogram  -     Mammo Screening Digital Tomosynthesis Bilateral With CAD; Future    11. Postmenopausal  -     DEXA Bone Density Axial; Future    Other orders  -     Syringe/Needle, Disp, 23G X 1\" 3 ML misc; Use to inject B12 every 30 days intramuscularly  Dispense: 12 each; Refill: 0      Conditions mostly stable  rf meds as above, cont current med regimen  Cont b12 " monthly injections  bmp reviewed and essentially unchanged, cont no NSAID's and increased water intake  F/u with neurology, cardiology as directed  F/u with psych next week, rec support group for parents of addicts      I spent 30 minutes caring for Jasmine Cerda on this date of service. This time includes time spent by me in the following activities: preparing for the visit, reviewing tests, performing a medically appropriate examination and/or evaluation , counseling and educating the patient/family/caregiver, ordering medications, tests, or procedures and documenting information in the medical record        Follow Up     Return in about 6 months (around 1/24/2024) for Medicare Wellness, Recheck. HTN panel, vit D, b12 prior to appt.  Patient was given instructions and counseling regarding her condition or for health maintenance advice. Please see specific information pulled into the AVS if appropriate.        Part of this note may be an electronic transcription/translation of spoken language to printed text using the Dragon Dictation System

## 2023-08-02 ENCOUNTER — OFFICE VISIT (OUTPATIENT)
Dept: PSYCHIATRY | Facility: CLINIC | Age: 63
End: 2023-08-02
Payer: MEDICARE

## 2023-08-02 DIAGNOSIS — F33.2 SEVERE EPISODE OF RECURRENT MAJOR DEPRESSIVE DISORDER, WITHOUT PSYCHOTIC FEATURES: Primary | Chronic | ICD-10-CM

## 2023-08-02 DIAGNOSIS — F43.12 CHRONIC POST-TRAUMATIC STRESS DISORDER (PTSD): Chronic | ICD-10-CM

## 2023-08-02 DIAGNOSIS — F51.04 PSYCHOPHYSIOLOGICAL INSOMNIA: Chronic | ICD-10-CM

## 2023-08-02 PROCEDURE — 1159F MED LIST DOCD IN RCRD: CPT | Performed by: PSYCHIATRY & NEUROLOGY

## 2023-08-02 PROCEDURE — 1160F RVW MEDS BY RX/DR IN RCRD: CPT | Performed by: PSYCHIATRY & NEUROLOGY

## 2023-08-02 PROCEDURE — 99214 OFFICE O/P EST MOD 30 MIN: CPT | Performed by: PSYCHIATRY & NEUROLOGY

## 2023-08-02 RX ORDER — MIRTAZAPINE 15 MG/1
15 TABLET, FILM COATED ORAL
Qty: 30 TABLET | Refills: 2 | Status: SHIPPED | OUTPATIENT
Start: 2023-08-02

## 2023-08-02 RX ORDER — QUETIAPINE FUMARATE 25 MG/1
25 TABLET, FILM COATED ORAL NIGHTLY
Qty: 30 TABLET | Refills: 2 | Status: SHIPPED | OUTPATIENT
Start: 2023-08-02

## 2023-08-02 RX ORDER — ALPRAZOLAM 0.5 MG/1
0.5 TABLET ORAL 3 TIMES DAILY PRN
Qty: 90 TABLET | Refills: 2 | Status: SHIPPED | OUTPATIENT
Start: 2023-08-02

## 2023-08-02 RX ORDER — GABAPENTIN 300 MG/1
300 CAPSULE ORAL 3 TIMES DAILY
Qty: 270 CAPSULE | Refills: 1 | Status: SHIPPED | OUTPATIENT
Start: 2023-08-02

## 2023-08-08 ENCOUNTER — OFFICE VISIT (OUTPATIENT)
Dept: CARDIOLOGY | Facility: CLINIC | Age: 63
End: 2023-08-08
Payer: MEDICARE

## 2023-08-08 VITALS
DIASTOLIC BLOOD PRESSURE: 85 MMHG | HEIGHT: 62 IN | RESPIRATION RATE: 18 BRPM | BODY MASS INDEX: 35.51 KG/M2 | WEIGHT: 193 LBS | OXYGEN SATURATION: 97 % | HEART RATE: 69 BPM | SYSTOLIC BLOOD PRESSURE: 137 MMHG

## 2023-08-08 DIAGNOSIS — I25.10 CORONARY ARTERY DISEASE INVOLVING NATIVE CORONARY ARTERY OF NATIVE HEART WITHOUT ANGINA PECTORIS: Primary | ICD-10-CM

## 2023-08-08 DIAGNOSIS — I10 PRIMARY HYPERTENSION: ICD-10-CM

## 2023-08-08 DIAGNOSIS — R94.39 ABNORMAL NUCLEAR STRESS TEST: ICD-10-CM

## 2023-08-08 DIAGNOSIS — I50.32 CHRONIC DIASTOLIC HEART FAILURE: ICD-10-CM

## 2023-08-08 DIAGNOSIS — I20.8 STABLE ANGINA PECTORIS: ICD-10-CM

## 2023-08-08 DIAGNOSIS — E78.2 MIXED HYPERLIPIDEMIA: ICD-10-CM

## 2023-08-08 DIAGNOSIS — I50.32 CHRONIC DIASTOLIC CONGESTIVE HEART FAILURE: ICD-10-CM

## 2023-08-08 DIAGNOSIS — I10 ESSENTIAL HYPERTENSION: ICD-10-CM

## 2023-08-08 PROCEDURE — 3079F DIAST BP 80-89 MM HG: CPT | Performed by: INTERNAL MEDICINE

## 2023-08-08 PROCEDURE — 99214 OFFICE O/P EST MOD 30 MIN: CPT | Performed by: INTERNAL MEDICINE

## 2023-08-08 PROCEDURE — 3075F SYST BP GE 130 - 139MM HG: CPT | Performed by: INTERNAL MEDICINE

## 2023-08-08 PROCEDURE — 1159F MED LIST DOCD IN RCRD: CPT | Performed by: INTERNAL MEDICINE

## 2023-08-08 PROCEDURE — 1160F RVW MEDS BY RX/DR IN RCRD: CPT | Performed by: INTERNAL MEDICINE

## 2023-08-08 NOTE — PROGRESS NOTES
Cardiology Office Visit      Encounter Date:  08/08/2023    Patient ID:   Jasmine Cerda is a 63 y.o. female.    Reason For Followup:  Shortness of breath  Coronary artery disease    Brief Clinical History:  Dear Lety Gillis APRN    I had the pleasure of seeing Jasmine Cerda today. As you are well aware, this is a 63 y.o. female  had a prior history of known coronary artery disease prior moderate disease in the LAD that is currently being treated medically and also history of congestive heart failure secondary to diastolic dysfunction.     Echocardiogram with  normal LV systolic function and evidence of diastolic dysfunction          Interval History:  Denies any further chest pain  Shortness of breath unchanged from before  Complaining of some orthopnea and PND  Lower extremity edema is better    Assessment & Plan    Impressions:  Coronary artery disease  Hypertension  Hyperlipidemia  Obesity  Mild obstructive coronary artery disease  Normal left-sided filling pressures  Diastolic dysfunction  Normal LV systolic function  Chronic kidney disease stage III    Recommendations:  Continue current medical therapy  Results of the echocardiogram stress test and cardiac catheterization reviewed and discussed with the patient  Continue current medical therapy with aspirin 81 mg p.o. once a day Crestor 40 mg p.o. once a day lisinopril 10 mg 2 times a day potassium 20 mg p.o. once a day Bumex 2 mg p.o. twice daily  Home blood pressure readings are optimal  Recheck lipids in few months  Continue close monitoring  Recheck lipids and chemistry in next 3 months  Goal LDL is less than 70  Follow-up in office in 6 months            Lab Results   Component Value Date    GLUCOSE 107 (H) 07/17/2023    BUN 10 07/17/2023    CREATININE 1.16 (H) 07/17/2023    EGFR 52.4 (L) 07/17/2023    BCR 8.6 07/17/2023    K 3.9 07/17/2023    CO2 26.0 07/17/2023    CALCIUM 11.0 (H) 07/17/2023    ALBUMIN 4.5 04/11/2023    BILITOT 0.3 04/11/2023     AST 13 04/11/2023    ALT 5 04/11/2023     Results for orders placed during the hospital encounter of 05/23/23    Adult Transthoracic Echo Complete W/ Cont if Necessary Per Protocol    Interpretation Summary    Left ventricular systolic function is normal. Calculated left ventricular EF = 56.2% Left ventricular ejection fraction appears to be 56 - 60%.    Left ventricular wall thickness is consistent with mild concentric hypertrophy.    Left ventricular diastolic function is consistent with (grade I) impaired relaxation.    The left atrial cavity is moderately dilated.    Estimated right ventricular systolic pressure from tricuspid regurgitation is normal (<35 mmHg).     Results for orders placed during the hospital encounter of 06/22/23    Cardiac Catheterization/Vascular Study    Narrative  Table formatting from the original result was not included.  Cardiac Catheterization Operative Report    Jasminedavid Cerda  [id number]  6/22/2023  @PCP@    She underwent cardiac catheterization.    Indications for the procedure include: abnormal stress test.    Procedure Details:  The risks, benefits, complications, treatment options, and expected outcomes were discussed with the patient. The patient and/or family concurred with the proposed plan, giving informed consent.    After informed consent the patient was brought to the cath lab after appropriate IV hydration was begun and oral premedication was given. She was further sedated with fentanyl. She was prepped and draped in the usual manner. Using the modified Seldinger access technique, a 6 Marshallese sheath was placed in the femoral artery. A left heart catheterization with coronary arteriography was performed. Findings are discussed below.    After the procedure was completed, sedation was stopped and the sheaths and catheters were all removed. Hemostasis was achieved per established hospital protocols.    Conscious sedation:  Conscious sedation was performed according to  protocol.  I supervised and directed an independent trained observer with the assistance of monitoring the patient's level of consciousness and physiologic status throughout the procedure.  Intraoperative service time was 60 minutes.    Findings:    Hemodynamics Central aortic pressure systolic 155 diastolic 78 with a mean pressure 108 mmHg  LV end-diastolic pressure of 16 mmHg  There was no gradient across the aortic valve on the pullback of the pigtail catheter  Left Main Large-caliber vessel angiographically normal bifurcates into left anterior descending and left circumflex arteries  RCA Large-caliber dominant vessel angiographically normal  Right coronary artery provides a large caliber PDA branch that is angiographically normal and moderate caliber PLB branches angiographically normal  LAD Large-caliber vessel proximal angiographic 10 to 20% stenosis otherwise angiographically rest of the LAD and diagonal branches are normal  LAD provides 2 small to moderate-sized diagonal branches that are angiographically normal  Circ Large-caliber vessel angiographically normal  Left circumflex artery provides a large caliber marginal branch that is angiographically normal  LV Normal LV systolic function normal wall motion estimate LV ejection fraction of 65%  Coronary Dominance Right coronary artery    Estimated Blood Loss:  Minimal    Specimens: None    Complications:  None; patient tolerated the procedure well.    Disposition: PACU - hemodynamically stable.    Condition: stable    Impressions:  Mild obstructive coronary artery disease involving the LAD otherwise normal coronaries  Normal LV systolic function  Stable LV ejection fraction of 65%  Normal left-sided filling pressures    Recommendations:  Optimize medical therapy  Test results reviewed and discussed patient and family     Lab Results   Component Value Date    CHOL 213 (H) 04/11/2023    TRIG 206 (H) 04/11/2023    HDL 45 04/11/2023     (H) 04/11/2023     "  Results for orders placed during the hospital encounter of 05/23/23    Stress Test With Myocardial Perfusion One Day    Interpretation Summary    Abnormal myocardial perfusion study    Lexiscan myocardial perfusion study shows a moderate to large size moderate to severe intensity reversible ischemia involving the anteroapical wall and LV apex    Left ventricular ejection fraction is normal (Calculated EF = 59%).    Impressions are consistent with a high risk study.    Clinical correlation is recommended   Results for orders placed in visit on 11/15/17    CARDIOVASCULAR STUDIES - CONVERTED    Narrative  ECHO REPORT      Imported By: Casandra Kebede 1/18/2018 10:29:55 AM    _____________________________________________________________________    External Attachment:    Type: Image  Comment:  External Document      Signed before import by Cuauhtemoc Kwon MD  Filed automatically on 01/18/2018 at 10:30 AM  ________________________________________________________________________      Disclaimer: Converted Note message may not contain all data elements that existed in the legacy source system. Please see [employer] System for the original note details.           Objective:    Vitals:  Vitals:    08/08/23 0826   BP: 137/85   BP Location: Left arm   Patient Position: Sitting   Cuff Size: Large Adult   Pulse: 69   Resp: 18   SpO2: 97%   Weight: 87.5 kg (193 lb)   Height: 157.5 cm (62\")       Physical Exam:    General: Alert, cooperative, no distress, appears stated age  Head:  Normocephalic, atraumatic, mucous membranes moist  Eyes:  Conjunctiva/corneas clear, EOM's intact     Neck:  Supple,  no adenopathy;      Lungs: Clear to auscultation bilaterally, no wheezes rhonchi rales are noted  Chest wall: No tenderness  Heart::  Regular rate and rhythm, S1 and S2 normal, no murmur, rub or gallop  Abdomen: Soft, non-tender, nondistended bowel sounds active  Extremities: No cyanosis, clubbing, or edema  Pulses: 2+ " and symmetric all extremities  Skin:  No rashes or lesions  Neuro/psych: A&O x3. CN II through XII are grossly intact with appropriate affect      Allergies:  No Known Allergies    Medication Review:     Current Outpatient Medications:     albuterol (PROVENTIL) (2.5 MG/3ML) 0.083% nebulizer solution, INHALE 1 VIAL VIA NEBULIZER DAILY AS NEEDED FOR WHEEZING, Disp: 300 mL, Rfl: 1    ALPRAZolam (XANAX) 0.5 MG tablet, Take 1 tablet by mouth 3 (Three) Times a Day As Needed for Anxiety., Disp: 90 tablet, Rfl: 2    aspirin 81 MG EC tablet, ASPIRIN EC 81 MG TBEC daily, Disp: , Rfl:     Budeson-Glycopyrrol-Formoterol (Breztri Aerosphere) 160-9-4.8 MCG/ACT aerosol inhaler, Inhale 2 puffs 2 (Two) Times a Day., Disp: 2 each, Rfl: 0    bumetanide (BUMEX) 2 MG tablet, Take 1 tablet by mouth 2 (Two) Times a Day., Disp: 180 tablet, Rfl: 3    carbidopa-levodopa (SINEMET)  MG per tablet, Take 1 tab at: 6 am, 10 am, 2pm, 7 pm, Disp: 120 tablet, Rfl: 6    carvedilol (COREG) 25 MG tablet, Take 1 tablet by mouth 2 (Two) Times a Day., Disp: 180 tablet, Rfl: 3    citalopram (CeleXA) 40 MG tablet, Take 1 tablet by mouth Every Morning., Disp: 90 tablet, Rfl: 1    cyanocobalamin 1000 MCG/ML injection, INJECT 1 ML INTO THE APPROPRIATE MUSCLE AS DIRECTED BY PRESCRIBER EVERY 28 (TWENTY-EIGHT) DAYS., Disp: 3 mL, Rfl: 1    gabapentin (NEURONTIN) 300 MG capsule, Take 1 capsule by mouth 3 (Three) Times a Day., Disp: 270 capsule, Rfl: 1    KLOR-CON 20 MEQ CR tablet, Take 1 tablet by mouth Daily., Disp: 180 tablet, Rfl: 0    lisinopril (PRINIVIL,ZESTRIL) 10 MG tablet, Take 1 tablet by mouth 2 (Two) Times a Day., Disp: 180 tablet, Rfl: 3    mirtazapine (REMERON) 15 MG tablet, Take 1 tablet by mouth every night at bedtime., Disp: 30 tablet, Rfl: 2    Multiple Vitamins-Minerals (MULTIVITAMIN ADULT) tablet, Take 1 tablet by mouth Daily. With Omega XL, Disp: , Rfl:     nitroglycerin (NITROSTAT) 0.4 MG SL tablet, Place 1 tablet under the tongue Every  "5 (Five) Minutes As Needed for Chest Pain. Take no more than 3 doses in 15 minutes., Disp: 25 tablet, Rfl: 2    ondansetron (ZOFRAN) 4 MG tablet, TAKE 1 TABLET BY MOUTH EVERY 8 HOURS AS NEEDED FOR NAUSEA OR VOMITING., Disp: 45 tablet, Rfl: 2    pantoprazole (PROTONIX) 40 MG EC tablet, Take 1 tablet by mouth Daily., Disp: 90 tablet, Rfl: 3    QUEtiapine (SEROquel) 25 MG tablet, Take 1 tablet by mouth Every Night., Disp: 30 tablet, Rfl: 2    rosuvastatin (CRESTOR) 40 MG tablet, Take 1 tablet by mouth Every Evening., Disp: 90 tablet, Rfl: 1    Syringe/Needle, Disp, 23G X 1\" 3 ML misc, Use to inject B12 every 30 days intramuscularly, Disp: 12 each, Rfl: 0    vitamin D (ERGOCALCIFEROL) 1.25 MG (82549 UT) capsule capsule, Take 1 capsule by mouth 1 (One) Time Per Week. Pt is out of this, picking it up tomorrow, Disp: 15 capsule, Rfl: 3    Family History:  Family History   Problem Relation Age of Onset    Colon cancer Mother     Diabetes Father     Pulmonary embolism Brother     Heart failure Brother     Breast cancer Maternal Aunt        Past Medical History:  Past Medical History:   Diagnosis Date    Anxiety     Breast cyst     CHF (congestive heart failure)     COPD (chronic obstructive pulmonary disease)     Coronary heart disease     Depression     Hyperlipidemia     Hypertension        Past surgical History:  Past Surgical History:   Procedure Laterality Date    APPENDECTOMY      BREAST CYST ASPIRATION      CARDIAC CATHETERIZATION  2017    No Stents placed - [facility]    CARDIAC CATHETERIZATION N/A 2023    Procedure: Left Heart Cath possible PCI;  Surgeon: Cuauhtemoc Kwon MD;  Location: [facility] CATH INVASIVE LOCATION;  Service: Cardiovascular;  Laterality: N/A;    CERVICAL FUSION      C6-C7     SECTION      x 2    CHOLECYSTECTOMY      HYSTERECTOMY      partial       Social History:  Social History     Socioeconomic History    Marital status:    Tobacco Use    Smoking status: Never    Smokeless " tobacco: Never    Tobacco comments:     Passive Smoke: N   Vaping Use    Vaping Use: Never used   Substance and Sexual Activity    Alcohol use: No    Drug use: No    Sexual activity: Defer       Review of Systems:  The following systems were reviewed as they relate to the cardiovascular system: Constitutional, Eyes, ENT, Cardiovascular, Respiratory, Gastrointestinal, Integumentary, Neurological, Psychiatric, Hematologic, Endocrine, Musculoskeletal, and Genitourinary. The pertinent cardiovascular findings are reported above with all other cardiovascular points within those systems being negative.    Diagnostic Study Review:     Current Electrocardiogram:  Procedures      Advance Care Planning   ACP discussion was held with the patient during this visit. Patient has an advance directive in EMR which is still valid.         NOTE: The following portions of the patient's history were reviewed and updated this visit as appropriate: allergies, current medications, past family history, past medical history, past social history, past surgical history and problem list.

## 2023-08-15 ENCOUNTER — CLINICAL SUPPORT (OUTPATIENT)
Dept: FAMILY MEDICINE CLINIC | Facility: CLINIC | Age: 63
End: 2023-08-15
Payer: MEDICARE

## 2023-08-15 DIAGNOSIS — I10 ESSENTIAL HYPERTENSION: ICD-10-CM

## 2023-08-15 DIAGNOSIS — I50.32 CHRONIC DIASTOLIC CONGESTIVE HEART FAILURE: ICD-10-CM

## 2023-08-15 DIAGNOSIS — E78.2 MIXED HYPERLIPIDEMIA: ICD-10-CM

## 2023-08-15 DIAGNOSIS — I25.10 CORONARY ARTERY DISEASE INVOLVING NATIVE CORONARY ARTERY OF NATIVE HEART WITHOUT ANGINA PECTORIS: ICD-10-CM

## 2023-08-15 PROCEDURE — 80061 LIPID PANEL: CPT | Performed by: INTERNAL MEDICINE

## 2023-08-15 PROCEDURE — 80053 COMPREHEN METABOLIC PANEL: CPT | Performed by: INTERNAL MEDICINE

## 2023-08-15 PROCEDURE — 36415 COLL VENOUS BLD VENIPUNCTURE: CPT | Performed by: NURSE PRACTITIONER

## 2023-08-15 NOTE — PROGRESS NOTES
Venipuncture Blood Specimen Collection  Venipuncture performed in the right arm by Marina Rogel MA with good hemostasis. Patient tolerated the procedure well without complications.   08/15/23   Marina Rogel MA

## 2023-08-16 ENCOUNTER — TELEPHONE (OUTPATIENT)
Dept: CARDIOLOGY | Facility: CLINIC | Age: 63
End: 2023-08-16
Payer: MEDICARE

## 2023-08-16 LAB
ALBUMIN SERPL-MCNC: 4.6 G/DL (ref 3.5–5.2)
ALBUMIN/GLOB SERPL: 2 G/DL
ALP SERPL-CCNC: 57 U/L (ref 39–117)
ALT SERPL W P-5'-P-CCNC: 12 U/L (ref 1–33)
ANION GAP SERPL CALCULATED.3IONS-SCNC: 11.6 MMOL/L (ref 5–15)
AST SERPL-CCNC: 21 U/L (ref 1–32)
BILIRUB SERPL-MCNC: 0.4 MG/DL (ref 0–1.2)
BUN SERPL-MCNC: 18 MG/DL (ref 8–23)
BUN/CREAT SERPL: 16.1 (ref 7–25)
CALCIUM SPEC-SCNC: 10.5 MG/DL (ref 8.6–10.5)
CHLORIDE SERPL-SCNC: 104 MMOL/L (ref 98–107)
CHOLEST SERPL-MCNC: 139 MG/DL (ref 0–200)
CO2 SERPL-SCNC: 26.4 MMOL/L (ref 22–29)
CREAT SERPL-MCNC: 1.12 MG/DL (ref 0.57–1)
EGFRCR SERPLBLD CKD-EPI 2021: 54.7 ML/MIN/1.73
GLOBULIN UR ELPH-MCNC: 2.3 GM/DL
GLUCOSE SERPL-MCNC: 102 MG/DL (ref 65–99)
HDLC SERPL-MCNC: 55 MG/DL (ref 40–60)
LDLC SERPL CALC-MCNC: 56 MG/DL (ref 0–100)
LDLC/HDLC SERPL: 0.91 {RATIO}
POTASSIUM SERPL-SCNC: 4.2 MMOL/L (ref 3.5–5.2)
PROT SERPL-MCNC: 6.9 G/DL (ref 6–8.5)
SODIUM SERPL-SCNC: 142 MMOL/L (ref 136–145)
TRIGL SERPL-MCNC: 169 MG/DL (ref 0–150)
VLDLC SERPL-MCNC: 28 MG/DL (ref 5–40)

## 2023-08-24 DIAGNOSIS — F51.04 PSYCHOPHYSIOLOGICAL INSOMNIA: Chronic | ICD-10-CM

## 2023-08-24 DIAGNOSIS — F43.12 CHRONIC POST-TRAUMATIC STRESS DISORDER (PTSD): Chronic | ICD-10-CM

## 2023-08-24 DIAGNOSIS — F33.2 SEVERE EPISODE OF RECURRENT MAJOR DEPRESSIVE DISORDER, WITHOUT PSYCHOTIC FEATURES: Chronic | ICD-10-CM

## 2023-08-25 RX ORDER — QUETIAPINE FUMARATE 25 MG/1
TABLET, FILM COATED ORAL
Qty: 30 TABLET | Refills: 2 | Status: SHIPPED | OUTPATIENT
Start: 2023-08-25

## 2023-09-01 ENCOUNTER — HOSPITAL ENCOUNTER (OUTPATIENT)
Dept: BONE DENSITY | Facility: HOSPITAL | Age: 63
Discharge: HOME | End: 2023-09-01
Payer: MEDICARE

## 2023-09-01 ENCOUNTER — HOSPITAL ENCOUNTER (OUTPATIENT)
Dept: MAMMOGRAPHY | Facility: HOSPITAL | Age: 63
Discharge: HOME | End: 2023-09-01
Payer: MEDICARE

## 2023-09-01 DIAGNOSIS — Z78.0 POSTMENOPAUSAL: ICD-10-CM

## 2023-09-01 DIAGNOSIS — Z12.31 BREAST CANCER SCREENING BY MAMMOGRAM: ICD-10-CM

## 2023-09-01 PROCEDURE — 77080 DXA BONE DENSITY AXIAL: CPT

## 2023-09-01 PROCEDURE — 77067 SCR MAMMO BI INCL CAD: CPT

## 2023-09-01 PROCEDURE — 77063 BREAST TOMOSYNTHESIS BI: CPT

## 2023-09-10 DIAGNOSIS — E53.8 B12 DEFICIENCY: ICD-10-CM

## 2023-09-12 RX ORDER — CYANOCOBALAMIN 1000 UG/ML
1000 INJECTION, SOLUTION INTRAMUSCULAR; SUBCUTANEOUS
Qty: 3 ML | Refills: 1 | Status: SHIPPED | OUTPATIENT
Start: 2023-09-12

## 2023-09-16 DIAGNOSIS — F51.04 PSYCHOPHYSIOLOGICAL INSOMNIA: Chronic | ICD-10-CM

## 2023-09-16 RX ORDER — TRAZODONE HYDROCHLORIDE 100 MG/1
100-200 TABLET ORAL
Qty: 180 TABLET | Refills: 1 | Status: SHIPPED | OUTPATIENT
Start: 2023-09-16

## 2023-10-11 RX ORDER — ONDANSETRON 4 MG/1
4 TABLET, FILM COATED ORAL EVERY 8 HOURS PRN
Qty: 45 TABLET | Refills: 2 | Status: SHIPPED | OUTPATIENT
Start: 2023-10-11

## 2023-10-11 RX ORDER — PANTOPRAZOLE SODIUM 40 MG/1
40 TABLET, DELAYED RELEASE ORAL DAILY
Qty: 90 TABLET | Refills: 3 | Status: SHIPPED | OUTPATIENT
Start: 2023-10-11

## 2023-11-02 ENCOUNTER — OFFICE VISIT (OUTPATIENT)
Dept: PSYCHIATRY | Facility: CLINIC | Age: 63
End: 2023-11-02
Payer: MEDICARE

## 2023-11-02 DIAGNOSIS — F51.04 PSYCHOPHYSIOLOGICAL INSOMNIA: Chronic | ICD-10-CM

## 2023-11-02 DIAGNOSIS — F43.12 CHRONIC POST-TRAUMATIC STRESS DISORDER (PTSD): Chronic | ICD-10-CM

## 2023-11-02 DIAGNOSIS — F33.2 SEVERE EPISODE OF RECURRENT MAJOR DEPRESSIVE DISORDER, WITHOUT PSYCHOTIC FEATURES: Primary | Chronic | ICD-10-CM

## 2023-11-02 PROCEDURE — 1160F RVW MEDS BY RX/DR IN RCRD: CPT | Performed by: PSYCHIATRY & NEUROLOGY

## 2023-11-02 PROCEDURE — 1159F MED LIST DOCD IN RCRD: CPT | Performed by: PSYCHIATRY & NEUROLOGY

## 2023-11-02 PROCEDURE — 99214 OFFICE O/P EST MOD 30 MIN: CPT | Performed by: PSYCHIATRY & NEUROLOGY

## 2023-11-02 RX ORDER — CITALOPRAM 40 MG/1
40 TABLET ORAL EVERY MORNING
Qty: 90 TABLET | Refills: 1 | Status: SHIPPED | OUTPATIENT
Start: 2023-11-02

## 2023-11-02 RX ORDER — TRAZODONE HYDROCHLORIDE 100 MG/1
100-200 TABLET ORAL
Qty: 180 TABLET | Refills: 1 | Status: SHIPPED | OUTPATIENT
Start: 2023-11-02

## 2023-11-02 RX ORDER — QUETIAPINE FUMARATE 50 MG/1
50 TABLET, FILM COATED ORAL
Qty: 90 TABLET | Refills: 1 | Status: SHIPPED | OUTPATIENT
Start: 2023-11-02

## 2023-11-02 RX ORDER — MIRTAZAPINE 15 MG/1
15 TABLET, FILM COATED ORAL
Qty: 90 TABLET | Refills: 1 | Status: SHIPPED | OUTPATIENT
Start: 2023-11-02

## 2023-11-02 RX ORDER — ALPRAZOLAM 0.5 MG/1
0.5 TABLET ORAL 3 TIMES DAILY PRN
Qty: 90 TABLET | Refills: 2 | Status: SHIPPED | OUTPATIENT
Start: 2023-11-02

## 2023-11-02 RX ORDER — GABAPENTIN 300 MG/1
300 CAPSULE ORAL 3 TIMES DAILY
Qty: 270 CAPSULE | Refills: 1 | Status: SHIPPED | OUTPATIENT
Start: 2023-11-02

## 2023-11-02 NOTE — PROGRESS NOTES
Subjective   Jasmine Cerda is a 63 y.o. female who presents today for follow up     Chief Complaint:   Increased Depression , feeling stressed     History of Present Illness:   The pt suffers from depression for a long time, was on multiple meds    The pt was more depressed last few months 2ry to family situation , her son attempted suicide, relapsed on drugs    Last visit abilify was d/c,    Seroquel 25 mg was started,   The pt is still on celexa ,remeron,  trazodone,  xanax and gabapentin   Neurologist - no concerns about psych meds, she was dsd with parkinsons     Today the pt reported feeling   depressed,   she  can not stop worrying about her son who is going through divorce, homeless, lives in the car, he does not have phone and the pt has no news from him for 10 days   Another son is getting  and the pt feels guilty that she does not get involved in the wedding preparation   Depression is rated as 9/10, denied SI/HI   Depression is associated with anhedonia , no desires to do anything,  decreased E level   poor sleep but  trazodone with seroquel - effective   After the pt was started on seroquel, she feels calmer at night                                Precipitating and Ameliorating Factors: relationship problems   Alleviating factors - to spend time with her grand daughter         PAST PSYCHIATRIC HISTORY      Previous Psychiatric Diagnoses   Axis I: Anxiety/Panic Disorder     Past Hospitalizations or Residential Treatment   Locations\Providers: none      Past Outpatient Treatment   Diagnosis Treated: Anxiety/Panic Dis.  Treatment Type: Medication Management  Location: with PCP, Dr Manning      Prior Psychiatric Medications   Comments: xanax - effective      celexa- not effective   zoloft - not effective  cymbalta - not effective      Prozac - GI sxs   ambien - side effects   Risperidone - not effective and side effects   Hydroxyzine - not effective  Abilify - not effective       Consequences of  Mental Disorder   Consequences: emotional distress     SOCIAL HISTORY     Number of children: 2  Number of grandkids: 1  Current Relationship is: supportive  Family of Origin is: supportive  Comments: Patient has never smoked.  Passive Smoke: N  Alcohol Use: N  Drug Use: N  HIV/High Risk: N        Current Living Situation   Lives with: spouse     Education   Level: high school graduate     Employment   Job Status: disabled     Hobbies and Leisure Activities   Activity Type: quiet activities     Smoking History   Smoking Hx: Never smoker     Exercise   Exercise sessions (per wk): 1     Illicit Drug Use   Illicit Drugs used: no     FAMILY HISTORY OF MENTAL DISORDERS   fh Grandparents: Non-contributory  fh Mother: Non-contributory  fh Father: Non-contributory  fh Siblings: Non-contributory  fh Other: Non-contributory  The following portions of the patient's history were reviewed and updated as appropriate: allergies, current medications, past family history, past medical history, past social history, past surgical history and problem list.            Interval History  No changes, still depressed        Side Effects  Denied       Past Medical History:  Past Medical History:   Diagnosis Date    Anxiety     Breast cyst     CHF (congestive heart failure)     COPD (chronic obstructive pulmonary disease)     Coronary heart disease     Depression     Hyperlipidemia     Hypertension        Social History:  Social History     Socioeconomic History    Marital status:    Tobacco Use    Smoking status: Never    Smokeless tobacco: Never    Tobacco comments:     Passive Smoke: N   Vaping Use    Vaping Use: Never used   Substance and Sexual Activity    Alcohol use: No    Drug use: No    Sexual activity: Defer       Family History:  Family History   Problem Relation Age of Onset    Colon cancer Mother     Diabetes Father     Pulmonary embolism Brother     Heart failure Brother     Breast cancer Maternal Aunt        Past  Surgical History:  Past Surgical History:   Procedure Laterality Date    APPENDECTOMY      BREAST CYST ASPIRATION      CARDIAC CATHETERIZATION  2017    No Stents placed - [facility]    CARDIAC CATHETERIZATION N/A 2023    Procedure: Left Heart Cath possible PCI;  Surgeon: Cuauhtemoc Kwon MD;  Location: [facility] CATH INVASIVE LOCATION;  Service: Cardiovascular;  Laterality: N/A;    CERVICAL FUSION      C6-C7     SECTION      x 2    CHOLECYSTECTOMY      HYSTERECTOMY      partial       Problem List:  Patient Active Problem List   Diagnosis    Chronic post-traumatic stress disorder (PTSD)    Severe episode of recurrent major depressive disorder    Asthma    Chronic low back pain    Chronic diastolic heart failure    Coronary heart disease    Degeneration of intervertebral disc of lumbosacral region    Headache, temporal    Psychophysiological insomnia    Hyperlipidemia    Hypertension    Vitamin D deficiency    Other fatigue    Preventative health care    Hyperglycemia, unspecified     Nausea    Stable angina pectoris    Chest pain    Dyspnea    Abnormal nuclear stress test       Allergy:   No Known Allergies     Discontinued Medications:  Medications Discontinued During This Encounter   Medication Reason    citalopram (CeleXA) 40 MG tablet Reorder    ALPRAZolam (XANAX) 0.5 MG tablet Reorder    gabapentin (NEURONTIN) 300 MG capsule Reorder    mirtazapine (REMERON) 15 MG tablet Reorder    QUEtiapine (SEROquel) 25 MG tablet Reorder    traZODone (DESYREL) 100 MG tablet Reorder           Current Medications:   Current Outpatient Medications   Medication Sig Dispense Refill    ALPRAZolam (XANAX) 0.5 MG tablet Take 1 tablet by mouth 3 (Three) Times a Day As Needed for Anxiety. 90 tablet 2    citalopram (CeleXA) 40 MG tablet Take 1 tablet by mouth Every Morning. 90 tablet 1    gabapentin (NEURONTIN) 300 MG capsule Take 1 capsule by mouth 3 (Three) Times a Day. 270 capsule 1    mirtazapine (REMERON) 15 MG tablet  "Take 1 tablet by mouth every night at bedtime. 90 tablet 1    QUEtiapine (SEROquel) 50 MG tablet Take 1 tablet by mouth every night at bedtime. 90 tablet 1    traZODone (DESYREL) 100 MG tablet Take 1-2 tablets by mouth every night at bedtime. 180 tablet 1    albuterol (PROVENTIL) (2.5 MG/3ML) 0.083% nebulizer solution INHALE 1 VIAL VIA NEBULIZER DAILY AS NEEDED FOR WHEEZING 300 mL 1    aspirin 81 MG EC tablet ASPIRIN EC 81 MG TBEC daily      Budeson-Glycopyrrol-Formoterol (Breztri Aerosphere) 160-9-4.8 MCG/ACT aerosol inhaler Inhale 2 puffs 2 (Two) Times a Day. 2 each 0    bumetanide (BUMEX) 2 MG tablet Take 1 tablet by mouth 2 (Two) Times a Day. 180 tablet 3    carbidopa-levodopa (SINEMET)  MG per tablet Take 1 tab at: 6 am, 10 am, 2pm, 7 pm 120 tablet 6    carvedilol (COREG) 25 MG tablet Take 1 tablet by mouth 2 (Two) Times a Day. 180 tablet 3    cyanocobalamin 1000 MCG/ML injection INJECT 1 ML INTO THE APPROPRIATE MUSCLE AS DIRECTED BY PRESCRIBER EVERY 28 (TWENTY-EIGHT) DAYS. 3 mL 1    KLOR-CON 20 MEQ CR tablet Take 1 tablet by mouth Daily. 180 tablet 0    lisinopril (PRINIVIL,ZESTRIL) 10 MG tablet Take 1 tablet by mouth 2 (Two) Times a Day. 180 tablet 3    Multiple Vitamins-Minerals (MULTIVITAMIN ADULT) tablet Take 1 tablet by mouth Daily. With Omega XL      nitroglycerin (NITROSTAT) 0.4 MG SL tablet Place 1 tablet under the tongue Every 5 (Five) Minutes As Needed for Chest Pain. Take no more than 3 doses in 15 minutes. 25 tablet 2    ondansetron (ZOFRAN) 4 MG tablet TAKE 1 TABLET BY MOUTH EVERY 8 HOURS AS NEEDED FOR NAUSEA AND VOMITING 45 tablet 2    pantoprazole (PROTONIX) 40 MG EC tablet TAKE 1 TABLET BY MOUTH EVERY DAY 90 tablet 3    rosuvastatin (CRESTOR) 40 MG tablet Take 1 tablet by mouth Every Evening. 90 tablet 1    Syringe/Needle, Disp, 23G X 1\" 3 ML misc Use to inject B12 every 30 days intramuscularly 12 each 0    vitamin D (ERGOCALCIFEROL) 1.25 MG (36128 UT) capsule capsule Take 1 capsule by " mouth 1 (One) Time Per Week. Pt is out of this, picking it up tomorrow 15 capsule 3     No current facility-administered medications for this visit.         Review of Symptoms:    Psychiatric/Behavioral: Negative for agitation, behavioral problems, confusion, decreased concentration, dysphoric mood, hallucinations, self-injury, sleep disturbance and suicidal ideas.   The patient is  Still   depressed,  Still very  nervous/anxious and is not hyperactive.        Physical Exam:   There were no vitals taken for this visit.    Mental Status Exam:   Hygiene:   good  Cooperation:  Cooperative  Eye Contact:  Good  Psychomotor Behavior:  Appropriate  Affect:  Appropriate and labile  tearful   Mood: depressed and fluctates,  Anxious    Hopelessness: Denies  Speech:  Normal  Thought Process:  Goal directed and Linear  Thought Content:  Normal  Suicidal:  None  Homicidal:  None  Hallucinations:  None  Delusion:  None  Memory:  Intact  Orientation:  Person, Place, Time and Situation  Reliability:  fair  Insight:  Good  Judgement:  Good  Impulse Control:  Good  Physical/Medical Issues:  Yes GI issues         MSE from 8/2/23   reviewed and accepted without  changes     PHQ-9 Depression Screening  Little interest or pleasure in doing things? 3-->nearly every day   Feeling down, depressed, or hopeless? 3-->nearly every day   Trouble falling or staying asleep, or sleeping too much? 2-->more than half the days   Feeling tired or having little energy? 2-->more than half the days   Poor appetite or overeating? 0-->not at all   Feeling bad about yourself - or that you are a failure or have let yourself or your family down? 3-->nearly every day   Trouble concentrating on things, such as reading the newspaper or watching television? 1-->several days   Moving or speaking so slowly that other people could have noticed? Or the opposite - being so fidgety or restless that you have been moving around a lot more than usual? 0-->not at all    Thoughts that you would be better off dead, or of hurting yourself in some way? 0-->not at all   PHQ-9 Total Score 14   If you checked off any problems, how difficult have these problems made it for you to do your work, take care of things at home, or get along with other people? very difficult       Never smoker    I advised Jasmine of the risks of tobacco use.     Lab Results:   Clinical Support on 08/15/2023   Component Date Value Ref Range Status    Total Cholesterol 08/15/2023 139  0 - 200 mg/dL Final    Triglycerides 08/15/2023 169 (H)  0 - 150 mg/dL Final    HDL Cholesterol 08/15/2023 55  40 - 60 mg/dL Final    LDL Cholesterol  08/15/2023 56  0 - 100 mg/dL Final    VLDL Cholesterol 08/15/2023 28  5 - 40 mg/dL Final    LDL/HDL Ratio 08/15/2023 0.91   Final    Glucose 08/15/2023 102 (H)  65 - 99 mg/dL Final    BUN 08/15/2023 18  8 - 23 mg/dL Final    Creatinine 08/15/2023 1.12 (H)  0.57 - 1.00 mg/dL Final    Sodium 08/15/2023 142  136 - 145 mmol/L Final    Potassium 08/15/2023 4.2  3.5 - 5.2 mmol/L Final    Chloride 08/15/2023 104  98 - 107 mmol/L Final    CO2 08/15/2023 26.4  22.0 - 29.0 mmol/L Final    Calcium 08/15/2023 10.5  8.6 - 10.5 mg/dL Final    Total Protein 08/15/2023 6.9  6.0 - 8.5 g/dL Final    Albumin 08/15/2023 4.6  3.5 - 5.2 g/dL Final    ALT (SGPT) 08/15/2023 12  1 - 33 U/L Final    AST (SGOT) 08/15/2023 21  1 - 32 U/L Final    Alkaline Phosphatase 08/15/2023 57  39 - 117 U/L Final    Total Bilirubin 08/15/2023 0.4  0.0 - 1.2 mg/dL Final    Globulin 08/15/2023 2.3  gm/dL Final    A/G Ratio 08/15/2023 2.0  g/dL Final    BUN/Creatinine Ratio 08/15/2023 16.1  7.0 - 25.0 Final    Anion Gap 08/15/2023 11.6  5.0 - 15.0 mmol/L Final    eGFR 08/15/2023 54.7 (L)  >60.0 mL/min/1.73 Final       Assessment & Plan   Problems Addressed this Visit       Chronic post-traumatic stress disorder (PTSD) (Chronic)    Relevant Medications    QUEtiapine (SEROquel) 50 MG tablet    traZODone (DESYREL) 100 MG tablet     mirtazapine (REMERON) 15 MG tablet    citalopram (CeleXA) 40 MG tablet    ALPRAZolam (XANAX) 0.5 MG tablet    gabapentin (NEURONTIN) 300 MG capsule    Severe episode of recurrent major depressive disorder - Primary (Chronic)    Relevant Medications    QUEtiapine (SEROquel) 50 MG tablet    traZODone (DESYREL) 100 MG tablet    mirtazapine (REMERON) 15 MG tablet    citalopram (CeleXA) 40 MG tablet    ALPRAZolam (XANAX) 0.5 MG tablet    Psychophysiological insomnia (Chronic)    Relevant Medications    QUEtiapine (SEROquel) 50 MG tablet    traZODone (DESYREL) 100 MG tablet    mirtazapine (REMERON) 15 MG tablet    citalopram (CeleXA) 40 MG tablet    ALPRAZolam (XANAX) 0.5 MG tablet     Diagnoses         Codes Comments    Severe episode of recurrent major depressive disorder, without psychotic features    -  Primary ICD-10-CM: F33.2  ICD-9-CM: 296.33     Chronic post-traumatic stress disorder (PTSD)     ICD-10-CM: F43.12  ICD-9-CM: 309.81     Psychophysiological insomnia     ICD-10-CM: F51.04  ICD-9-CM: 307.42             Visit Diagnoses:    ICD-10-CM ICD-9-CM   1. Severe episode of recurrent major depressive disorder, without psychotic features  F33.2 296.33   2. Chronic post-traumatic stress disorder (PTSD)  F43.12 309.81   3. Psychophysiological insomnia  F51.04 307.42           TREATMENT PLAN/GOALS: Continue supportive psychotherapy efforts and medications as indicated. Treatment and medication options discussed during today's visit. Patient ackowledged and verbally consented to continue with current treatment plan and was educated on the importance of compliance with treatment and follow-up appointments.    MEDICATION ISSUES:  1. MDD - cont celexa 40 mg ,   increase  seroquel to 50  mg po QHS for depression, anxiety, insomnia  Neurology was ok with her meds   Cont  remeron 15 mg po QHS for depression and insomnia   2. PTSD - cont celexa 40 mg  (Qtc 464) , Xanax - low dose 0.5 mg TID, no increase,  long term benzo use  discussed again   hydroxyzine - not effective   3. Insomnia -  cont   trazodone - 100-200 mg po QHS , increase  seroquel 50 mg po qhS   Cont  remeron 15 mg po QHS      4. Long term therapeutic drug monitoring - UDS  8/31/2021 - consistent  1/5/23 - consistent   LABORATORY - SCAN - [facility] DRUG SCREEN, [facility], 1/5/2023 (01/05/2023)       EKG 5/15/23 Qtc 464     Psychotherapy was recommended , the is reluctant to start now,   cont supportive therapy,   Referred to [facility]     INSPECT reviewed as expected, last xanax 0.5 mg # 90 for 30 days refill was on 10/13/23     Patient screened positive for depression based on a PHQ-9 score of 14 on 11/2/2023. Follow-up recommendations include: Prescribed antidepressant medication treatment.    PHQ scored 14 and indicated moderate depression   DWIGHT 7 scored 15      Discussed medication options and treatment plan of prescribed medication as well as the risks, benefits, and side effects including potential falls, possible impaired driving and metabolic adversities among others. Patient is agreeable to call the office with any worsening of symptoms or onset of side effects. Patient is agreeable to call 911 or go to the nearest ER should he/she begin having SI/HI. No medication side effects or related complaints today.     MEDS ORDERED DURING VISIT:  New Medications Ordered This Visit   Medications    QUEtiapine (SEROquel) 50 MG tablet     Sig: Take 1 tablet by mouth every night at bedtime.     Dispense:  90 tablet     Refill:  1    traZODone (DESYREL) 100 MG tablet     Sig: Take 1-2 tablets by mouth every night at bedtime.     Dispense:  180 tablet     Refill:  1    mirtazapine (REMERON) 15 MG tablet     Sig: Take 1 tablet by mouth every night at bedtime.     Dispense:  90 tablet     Refill:  1    citalopram (CeleXA) 40 MG tablet     Sig: Take 1 tablet by mouth Every Morning.     Dispense:  90 tablet     Refill:  1    ALPRAZolam (XANAX) 0.5 MG tablet     Sig: Take 1 tablet by  mouth 3 (Three) Times a Day As Needed for Anxiety.     Dispense:  90 tablet     Refill:  2     Please dispense when it is due    gabapentin (NEURONTIN) 300 MG capsule     Sig: Take 1 capsule by mouth 3 (Three) Times a Day.     Dispense:  270 capsule     Refill:  1     Please dispense when it is due       Return in about 4 months (around 3/2/2024).         This document has been electronically signed by Sivan Ken MD  November 2, 2023 08:35 EDT

## 2023-11-15 ENCOUNTER — TELEPHONE (OUTPATIENT)
Dept: PSYCHIATRY | Facility: CLINIC | Age: 63
End: 2023-11-15
Payer: MEDICARE

## 2023-12-07 DIAGNOSIS — F43.12 CHRONIC POST-TRAUMATIC STRESS DISORDER (PTSD): Chronic | ICD-10-CM

## 2023-12-07 DIAGNOSIS — E78.2 MIXED HYPERLIPIDEMIA: ICD-10-CM

## 2023-12-07 RX ORDER — ALPRAZOLAM 0.5 MG/1
0.5 TABLET ORAL 3 TIMES DAILY PRN
Qty: 90 TABLET | Refills: 2 | OUTPATIENT
Start: 2023-12-07

## 2023-12-07 RX ORDER — ROSUVASTATIN CALCIUM 40 MG/1
40 TABLET, COATED ORAL EVERY EVENING
Qty: 90 TABLET | Refills: 1 | Status: SHIPPED | OUTPATIENT
Start: 2023-12-07

## 2023-12-07 RX ORDER — POTASSIUM CHLORIDE 1500 MG/1
20 TABLET, EXTENDED RELEASE ORAL DAILY
Qty: 90 TABLET | Refills: 1 | Status: SHIPPED | OUTPATIENT
Start: 2023-12-07

## 2024-01-02 ENCOUNTER — TELEPHONE (OUTPATIENT)
Dept: PSYCHIATRY | Facility: CLINIC | Age: 64
End: 2024-01-02
Payer: MEDICARE

## 2024-01-02 DIAGNOSIS — F43.12 CHRONIC POST-TRAUMATIC STRESS DISORDER (PTSD): Chronic | ICD-10-CM

## 2024-01-02 NOTE — TELEPHONE ENCOUNTER
I cancelled pts xanax refills by LVM and faxing a request.    Pt has new insurance and needs Xanax to go to [facility] in [place].  She will transfer the rest of the scripts.

## 2024-01-03 RX ORDER — ALPRAZOLAM 0.5 MG/1
0.5 TABLET ORAL 3 TIMES DAILY PRN
Qty: 90 TABLET | Refills: 1 | Status: SHIPPED | OUTPATIENT
Start: 2024-01-03

## 2024-01-09 DIAGNOSIS — E78.2 MIXED HYPERLIPIDEMIA: ICD-10-CM

## 2024-01-09 DIAGNOSIS — E55.9 VITAMIN D DEFICIENCY: ICD-10-CM

## 2024-01-09 DIAGNOSIS — E53.8 B12 DEFICIENCY: Primary | ICD-10-CM

## 2024-01-09 DIAGNOSIS — I10 ESSENTIAL HYPERTENSION: ICD-10-CM

## 2024-01-12 DIAGNOSIS — R25.1 TREMOR, UNSPECIFIED: ICD-10-CM

## 2024-01-12 RX ORDER — ONDANSETRON 4 MG/1
4 TABLET, FILM COATED ORAL EVERY 8 HOURS PRN
Qty: 45 TABLET | Refills: 2 | Status: CANCELLED | OUTPATIENT
Start: 2024-01-12

## 2024-01-12 NOTE — TELEPHONE ENCOUNTER
Caller: Jasmine Cerda    Relationship: Self    Best call back number: [phone]    Requested Prescriptions:   Requested Prescriptions     Pending Prescriptions Disp Refills    carbidopa-levodopa (SINEMET)  MG per tablet 120 tablet 6     Sig: Take 1 tab at: 6 am, 10 am, 2pm, 7 pm    ondansetron (ZOFRAN) 4 MG tablet 45 tablet 2     Sig: Take 1 tablet by mouth Every 8 (Eight) Hours As Needed for Nausea or Vomiting.        Pharmacy where request should be sent: [facility] [phone] [place], [place] [phone]      Last office visit with prescribing clinician: 7/24/2023   Last telemedicine visit with prescribing clinician: Visit date not found   Next office visit with prescribing clinician: 1/24/2024     Does the patient have less than a 3 day supply:  [x] Yes  [] No      Kailash Agee Rep   01/12/24 09:35 EST

## 2024-01-17 ENCOUNTER — CLINICAL SUPPORT (OUTPATIENT)
Dept: FAMILY MEDICINE CLINIC | Facility: CLINIC | Age: 64
End: 2024-01-17
Payer: MEDICARE

## 2024-01-17 DIAGNOSIS — I10 PRIMARY HYPERTENSION: Primary | ICD-10-CM

## 2024-01-17 LAB
CHOLEST SERPL-MCNC: 176 MG/DL (ref 0–200)
HDLC SERPL-MCNC: 64 MG/DL (ref 40–60)
LDLC SERPL CALC-MCNC: 80 MG/DL (ref 0–100)
LDLC/HDLC SERPL: 1.15 {RATIO}
TRIGL SERPL-MCNC: 191 MG/DL (ref 0–150)
VLDLC SERPL-MCNC: 32 MG/DL (ref 5–40)

## 2024-01-17 PROCEDURE — 80061 LIPID PANEL: CPT | Performed by: NURSE PRACTITIONER

## 2024-01-17 PROCEDURE — 84443 ASSAY THYROID STIM HORMONE: CPT | Performed by: NURSE PRACTITIONER

## 2024-01-17 PROCEDURE — 36415 COLL VENOUS BLD VENIPUNCTURE: CPT | Performed by: NURSE PRACTITIONER

## 2024-01-17 PROCEDURE — 85027 COMPLETE CBC AUTOMATED: CPT | Performed by: NURSE PRACTITIONER

## 2024-01-17 PROCEDURE — 82306 VITAMIN D 25 HYDROXY: CPT | Performed by: NURSE PRACTITIONER

## 2024-01-17 PROCEDURE — 80053 COMPREHEN METABOLIC PANEL: CPT | Performed by: NURSE PRACTITIONER

## 2024-01-17 PROCEDURE — 82607 VITAMIN B-12: CPT | Performed by: NURSE PRACTITIONER

## 2024-01-17 NOTE — PROGRESS NOTES
Venipuncture Blood Specimen Collection  Venipuncture performed in the right arm by Marina Rogel MA with good hemostasis. Patient tolerated the procedure well without complications.   01/17/24   Marina Rogel MA

## 2024-01-18 LAB
25(OH)D3 SERPL-MCNC: 59.3 NG/ML (ref 30–100)
ALBUMIN SERPL-MCNC: 4.6 G/DL (ref 3.5–5.2)
ALBUMIN/GLOB SERPL: 1.8 G/DL
ALP SERPL-CCNC: 73 U/L (ref 39–117)
ALT SERPL W P-5'-P-CCNC: 16 U/L (ref 1–33)
ANION GAP SERPL CALCULATED.3IONS-SCNC: 10 MMOL/L (ref 5–15)
AST SERPL-CCNC: 14 U/L (ref 1–32)
BILIRUB SERPL-MCNC: 0.3 MG/DL (ref 0–1.2)
BUN SERPL-MCNC: 24 MG/DL (ref 8–23)
BUN/CREAT SERPL: 17.8 (ref 7–25)
CALCIUM SPEC-SCNC: 10.3 MG/DL (ref 8.6–10.5)
CHLORIDE SERPL-SCNC: 99 MMOL/L (ref 98–107)
CO2 SERPL-SCNC: 26 MMOL/L (ref 22–29)
CREAT SERPL-MCNC: 1.35 MG/DL (ref 0.57–1)
DEPRECATED RDW RBC AUTO: 41.3 FL (ref 37–54)
EGFRCR SERPLBLD CKD-EPI 2021: 43.7 ML/MIN/1.73
ERYTHROCYTE [DISTWIDTH] IN BLOOD BY AUTOMATED COUNT: 12.3 % (ref 12.3–15.4)
GLOBULIN UR ELPH-MCNC: 2.6 GM/DL
GLUCOSE SERPL-MCNC: 80 MG/DL (ref 65–99)
HCT VFR BLD AUTO: 39.9 % (ref 34–46.6)
HGB BLD-MCNC: 13.9 G/DL (ref 12–15.9)
MCH RBC QN AUTO: 32 PG (ref 26.6–33)
MCHC RBC AUTO-ENTMCNC: 34.8 G/DL (ref 31.5–35.7)
MCV RBC AUTO: 91.7 FL (ref 79–97)
PLATELET # BLD AUTO: 306 10*3/MM3 (ref 140–450)
PMV BLD AUTO: 9.8 FL (ref 6–12)
POTASSIUM SERPL-SCNC: 5.7 MMOL/L (ref 3.5–5.2)
PROT SERPL-MCNC: 7.2 G/DL (ref 6–8.5)
RBC # BLD AUTO: 4.35 10*6/MM3 (ref 3.77–5.28)
SODIUM SERPL-SCNC: 135 MMOL/L (ref 136–145)
TSH SERPL DL<=0.05 MIU/L-ACNC: 1.97 UIU/ML (ref 0.27–4.2)
VIT B12 BLD-MCNC: 973 PG/ML (ref 211–946)
WBC NRBC COR # BLD AUTO: 6.3 10*3/MM3 (ref 3.4–10.8)

## 2024-01-24 ENCOUNTER — OFFICE VISIT (OUTPATIENT)
Dept: FAMILY MEDICINE CLINIC | Facility: CLINIC | Age: 64
End: 2024-01-24
Payer: MEDICARE

## 2024-01-24 VITALS
RESPIRATION RATE: 16 BRPM | WEIGHT: 205.6 LBS | OXYGEN SATURATION: 97 % | HEART RATE: 67 BPM | TEMPERATURE: 98.3 F | SYSTOLIC BLOOD PRESSURE: 138 MMHG | DIASTOLIC BLOOD PRESSURE: 89 MMHG | HEIGHT: 62 IN | BODY MASS INDEX: 37.84 KG/M2

## 2024-01-24 DIAGNOSIS — J44.9 CHRONIC OBSTRUCTIVE PULMONARY DISEASE, UNSPECIFIED COPD TYPE: ICD-10-CM

## 2024-01-24 DIAGNOSIS — I10 PRIMARY HYPERTENSION: ICD-10-CM

## 2024-01-24 DIAGNOSIS — F43.12 CHRONIC POST-TRAUMATIC STRESS DISORDER (PTSD): Chronic | ICD-10-CM

## 2024-01-24 DIAGNOSIS — R25.1 TREMOR, UNSPECIFIED: ICD-10-CM

## 2024-01-24 DIAGNOSIS — Z00.00 MEDICARE ANNUAL WELLNESS VISIT, SUBSEQUENT: Primary | ICD-10-CM

## 2024-01-24 DIAGNOSIS — E87.5 HYPERKALEMIA: ICD-10-CM

## 2024-01-24 DIAGNOSIS — E53.8 B12 DEFICIENCY: ICD-10-CM

## 2024-01-24 DIAGNOSIS — N18.32 STAGE 3B CHRONIC KIDNEY DISEASE: ICD-10-CM

## 2024-01-24 DIAGNOSIS — E55.9 VITAMIN D DEFICIENCY: ICD-10-CM

## 2024-01-24 DIAGNOSIS — F33.2 SEVERE EPISODE OF RECURRENT MAJOR DEPRESSIVE DISORDER, WITHOUT PSYCHOTIC FEATURES: Chronic | ICD-10-CM

## 2024-01-24 DIAGNOSIS — F51.04 PSYCHOPHYSIOLOGICAL INSOMNIA: Chronic | ICD-10-CM

## 2024-01-24 DIAGNOSIS — I25.10 CORONARY ARTERY DISEASE INVOLVING NATIVE CORONARY ARTERY OF NATIVE HEART WITHOUT ANGINA PECTORIS: ICD-10-CM

## 2024-01-24 DIAGNOSIS — E78.2 MIXED HYPERLIPIDEMIA: ICD-10-CM

## 2024-01-24 DIAGNOSIS — K21.9 GASTROESOPHAGEAL REFLUX DISEASE WITHOUT ESOPHAGITIS: ICD-10-CM

## 2024-01-24 RX ORDER — POTASSIUM CHLORIDE 750 MG/1
10 TABLET, FILM COATED, EXTENDED RELEASE ORAL DAILY
Qty: 90 TABLET | Refills: 1 | Status: SHIPPED | OUTPATIENT
Start: 2024-01-24

## 2024-01-24 RX ORDER — QUETIAPINE FUMARATE 50 MG/1
50 TABLET, FILM COATED ORAL
Qty: 90 TABLET | Refills: 1 | Status: SHIPPED | OUTPATIENT
Start: 2024-01-24

## 2024-01-24 RX ORDER — ONDANSETRON 4 MG/1
4 TABLET, FILM COATED ORAL EVERY 8 HOURS PRN
Qty: 45 TABLET | Refills: 2 | Status: SHIPPED | OUTPATIENT
Start: 2024-01-24

## 2024-01-24 RX ORDER — BUDESONIDE, GLYCOPYRROLATE, AND FORMOTEROL FUMARATE 160; 9; 4.8 UG/1; UG/1; UG/1
2 AEROSOL, METERED RESPIRATORY (INHALATION) 2 TIMES DAILY
Qty: 2 EACH | Refills: 0 | COMMUNITY
Start: 2024-01-24

## 2024-01-24 NOTE — PROGRESS NOTES
The ABCs of the Annual Wellness Visit  Subsequent Medicare Wellness Visit    Chief Complaint   Patient presents with    Medicare Wellness-subsequent    Weight Check     Discuss weight gain.     Depression     Has not begun SEROquel due to insurance. Struggles w/ depression more , slight anxiety  .         Subjective      Jasmine Cerda is a 63 y.o. female who presents for a Subsequent Medicare Wellness Visit.     CKD stg 3, have been monitoring labs, she is on Bumex and KCl, has eliminated NSAIDs and only drinks water      Tremor, patient following with neurology on Sinemet 4 times per day, MRI of the brain was normal with mild chronic microvascular disease features 12/22/2022. She reports improvement in tremor, now some days w/o tremor.      COPD stable on breztri and albuterol, insurance does not cover Breztri well and she is asking for samples. She denies BAY, wheezing, shortness of air, does have occasional cough     Anxiety/depression/insomnia, on citalopram, abilify, remeron, trazodone and Xanax as needed, seroquel was added in November but her insurance changed and has not been able to get medication.  She follows with Dr. Ken. Reports symptoms of depression are still worsening, her son is a meth addict, has attempted to commit suicide, he is currently homeless, living in his truck, she has tried to help him and always relapses, she constantly worries about him     Hyperlipidemia/CAD, the patient denies muscle aches, constipation, diarrhea, GI upset, fatigue, chest pain/pressure, exercise intolerance, dyspnea, palpitations, syncope and pedal edema.       Vitamin D deficiency, on weekly vitamin D     Insomnia, takes trazodone nightly     GERD, stable on medication, denies nausea, vomiting, constipation, abdominal pain and diarrhea.     Here to review labs     The following portions of the patient's history were reviewed and   updated as appropriate: allergies, current medications, past family history, past  medical history, past social history, past surgical history, and problem list.    Compared to one year ago, the patient feels her physical   health is worse.    Compared to one year ago, the patient feels her mental   health is worse.    Recent Hospitalizations:  She was not admitted to the hospital during the last year.       Current Medical Providers:  Patient Care Team:  Eleni Miranda APRN as PCP - General (Nurse Practitioner)  Sivan Ken MD as Consulting Physician (Psychiatry)  CHASTITY Richardson DPM as Consulting Physician (Podiatry)  Cuauhtemoc Kwon MD as Consulting Physician (Cardiology)    Outpatient Medications Prior to Visit   Medication Sig Dispense Refill    albuterol (PROVENTIL) (2.5 MG/3ML) 0.083% nebulizer solution INHALE 1 VIAL VIA NEBULIZER DAILY AS NEEDED FOR WHEEZING 300 mL 1    ALPRAZolam (XANAX) 0.5 MG tablet Take 1 tablet by mouth 3 (Three) Times a Day As Needed for Anxiety. 90 tablet 1    aspirin 81 MG EC tablet ASPIRIN EC 81 MG TBEC daily      bumetanide (BUMEX) 2 MG tablet Take 1 tablet by mouth 2 (Two) Times a Day. 180 tablet 3    carvedilol (COREG) 25 MG tablet Take 1 tablet by mouth 2 (Two) Times a Day. 180 tablet 3    citalopram (CeleXA) 40 MG tablet Take 1 tablet by mouth Every Morning. 90 tablet 1    cyanocobalamin 1000 MCG/ML injection INJECT 1 ML INTO THE APPROPRIATE MUSCLE AS DIRECTED BY PRESCRIBER EVERY 28 (TWENTY-EIGHT) DAYS. 3 mL 1    gabapentin (NEURONTIN) 300 MG capsule Take 1 capsule by mouth 3 (Three) Times a Day. 270 capsule 1    lisinopril (PRINIVIL,ZESTRIL) 10 MG tablet Take 1 tablet by mouth 2 (Two) Times a Day. 180 tablet 3    mirtazapine (REMERON) 15 MG tablet Take 1 tablet by mouth every night at bedtime. 90 tablet 1    Multiple Vitamins-Minerals (MULTIVITAMIN ADULT) tablet Take 1 tablet by mouth Daily. With Omega XL      nitroglycerin (NITROSTAT) 0.4 MG SL tablet Place 1 tablet under the tongue Every 5 (Five) Minutes As Needed for Chest Pain. Take no  "more than 3 doses in 15 minutes. 25 tablet 2    pantoprazole (PROTONIX) 40 MG EC tablet TAKE 1 TABLET BY MOUTH EVERY DAY 90 tablet 3    rosuvastatin (CRESTOR) 40 MG tablet TAKE 1 TABLET BY MOUTH EVERY DAY IN THE EVENING 90 tablet 1    traZODone (DESYREL) 100 MG tablet Take 1-2 tablets by mouth every night at bedtime. 180 tablet 1    vitamin D (ERGOCALCIFEROL) 1.25 MG (36128 UT) capsule capsule Take 1 capsule by mouth 1 (One) Time Per Week. Pt is out of this, picking it up tomorrow 15 capsule 3    Budeson-Glycopyrrol-Formoterol (Breztri Aerosphere) 160-9-4.8 MCG/ACT aerosol inhaler Inhale 2 puffs 2 (Two) Times a Day. 2 each 0    carbidopa-levodopa (SINEMET)  MG per tablet Take 1 tab at: 6 am, 10 am, 2pm, 7 pm 120 tablet 0    KLOR-CON 20 MEQ CR tablet TAKE 1 TABLET BY MOUTH EVERY DAY 90 tablet 1    ondansetron (ZOFRAN) 4 MG tablet TAKE 1 TABLET BY MOUTH EVERY 8 HOURS AS NEEDED FOR NAUSEA AND VOMITING 45 tablet 2    Syringe/Needle, Disp, 23G X 1\" 3 ML misc Use to inject B12 every 30 days intramuscularly 12 each 0    QUEtiapine (SEROquel) 50 MG tablet Take 1 tablet by mouth every night at bedtime. (Patient not taking: Reported on 1/24/2024) 90 tablet 1     No facility-administered medications prior to visit.       No opioid medication identified on active medication list. I have reviewed chart for other potential  high risk medication/s and harmful drug interactions in the elderly.        Aspirin is on active medication list. Aspirin use is indicated based on review of current medical condition/s. Pros and cons of this therapy have been discussed today. Benefits of this medication outweigh potential harm.  Patient has been encouraged to continue taking this medication.  .      Patient Active Problem List   Diagnosis    Chronic post-traumatic stress disorder (PTSD)    Severe episode of recurrent major depressive disorder    Asthma    Chronic low back pain    Chronic diastolic heart failure    Coronary heart disease " "   Degeneration of intervertebral disc of lumbosacral region    Headache, temporal    Psychophysiological insomnia    Hyperlipidemia    Hypertension    Vitamin D deficiency    Other fatigue    Preventative health care    Hyperglycemia, unspecified     Nausea    Stable angina pectoris    Chest pain    Dyspnea    Abnormal nuclear stress test     Advance Care Planning   Advance Care Planning     Advance Directive is not on file.  ACP discussion was held with the patient during this visit. Patient does not have an advance directive, information provided.     Objective    Vitals:    01/24/24 0835   BP: 138/89   BP Location: Right arm   Patient Position: Sitting   Cuff Size: Large Adult   Pulse: 67   Resp: 16   Temp: 98.3 °F (36.8 °C)   TempSrc: Oral   SpO2: 97%   Weight: 93.3 kg (205 lb 9.6 oz)   Height: 157.5 cm (62\")     Estimated body mass index is 37.6 kg/m² as calculated from the following:    Height as of this encounter: 157.5 cm (62\").    Weight as of this encounter: 93.3 kg (205 lb 9.6 oz).    Physical Exam  Constitutional:       General: She is not in acute distress.     Appearance: Normal appearance. She is well-developed. She is not ill-appearing or diaphoretic.   HENT:      Head: Normocephalic.   Eyes:      Conjunctiva/sclera: Conjunctivae normal.      Pupils: Pupils are equal, round, and reactive to light.   Neck:      Thyroid: No thyromegaly.      Vascular: No JVD.   Cardiovascular:      Rate and Rhythm: Normal rate and regular rhythm.      Heart sounds: Normal heart sounds. No murmur heard.  Pulmonary:      Effort: Pulmonary effort is normal. No respiratory distress.      Breath sounds: Normal breath sounds. No wheezing or rhonchi.   Abdominal:      General: Bowel sounds are normal. There is no distension.      Palpations: Abdomen is soft.      Tenderness: There is no abdominal tenderness.   Musculoskeletal:         General: No swelling or tenderness. Normal range of motion.      Cervical back: Normal " range of motion and neck supple. No tenderness.   Lymphadenopathy:      Cervical: No cervical adenopathy.   Skin:     General: Skin is warm and dry.      Coloration: Skin is not jaundiced.      Findings: No erythema or rash.   Neurological:      General: No focal deficit present.      Mental Status: She is alert and oriented to person, place, and time. Mental status is at baseline.      Sensory: No sensory deficit.      Motor: No weakness.      Gait: Gait normal.   Psychiatric:         Attention and Perception: Attention normal.         Mood and Affect: Mood normal. Mood is anxious and depressed. Affect is tearful.         Speech: Speech normal.         Behavior: Behavior normal. Behavior is cooperative.         Thought Content: Thought content normal.         Judgment: Judgment normal.              Does the patient have evidence of cognitive impairment?   No    Lab Results   Component Value Date    TRIG 191 (H) 2024    HDL 64 (H) 2024    LDL 80 2024    VLDL 32 2024          HEALTH RISK ASSESSMENT    Smoking Status:  Social History     Tobacco Use   Smoking Status Never   Smokeless Tobacco Never   Tobacco Comments    Passive Smoke: N     Alcohol Consumption:  Social History     Substance and Sexual Activity   Alcohol Use No     Fall Risk Screen:    STEADI Fall Risk Assessment was completed, and patient is at LOW risk for falls.Assessment completed on:2024    Depression Screenin/24/2024     8:26 AM   PHQ-2/PHQ-9 Depression Screening   Little Interest or Pleasure in Doing Things 3-->nearly every day   Feeling Down, Depressed or Hopeless 3-->nearly every day   Trouble Falling or Staying Asleep, or Sleeping Too Much 3-->nearly every day   Feeling Tired or Having Little Energy 3-->nearly every day   Poor Appetite or Overeating 3-->nearly every day   Feeling Bad about Yourself - or that You are a Failure or Have Let Yourself or Your Family Down 3-->nearly every day   Trouble  Concentrating on Things, Such as Reading the Newspaper or Watching Television 0-->not at all   Moving or Speaking So Slowly that Other People Could Have Noticed? Or the Opposite - Being So Fidgety 2-->more than half the days   Thoughts that You Would be Better Off Dead or of Hurting Yourself in Some Way 0-->not at all   PHQ-9: Brief Depression Severity Measure Score 20   If You Checked Off Any Problems, How Difficult Have These Problems Made It For You to Do Your Work, Take Care of Things at Home, or Get Along with Other People? very difficult       Health Habits and Functional and Cognitive Screenin/24/2024     8:00 AM   Functional & Cognitive Status   Do you have difficulty preparing food and eating? No   Do you have difficulty bathing yourself, getting dressed or grooming yourself? No   Do you have difficulty using the toilet? No   Do you have difficulty moving around from place to place? No   Do you have trouble with steps or getting out of a bed or a chair? No   Current Diet Unhealthy Diet   Dental Exam Up to date   Eye Exam Up to date   Exercise (times per week) 3 times per week   Current Exercises Include Walking   Do you need help using the phone?  No   Are you deaf or do you have serious difficulty hearing?  No   Do you need help to go to places out of walking distance? No   Do you need help shopping? No   Do you need help preparing meals?  No   Do you need help with housework?  No   Do you need help with laundry? No   Do you need help taking your medications? No   Do you need help managing money? No   Do you ever drive or ride in a car without wearing a seat belt? No   Have you felt unusual stress, anger or loneliness in the last month? Yes   Who do you live with? Spouse   If you need help, do you have trouble finding someone available to you? No   Have you been bothered in the last four weeks by sexual problems? No   Do you have difficulty concentrating, remembering or making decisions? No      ATTENTION  What is the year: correct  What is the month of the year: correct  What is the day of the week?: correct  What is the date?: correct  MEMORY  Repeat address three times, only score third attempt: [address] [place], [place]: 6  HOW MANY ANIMALS DID THE PATIENT NAME  Verbal Fluency -- Animal Names (0-25): 11-13  CLOCK DRAWING  Clock Drawing: All Correct  MEMORY RECALL  Tell me what you remember about that name and address we were repeating at the beginnin  ACE TOTAL SCORE  Total ACE Score - <25/30 strongly suggests cognitive impairment; <21/30 almost certainly shows dementia: 24      Age-appropriate Screening Schedule:  Refer to the list below for future screening recommendations based on patient's age, sex and/or medical conditions. Orders for these recommended tests are listed in the plan section. The patient has been provided with a written plan.    Health Maintenance   Topic Date Due    ZOSTER VACCINE (1 of 2) Never done    TDAP/TD VACCINES (1 - Tdap) 2024 (Originally 1979)    BMI FOLLOWUP  2024    LIPID PANEL  2025    ANNUAL WELLNESS VISIT  2025    MAMMOGRAM  2025    DXA SCAN  2025    Pneumococcal Vaccine 65+ (3 of 3 - PPSV23 or PCV20) 2026    COLORECTAL CANCER SCREENING  2030    HEPATITIS C SCREENING  Completed    COVID-19 Vaccine  Completed    INFLUENZA VACCINE  Completed    PAP SMEAR  Discontinued              CMS Preventative Services Quick Reference  Risk Factors Identified During Encounter:    Depression/Dysphoria: Prescribed new antidepressant medication treatment. Follow up visit planned.  Immunizations Discussed/Encouraged: Shingrix    The above risks/problems have been discussed with the patient.  Pertinent information has been shared with the patient in the After Visit Summary.      Diagnoses and all orders for this visit:    1. Medicare annual wellness visit, subsequent (Primary)    2. Severe episode of  "recurrent major depressive disorder, without psychotic features  -     QUEtiapine (SEROquel) 50 MG tablet; Take 1 tablet by mouth every night at bedtime.  Dispense: 90 tablet; Refill: 1    3. Chronic post-traumatic stress disorder (PTSD)  -     QUEtiapine (SEROquel) 50 MG tablet; Take 1 tablet by mouth every night at bedtime.  Dispense: 90 tablet; Refill: 1    4. Psychophysiological insomnia  -     QUEtiapine (SEROquel) 50 MG tablet; Take 1 tablet by mouth every night at bedtime.  Dispense: 90 tablet; Refill: 1    5. Chronic obstructive pulmonary disease, unspecified COPD type  -     Budeson-Glycopyrrol-Formoterol (Breztri Aerosphere) 160-9-4.8 MCG/ACT aerosol inhaler; Inhale 2 puffs 2 (Two) Times a Day.  Dispense: 2 each; Refill: 0    6. Tremor, unspecified  -     carbidopa-levodopa (SINEMET)  MG per tablet; Take 1 tab at: 6 am, 10 am, 2pm, 7 pm  Dispense: 120 tablet; Refill: 5    7. Vitamin D deficiency  -     Vitamin D,25-Hydroxy; Future    8. Primary hypertension  -     Comprehensive Metabolic Panel; Future  -     CBC (No Diff); Future  -     TSH; Future    9. B12 deficiency  -     Vitamin B12; Future    10. Mixed hyperlipidemia  -     Lipid Panel; Future    11. Stage 3b chronic kidney disease    12. Coronary artery disease involving native coronary artery of native heart without angina pectoris    13. Gastroesophageal reflux disease without esophagitis    14. Hyperkalemia    Other orders  -     ondansetron (ZOFRAN) 4 MG tablet; Take 1 tablet by mouth Every 8 (Eight) Hours As Needed for Nausea or Vomiting.  Dispense: 45 tablet; Refill: 2  -     Syringe/Needle, Disp, 23G X 1\" 3 ML misc; Use to inject B12 every 30 days intramuscularly  Dispense: 12 each; Refill: 0  -     potassium chloride (KLOR-CON) 10 MEQ CR tablet; Take 1 tablet by mouth Daily.  Dispense: 90 tablet; Refill: 1      Reordered Seroquel  Keep follow-up with psychiatry as directed  Labs reviewed, RFT's worse, continue increased water intake, " decrease potassium, consider nephrology referral if no improvement with next labs   Age appropriate preventative counseling provided, including healthy lifestyle modifications and exercise  Recommend Shingrix at pharmacy  Continue monthly b12 IM  Continue all other medications same, refills when needed  Follow-up with neurology and cardiology as directed    Follow Up:     Return in about 6 months (around 7/24/2024) for Recheck. HTN panel vitamin D and b12.     Next Medicare Wellness visit to be scheduled in 1 year.      An After Visit Summary and PPPS were made available to the patient.    EMR Dragon transcription disclaimer:  Part of this note may be an electronic transcription/translation of spoken language to printed text using the Dragon Dictation System.

## 2024-02-02 RX ORDER — LISINOPRIL 10 MG/1
10 TABLET ORAL 2 TIMES DAILY
Qty: 180 TABLET | Refills: 3 | Status: SHIPPED | OUTPATIENT
Start: 2024-02-02

## 2024-02-02 NOTE — TELEPHONE ENCOUNTER
Caller: Jasmine Cerda    Relationship: Self    Best call back number: [phone]    Requested Prescriptions:   Requested Prescriptions     Pending Prescriptions Disp Refills    lisinopril (PRINIVIL,ZESTRIL) 10 MG tablet 180 tablet 3     Sig: Take 1 tablet by mouth 2 (Two) Times a Day.        Pharmacy where request should be sent: [facility] [address] [place] [phone] [place], [place] [phone]     Last office visit with prescribing clinician: 1/24/2024   Last telemedicine visit with prescribing clinician: Visit date not found   Next office visit with prescribing clinician: 7/24/2024     Additional details provided by patient: LESS THAN 3 DAYS    Does the patient have less than a 3 day supply:  [x] Yes  [] No    Would you like a call back once the refill request has been completed: [] Yes [x] No    If the office needs to give you a call back, can they leave a voicemail: [] Yes [x] No    Ronen Simms   02/02/24 08:24 EST

## 2024-02-06 DIAGNOSIS — E53.8 B12 DEFICIENCY: ICD-10-CM

## 2024-02-06 RX ORDER — PANTOPRAZOLE SODIUM 40 MG/1
40 TABLET, DELAYED RELEASE ORAL DAILY
Qty: 90 TABLET | Refills: 3 | Status: SHIPPED | OUTPATIENT
Start: 2024-02-06

## 2024-02-06 RX ORDER — CYANOCOBALAMIN 1000 UG/ML
1000 INJECTION, SOLUTION INTRAMUSCULAR; SUBCUTANEOUS
Qty: 3 ML | Refills: 1 | Status: SHIPPED | OUTPATIENT
Start: 2024-02-06

## 2024-02-06 RX ORDER — ERGOCALCIFEROL 1.25 MG/1
50000 CAPSULE ORAL WEEKLY
Qty: 15 CAPSULE | Refills: 3 | Status: SHIPPED | OUTPATIENT
Start: 2024-02-06

## 2024-02-06 NOTE — TELEPHONE ENCOUNTER
Caller: CerdaJasmine    Relationship: Self    Best call back number: [phone]     Requested Prescriptions:   Requested Prescriptions     Pending Prescriptions Disp Refills    cyanocobalamin 1000 MCG/ML injection 3 mL 1     Sig: Inject 1 mL into the appropriate muscle as directed by prescriber Every 28 (Twenty-Eight) Days.    vitamin D (ERGOCALCIFEROL) 1.25 MG (84697 UT) capsule capsule 15 capsule 3     Sig: Take 1 capsule by mouth 1 (One) Time Per Week. Pt is out of this, picking it up tomorrow    pantoprazole (PROTONIX) 40 MG EC tablet 90 tablet 3     Sig: Take 1 tablet by mouth Daily.        Pharmacy where request should be sent: [facility] [address] [place] [phone] [place], [place] [phone]      Last office visit with prescribing clinician: 1/24/2024   Last telemedicine visit with prescribing clinician: Visit date not found   Next office visit with prescribing clinician: 7/24/2024     Additional details provided by patient: PATIENT IS OUT OF THE pantoprazole (PROTONIX) 40 MG EC tablet &     vitamin D (ERGOCALCIFEROL) 1.25 MG (71009 UT) capsule capsule       Does the patient have less than a 3 day supply:  [x] Yes  [] No    Would you like a call back once the refill request has been completed: [] Yes [x] No    Kailash Alatorre   02/06/24 11:32 EST

## 2024-02-16 DIAGNOSIS — I50.32 CHRONIC DIASTOLIC CONGESTIVE HEART FAILURE: ICD-10-CM

## 2024-02-16 RX ORDER — BUMETANIDE 2 MG/1
2 TABLET ORAL 2 TIMES DAILY
Qty: 180 TABLET | Refills: 3 | Status: SHIPPED | OUTPATIENT
Start: 2024-02-16

## 2024-02-16 RX ORDER — CARVEDILOL 25 MG/1
25 TABLET ORAL 2 TIMES DAILY
Qty: 180 TABLET | Refills: 3 | Status: SHIPPED | OUTPATIENT
Start: 2024-02-16

## 2024-02-21 DIAGNOSIS — N18.32 STAGE 3B CHRONIC KIDNEY DISEASE: Primary | ICD-10-CM

## 2024-02-21 DIAGNOSIS — E78.2 MIXED HYPERLIPIDEMIA: ICD-10-CM

## 2024-02-21 DIAGNOSIS — E87.5 HYPERKALEMIA: ICD-10-CM

## 2024-02-21 RX ORDER — ROSUVASTATIN CALCIUM 40 MG/1
40 TABLET, COATED ORAL EVERY EVENING
Qty: 90 TABLET | Refills: 1 | Status: SHIPPED | OUTPATIENT
Start: 2024-02-21

## 2024-02-21 NOTE — TELEPHONE ENCOUNTER
Caller: Jasmine Cerda    Relationship: Self    Best call back number: [phone]    Requested Prescriptions:   Requested Prescriptions     Pending Prescriptions Disp Refills    rosuvastatin (CRESTOR) 40 MG tablet 90 tablet 1     Sig: Take 1 tablet by mouth Every Evening.        Pharmacy where request should be sent: [facility] [address] [place], [place] [address] [place] [phone] [place] [phone]      Last office visit with prescribing clinician: 1/24/2024   Last telemedicine visit with prescribing clinician: Visit date not found   Next office visit with prescribing clinician: 7/24/2024     Additional details provided by patient:     Does the patient have less than a 3 day supply:  [] Yes  [x] No      Kailash Marcial Rep   02/21/24 10:27 EST

## 2024-02-26 ENCOUNTER — CLINICAL SUPPORT (OUTPATIENT)
Dept: FAMILY MEDICINE CLINIC | Facility: CLINIC | Age: 64
End: 2024-02-26
Payer: MEDICARE

## 2024-02-26 DIAGNOSIS — I10 PRIMARY HYPERTENSION: ICD-10-CM

## 2024-02-26 DIAGNOSIS — E78.2 MIXED HYPERLIPIDEMIA: Primary | ICD-10-CM

## 2024-02-26 PROCEDURE — 36415 COLL VENOUS BLD VENIPUNCTURE: CPT | Performed by: NURSE PRACTITIONER

## 2024-02-26 PROCEDURE — 80048 BASIC METABOLIC PNL TOTAL CA: CPT | Performed by: NURSE PRACTITIONER

## 2024-02-26 NOTE — PROGRESS NOTES
Venipuncture Blood Specimen Collection  Venipuncture performed in the right arm by Marina Rogel MA with good hemostasis. Patient tolerated the procedure well without complications.   02/26/24   Marina Rogel MA

## 2024-02-27 LAB
ANION GAP SERPL CALCULATED.3IONS-SCNC: 13 MMOL/L (ref 5–15)
BUN SERPL-MCNC: 40 MG/DL (ref 8–23)
BUN/CREAT SERPL: 17.1 (ref 7–25)
CALCIUM SPEC-SCNC: 11.4 MG/DL (ref 8.6–10.5)
CHLORIDE SERPL-SCNC: 99 MMOL/L (ref 98–107)
CO2 SERPL-SCNC: 27 MMOL/L (ref 22–29)
CREAT SERPL-MCNC: 2.34 MG/DL (ref 0.57–1)
EGFRCR SERPLBLD CKD-EPI 2021: 22.6 ML/MIN/1.73
GLUCOSE SERPL-MCNC: 94 MG/DL (ref 65–99)
POTASSIUM SERPL-SCNC: 5.5 MMOL/L (ref 3.5–5.2)
SODIUM SERPL-SCNC: 139 MMOL/L (ref 136–145)

## 2024-02-28 DIAGNOSIS — N18.4 CKD (CHRONIC KIDNEY DISEASE) STAGE 4, GFR 15-29 ML/MIN: Primary | ICD-10-CM

## 2024-02-28 DIAGNOSIS — E53.8 B12 DEFICIENCY: ICD-10-CM

## 2024-02-28 DIAGNOSIS — E87.5 HYPERKALEMIA: ICD-10-CM

## 2024-02-28 RX ORDER — CYANOCOBALAMIN 1000 UG/ML
1000 INJECTION, SOLUTION INTRAMUSCULAR; SUBCUTANEOUS
Qty: 3 ML | Refills: 1 | Status: SHIPPED | OUTPATIENT
Start: 2024-02-28

## 2024-02-29 ENCOUNTER — OFFICE VISIT (OUTPATIENT)
Dept: PSYCHIATRY | Facility: CLINIC | Age: 64
End: 2024-02-29
Payer: MEDICARE

## 2024-02-29 DIAGNOSIS — F33.2 SEVERE EPISODE OF RECURRENT MAJOR DEPRESSIVE DISORDER, WITHOUT PSYCHOTIC FEATURES: Primary | Chronic | ICD-10-CM

## 2024-02-29 DIAGNOSIS — F43.12 CHRONIC POST-TRAUMATIC STRESS DISORDER (PTSD): Chronic | ICD-10-CM

## 2024-02-29 DIAGNOSIS — F51.04 PSYCHOPHYSIOLOGICAL INSOMNIA: Chronic | ICD-10-CM

## 2024-02-29 PROCEDURE — 1160F RVW MEDS BY RX/DR IN RCRD: CPT | Performed by: PSYCHIATRY & NEUROLOGY

## 2024-02-29 PROCEDURE — 99214 OFFICE O/P EST MOD 30 MIN: CPT | Performed by: PSYCHIATRY & NEUROLOGY

## 2024-02-29 PROCEDURE — 1159F MED LIST DOCD IN RCRD: CPT | Performed by: PSYCHIATRY & NEUROLOGY

## 2024-02-29 RX ORDER — GABAPENTIN 300 MG/1
300 CAPSULE ORAL 3 TIMES DAILY
Qty: 270 CAPSULE | Refills: 1 | Status: SHIPPED | OUTPATIENT
Start: 2024-02-29

## 2024-02-29 RX ORDER — CITALOPRAM 40 MG/1
40 TABLET ORAL EVERY MORNING
Qty: 90 TABLET | Refills: 1 | Status: SHIPPED | OUTPATIENT
Start: 2024-02-29

## 2024-02-29 RX ORDER — MIRTAZAPINE 30 MG/1
30 TABLET, FILM COATED ORAL
Qty: 90 TABLET | Refills: 1 | Status: SHIPPED | OUTPATIENT
Start: 2024-02-29

## 2024-02-29 RX ORDER — ALPRAZOLAM 0.5 MG/1
0.5 TABLET ORAL 3 TIMES DAILY PRN
Qty: 90 TABLET | Refills: 1 | Status: SHIPPED | OUTPATIENT
Start: 2024-02-29

## 2024-02-29 RX ORDER — QUETIAPINE FUMARATE 100 MG/1
100 TABLET, FILM COATED ORAL
Qty: 90 TABLET | Refills: 1 | Status: SHIPPED | OUTPATIENT
Start: 2024-02-29

## 2024-02-29 NOTE — PROGRESS NOTES
Subjective   Jasmine Cerda is a 63 y.o. female who presents today for follow up     Chief Complaint:   depression anxiety insomnia      History of Present Illness:   The pt suffers from depression for a long time, was on multiple meds    The pt was more depressed last few months 2ry to family situation , her son attempted suicide last year and  relapsed on drugs  Last visit Seroquel was increased to 60  mg  ,   The pt is still on celexa ,remeron,  trazodone,  xanax and gabapentin   Neurologist - no concerns about psych meds, she was dsd with parkinsons     Today the pt reported still feeling   depressed,   she  can not stop worrying about her son who is using drugs and  homeless, he lives in the car   Depression is rated as 8-9/10, denied SI/HI   Depression is associated with anhedonia , no desires to do anything,  decreased E level   poor sleep  , frequent awakening, total 2-3 hrs per night      Precipitating and Ameliorating Factors: relationship problems   Alleviating factors - to spend time with her grand daughter         PAST PSYCHIATRIC HISTORY      Previous Psychiatric Diagnoses   Axis I: Anxiety/Panic Disorder     Past Hospitalizations or Residential Treatment   Locations\Providers: none      Past Outpatient Treatment   Diagnosis Treated: Anxiety/Panic Dis.  Treatment Type: Medication Management  Location: with PCP, Dr Manning      Prior Psychiatric Medications   Comments: xanax - effective      celexa- not effective   zoloft - not effective  cymbalta - not effective      Prozac - GI sxs   ambien - side effects   Risperidone - not effective and side effects   Hydroxyzine - not effective  Abilify - not effective   Trazodone - not effective     Consequences of Mental Disorder   Consequences: emotional distress     SOCIAL HISTORY     Number of children: 2  Number of grandkids: 1  Current Relationship is: supportive  Family of Origin is: supportive  Comments: Patient has never smoked.  Passive Smoke:  N  Alcohol Use: N  Drug Use: N  HIV/High Risk: N        Current Living Situation   Lives with: spouse     Education   Level: high school graduate     Employment   Job Status: disabled     Hobbies and Leisure Activities   Activity Type: quiet activities     Smoking History   Smoking Hx: Never smoker     Exercise   Exercise sessions (per wk): 1     Illicit Drug Use   Illicit Drugs used: no     FAMILY HISTORY OF MENTAL DISORDERS   fh Grandparents: Non-contributory  fh Mother: Non-contributory  fh Father: Non-contributory  fh Siblings: Non-contributory  fh Other: Non-contributory  The following portions of the patient's history were reviewed and updated as appropriate: allergies, current medications, past family history, past medical history, past social history, past surgical history and problem list.            Interval History  No changes, still depressed        Side Effects  Denied       Past Medical History:  Past Medical History:   Diagnosis Date    Anxiety     Breast cyst     CHF (congestive heart failure)     COPD (chronic obstructive pulmonary disease)     Coronary heart disease     Depression     Hyperlipidemia     Hypertension        Social History:  Social History     Socioeconomic History    Marital status:    Tobacco Use    Smoking status: Never    Smokeless tobacco: Never    Tobacco comments:     Passive Smoke: N   Vaping Use    Vaping Use: Never used   Substance and Sexual Activity    Alcohol use: No    Drug use: No    Sexual activity: Defer       Family History:  Family History   Problem Relation Age of Onset    Colon cancer Mother     Diabetes Father     Pulmonary embolism Brother     Heart failure Brother     Breast cancer Maternal Aunt        Past Surgical History:  Past Surgical History:   Procedure Laterality Date    APPENDECTOMY      BREAST CYST ASPIRATION      CARDIAC CATHETERIZATION  07/2017    No Stents placed - Located within [facility]    CARDIAC CATHETERIZATION N/A 6/22/2023    Procedure: Left Heart Cath  possible PCI;  Surgeon: Cuauhtemoc Kwon MD;  Location: [facility] INVASIVE LOCATION;  Service: Cardiovascular;  Laterality: N/A;    CERVICAL FUSION      C6-C7     SECTION      x 2    CHOLECYSTECTOMY      HYSTERECTOMY      partial       Problem List:  Patient Active Problem List   Diagnosis    Chronic post-traumatic stress disorder (PTSD)    Severe episode of recurrent major depressive disorder    Asthma    Chronic low back pain    Chronic diastolic heart failure    Coronary heart disease    Degeneration of intervertebral disc of lumbosacral region    Headache, temporal    Psychophysiological insomnia    Hyperlipidemia    Hypertension    Vitamin D deficiency    Other fatigue    Preventative health care    Hyperglycemia, unspecified     Nausea    Stable angina pectoris    Chest pain    Dyspnea    Abnormal nuclear stress test       Allergy:   No Known Allergies     Discontinued Medications:  Medications Discontinued During This Encounter   Medication Reason    traZODone (DESYREL) 100 MG tablet Not Efficacious    mirtazapine (REMERON) 15 MG tablet Reorder    citalopram (CeleXA) 40 MG tablet Reorder    gabapentin (NEURONTIN) 300 MG capsule Reorder    ALPRAZolam (XANAX) 0.5 MG tablet Reorder    QUEtiapine (SEROquel) 50 MG tablet Reorder             Current Medications:   Current Outpatient Medications   Medication Sig Dispense Refill    ALPRAZolam (XANAX) 0.5 MG tablet Take 1 tablet by mouth 3 (Three) Times a Day As Needed for Anxiety. 90 tablet 1    citalopram (CeleXA) 40 MG tablet Take 1 tablet by mouth Every Morning. 90 tablet 1    gabapentin (NEURONTIN) 300 MG capsule Take 1 capsule by mouth 3 (Three) Times a Day. 270 capsule 1    mirtazapine (REMERON) 30 MG tablet Take 1 tablet by mouth every night at bedtime. 90 tablet 1    QUEtiapine (SEROquel) 100 MG tablet Take 1 tablet by mouth every night at bedtime. 90 tablet 1    albuterol (PROVENTIL) (2.5 MG/3ML) 0.083% nebulizer solution INHALE 1 VIAL VIA  "NEBULIZER DAILY AS NEEDED FOR WHEEZING 300 mL 1    aspirin 81 MG EC tablet ASPIRIN EC 81 MG TBEC daily      Budeson-Glycopyrrol-Formoterol (Breztri Aerosphere) 160-9-4.8 MCG/ACT aerosol inhaler Inhale 2 puffs 2 (Two) Times a Day. 2 each 0    bumetanide (BUMEX) 2 MG tablet Take 1 tablet by mouth 2 (Two) Times a Day. 180 tablet 3    carbidopa-levodopa (SINEMET)  MG per tablet Take 1 tab at: 6 am, 10 am, 2pm, 7 pm 120 tablet 5    carvedilol (COREG) 25 MG tablet Take 1 tablet by mouth 2 (Two) Times a Day. 180 tablet 3    cyanocobalamin 1000 MCG/ML injection INJECT 1 ML INTO THE APPROPRIATE MUSCLE AS DIRECTED BY PRESCRIBER EVERY 28 (TWENTY-EIGHT) DAYS. 3 mL 1    lisinopril (PRINIVIL,ZESTRIL) 10 MG tablet Take 1 tablet by mouth 2 (Two) Times a Day. 180 tablet 3    Multiple Vitamins-Minerals (MULTIVITAMIN ADULT) tablet Take 1 tablet by mouth Daily. With Omega XL      nitroglycerin (NITROSTAT) 0.4 MG SL tablet Place 1 tablet under the tongue Every 5 (Five) Minutes As Needed for Chest Pain. Take no more than 3 doses in 15 minutes. 25 tablet 2    ondansetron (ZOFRAN) 4 MG tablet Take 1 tablet by mouth Every 8 (Eight) Hours As Needed for Nausea or Vomiting. 45 tablet 2    pantoprazole (PROTONIX) 40 MG EC tablet Take 1 tablet by mouth Daily. 90 tablet 3    potassium chloride (KLOR-CON) 10 MEQ CR tablet Take 1 tablet by mouth Daily. 90 tablet 1    rosuvastatin (CRESTOR) 40 MG tablet Take 1 tablet by mouth Every Evening. 90 tablet 1    Syringe/Needle, Disp, 23G X 1\" 3 ML misc Use to inject B12 every 30 days intramuscularly 12 each 0    vitamin D (ERGOCALCIFEROL) 1.25 MG (43366 UT) capsule capsule Take 1 capsule by mouth 1 (One) Time Per Week. 15 capsule 3     No current facility-administered medications for this visit.         Review of Symptoms:    Psychiatric/Behavioral: Negative for agitation, behavioral problems, confusion, decreased concentration, dysphoric mood, hallucinations, self-injury, sleep disturbance and " suicidal ideas.   The patient is  Still   depressed,  Still very  nervous/anxious and is not hyperactive.        Physical Exam:   There were no vitals taken for this visit.    Mental Status Exam:   Hygiene:   good  Cooperation:  Cooperative  Eye Contact:  Good  Psychomotor Behavior:  Appropriate  Affect:  Appropriate and labile  tearful   Mood: depressed and fluctates,  Anxious    Hopelessness: Denies  Speech:  Normal  Thought Process:  Goal directed and Linear  Thought Content:  Normal  Suicidal:  None  Homicidal:  None  Hallucinations:  None  Delusion:  None  Memory:  Intact  Orientation:  Person, Place, Time and Situation  Reliability:  fair  Insight:  Good  Judgement:  Good  Impulse Control:  Good  Physical/Medical Issues:  Yes GI issues         MSE from 11/2/23   reviewed and accepted without  changes     PHQ-9 Depression Screening  Little interest or pleasure in doing things? 3-->nearly every day   Feeling down, depressed, or hopeless? 3-->nearly every day   Trouble falling or staying asleep, or sleeping too much? 1-->several days   Feeling tired or having little energy? 1-->several days   Poor appetite or overeating? 0-->not at all   Feeling bad about yourself - or that you are a failure or have let yourself or your family down? 2-->more than half the days   Trouble concentrating on things, such as reading the newspaper or watching television? 1-->several days   Moving or speaking so slowly that other people could have noticed? Or the opposite - being so fidgety or restless that you have been moving around a lot more than usual? 1-->several days   Thoughts that you would be better off dead, or of hurting yourself in some way? 0-->not at all   PHQ-9 Total Score 12   If you checked off any problems, how difficult have these problems made it for you to do your work, take care of things at home, or get along with other people? very difficult       Never smoker    I advised Jasmine of the risks of tobacco use.      Lab Results:   Orders Only on 02/21/2024   Component Date Value Ref Range Status    Glucose 02/26/2024 94  65 - 99 mg/dL Final    BUN 02/26/2024 40 (H)  8 - 23 mg/dL Final    Creatinine 02/26/2024 2.34 (H)  0.57 - 1.00 mg/dL Final    Sodium 02/26/2024 139  136 - 145 mmol/L Final    Potassium 02/26/2024 5.5 (H)  3.5 - 5.2 mmol/L Final    Chloride 02/26/2024 99  98 - 107 mmol/L Final    CO2 02/26/2024 27.0  22.0 - 29.0 mmol/L Final    Calcium 02/26/2024 11.4 (H)  8.6 - 10.5 mg/dL Final    BUN/Creatinine Ratio 02/26/2024 17.1  7.0 - 25.0 Final    Anion Gap 02/26/2024 13.0  5.0 - 15.0 mmol/L Final    eGFR 02/26/2024 22.6 (L)  >60.0 mL/min/1.73 Final   Orders Only on 01/09/2024   Component Date Value Ref Range Status    WBC 01/17/2024 6.30  3.40 - 10.80 10*3/mm3 Final    RBC 01/17/2024 4.35  3.77 - 5.28 10*6/mm3 Final    Hemoglobin 01/17/2024 13.9  12.0 - 15.9 g/dL Final    Hematocrit 01/17/2024 39.9  34.0 - 46.6 % Final    MCV 01/17/2024 91.7  79.0 - 97.0 fL Final    MCH 01/17/2024 32.0  26.6 - 33.0 pg Final    MCHC 01/17/2024 34.8  31.5 - 35.7 g/dL Final    RDW 01/17/2024 12.3  12.3 - 15.4 % Final    RDW-SD 01/17/2024 41.3  37.0 - 54.0 fl Final    MPV 01/17/2024 9.8  6.0 - 12.0 fL Final    Platelets 01/17/2024 306  140 - 450 10*3/mm3 Final    Glucose 01/17/2024 80  65 - 99 mg/dL Final    BUN 01/17/2024 24 (H)  8 - 23 mg/dL Final    Creatinine 01/17/2024 1.35 (H)  0.57 - 1.00 mg/dL Final    Sodium 01/17/2024 135 (L)  136 - 145 mmol/L Final    Potassium 01/17/2024 5.7 (H)  3.5 - 5.2 mmol/L Final    Chloride 01/17/2024 99  98 - 107 mmol/L Final    CO2 01/17/2024 26.0  22.0 - 29.0 mmol/L Final    Calcium 01/17/2024 10.3  8.6 - 10.5 mg/dL Final    Total Protein 01/17/2024 7.2  6.0 - 8.5 g/dL Final    Albumin 01/17/2024 4.6  3.5 - 5.2 g/dL Final    ALT (SGPT) 01/17/2024 16  1 - 33 U/L Final    AST (SGOT) 01/17/2024 14  1 - 32 U/L Final    Alkaline Phosphatase 01/17/2024 73  39 - 117 U/L Final    Total Bilirubin  01/17/2024 0.3  0.0 - 1.2 mg/dL Final    Globulin 01/17/2024 2.6  gm/dL Final    A/G Ratio 01/17/2024 1.8  g/dL Final    BUN/Creatinine Ratio 01/17/2024 17.8  7.0 - 25.0 Final    Anion Gap 01/17/2024 10.0  5.0 - 15.0 mmol/L Final    eGFR 01/17/2024 43.7 (L)  >60.0 mL/min/1.73 Final    Total Cholesterol 01/17/2024 176  0 - 200 mg/dL Final    Triglycerides 01/17/2024 191 (H)  0 - 150 mg/dL Final    HDL Cholesterol 01/17/2024 64 (H)  40 - 60 mg/dL Final    LDL Cholesterol  01/17/2024 80  0 - 100 mg/dL Final    VLDL Cholesterol 01/17/2024 32  5 - 40 mg/dL Final    LDL/HDL Ratio 01/17/2024 1.15   Final    TSH 01/17/2024 1.970  0.270 - 4.200 uIU/mL Final    Vitamin B-12 01/17/2024 973 (H)  211 - 946 pg/mL Final    25 Hydroxy, Vitamin D 01/17/2024 59.3  30.0 - 100.0 ng/ml Final       Assessment & Plan   Problems Addressed this Visit       Chronic post-traumatic stress disorder (PTSD) (Chronic)    Relevant Medications    citalopram (CeleXA) 40 MG tablet    ALPRAZolam (XANAX) 0.5 MG tablet    gabapentin (NEURONTIN) 300 MG capsule    mirtazapine (REMERON) 30 MG tablet    QUEtiapine (SEROquel) 100 MG tablet    Severe episode of recurrent major depressive disorder - Primary (Chronic)    Relevant Medications    citalopram (CeleXA) 40 MG tablet    ALPRAZolam (XANAX) 0.5 MG tablet    mirtazapine (REMERON) 30 MG tablet    QUEtiapine (SEROquel) 100 MG tablet    Psychophysiological insomnia (Chronic)    Relevant Medications    citalopram (CeleXA) 40 MG tablet    ALPRAZolam (XANAX) 0.5 MG tablet    mirtazapine (REMERON) 30 MG tablet    QUEtiapine (SEROquel) 100 MG tablet     Diagnoses         Codes Comments    Severe episode of recurrent major depressive disorder, without psychotic features    -  Primary ICD-10-CM: F33.2  ICD-9-CM: 296.33     Chronic post-traumatic stress disorder (PTSD)     ICD-10-CM: F43.12  ICD-9-CM: 309.81     Psychophysiological insomnia     ICD-10-CM: F51.04  ICD-9-CM: 307.42             Visit Diagnoses:     ICD-10-CM ICD-9-CM   1. Severe episode of recurrent major depressive disorder, without psychotic features  F33.2 296.33   2. Chronic post-traumatic stress disorder (PTSD)  F43.12 309.81   3. Psychophysiological insomnia  F51.04 307.42             TREATMENT PLAN/GOALS: Continue supportive psychotherapy efforts and medications as indicated. Treatment and medication options discussed during today's visit. Patient ackowledged and verbally consented to continue with current treatment plan and was educated on the importance of compliance with treatment and follow-up appointments.    MEDICATION ISSUES:  1. MDD - cont celexa 40 mg ,   increase  seroquel to 100 mg po QHS for depression, anxiety, insomnia  Neurology was ok with her meds   Cont  remeron, increase to  30 mg po QHS for depression and insomnia   2. PTSD - cont celexa 40 mg  (Qtc 464) , Xanax - low dose 0.5 mg TID, no increase,  long term benzo use discussed again   hydroxyzine - not effective   3. Insomnia -  d/c   trazodone - 100-200 mg po QHS - not effective, increase  seroquel to 100 mg po QHS    Cont  remeron and increase to 30 mg po QHS      4. Long term therapeutic drug monitoring - UDS  8/31/2021 - consistent  1/5/23 - consistent   LABORATORY - SCAN - [facility] SCREEN, [facility], 1/5/2023 (01/05/2023)        EKG 5/15/23 Qtc 464     Psychotherapy was recommended , the is reluctant to start now, will put on he list for [facility]   cont supportive therapy, ALONON     INSPECT reviewed as expected, last xanax 0.5 mg # 90 for 30 days refill was on 2/9/24     Patient screened positive for depression based on a PHQ-9 score of 12 on 2/29/2024. Follow-up recommendations include: Prescribed antidepressant medication treatment.    PHQ scored 12 and indicated moderate depression   DWIGHT 7 scored 12      Discussed medication options and treatment plan of prescribed medication as well as the risks, benefits, and side effects including potential falls, possible impaired  driving and metabolic adversities among others. Patient is agreeable to call the office with any worsening of symptoms or onset of side effects. Patient is agreeable to call 911 or go to the nearest ER should he/she begin having SI/HI. No medication side effects or related complaints today.     MEDS ORDERED DURING VISIT:  New Medications Ordered This Visit   Medications    citalopram (CeleXA) 40 MG tablet     Sig: Take 1 tablet by mouth Every Morning.     Dispense:  90 tablet     Refill:  1    ALPRAZolam (XANAX) 0.5 MG tablet     Sig: Take 1 tablet by mouth 3 (Three) Times a Day As Needed for Anxiety.     Dispense:  90 tablet     Refill:  1     Please dispense when it is due    gabapentin (NEURONTIN) 300 MG capsule     Sig: Take 1 capsule by mouth 3 (Three) Times a Day.     Dispense:  270 capsule     Refill:  1     Please dispense when it is due    mirtazapine (REMERON) 30 MG tablet     Sig: Take 1 tablet by mouth every night at bedtime.     Dispense:  90 tablet     Refill:  1    QUEtiapine (SEROquel) 100 MG tablet     Sig: Take 1 tablet by mouth every night at bedtime.     Dispense:  90 tablet     Refill:  1       Return in about 4 months (around 6/29/2024).         This document has been electronically signed by Sivan Ken MD  February 29, 2024 09:49 EST

## 2024-03-13 ENCOUNTER — TELEPHONE (OUTPATIENT)
Dept: FAMILY MEDICINE CLINIC | Facility: CLINIC | Age: 64
End: 2024-03-13

## 2024-03-13 NOTE — TELEPHONE ENCOUNTER
Caller: Anish Cerda    Relationship: Self    Best call back number: [phone]    ANISH WOULD LIKE A CALL TO DISCUSS WHY SHE WAS REFERRED TO NEPHROLOGY.

## 2024-03-14 NOTE — TELEPHONE ENCOUNTER
Spoke with Jasmine. States that the nephrologist that the referral went to a provider in [place], does not drive to [place] and wants one in [place]

## 2024-03-26 ENCOUNTER — TELEPHONE (OUTPATIENT)
Dept: PSYCHIATRY | Facility: CLINIC | Age: 64
End: 2024-03-26
Payer: MEDICARE

## 2024-03-26 NOTE — TELEPHONE ENCOUNTER
Pt LVM saying she would like to go back on hydroxyzine 25 mg, because she has been having a lot of anxiety lately.

## 2024-03-28 RX ORDER — HYDROXYZINE HYDROCHLORIDE 25 MG/1
25 TABLET, FILM COATED ORAL 3 TIMES DAILY PRN
Qty: 90 TABLET | Refills: 3 | Status: SHIPPED | OUTPATIENT
Start: 2024-03-28

## 2024-03-28 RX ORDER — HYDROXYZINE HYDROCHLORIDE 25 MG/1
25 TABLET, FILM COATED ORAL 3 TIMES DAILY PRN
Qty: 90 TABLET | Refills: 3 | Status: SHIPPED | OUTPATIENT
Start: 2024-03-28 | End: 2024-03-28 | Stop reason: SDUPTHER

## 2024-04-01 RX ORDER — ONDANSETRON 4 MG/1
TABLET, FILM COATED ORAL
Qty: 45 TABLET | Refills: 0 | Status: SHIPPED | OUTPATIENT
Start: 2024-04-01

## 2024-05-07 ENCOUNTER — TRANSCRIBE ORDERS (OUTPATIENT)
Dept: ADMINISTRATIVE | Facility: HOSPITAL | Age: 64
End: 2024-05-07
Payer: MEDICARE

## 2024-05-07 ENCOUNTER — LAB (OUTPATIENT)
Dept: FAMILY MEDICINE CLINIC | Facility: CLINIC | Age: 64
End: 2024-05-07
Payer: MEDICARE

## 2024-05-07 DIAGNOSIS — N17.9 ACUTE RENAL FAILURE, UNSPECIFIED ACUTE RENAL FAILURE TYPE: Primary | ICD-10-CM

## 2024-05-07 DIAGNOSIS — N18.30 STAGE 3 CHRONIC KIDNEY DISEASE, UNSPECIFIED WHETHER STAGE 3A OR 3B CKD: Primary | ICD-10-CM

## 2024-05-07 PROCEDURE — 36415 COLL VENOUS BLD VENIPUNCTURE: CPT

## 2024-05-07 PROCEDURE — 84156 ASSAY OF PROTEIN URINE: CPT | Performed by: INTERNAL MEDICINE

## 2024-05-07 PROCEDURE — 80048 BASIC METABOLIC PNL TOTAL CA: CPT | Performed by: INTERNAL MEDICINE

## 2024-05-07 PROCEDURE — 82570 ASSAY OF URINE CREATININE: CPT | Performed by: INTERNAL MEDICINE

## 2024-05-07 PROCEDURE — 81001 URINALYSIS AUTO W/SCOPE: CPT | Performed by: INTERNAL MEDICINE

## 2024-05-08 DIAGNOSIS — F43.12 CHRONIC POST-TRAUMATIC STRESS DISORDER (PTSD): Chronic | ICD-10-CM

## 2024-05-08 LAB
ANION GAP SERPL CALCULATED.3IONS-SCNC: 17 MMOL/L (ref 5–15)
BACTERIA UR QL AUTO: NORMAL /HPF
BILIRUB UR QL STRIP: NEGATIVE
BUN SERPL-MCNC: 36 MG/DL (ref 8–23)
BUN/CREAT SERPL: 17.9 (ref 7–25)
CALCIUM SPEC-SCNC: 10.5 MG/DL (ref 8.6–10.5)
CHLORIDE SERPL-SCNC: 98 MMOL/L (ref 98–107)
CLARITY UR: CLEAR
CO2 SERPL-SCNC: 22 MMOL/L (ref 22–29)
COLOR UR: YELLOW
CREAT SERPL-MCNC: 2.01 MG/DL (ref 0.57–1)
CREAT UR-MCNC: 13.9 MG/DL
EGFRCR SERPLBLD CKD-EPI 2021: 27.1 ML/MIN/1.73
GLUCOSE SERPL-MCNC: 96 MG/DL (ref 65–99)
GLUCOSE UR STRIP-MCNC: NEGATIVE MG/DL
HGB UR QL STRIP.AUTO: NEGATIVE
HYALINE CASTS UR QL AUTO: NORMAL /LPF
KETONES UR QL STRIP: NEGATIVE
LEUKOCYTE ESTERASE UR QL STRIP.AUTO: NEGATIVE
NITRITE UR QL STRIP: NEGATIVE
PH UR STRIP.AUTO: 8 [PH] (ref 5–8)
POTASSIUM SERPL-SCNC: 4.6 MMOL/L (ref 3.5–5.2)
PROT ?TM UR-MCNC: <4 MG/DL
PROT UR QL STRIP: NEGATIVE
PROT/CREAT UR: NORMAL MG/G{CREAT}
RBC # UR STRIP: NORMAL /HPF
REF LAB TEST METHOD: NORMAL
SODIUM SERPL-SCNC: 137 MMOL/L (ref 136–145)
SP GR UR STRIP: 1.01 (ref 1–1.03)
SQUAMOUS #/AREA URNS HPF: NORMAL /HPF
UROBILINOGEN UR QL STRIP: NORMAL
WBC # UR STRIP: NORMAL /HPF

## 2024-05-08 RX ORDER — ONDANSETRON 4 MG/1
TABLET, FILM COATED ORAL
Qty: 45 TABLET | Refills: 0 | Status: SHIPPED | OUTPATIENT
Start: 2024-05-08

## 2024-05-09 RX ORDER — ALPRAZOLAM 0.5 MG/1
0.5 TABLET ORAL 3 TIMES DAILY PRN
Qty: 90 TABLET | Refills: 0 | Status: SHIPPED | OUTPATIENT
Start: 2024-05-09

## 2024-05-10 ENCOUNTER — HOSPITAL ENCOUNTER (OUTPATIENT)
Dept: ULTRASOUND IMAGING | Facility: HOSPITAL | Age: 64
Discharge: HOME | End: 2024-05-10
Payer: MEDICARE

## 2024-05-10 DIAGNOSIS — N18.30 STAGE 3 CHRONIC KIDNEY DISEASE, UNSPECIFIED WHETHER STAGE 3A OR 3B CKD: ICD-10-CM

## 2024-05-10 PROCEDURE — 76775 US EXAM ABDO BACK WALL LIM: CPT

## 2024-05-21 ENCOUNTER — TELEPHONE (OUTPATIENT)
Dept: FAMILY MEDICINE CLINIC | Facility: CLINIC | Age: 64
End: 2024-05-21

## 2024-05-23 RX ORDER — ONDANSETRON 4 MG/1
TABLET, FILM COATED ORAL
Qty: 45 TABLET | Refills: 0 | Status: SHIPPED | OUTPATIENT
Start: 2024-05-23

## 2024-05-30 NOTE — TELEPHONE ENCOUNTER
Caller: Jasmine Cerda    Relationship: Self    Best call back number: [phone]    Caller requesting test results:     What test was performed: ULTRA SOUND     When was the test performed: 5/07/24    Where was the test performed:     Additional notes: WOULD LIKE CALL BACK WITH RESULTS        
Called and left vm for patient.  
Spoke with patient she expressed understanding.  
no

## 2024-06-02 ENCOUNTER — HOSPITAL ENCOUNTER (INPATIENT)
Facility: HOSPITAL | Age: 64
LOS: 2 days | Discharge: HOME | End: 2024-06-04
Attending: HOSPITALIST | Admitting: HOSPITALIST
Payer: MEDICARE

## 2024-06-02 ENCOUNTER — APPOINTMENT (OUTPATIENT)
Dept: GENERAL RADIOLOGY | Facility: HOSPITAL | Age: 64
End: 2024-06-02
Payer: MEDICARE

## 2024-06-02 ENCOUNTER — APPOINTMENT (OUTPATIENT)
Dept: ULTRASOUND IMAGING | Facility: HOSPITAL | Age: 64
End: 2024-06-02
Payer: MEDICARE

## 2024-06-02 DIAGNOSIS — R06.00 DYSPNEA, UNSPECIFIED TYPE: Primary | ICD-10-CM

## 2024-06-02 DIAGNOSIS — N17.9 ACUTE RENAL FAILURE, UNSPECIFIED ACUTE RENAL FAILURE TYPE: ICD-10-CM

## 2024-06-02 LAB
ALBUMIN SERPL-MCNC: 5.3 G/DL (ref 3.5–5.2)
ALBUMIN/GLOB SERPL: 1.8 G/DL
ALP SERPL-CCNC: 56 U/L (ref 39–117)
ALT SERPL W P-5'-P-CCNC: 12 U/L (ref 1–33)
ANION GAP SERPL CALCULATED.3IONS-SCNC: 22.9 MMOL/L (ref 5–15)
ARTERIAL PATENCY WRIST A: POSITIVE
AST SERPL-CCNC: 17 U/L (ref 1–32)
ATMOSPHERIC PRESS: ABNORMAL MM[HG]
B PARAPERT DNA SPEC QL NAA+PROBE: NOT DETECTED
B PERT DNA SPEC QL NAA+PROBE: NOT DETECTED
BACTERIA UR QL AUTO: NORMAL /HPF
BASE EXCESS BLDA CALC-SCNC: -6.4 MMOL/L (ref 0–3)
BASOPHILS # BLD AUTO: 0.03 10*3/MM3 (ref 0–0.2)
BASOPHILS NFR BLD AUTO: 0.2 % (ref 0–1.5)
BDY SITE: ABNORMAL
BILIRUB SERPL-MCNC: 0.8 MG/DL (ref 0–1.2)
BILIRUB UR QL STRIP: NEGATIVE
BUN SERPL-MCNC: 83 MG/DL (ref 8–23)
BUN/CREAT SERPL: 18.3 (ref 7–25)
C PNEUM DNA NPH QL NAA+NON-PROBE: NOT DETECTED
CALCIUM SPEC-SCNC: 12.5 MG/DL (ref 8.6–10.5)
CHLORIDE SERPL-SCNC: 99 MMOL/L (ref 98–107)
CHLORIDE UR-SCNC: <20 MMOL/L
CLARITY UR: CLEAR
CO2 BLDA-SCNC: 12 MMOL/L (ref 22–29)
CO2 SERPL-SCNC: 14.1 MMOL/L (ref 22–29)
COLOR UR: ABNORMAL
CREAT SERPL-MCNC: 4.53 MG/DL (ref 0.57–1)
CREAT UR-MCNC: 151 MG/DL
D DIMER PPP FEU-MCNC: <0.19 MG/L (FEU) (ref 0–0.66)
D-LACTATE SERPL-SCNC: 1.7 MMOL/L (ref 0.5–2)
D-LACTATE SERPL-SCNC: 2.3 MMOL/L (ref 0.3–2)
DEPRECATED RDW RBC AUTO: 37.4 FL (ref 37–54)
EGFRCR SERPLBLD CKD-EPI 2021: 10.2 ML/MIN/1.73
EOSINOPHIL # BLD AUTO: 0 10*3/MM3 (ref 0–0.4)
EOSINOPHIL NFR BLD AUTO: 0 % (ref 0.3–6.2)
ERYTHROCYTE [DISTWIDTH] IN BLOOD BY AUTOMATED COUNT: 11.9 % (ref 12.3–15.4)
FLUAV SUBTYP SPEC NAA+PROBE: NOT DETECTED
FLUBV RNA ISLT QL NAA+PROBE: NOT DETECTED
GLOBULIN UR ELPH-MCNC: 3 GM/DL
GLUCOSE SERPL-MCNC: 103 MG/DL (ref 65–99)
GLUCOSE UR STRIP-MCNC: NEGATIVE MG/DL
HADV DNA SPEC NAA+PROBE: NOT DETECTED
HCO3 BLDA-SCNC: 11.6 MMOL/L (ref 21–28)
HCOV 229E RNA SPEC QL NAA+PROBE: NOT DETECTED
HCOV HKU1 RNA SPEC QL NAA+PROBE: NOT DETECTED
HCOV NL63 RNA SPEC QL NAA+PROBE: NOT DETECTED
HCOV OC43 RNA SPEC QL NAA+PROBE: NOT DETECTED
HCT VFR BLD AUTO: 39.4 % (ref 34–46.6)
HEMODILUTION: NO
HGB BLD-MCNC: 13.2 G/DL (ref 12–15.9)
HGB UR QL STRIP.AUTO: NEGATIVE
HMPV RNA NPH QL NAA+NON-PROBE: NOT DETECTED
HOLD SPECIMEN: NORMAL
HOLD SPECIMEN: NORMAL
HPIV1 RNA ISLT QL NAA+PROBE: NOT DETECTED
HPIV2 RNA SPEC QL NAA+PROBE: NOT DETECTED
HPIV3 RNA NPH QL NAA+PROBE: NOT DETECTED
HPIV4 P GENE NPH QL NAA+PROBE: NOT DETECTED
HYALINE CASTS UR QL AUTO: NORMAL /LPF
IMM GRANULOCYTES # BLD AUTO: 0.05 10*3/MM3 (ref 0–0.05)
IMM GRANULOCYTES NFR BLD AUTO: 0.4 % (ref 0–0.5)
INHALED O2 CONCENTRATION: 30 %
KETONES UR QL STRIP: ABNORMAL
LEUKOCYTE ESTERASE UR QL STRIP.AUTO: ABNORMAL
LYMPHOCYTES # BLD AUTO: 3.78 10*3/MM3 (ref 0.7–3.1)
LYMPHOCYTES NFR BLD AUTO: 29.6 % (ref 19.6–45.3)
M PNEUMO IGG SER IA-ACNC: NOT DETECTED
MAGNESIUM SERPL-MCNC: 2.2 MG/DL (ref 1.6–2.4)
MCH RBC QN AUTO: 28.8 PG (ref 26.6–33)
MCHC RBC AUTO-ENTMCNC: 33.5 G/DL (ref 31.5–35.7)
MCV RBC AUTO: 86 FL (ref 79–97)
MODALITY: ABNORMAL
MONOCYTES # BLD AUTO: 0.61 10*3/MM3 (ref 0.1–0.9)
MONOCYTES NFR BLD AUTO: 4.8 % (ref 5–12)
NEUTROPHILS NFR BLD AUTO: 65 % (ref 42.7–76)
NEUTROPHILS NFR BLD AUTO: 8.3 10*3/MM3 (ref 1.7–7)
NITRITE UR QL STRIP: NEGATIVE
NRBC BLD AUTO-RTO: 0 /100 WBC (ref 0–0.2)
NT-PROBNP SERPL-MCNC: 278 PG/ML (ref 0–900)
PCO2 BLDA: 11.7 MM HG (ref 35–48)
PH BLDA: 7.6 PH UNITS (ref 7.35–7.45)
PH UR STRIP.AUTO: 6 [PH] (ref 5–8)
PHOSPHATE SERPL-MCNC: 3.8 MG/DL (ref 2.5–4.5)
PLATELET # BLD AUTO: 319 10*3/MM3 (ref 140–450)
PMV BLD AUTO: 9.3 FL (ref 6–12)
PO2 BLDA: 130.4 MM HG (ref 83–108)
POTASSIUM SERPL-SCNC: 4.7 MMOL/L (ref 3.5–5.2)
PROT SERPL-MCNC: 8.3 G/DL (ref 6–8.5)
PROT UR QL STRIP: ABNORMAL
QT INTERVAL: 405 MS
QTC INTERVAL: 591 MS
RBC # BLD AUTO: 4.58 10*6/MM3 (ref 3.77–5.28)
RBC # UR STRIP: NORMAL /HPF
REF LAB TEST METHOD: NORMAL
RHINOVIRUS RNA SPEC NAA+PROBE: NOT DETECTED
RSV RNA NPH QL NAA+NON-PROBE: NOT DETECTED
SALICYLATES SERPL-MCNC: <0.5 MG/DL
SAO2 % BLDCOA: 99.5 % (ref 94–98)
SARS-COV-2 RNA NPH QL NAA+NON-PROBE: NOT DETECTED
SODIUM SERPL-SCNC: 136 MMOL/L (ref 136–145)
SODIUM UR-SCNC: <20 MMOL/L
SP GR UR STRIP: 1.01 (ref 1–1.03)
SQUAMOUS #/AREA URNS HPF: NORMAL /HPF
TROPONIN T SERPL HS-MCNC: 25 NG/L
UROBILINOGEN UR QL STRIP: ABNORMAL
WBC # UR STRIP: NORMAL /HPF
WBC NRBC COR # BLD AUTO: 12.77 10*3/MM3 (ref 3.4–10.8)
WHOLE BLOOD HOLD COAG: NORMAL
WHOLE BLOOD HOLD SPECIMEN: NORMAL

## 2024-06-02 PROCEDURE — 71045 X-RAY EXAM CHEST 1 VIEW: CPT

## 2024-06-02 PROCEDURE — 83605 ASSAY OF LACTIC ACID: CPT

## 2024-06-02 PROCEDURE — 85025 COMPLETE CBC W/AUTO DIFF WBC: CPT | Performed by: PHYSICIAN ASSISTANT

## 2024-06-02 PROCEDURE — 94640 AIRWAY INHALATION TREATMENT: CPT

## 2024-06-02 PROCEDURE — 25010000002 ONDANSETRON PER 1 MG

## 2024-06-02 PROCEDURE — 80053 COMPREHEN METABOLIC PANEL: CPT | Performed by: PHYSICIAN ASSISTANT

## 2024-06-02 PROCEDURE — 80179 DRUG ASSAY SALICYLATE: CPT | Performed by: INTERNAL MEDICINE

## 2024-06-02 PROCEDURE — 84484 ASSAY OF TROPONIN QUANT: CPT | Performed by: PHYSICIAN ASSISTANT

## 2024-06-02 PROCEDURE — 94761 N-INVAS EAR/PLS OXIMETRY MLT: CPT

## 2024-06-02 PROCEDURE — 83735 ASSAY OF MAGNESIUM: CPT | Performed by: PHYSICIAN ASSISTANT

## 2024-06-02 PROCEDURE — 83880 ASSAY OF NATRIURETIC PEPTIDE: CPT | Performed by: PHYSICIAN ASSISTANT

## 2024-06-02 PROCEDURE — 25810000003 SODIUM CHLORIDE 0.9 % SOLUTION: Performed by: INTERNAL MEDICINE

## 2024-06-02 PROCEDURE — 25010000002 HEPARIN (PORCINE) PER 1000 UNITS: Performed by: HOSPITALIST

## 2024-06-02 PROCEDURE — 94799 UNLISTED PULMONARY SVC/PX: CPT

## 2024-06-02 PROCEDURE — 82436 ASSAY OF URINE CHLORIDE: CPT | Performed by: INTERNAL MEDICINE

## 2024-06-02 PROCEDURE — 81001 URINALYSIS AUTO W/SCOPE: CPT | Performed by: PHYSICIAN ASSISTANT

## 2024-06-02 PROCEDURE — 25810000003 SODIUM CHLORIDE 0.9 % SOLUTION: Performed by: HOSPITALIST

## 2024-06-02 PROCEDURE — 82570 ASSAY OF URINE CREATININE: CPT | Performed by: HOSPITALIST

## 2024-06-02 PROCEDURE — 36415 COLL VENOUS BLD VENIPUNCTURE: CPT | Performed by: PHYSICIAN ASSISTANT

## 2024-06-02 PROCEDURE — 25010000002 ONDANSETRON PER 1 MG: Performed by: FAMILY MEDICINE

## 2024-06-02 PROCEDURE — 84300 ASSAY OF URINE SODIUM: CPT | Performed by: INTERNAL MEDICINE

## 2024-06-02 PROCEDURE — 76775 US EXAM ABDO BACK WALL LIM: CPT

## 2024-06-02 PROCEDURE — 36600 WITHDRAWAL OF ARTERIAL BLOOD: CPT

## 2024-06-02 PROCEDURE — 85379 FIBRIN DEGRADATION QUANT: CPT | Performed by: PHYSICIAN ASSISTANT

## 2024-06-02 PROCEDURE — 0202U NFCT DS 22 TRGT SARS-COV-2: CPT | Performed by: PHYSICIAN ASSISTANT

## 2024-06-02 PROCEDURE — 93005 ELECTROCARDIOGRAM TRACING: CPT

## 2024-06-02 PROCEDURE — 25010000002 METHYLPREDNISOLONE PER 125 MG: Performed by: PHYSICIAN ASSISTANT

## 2024-06-02 PROCEDURE — 82803 BLOOD GASES ANY COMBINATION: CPT

## 2024-06-02 PROCEDURE — 84100 ASSAY OF PHOSPHORUS: CPT | Performed by: PHYSICIAN ASSISTANT

## 2024-06-02 PROCEDURE — 93005 ELECTROCARDIOGRAM TRACING: CPT | Performed by: EMERGENCY MEDICINE

## 2024-06-02 PROCEDURE — 99285 EMERGENCY DEPT VISIT HI MDM: CPT

## 2024-06-02 PROCEDURE — P9612 CATHETERIZE FOR URINE SPEC: HCPCS

## 2024-06-02 PROCEDURE — 87040 BLOOD CULTURE FOR BACTERIA: CPT | Performed by: PHYSICIAN ASSISTANT

## 2024-06-02 RX ORDER — ONDANSETRON 2 MG/ML
4 INJECTION INTRAMUSCULAR; INTRAVENOUS EVERY 6 HOURS PRN
Status: DISCONTINUED | OUTPATIENT
Start: 2024-06-02 | End: 2024-06-04 | Stop reason: HOSPADM

## 2024-06-02 RX ORDER — ALPRAZOLAM 0.5 MG/1
0.5 TABLET ORAL ONCE
Status: COMPLETED | OUTPATIENT
Start: 2024-06-02 | End: 2024-06-02

## 2024-06-02 RX ORDER — AMOXICILLIN 250 MG
2 CAPSULE ORAL 2 TIMES DAILY PRN
Status: DISCONTINUED | OUTPATIENT
Start: 2024-06-02 | End: 2024-06-04 | Stop reason: HOSPADM

## 2024-06-02 RX ORDER — CARVEDILOL 25 MG/1
25 TABLET ORAL 2 TIMES DAILY WITH MEALS
Status: DISCONTINUED | OUTPATIENT
Start: 2024-06-02 | End: 2024-06-04 | Stop reason: HOSPADM

## 2024-06-02 RX ORDER — ALPRAZOLAM 0.5 MG/1
0.5 TABLET ORAL 3 TIMES DAILY PRN
Status: DISCONTINUED | OUTPATIENT
Start: 2024-06-02 | End: 2024-06-04 | Stop reason: HOSPADM

## 2024-06-02 RX ORDER — SODIUM CHLORIDE 9 MG/ML
40 INJECTION, SOLUTION INTRAVENOUS AS NEEDED
Status: DISCONTINUED | OUTPATIENT
Start: 2024-06-02 | End: 2024-06-04 | Stop reason: HOSPADM

## 2024-06-02 RX ORDER — ONDANSETRON 2 MG/ML
INJECTION INTRAMUSCULAR; INTRAVENOUS
Status: COMPLETED
Start: 2024-06-02 | End: 2024-06-02

## 2024-06-02 RX ORDER — SODIUM CHLORIDE 9 MG/ML
100 INJECTION, SOLUTION INTRAVENOUS CONTINUOUS
Status: DISCONTINUED | OUTPATIENT
Start: 2024-06-02 | End: 2024-06-03

## 2024-06-02 RX ORDER — CITALOPRAM 20 MG/1
10 TABLET ORAL DAILY
Status: DISCONTINUED | OUTPATIENT
Start: 2024-06-02 | End: 2024-06-04 | Stop reason: HOSPADM

## 2024-06-02 RX ORDER — IPRATROPIUM BROMIDE AND ALBUTEROL SULFATE 2.5; .5 MG/3ML; MG/3ML
3 SOLUTION RESPIRATORY (INHALATION) ONCE
Status: COMPLETED | OUTPATIENT
Start: 2024-06-02 | End: 2024-06-02

## 2024-06-02 RX ORDER — BISACODYL 5 MG/1
5 TABLET, DELAYED RELEASE ORAL DAILY PRN
Status: DISCONTINUED | OUTPATIENT
Start: 2024-06-02 | End: 2024-06-04 | Stop reason: HOSPADM

## 2024-06-02 RX ORDER — ROSUVASTATIN CALCIUM 10 MG/1
40 TABLET, COATED ORAL NIGHTLY
Status: DISCONTINUED | OUTPATIENT
Start: 2024-06-02 | End: 2024-06-02

## 2024-06-02 RX ORDER — SODIUM CHLORIDE 0.9 % (FLUSH) 0.9 %
10 SYRINGE (ML) INJECTION EVERY 12 HOURS SCHEDULED
Status: DISCONTINUED | OUTPATIENT
Start: 2024-06-02 | End: 2024-06-04 | Stop reason: HOSPADM

## 2024-06-02 RX ORDER — BISACODYL 10 MG
10 SUPPOSITORY, RECTAL RECTAL DAILY PRN
Status: DISCONTINUED | OUTPATIENT
Start: 2024-06-02 | End: 2024-06-04 | Stop reason: HOSPADM

## 2024-06-02 RX ORDER — GABAPENTIN 100 MG/1
100 CAPSULE ORAL 3 TIMES DAILY
Status: DISCONTINUED | OUTPATIENT
Start: 2024-06-02 | End: 2024-06-04 | Stop reason: HOSPADM

## 2024-06-02 RX ORDER — SODIUM CHLORIDE 0.9 % (FLUSH) 0.9 %
10 SYRINGE (ML) INJECTION AS NEEDED
Status: DISCONTINUED | OUTPATIENT
Start: 2024-06-02 | End: 2024-06-04 | Stop reason: HOSPADM

## 2024-06-02 RX ORDER — ONDANSETRON 2 MG/ML
4 INJECTION INTRAMUSCULAR; INTRAVENOUS ONCE
Status: COMPLETED | OUTPATIENT
Start: 2024-06-02 | End: 2024-06-02

## 2024-06-02 RX ORDER — HEPARIN SODIUM 5000 [USP'U]/ML
5000 INJECTION, SOLUTION INTRAVENOUS; SUBCUTANEOUS EVERY 12 HOURS SCHEDULED
Status: DISCONTINUED | OUTPATIENT
Start: 2024-06-02 | End: 2024-06-04 | Stop reason: HOSPADM

## 2024-06-02 RX ORDER — POLYETHYLENE GLYCOL 3350 17 G/17G
17 POWDER, FOR SOLUTION ORAL DAILY PRN
Status: DISCONTINUED | OUTPATIENT
Start: 2024-06-02 | End: 2024-06-04 | Stop reason: HOSPADM

## 2024-06-02 RX ORDER — ROSUVASTATIN CALCIUM 10 MG/1
10 TABLET, COATED ORAL NIGHTLY
Status: DISCONTINUED | OUTPATIENT
Start: 2024-06-02 | End: 2024-06-04 | Stop reason: HOSPADM

## 2024-06-02 RX ORDER — SODIUM CHLORIDE 9 MG/ML
150 INJECTION, SOLUTION INTRAVENOUS CONTINUOUS
Status: DISCONTINUED | OUTPATIENT
Start: 2024-06-02 | End: 2024-06-03

## 2024-06-02 RX ORDER — QUETIAPINE FUMARATE 100 MG/1
100 TABLET, FILM COATED ORAL NIGHTLY
Status: DISCONTINUED | OUTPATIENT
Start: 2024-06-02 | End: 2024-06-04 | Stop reason: HOSPADM

## 2024-06-02 RX ORDER — METHYLPREDNISOLONE SODIUM SUCCINATE 125 MG/2ML
125 INJECTION, POWDER, LYOPHILIZED, FOR SOLUTION INTRAMUSCULAR; INTRAVENOUS ONCE
Status: COMPLETED | OUTPATIENT
Start: 2024-06-02 | End: 2024-06-02

## 2024-06-02 RX ADMIN — SODIUM CHLORIDE 150 ML/HR: 9 INJECTION, SOLUTION INTRAVENOUS at 09:37

## 2024-06-02 RX ADMIN — GABAPENTIN 100 MG: 100 CAPSULE ORAL at 20:37

## 2024-06-02 RX ADMIN — SODIUM CHLORIDE 150 ML/HR: 9 INJECTION, SOLUTION INTRAVENOUS at 22:16

## 2024-06-02 RX ADMIN — ALPRAZOLAM 0.5 MG: 0.5 TABLET ORAL at 09:15

## 2024-06-02 RX ADMIN — METHYLPREDNISOLONE SODIUM SUCCINATE 125 MG: 125 INJECTION, POWDER, FOR SOLUTION INTRAMUSCULAR; INTRAVENOUS at 08:47

## 2024-06-02 RX ADMIN — SODIUM CHLORIDE 150 ML/HR: 9 INJECTION, SOLUTION INTRAVENOUS at 09:46

## 2024-06-02 RX ADMIN — QUETIAPINE FUMARATE 100 MG: 100 TABLET ORAL at 20:35

## 2024-06-02 RX ADMIN — SODIUM CHLORIDE 150 ML/HR: 9 INJECTION, SOLUTION INTRAVENOUS at 16:06

## 2024-06-02 RX ADMIN — CARBIDOPA AND LEVODOPA 1 TABLET: 25; 100 TABLET ORAL at 19:22

## 2024-06-02 RX ADMIN — ONDANSETRON 4 MG: 2 INJECTION INTRAMUSCULAR; INTRAVENOUS at 07:17

## 2024-06-02 RX ADMIN — HEPARIN SODIUM 5000 UNITS: 5000 INJECTION INTRAVENOUS; SUBCUTANEOUS at 12:45

## 2024-06-02 RX ADMIN — GABAPENTIN 100 MG: 100 CAPSULE ORAL at 15:31

## 2024-06-02 RX ADMIN — CITALOPRAM HYDROBROMIDE 10 MG: 20 TABLET ORAL at 15:31

## 2024-06-02 RX ADMIN — Medication 10 ML: at 20:37

## 2024-06-02 RX ADMIN — HEPARIN SODIUM 5000 UNITS: 5000 INJECTION INTRAVENOUS; SUBCUTANEOUS at 20:37

## 2024-06-02 RX ADMIN — ONDANSETRON 4 MG: 2 INJECTION INTRAMUSCULAR; INTRAVENOUS at 20:35

## 2024-06-02 RX ADMIN — IPRATROPIUM BROMIDE AND ALBUTEROL SULFATE 3 ML: .5; 3 SOLUTION RESPIRATORY (INHALATION) at 08:41

## 2024-06-02 RX ADMIN — SODIUM CHLORIDE 500 ML: 9 INJECTION, SOLUTION INTRAVENOUS at 09:50

## 2024-06-02 RX ADMIN — ALPRAZOLAM 0.5 MG: 0.5 TABLET ORAL at 20:35

## 2024-06-02 RX ADMIN — CARVEDILOL 25 MG: 25 TABLET, FILM COATED ORAL at 17:34

## 2024-06-02 RX ADMIN — ROSUVASTATIN 10 MG: 10 TABLET, FILM COATED ORAL at 20:37

## 2024-06-02 NOTE — ED NOTES
Nursing report ED to floor  Jasmine Cerda  64 y.o.  female    HPI:   Chief Complaint   Patient presents with    Shortness of Breath       Admitting doctor:   Mandy Yu MD    Admitting diagnosis:   The primary encounter diagnosis was Dyspnea, unspecified type. A diagnosis of Acute renal failure, unspecified acute renal failure type was also pertinent to this visit.    Code status:   Current Code Status       Date Active Code Status Order ID Comments User Context       Prior            Allergies:   Patient has no known allergies.    Isolation:  No active isolations     Fall Risk:  Fall Risk Assessment was completed, and patient is at high risk for falls.   Predictive Model Details         17 (Low) Factor Value    Calculated 6/2/2024 13:43 Age 64    Risk of Fall Model Active Peripheral IV Present     Imaging order in this encounter Present     Respiratory Rate 20     Magnesium 2.2 mg/dL     Number of Distinct Medication Classes administered 6     Francisco Javier Scale not on file     Creatinine 4.53 mg/dL     Chloride 99 mmol/L     Total Bilirubin 0.8 mg/dL     Diastolic BP 80     Calcium 12.5 mg/dL     Gastrointestinal Assessment X     Days after Admission 0.29     Number of administrations of Anti-Coagulants 1     Potassium 4.7 mmol/L     Albumin 5.3 g/dL     ALT 12 U/L         Weight:       06/02/24  0643   Weight: 77.1 kg (170 lb)       Intake and Output    Intake/Output Summary (Last 24 hours) at 6/2/2024 1343  Last data filed at 6/2/2024 1103  Gross per 24 hour   Intake 500 ml   Output --   Net 500 ml       Diet:   Dietary Orders (From admission, onward)       Start     Ordered    06/02/24 1136  Diet: Cardiac; Healthy Heart (2-3 Na+); Fluid Consistency: Thin (IDDSI 0)  Diet Effective Now        References:    Diet Order [place]   Question Answer Comment   Diets: Cardiac    Cardiac Diet: Healthy Heart (2-3 Na+)    Fluid Consistency: Thin (IDDSI 0)        06/02/24 1135                     Most recent vitals:    Vitals:    06/02/24 1003 06/02/24 1131 06/02/24 1300 06/02/24 1331   BP: 121/71 118/75 136/82 144/80   BP Location:       Patient Position:       Pulse: 67 69 72 71   Resp:       Temp:       TempSrc:       SpO2: 100% 97% 96% 98%   Weight:       Height:           Active LDAs/IV Access:   Lines, Drains & Airways       Active LDAs       Name Placement date Placement time Site Days    Peripheral IV 06/02/24 0717 Right Antecubital 06/02/24  0717  Antecubital  less than 1    Peripheral IV 06/02/24 0650 Left Antecubital 06/02/24  0650  Antecubital  less than 1                    Skin Condition:   Skin Assessments (last day)       None             Labs (abnormal labs have a star):   Labs Reviewed   COMPREHENSIVE METABOLIC PANEL - Abnormal; Notable for the following components:       Result Value    Glucose 103 (*)     BUN 83 (*)     Creatinine 4.53 (*)     CO2 14.1 (*)     Calcium 12.5 (*)     Albumin 5.3 (*)     Anion Gap 22.9 (*)     eGFR 10.2 (*)     All other components within normal limits    Narrative:     GFR Normal >60  Chronic Kidney Disease <60  Kidney Failure <15     SINGLE HS TROPONIN T - Abnormal; Notable for the following components:    HS Troponin T 25 (*)     All other components within normal limits    Narrative:     High Sensitive Troponin T Reference Range:  <14.0 ng/L- Negative Female for AMI  <22.0 ng/L- Negative Male for AMI  >=14 - Abnormal Female indicating possible myocardial injury.  >=22 - Abnormal Male indicating possible myocardial injury.   Clinicians would have to utilize clinical acumen, EKG, Troponin, and serial changes to determine if it is an Acute Myocardial Infarction or myocardial injury due to an underlying chronic condition.        CBC WITH AUTO DIFFERENTIAL - Abnormal; Notable for the following components:    WBC 12.77 (*)     RDW 11.9 (*)     Monocyte % 4.8 (*)     Eosinophil % 0.0 (*)     Neutrophils, Absolute 8.30 (*)     Lymphocytes, Absolute 3.78 (*)     All other components  within normal limits   BLOOD GAS, ARTERIAL - Abnormal; Notable for the following components:    pH, Arterial 7.604 (*)     pCO2, Arterial 11.7 (*)     pO2, Arterial 130.4 (*)     HCO3, Arterial 11.6 (*)     Base Excess, Arterial -6.4 (*)     O2 Saturation, Arterial 99.5 (*)     CO2 Content 12.0 (*)     All other components within normal limits   URINALYSIS W/ MICROSCOPIC IF INDICATED (NO CULTURE) - Abnormal; Notable for the following components:    Color, UA Dark Yellow (*)     Ketones, UA Trace (*)     Protein, UA Trace (*)     Leuk Esterase, UA Trace (*)     All other components within normal limits   POC LACTATE - Abnormal; Notable for the following components:    Lactate 2.3 (*)     All other components within normal limits   RESPIRATORY PANEL PCR W/ COVID-19 (SARS-COV-2), NP SWAB IN UTM/VTP, 2 HR TAT - Normal    Narrative:     In the setting of a positive respiratory panel with a viral infection PLUS a negative procalcitonin without other underlying concern for bacterial infection, consider observing off antibiotics or discontinuation of antibiotics and continue supportive care. If the respiratory panel is positive for atypical bacterial infection (Bordetella pertussis, Chlamydophila pneumoniae, or Mycoplasma pneumoniae), consider antibiotic de-escalation to target atypical bacterial infection.   MAGNESIUM - Normal   PHOSPHORUS - Normal   D-DIMER, QUANTITATIVE - Normal    Narrative:     According to the assay 's published package insert, a normal (<0.50 mg/L (FEU)) D-dimer result in conjunction with a non-high clinical probability assessment, excludes deep vein thrombosis (DVT) and pulmonary embolism (PE) with high sensitivity.    D-dimer values increase with age and this can make VTE exclusion of an older population difficult. To address this, the [employer], based on best available evidence and recent guidelines, recommends that clinicians use age-adjusted D-dimer thresholds in  "patients greater than 50 years of age with: a) a low probability of PE who do not meet all Pulmonary Embolism Rule Out Criteria, or b) in those with intermediate probability of PE.   The formula for an age-adjusted D-dimer cut-off is \"age/100\".  For example, a 60 year old patient would have an age-adjusted cut-off of 0.60 mg/L (FEU) and an 80 year old 0.80 mg/L (FEU).   BNP (IN-HOUSE) - Normal    Narrative:     This assay is used as an aid in the diagnosis of individuals suspected of having heart failure. It can be used as an aid in the diagnosis of acute decompensated heart failure (ADHF) in patients presenting with signs and symptoms of ADHF to the emergency department (ED). In addition, NT-proBNP of <300 pg/mL indicates ADHF is not likely.    Age Range Result Interpretation  NT-proBNP Concentration (pg/mL:      <50             Positive            >450                   Gray                 300-450                    Negative             <300    50-75           Positive            >900                  Gray                300-900                  Negative            <300      >75             Positive            >1800                  Gray                300-1800                  Negative            <300   LACTIC ACID, REFLEX - Normal   SALICYLATE LEVEL - Normal   BLOOD CULTURE   BLOOD CULTURE   RAINBOW DRAW    Narrative:     The following orders were created for panel order [place] Draw.  Procedure                               Abnormality         Status                     ---------                               -----------         ------                     Green Top (Gel)[[id number]]                                                             Lavender Top[[id number]]                                                                Gold Top - SST[[id number]]                                   Final result               Light Blue Top[[id number]]                                                                Please view " results for these tests on the individual orders.   RAINBOW DRAW    Narrative:     The following orders were created for panel order [place] Draw.  Procedure                               Abnormality         Status                     ---------                               -----------         ------                     Green Top (Gel)[[id number]]                                  Final result               Lavender Top[[id number]]                                     Final result               Gold Top - SST[[id number]]                                                              Light Blue Top[[id number]]                                   Final result                 Please view results for these tests on the individual orders.   BLOOD GAS, ARTERIAL   URINALYSIS, MICROSCOPIC ONLY   SODIUM, URINE, RANDOM    Narrative:     Reference intervals for random urine have not been established.  Clinical usage is dependent upon physician's interpretation in combination with other laboratory tests.      CHLORIDE, URINE, RANDOM    Narrative:     Reference intervals for random urine have not been established.  Clinical usage is dependent upon physician's interpretation in combination with other laboratory tests.      CREATININE URINE RANDOM (KIDNEY FUNCTION) GFR COMPONENT   POC LACTATE   GOLD TOP - SST   CBC AND DIFFERENTIAL    Narrative:     The following orders were created for panel order CBC & Differential.  Procedure                               Abnormality         Status                     ---------                               -----------         ------                     CBC Auto Differential[[id number]]        Abnormal            Final result                 Please view results for these tests on the individual orders.   GREEN TOP   LAVENDER TOP   LIGHT BLUE TOP       LOC: Person, Place, Time, and Situation    Telemetry:  Telemetry    Cardiac Monitoring Ordered: no    EKG:   ECG 12 Lead Rhythm Change   Preliminary Result    HEART RATE= 73  bpm   RR Interval= 820  ms   MT Interval= 153  ms   P Horizontal Axis= -11  deg   P Front Axis= 5  deg   QRSD Interval= 105  ms   QT Interval= 437  ms   QTcB= 483  ms   QRS Axis= -15  deg   T Wave Axis= 97  deg   - ABNORMAL ECG -   Sinus rhythm   Probable anterior infarct, age indeterminate   Electronically Signed By:    Date and Time of Study: 2024-06-02 07:31:09      ECG 12 Lead Dyspnea   Preliminary Result   HEART RATE= 127  bpm   RR Interval= 470  ms   MT Interval=   ms   P Horizontal Axis=   deg   P Front Axis=   deg   QRSD Interval= 142  ms   QT Interval= 405  ms   QTcB= 591  ms   QRS Axis= -46  deg   T Wave Axis= 203  deg   - ABNORMAL ECG -   Atrial fibrillation   Paired ventricular premature complexes   Left axis deviation   Nonspecific intraventricular conduction delay   Abnormal T, consider ischemia, lateral leads   When compared with ECG of 10-Sep-2019 11:21:01,   Significant change in rhythm: previously sinus   New conduction abnormality   Electronically Signed By:    Date and Time of Study: 2024-06-02 07:04:22          Medications Given in the ED:   Medications   sodium chloride 0.9 % flush 10 mL (has no administration in time range)   sodium chloride 0.9 % infusion (150 mL/hr Intravenous New Bag 6/2/24 0946)   sodium chloride 0.9 % flush 10 mL (10 mL Intravenous Not Given 6/2/24 1238)   sodium chloride 0.9 % flush 10 mL (has no administration in time range)   sodium chloride 0.9 % infusion 40 mL (has no administration in time range)   heparin (porcine) 5000 UNIT/ML injection 5,000 Units (5,000 Units Subcutaneous Given 6/2/24 1245)   sennosides-docusate (PERICOLACE) 8.6-50 MG per tablet 2 tablet (has no administration in time range)     And   polyethylene glycol (MIRALAX) packet 17 g (has no administration in time range)     And   bisacodyl (DULCOLAX) EC tablet 5 mg (has no administration in time range)     And   bisacodyl (DULCOLAX) suppository 10 mg (has no administration in time  range)   Potassium Replacement - Follow Nurse / BPA Driven Protocol (has no administration in time range)   Magnesium Low Dose Replacement - Follow Nurse / BPA Driven Protocol (has no administration in time range)   Phosphorus Replacement - Follow Nurse / BPA Driven Protocol (has no administration in time range)   Calcium Replacement - Follow Nurse / BPA Driven Protocol (has no administration in time range)   sodium chloride 0.9 % infusion (100 mL/hr Intravenous Not Given 6/2/24 1136)   ondansetron (ZOFRAN) injection 4 mg (4 mg Intravenous Given 6/2/24 0717)   ipratropium-albuterol (DUO-NEB) nebulizer solution 3 mL (3 mL Nebulization Given 6/2/24 0841)   methylPREDNISolone sodium succinate (SOLU-Medrol) injection 125 mg (125 mg Intravenous Given 6/2/24 0847)   ALPRAZolam (XANAX) tablet 0.5 mg (0.5 mg Oral Given 6/2/24 0915)   sodium chloride 0.9 % bolus 500 mL (0 mL Intravenous Stopped 6/2/24 1103)       Imaging results:  XR Chest 1 View    Result Date: 6/2/2024  Impression: No acute cardiopulmonary process. Electronically Signed: Radha Godoy MD  6/2/2024 7:30 AM EDT  Workstation ID: [id number]     Social issues:   Social History     Socioeconomic History    Marital status:    Tobacco Use    Smoking status: Never    Smokeless tobacco: Never    Tobacco comments:     Passive Smoke: N   Vaping Use    Vaping status: Never Used   Substance and Sexual Activity    Alcohol use: No    Drug use: No    Sexual activity: Defer       NIH Stroke Scale:  Interval: (not recorded)  1a. Level of Consciousness: (not recorded)  1b. LOC Questions: (not recorded)  1c. LOC Commands: (not recorded)  2. Best Gaze: (not recorded)  3. Visual: (not recorded)  4. Facial Palsy: (not recorded)  5a. Motor Arm, Left: (not recorded)  5b. Motor Arm, Right: (not recorded)  6a. Motor Leg, Left: (not recorded)  6b. Motor Leg, Right: (not recorded)  7. Limb Ataxia: (not recorded)  8. Sensory: (not recorded)  9. Best Language: (not recorded)  10.  Dysarthria: (not recorded)  11. Extinction and Inattention (formerly Neglect): (not recorded)    Total (NIH Stroke Scale): (not recorded)     Additional notable assessment information:     Nursing report ED to floor:  Davi Salmeron, PCT   06/02/24 13:43 EDT

## 2024-06-02 NOTE — PLAN OF CARE
Goal Outcome Evaluation:     Noticed pt has tremor on bilateral arms.  she stated  that she did not have it before and she is very cold. So BT measured but 97.8  MD notified and provide warm blanket. Will continue to monitor.

## 2024-06-02 NOTE — H&P
[facility]      Patient Name: Jasmine Cerda  : 1960  MRN: [id number]  Primary Care Physician:  Eleni Miranda APRN  Date of admission: 2024      Subjective      Chief Complaint: Shortness of breath    History of Present Illness: Jasmine Cerda is a 64 y.o. female who presented to [facility] on 2024 complaining of increasing shortness of breath for last few days, she has underlying anxiety, patient on Xanax at home as needed.  Patient underwent workup in ER including D-dimer, came out within normal limit.  Patient was found to have acute kidney injury with anion gap metabolic acidosis and also respiratory alkalosis, requiring hospital admission with nephrology consultation.  Patient has a baseline creatinine around 1.12 with GFR around 54%-, patient has some progressive decline in GFR over the last few months, UA in May was bland, no proteinuria, no leukocytosis was noted.  Patient noted on Bumex and ACE inhibitors at home.  She denies taking any NSAID or any herbal medication.  Patient noted to have 300 ml urine on bedside ultrasound.  Patient states she has been running low blood pressure lately.  Following this asked to admit patient for the further care.      Review of Systems   All other systems reviewed and are negative.       Personal History     Past Medical History:   Diagnosis Date    Anxiety     Breast cyst     CHF (congestive heart failure)     COPD (chronic obstructive pulmonary disease)     Coronary heart disease     Depression     Hyperlipidemia     Hypertension        Past Surgical History:   Procedure Laterality Date    APPENDECTOMY      BREAST CYST ASPIRATION      CARDIAC CATHETERIZATION  2017    No Stents placed - [facility]    CARDIAC CATHETERIZATION N/A 2023    Procedure: Left Heart Cath possible PCI;  Surgeon: Cuauhtemoc Kwon MD;  Location: [facility] INVASIVE LOCATION;  Service: Cardiovascular;  Laterality: N/A;     CERVICAL FUSION      C6-C7     SECTION      x 2    CHOLECYSTECTOMY      HYSTERECTOMY      partial       Family History: family history includes Breast cancer in her maternal aunt; Colon cancer in her mother; Diabetes in her father; Heart failure in her brother; Pulmonary embolism in her brother. Otherwise pertinent FHx was reviewed and not pertinent to current issue.    Social History:  reports that she has never smoked. She has never used smokeless tobacco. She reports that she does not drink alcohol and does not use drugs.    Home Medications:  Prior to Admission Medications       Prescriptions Last Dose Informant Patient Reported? Taking?    albuterol (PROVENTIL) (2.5 MG/3ML) 0.083% nebulizer solution   No No    INHALE 1 VIAL VIA NEBULIZER DAILY AS NEEDED FOR WHEEZING    ALPRAZolam (XANAX) 0.5 MG tablet   No No    TAKE 1 TABLET BY MOUTH THREE TIMES DAILY AS NEEDED FOR ANXIETY    aspirin 81 MG EC tablet   Yes No    ASPIRIN EC 81 MG TBEC daily    Budeson-Glycopyrrol-Formoterol (Breztri Aerosphere) 160-9-4.8 MCG/ACT aerosol inhaler   No No    Inhale 2 puffs 2 (Two) Times a Day.    bumetanide (BUMEX) 2 MG tablet   No No    Take 1 tablet by mouth 2 (Two) Times a Day.    carbidopa-levodopa (SINEMET)  MG per tablet   No No    Take 1 tab at: 6 am, 10 am, 2pm, 7 pm    carvedilol (COREG) 25 MG tablet   No No    Take 1 tablet by mouth 2 (Two) Times a Day.    citalopram (CeleXA) 40 MG tablet   No No    Take 1 tablet by mouth Every Morning.    cyanocobalamin 1000 MCG/ML injection   No No    INJECT 1 ML INTO THE APPROPRIATE MUSCLE AS DIRECTED BY PRESCRIBER EVERY 28 (TWENTY-EIGHT) DAYS.    gabapentin (NEURONTIN) 300 MG capsule   No No    Take 1 capsule by mouth 3 (Three) Times a Day.    hydrOXYzine (ATARAX) 25 MG tablet   No No    Take 1 tablet by mouth 3 (Three) Times a Day As Needed for Anxiety.    lisinopril (PRINIVIL,ZESTRIL) 10 MG tablet   No No    Take 1 tablet by mouth 2 (Two) Times a Day.    mirtazapine  "(REMERON) 30 MG tablet   No No    Take 1 tablet by mouth every night at bedtime.    Multiple Vitamins-Minerals (MULTIVITAMIN ADULT) tablet   Yes No    Take 1 tablet by mouth Daily. With Omega XL    nitroglycerin (NITROSTAT) 0.4 MG SL tablet   No No    Place 1 tablet under the tongue Every 5 (Five) Minutes As Needed for Chest Pain. Take no more than 3 doses in 15 minutes.    ondansetron (ZOFRAN) 4 MG tablet   No No    TAKE 1 TABLET BY MOUTH EVERY 8 HOURS AS NEEDED FOR NAUSEA FOR VOMITING    pantoprazole (PROTONIX) 40 MG EC tablet   No No    Take 1 tablet by mouth Daily.    potassium chloride (KLOR-CON) 10 MEQ CR tablet   No No    Take 1 tablet by mouth Daily.    QUEtiapine (SEROquel) 100 MG tablet   No No    Take 1 tablet by mouth every night at bedtime.    rosuvastatin (CRESTOR) 40 MG tablet   No No    Take 1 tablet by mouth Every Evening.    Syringe/Needle, Disp, 23G X 1\" 3 ML misc   No No    Use to inject B12 every 30 days intramuscularly    vitamin D (ERGOCALCIFEROL) 1.25 MG (39447 UT) capsule capsule   No No    Take 1 capsule by mouth 1 (One) Time Per Week.              Allergies:  No Known Allergies    Objective      Vitals:   Temp:  [97.6 °F (36.4 °C)] 97.6 °F (36.4 °C)  Heart Rate:  [] 68  Resp:  [16-45] 20  BP: (111-144)/(67-92) 138/84  Flow (L/min):  [2-3] 2    Physical Exam  Vitals and nursing note reviewed.   Constitutional:       General: She is not in acute distress.     Appearance: Normal appearance. She is well-developed. She is not ill-appearing, toxic-appearing or diaphoretic.   HENT:      Head: Normocephalic and atraumatic.      Right Ear: Ear canal and external ear normal.      Left Ear: Ear canal and external ear normal.      Nose: Nose normal. No congestion or rhinorrhea.      Mouth/Throat:      Mouth: Mucous membranes are moist.      Pharynx: No oropharyngeal exudate.   Eyes:      General: No scleral icterus.        Right eye: No discharge.         Left eye: No discharge.      Extraocular " Movements: Extraocular movements intact.      Conjunctiva/sclera: Conjunctivae normal.      Pupils: Pupils are equal, round, and reactive to light.   Neck:      Thyroid: No thyromegaly.      Vascular: No carotid bruit or JVD.      Trachea: No tracheal deviation.   Cardiovascular:      Rate and Rhythm: Normal rate and regular rhythm.      Pulses: Normal pulses.      Heart sounds: Normal heart sounds. No murmur heard.     No friction rub. No gallop.   Pulmonary:      Effort: Pulmonary effort is normal. No respiratory distress.      Breath sounds: Normal breath sounds. No stridor. No wheezing, rhonchi or rales.   Chest:      Chest wall: No tenderness.   Abdominal:      General: Bowel sounds are normal. There is no distension.      Palpations: Abdomen is soft. There is no mass.      Tenderness: There is no abdominal tenderness. There is no guarding or rebound.      Hernia: No hernia is present.   Musculoskeletal:         General: No swelling, tenderness, deformity or signs of injury. Normal range of motion.      Cervical back: Normal range of motion and neck supple. No rigidity. No muscular tenderness.      Right lower leg: No edema.      Left lower leg: No edema.   Lymphadenopathy:      Cervical: No cervical adenopathy.   Skin:     General: Skin is warm and dry.      Coloration: Skin is not jaundiced or pale.      Findings: No bruising, erythema or rash.   Neurological:      General: No focal deficit present.      Mental Status: She is alert and oriented to person, place, and time. Mental status is at baseline.      Cranial Nerves: No cranial nerve deficit.      Sensory: No sensory deficit.      Motor: No weakness or abnormal muscle tone.      Coordination: Coordination normal.   Psychiatric:         Mood and Affect: Mood normal.         Behavior: Behavior normal.         Thought Content: Thought content normal.         Judgment: Judgment normal.          Result Review    Result Review:  I have personally reviewed the  results from the time of this admission to 6/2/2024 14:54 EDT and agree with these findings:  [x]  Laboratory  [x]  Microbiology  [x]  Radiology  [x]  EKG/Telemetry   []  Cardiology/Vascular   []  Pathology  []  Old records  []  Other:  Most notable findings include:       Assessment & Plan        Active Hospital Problems:  Active Hospital Problems    Diagnosis     **Acute kidney injury     Acute kidney injury (nontraumatic)      Plan:     Acute kidney injury with anion gap metabolic acidosis, likely prerenal with possible component of ATN, IV fluids, avoid NSAID, keep MAP above 65.  Hold diuretic and ACE inhibitor, urine electrolytes and ultrasound kidney.    Primary respiratory alkalosis likely from hyperventilation likely related to anxiety, continue to monitor, will repeat blood gas.    History of COPD, no exacerbation, continue home inhalers.    Hypertension, continue home regimen, monitor vitals.  Will hold on ACE inhibitors, will resume beta-blockers.    DVT prophylaxis with heparin subcu    DVT prophylaxis:  Medical DVT prophylaxis orders are present.        CODE STATUS:       Admission Status:  I believe this patient meets observation status.    I discussed the patient's findings and my recommendations with patient.    This patient has been examined wearing appropriate Personal Protective Equipment and discussed with hospital infection control department. 06/02/24      Signature:

## 2024-06-02 NOTE — ED PROVIDER NOTES
Subjective   History of Present Illness  Chief Complaint: Shortness of breath    Patient is a 64-year-old female with history of anxiety CHF COPD CAD presents to the ER with shortness of breath for few days.  She states is progressively been getting worse over the last couple days.  No chest pain.  No cough or sore throat.  No headache lightheadedness or dizziness.  Denies abdominal pain nausea vomiting or diarrhea.  No fever or chills.  No recent travel or surgery.  No recent sick contacts.  No history of blood clots.  No leg swelling.    PCP: Eleni Miranad    History provided by:  Patient      Review of Systems   Constitutional:  Negative for chills and fever.   HENT:  Negative for sore throat and trouble swallowing.    Eyes: Negative.    Respiratory:  Positive for shortness of breath. Negative for chest tightness and wheezing.    Cardiovascular:  Negative for chest pain.   Gastrointestinal:  Negative for abdominal pain, diarrhea, nausea and vomiting.   Endocrine: Negative.    Genitourinary:  Negative for dysuria.   Musculoskeletal:  Negative for myalgias.   Skin:  Negative for rash.   Allergic/Immunologic: Negative.    Neurological:  Negative for headaches.   Psychiatric/Behavioral:  Negative for behavioral problems. The patient is nervous/anxious.    All other systems reviewed and are negative.      Past Medical History:   Diagnosis Date    Anxiety     Breast cyst     CHF (congestive heart failure)     COPD (chronic obstructive pulmonary disease)     Coronary heart disease     Depression     Hyperlipidemia     Hypertension        No Known Allergies    Past Surgical History:   Procedure Laterality Date    APPENDECTOMY      BREAST CYST ASPIRATION      CARDIAC CATHETERIZATION  07/2017    No Stents placed - [facility]    CARDIAC CATHETERIZATION N/A 6/22/2023    Procedure: Left Heart Cath possible PCI;  Surgeon: Cuauhtemoc Kwon MD;  Location: [facility] CATH INVASIVE LOCATION;  Service: Cardiovascular;  Laterality: N/A;     CERVICAL FUSION      C6-C7     SECTION      x 2    CHOLECYSTECTOMY      HYSTERECTOMY      partial       Family History   Problem Relation Age of Onset    Colon cancer Mother     Diabetes Father     Pulmonary embolism Brother     Heart failure Brother     Breast cancer Maternal Aunt        Social History     Socioeconomic History    Marital status:    Tobacco Use    Smoking status: Never    Smokeless tobacco: Never    Tobacco comments:     Passive Smoke: N   Vaping Use    Vaping status: Never Used   Substance and Sexual Activity    Alcohol use: No    Drug use: No    Sexual activity: Defer           Objective   Physical Exam  Vitals and nursing note reviewed.   Constitutional:       Appearance: Normal appearance. She is well-developed. She is obese. She is not ill-appearing or toxic-appearing.   HENT:      Head: Normocephalic and atraumatic.   Eyes:      Pupils: Pupils are equal, round, and reactive to light.   Cardiovascular:      Rate and Rhythm: Normal rate and regular rhythm.      Heart sounds: Normal heart sounds. No murmur heard.  Pulmonary:      Effort: Pulmonary effort is normal. Tachypnea present. No respiratory distress.      Breath sounds: Normal breath sounds. No stridor. No wheezing, rhonchi or rales.   Chest:      Chest wall: No tenderness.   Abdominal:      General: Bowel sounds are normal. There is no distension.      Palpations: Abdomen is soft.      Tenderness: There is no abdominal tenderness.   Musculoskeletal:      Right lower leg: No edema.      Left lower leg: No edema.   Skin:     General: Skin is warm and dry.      Capillary Refill: Capillary refill takes less than 2 seconds.      Findings: No rash.   Neurological:      General: No focal deficit present.      Mental Status: She is alert and oriented to person, place, and time.   Psychiatric:         Mood and Affect: Mood normal.         Behavior: Behavior normal.         ECG 12 Lead      Date/Time: 2024 4:55  "PM    Performed by: Jenni Peterson PA  Authorized by: Mandy Yu MD  Interpreted by ED physician  Previous ECG: no previous ECG available  Rhythm: sinus rhythm  Rate: normal  BPM: 73  QRS axis: normal  Conduction: conduction normal  Clinical impression: non-specific ECG               ED Course  ED Course as of 06/02/24 1705   Sun Jun 02, 2024   0738 D-Dimer, Quant: <0.19 [MC]   0743 Patient reevaluated, reports feeling better, respirations improved [MC]   0752 Creatinine(!): 4.53 [MC]   0758 Renal Ultrasound    IMPRESSION:  Impression:     Bilateral renal cortical thinning.     Tiny right renal cyst           Electronically Signed: Faizan Plaza MD    5/13/2024 8:30 AM EDT    [MC]   0906 Patient still appears anxious. She takes Xanax at home, her home dose was ordered [MC]   0926 I spoke with Dr. Balderas regarding patient renal function [MC]   0936 I spoke with Dr. Pena regarding admission [MC]      ED Course User Index  [MC] Jenni Peterson PA    /78 (BP Location: Right arm, Patient Position: Lying)   Pulse 68   Temp 98 °F (36.7 °C) (Oral)   Resp 22   Ht 157.5 cm (62\")   Wt 87 kg (191 lb 12.8 oz)   SpO2 97%   BMI 35.08 kg/m²   Labs Reviewed   COMPREHENSIVE METABOLIC PANEL - Abnormal; Notable for the following components:       Result Value    Glucose 103 (*)     BUN 83 (*)     Creatinine 4.53 (*)     CO2 14.1 (*)     Calcium 12.5 (*)     Albumin 5.3 (*)     Anion Gap 22.9 (*)     eGFR 10.2 (*)     All other components within normal limits    Narrative:     GFR Normal >60  Chronic Kidney Disease <60  Kidney Failure <15     SINGLE HS TROPONIN T - Abnormal; Notable for the following components:    HS Troponin T 25 (*)     All other components within normal limits    Narrative:     High Sensitive Troponin T Reference Range:  <14.0 ng/L- Negative Female for AMI  <22.0 ng/L- Negative Male for AMI  >=14 - Abnormal Female indicating possible myocardial injury.  >=22 - Abnormal Male " indicating possible myocardial injury.   Clinicians would have to utilize clinical acumen, EKG, Troponin, and serial changes to determine if it is an Acute Myocardial Infarction or myocardial injury due to an underlying chronic condition.        CBC WITH AUTO DIFFERENTIAL - Abnormal; Notable for the following components:    WBC 12.77 (*)     RDW 11.9 (*)     Monocyte % 4.8 (*)     Eosinophil % 0.0 (*)     Neutrophils, Absolute 8.30 (*)     Lymphocytes, Absolute 3.78 (*)     All other components within normal limits   BLOOD GAS, ARTERIAL - Abnormal; Notable for the following components:    pH, Arterial 7.604 (*)     pCO2, Arterial 11.7 (*)     pO2, Arterial 130.4 (*)     HCO3, Arterial 11.6 (*)     Base Excess, Arterial -6.4 (*)     O2 Saturation, Arterial 99.5 (*)     CO2 Content 12.0 (*)     All other components within normal limits   URINALYSIS W/ MICROSCOPIC IF INDICATED (NO CULTURE) - Abnormal; Notable for the following components:    Color, UA Dark Yellow (*)     Ketones, UA Trace (*)     Protein, UA Trace (*)     Leuk Esterase, UA Trace (*)     All other components within normal limits   POC LACTATE - Abnormal; Notable for the following components:    Lactate 2.3 (*)     All other components within normal limits   RESPIRATORY PANEL PCR W/ COVID-19 (SARS-COV-2), NP SWAB IN UTM/VTP, 2 HR TAT - Normal    Narrative:     In the setting of a positive respiratory panel with a viral infection PLUS a negative procalcitonin without other underlying concern for bacterial infection, consider observing off antibiotics or discontinuation of antibiotics and continue supportive care. If the respiratory panel is positive for atypical bacterial infection (Bordetella pertussis, Chlamydophila pneumoniae, or Mycoplasma pneumoniae), consider antibiotic de-escalation to target atypical bacterial infection.   MAGNESIUM - Normal   PHOSPHORUS - Normal   D-DIMER, QUANTITATIVE - Normal    Narrative:     According to the assay  "'s published package insert, a normal (<0.50 mg/L (FEU)) D-dimer result in conjunction with a non-high clinical probability assessment, excludes deep vein thrombosis (DVT) and pulmonary embolism (PE) with high sensitivity.    D-dimer values increase with age and this can make VTE exclusion of an older population difficult. To address this, the [employer], based on best available evidence and recent guidelines, recommends that clinicians use age-adjusted D-dimer thresholds in patients greater than 50 years of age with: a) a low probability of PE who do not meet all Pulmonary Embolism Rule Out Criteria, or b) in those with intermediate probability of PE.   The formula for an age-adjusted D-dimer cut-off is \"age/100\".  For example, a 60 year old patient would have an age-adjusted cut-off of 0.60 mg/L (FEU) and an 80 year old 0.80 mg/L (FEU).   BNP (IN-HOUSE) - Normal    Narrative:     This assay is used as an aid in the diagnosis of individuals suspected of having heart failure. It can be used as an aid in the diagnosis of acute decompensated heart failure (ADHF) in patients presenting with signs and symptoms of ADHF to the emergency department (ED). In addition, NT-proBNP of <300 pg/mL indicates ADHF is not likely.    Age Range Result Interpretation  NT-proBNP Concentration (pg/mL:      <50             Positive            >450                   Gray                 300-450                    Negative             <300    50-75           Positive            >900                  Gray                300-900                  Negative            <300      >75             Positive            >1800                  Gray                300-1800                  Negative            <300   LACTIC ACID, REFLEX - Normal   SALICYLATE LEVEL - Normal   BLOOD CULTURE   BLOOD CULTURE   RAINBOW DRAW    Narrative:     The following orders were created for panel order [facility].  Procedure               "                 Abnormality         Status                     ---------                               -----------         ------                     Green Top (Gel)[[id number]]                                                             Lavender Top[[id number]]                                                                Gold Top - SST[[id number]]                                   Final result               Light Blue Top[[id number]]                                                                Please view results for these tests on the individual orders.   RAINBOW DRAW    Narrative:     The following orders were created for panel order [place] Draw.  Procedure                               Abnormality         Status                     ---------                               -----------         ------                     Green Top (Gel)[[id number]]                                  Final result               Lavender Top[[id number]]                                     Final result               Gold Top - SST[[id number]]                                                              Light Blue Top[[id number]]                                   Final result                 Please view results for these tests on the individual orders.   BLOOD GAS, ARTERIAL   URINALYSIS, MICROSCOPIC ONLY   SODIUM, URINE, RANDOM    Narrative:     Reference intervals for random urine have not been established.  Clinical usage is dependent upon physician's interpretation in combination with other laboratory tests.      CHLORIDE, URINE, RANDOM    Narrative:     Reference intervals for random urine have not been established.  Clinical usage is dependent upon physician's interpretation in combination with other laboratory tests.      CREATININE URINE RANDOM (KIDNEY FUNCTION) GFR COMPONENT   POC LACTATE   GOLD TOP - SST   CBC AND DIFFERENTIAL    Narrative:     The following orders were created for panel order CBC & Differential.  Procedure                                Abnormality         Status                     ---------                               -----------         ------                     CBC Auto Differential[[id number]]        Abnormal            Final result                 Please view results for these tests on the individual orders.   GREEN TOP   LAVENDER TOP   LIGHT BLUE TOP     Medications   sodium chloride 0.9 % flush 10 mL (has no administration in time range)   sodium chloride 0.9 % infusion (150 mL/hr Intravenous New Bag 6/2/24 1606)   sodium chloride 0.9 % flush 10 mL (10 mL Intravenous Not Given 6/2/24 1238)   sodium chloride 0.9 % flush 10 mL (has no administration in time range)   sodium chloride 0.9 % infusion 40 mL (has no administration in time range)   heparin (porcine) 5000 UNIT/ML injection 5,000 Units (5,000 Units Subcutaneous Given 6/2/24 1245)   sennosides-docusate (PERICOLACE) 8.6-50 MG per tablet 2 tablet (has no administration in time range)     And   polyethylene glycol (MIRALAX) packet 17 g (has no administration in time range)     And   bisacodyl (DULCOLAX) EC tablet 5 mg (has no administration in time range)     And   bisacodyl (DULCOLAX) suppository 10 mg (has no administration in time range)   Potassium Replacement - Follow Nurse / BPA Driven Protocol (has no administration in time range)   Magnesium Low Dose Replacement - Follow Nurse / BPA Driven Protocol (has no administration in time range)   Phosphorus Replacement - Follow Nurse / BPA Driven Protocol (has no administration in time range)   Calcium Replacement - Follow Nurse / BPA Driven Protocol (has no administration in time range)   sodium chloride 0.9 % infusion (100 mL/hr Intravenous Not Given 6/2/24 1136)   ALPRAZolam (XANAX) tablet 0.5 mg (has no administration in time range)   carbidopa-levodopa (SINEMET)  MG per tablet 1 tablet (has no administration in time range)   carvedilol (COREG) tablet 25 mg (has no administration in time range)   citalopram  (CeleXA) tablet 10 mg (10 mg Oral Given 6/2/24 1531)   gabapentin (NEURONTIN) capsule 100 mg (100 mg Oral Given 6/2/24 1531)   rosuvastatin (CRESTOR) tablet 10 mg (has no administration in time range)   ondansetron (ZOFRAN) injection 4 mg (4 mg Intravenous Given 6/2/24 0717)   ipratropium-albuterol (DUO-NEB) nebulizer solution 3 mL (3 mL Nebulization Given 6/2/24 0841)   methylPREDNISolone sodium succinate (SOLU-Medrol) injection 125 mg (125 mg Intravenous Given 6/2/24 0847)   ALPRAZolam (XANAX) tablet 0.5 mg (0.5 mg Oral Given 6/2/24 0915)   sodium chloride 0.9 % bolus 500 mL (0 mL Intravenous Stopped 6/2/24 1103)     US Renal Bilateral    Result Date: 6/2/2024  1.No hydronephrosis. 2.Renal cortical thinning and increased echogenicity which may be seen with chronic kidney disease. 3.Suspected small renal cysts. Electronically Signed: Denny Ryan  6/2/2024 1:46 PM EDT  Workstation ID: [id number]    XR Chest 1 View    Result Date: 6/2/2024  Impression: No acute cardiopulmonary process. Electronically Signed: Radha Godoy MD  6/2/2024 7:30 AM EDT  Workstation ID: [id number]                                            Medical Decision Making  Differential Dx (Includes but not limited to): Hyperventilation, PE, pneumonia, pneumothorax, anxiety, CHF exacerbation  Medical Records Reviewed: Patient had renal ultrasound earlier this month that showed renal cysts  Labs: On my interpretation lactate 2.3.  Urinalysis negative for UTI.  Respiratory panel negative.  ABG pH 7.6, pCO2 11.7.  CBC mild leukocytosis.  CMP glucose 103 BUN 83 creatinine 4.53.  Magnesium 2.2.  Troponin 25.  Dimer normal.  Imaging: Imaging was reviewed by myself, independently interpreted by radiologist chest x-ray shows no obvious pneumonia or pneumothorax  Telemetry: EKG was reviewed by myself, independently interpreted by Dr. Bennett, sinus rhythm rate of 73 with no acute ST changes.  Testing considered but not ordered: CT PE protocol, dimer negative,  patient not hypoxic  Nature of Complaint: Acute  Admission vs Discharge: Admission      Discussion: While in the ED IV was placed and labs were obtained appropriate PPE was worn during exam and throughout all encounters with the patient.  Patient had the above evaluation.  She is afebrile nontoxic appearance, she arrived hypoxic hyperventilating, she was placed supplemental oxygen and coached on slowed breathing techniques.  Lab work reassuring.  Patient did have elevated lactate but normal D-dimer and no evidence of pneumonia or other infectious process.  Patient appeared to be hyperventilating, she was coached on breathing techniques and was able to improve her breathing and reported feeling much better.  She was also given Xanax.  Lab work significant for new renal failure with creatinine 4.5, 3 weeks ago creatinine 2.0.  Spoke with nephrology Dr. Balderas who agreed to consult during admission and place orders for fluids.  Spoke with Dr. Garcia regarding admission.     Based on the clinical findings at this time I anticipate that the patient will require 2 midnight stay.    Case discussed with ER attending Dr. Bennett who agreed with plan of care.    Problems Addressed:  Acute renal failure, unspecified acute renal failure type: acute illness or injury  Dyspnea, unspecified type: acute illness or injury    Amount and/or Complexity of Data Reviewed  External Data Reviewed: radiology.  Labs: ordered. Decision-making details documented in ED Course.  Radiology: ordered. Decision-making details documented in ED Course.    Risk  Prescription drug management.  Decision regarding hospitalization.        Final diagnoses:   Dyspnea, unspecified type   Acute renal failure, unspecified acute renal failure type       ED Disposition  ED Disposition       ED Disposition   Decision to Admit    Condition   --    Comment   Level of Care: Telemetry [5]   Diagnosis: Acute kidney injury (nontraumatic) [[id number]]   Admitting  Physician: CARLOS SCHROEDER [[id number]]   Attending Physician: CARLOS SCHROEDER [[id number]]   Certification: I Certify That Inpatient Hospital Services Are Medically Necessary For Greater Than 2 Midnights                 No follow-up provider specified.       Medication List      No changes were made to your prescriptions during this visit.            Jenni Peterson PA  06/02/24 7081

## 2024-06-02 NOTE — ED NOTES
Pt tachypneic, 100% on room air but able to speak in short sentences at rest only despite coaching on slow deep breathing. Pt reported dizziness when attempting to sit on the side of bed, pt unable to tolerate transferring to bedside commode for clean catch urine sample. Pt to be in and out cathed for urine by Ricarda Salmeron RN. Pt agreeable to this.

## 2024-06-02 NOTE — Clinical Note
Level of Care: Telemetry [5]   Diagnosis: Acute kidney injury [[id number]]   Admitting Physician: CARLOS SCHROEDER [[id number]]   Attending Physician: CARLOS SCHROEDER [[id number]]

## 2024-06-02 NOTE — CONSULTS
RENAL/KCC:    Referring Provider: ER  Reason for Consultation: DENIS/CKD, Acid/Base disrubance    Subjective     Chief complaint: Hypotension, Tachypnea    History of present illness:  Patient is a 65 yo WF with h/o CKD who follows in the office with Dr. Allen.  Her Cr had been 1.1 in  and 1.35 in 2024. It bumped to 2.3 in 2024.  Her Bumex was decreased and Cr was 2 on recheck in May 2024.  K had been 5.7 but improved to 4.6.  She was still on Lisinopril outpatient.  She presents to the ER with three days of low BP (states in the 90's) and now with worsening tachypnea.  She did stop her BP meds when her BP got low.  Her Cr today is 4.5.  She states she has had low UOP.  Renal US on  showed bilateral cortical thinning and a right renal cyst.  UA today with no RBC's or WBC's and only trace protein.  Her pH is 7.6 in the ER with a PCO2 of 11.7 and a serum bicarbonate of 14.1.  Lactate is elevated at 2.3.  She denies any N/V/D.  No hematuria or dysuria.  She denies taking any Aspirin.  No new medications.      History  Past Medical History:   Diagnosis Date    Anxiety     Breast cyst     CHF (congestive heart failure)     COPD (chronic obstructive pulmonary disease)     Coronary heart disease     Depression     Hyperlipidemia     Hypertension    ,   Past Surgical History:   Procedure Laterality Date    APPENDECTOMY      BREAST CYST ASPIRATION      CARDIAC CATHETERIZATION  2017    No Stents placed - [facility]    CARDIAC CATHETERIZATION N/A 2023    Procedure: Left Heart Cath possible PCI;  Surgeon: Cuauhtemoc Kwon MD;  Location: [facility] CATH INVASIVE LOCATION;  Service: Cardiovascular;  Laterality: N/A;    CERVICAL FUSION      C6-C7     SECTION      x 2    CHOLECYSTECTOMY      HYSTERECTOMY      partial   ,   Family History   Problem Relation Age of Onset    Colon cancer Mother     Diabetes Father     Pulmonary embolism Brother     Heart failure Brother     Breast cancer Maternal  "Aunt    ,   Social History     Socioeconomic History    Marital status:    Tobacco Use    Smoking status: Never    Smokeless tobacco: Never    Tobacco comments:     Passive Smoke: N   Vaping Use    Vaping status: Never Used   Substance and Sexual Activity    Alcohol use: No    Drug use: No    Sexual activity: Defer     E-cigarette/Vaping    E-cigarette/Vaping Use Never User     Passive Exposure No     Counseling Given No      E-cigarette/Vaping Substances    Nicotine No     THC No     CBD No     Flavoring No      E-cigarette/Vaping Devices    Disposable No     Pre-filled or Refillable Cartridge No     Refillable Tank No     Pre-filled Pod No          , (Not in a hospital admission) , Scheduled Meds:  sodium chloride, 500 mL, Intravenous, Once   , Continuous Infusions:  sodium chloride, 150 mL/hr, Last Rate: 150 mL/hr (06/02/24 0946)   , PRN Meds:    sodium chloride, and Allergies:  Patient has no known allergies.    Review of Systems  Pertinent items are noted in HPI    Objective     Vital Signs  Temp:  [97.6 °F (36.4 °C)] 97.6 °F (36.4 °C)  Heart Rate:  [] 70  Resp:  [16-45] 20  BP: (111-131)/(67-92) 114/73    No intake/output data recorded.  No intake/output data recorded.    Physical Exam:  GEN: Awake, Anxious, Tachypneic  ENT: PERRL, EOMI, MMM  NECK: Supple, no JVD  CHEST: CTAB, no W/R/C  CV: RRR, no M/G/R  ABD: Soft, NT, +BS  SKIN: Warm and Dry  NEURO: CN's intact      Results Review:   I reviewed the patient's new clinical results.    WBC WBC   Date Value Ref Range Status   06/02/2024 12.77 (H) 3.40 - 10.80 10*3/mm3 Final      HGB Hemoglobin   Date Value Ref Range Status   06/02/2024 13.2 12.0 - 15.9 g/dL Final      HCT Hematocrit   Date Value Ref Range Status   06/02/2024 39.4 34.0 - 46.6 % Final      Platlets No results found for: \"LABPLAT\"   MCV MCV   Date Value Ref Range Status   06/02/2024 86.0 79.0 - 97.0 fL Final          Sodium Sodium   Date Value Ref Range Status   06/02/2024 136 136 - " "145 mmol/L Final      Potassium Potassium   Date Value Ref Range Status   06/02/2024 4.7 3.5 - 5.2 mmol/L Final      Chloride Chloride   Date Value Ref Range Status   06/02/2024 99 98 - 107 mmol/L Final      CO2 CO2   Date Value Ref Range Status   06/02/2024 14.1 (L) 22.0 - 29.0 mmol/L Final      BUN BUN   Date Value Ref Range Status   06/02/2024 83 (H) 8 - 23 mg/dL Final      Creatinine Creatinine   Date Value Ref Range Status   06/02/2024 4.53 (H) 0.57 - 1.00 mg/dL Final      Calcium Calcium   Date Value Ref Range Status   06/02/2024 12.5 (H) 8.6 - 10.5 mg/dL Final      PO4 No results found for: \"CAPO4\"   Albumin Albumin   Date Value Ref Range Status   06/02/2024 5.3 (H) 3.5 - 5.2 g/dL Final      Magnesium Magnesium   Date Value Ref Range Status   06/02/2024 2.2 1.6 - 2.4 mg/dL Final      Uric Acid No results found for: \"URICACID\"         sodium chloride, 500 mL, Intravenous, Once      sodium chloride, 150 mL/hr, Last Rate: 150 mL/hr (06/02/24 0946)        Assessment & Plan     1) DENIS - Pre-renal/ATN from hypotension  2) CKD3a - Past baseline Cr in the low 1's.  CKD from HTN.  3) Mixed acid base disturbance  4) Primary respiratory alkalosis - ? From anxiety, CXR clear, not hypoxic, D-dimer negative.  Patient does have underlying COPD.  5) HAGMA - Lactic acidosis + DENIS   6) Hypotension  7) Tachypnea - not hypoxic  8) H/O COPD  9) H/O CAD/CHF    PLAN: Start IVF with NS and treat apparent anxiety.  Holding all BP meds and diuretics.  Check salicylate level though patient denies any ingestion.  Would work up for any underlying infection or sepsis.  Could also consider work-up for CNS pathology if persistent hyperventilation.  Hopeful to see renal function improve with IVF and improved BP.  Discussed possibility of ATN and worsening of function as well as the possibility of needing acute HD.  Will follow closely.        Clarke Balderas MD  [facility]  06/02/24  @NOW            "

## 2024-06-03 LAB
ANION GAP SERPL CALCULATED.3IONS-SCNC: 11.2 MMOL/L (ref 5–15)
ARTERIAL PATENCY WRIST A: ABNORMAL
ATMOSPHERIC PRESS: ABNORMAL MM[HG]
BASE EXCESS BLDA CALC-SCNC: -8.6 MMOL/L (ref 0–3)
BASOPHILS # BLD AUTO: 0 10*3/MM3 (ref 0–0.2)
BASOPHILS NFR BLD AUTO: 0 % (ref 0–1.5)
BDY SITE: ABNORMAL
BUN SERPL-MCNC: 68 MG/DL (ref 8–23)
BUN/CREAT SERPL: 24 (ref 7–25)
CALCIUM SPEC-SCNC: 9.6 MG/DL (ref 8.6–10.5)
CHLORIDE SERPL-SCNC: 111 MMOL/L (ref 98–107)
CO2 BLDA-SCNC: 15.8 MMOL/L (ref 22–29)
CO2 SERPL-SCNC: 14.8 MMOL/L (ref 22–29)
CREAT SERPL-MCNC: 2.83 MG/DL (ref 0.57–1)
DEPRECATED RDW RBC AUTO: 39.5 FL (ref 37–54)
EGFRCR SERPLBLD CKD-EPI 2021: 17.9 ML/MIN/1.73
EOSINOPHIL # BLD AUTO: 0 10*3/MM3 (ref 0–0.4)
EOSINOPHIL NFR BLD AUTO: 0 % (ref 0.3–6.2)
ERYTHROCYTE [DISTWIDTH] IN BLOOD BY AUTOMATED COUNT: 11.9 % (ref 12.3–15.4)
GLUCOSE SERPL-MCNC: 119 MG/DL (ref 65–99)
HCO3 BLDA-SCNC: 15 MMOL/L (ref 21–28)
HCT VFR BLD AUTO: 33.4 % (ref 34–46.6)
HEMODILUTION: NO
HGB BLD-MCNC: 10.7 G/DL (ref 12–15.9)
IMM GRANULOCYTES # BLD AUTO: 0.02 10*3/MM3 (ref 0–0.05)
IMM GRANULOCYTES NFR BLD AUTO: 0.3 % (ref 0–0.5)
INHALED O2 CONCENTRATION: 21 %
LYMPHOCYTES # BLD AUTO: 0.93 10*3/MM3 (ref 0.7–3.1)
LYMPHOCYTES NFR BLD AUTO: 13.7 % (ref 19.6–45.3)
MAGNESIUM SERPL-MCNC: 2 MG/DL (ref 1.6–2.4)
MCH RBC QN AUTO: 29 PG (ref 26.6–33)
MCHC RBC AUTO-ENTMCNC: 32 G/DL (ref 31.5–35.7)
MCV RBC AUTO: 90.5 FL (ref 79–97)
MODALITY: ABNORMAL
MONOCYTES # BLD AUTO: 0.32 10*3/MM3 (ref 0.1–0.9)
MONOCYTES NFR BLD AUTO: 4.7 % (ref 5–12)
NEUTROPHILS NFR BLD AUTO: 5.5 10*3/MM3 (ref 1.7–7)
NEUTROPHILS NFR BLD AUTO: 81.3 % (ref 42.7–76)
NRBC BLD AUTO-RTO: 0 /100 WBC (ref 0–0.2)
PCO2 BLDA: 24.8 MM HG (ref 35–48)
PH BLDA: 7.39 PH UNITS (ref 7.35–7.45)
PHOSPHATE SERPL-MCNC: 3.9 MG/DL (ref 2.5–4.5)
PLATELET # BLD AUTO: 207 10*3/MM3 (ref 140–450)
PMV BLD AUTO: 10.1 FL (ref 6–12)
PO2 BLDA: 76 MM HG (ref 83–108)
POTASSIUM SERPL-SCNC: 4.8 MMOL/L (ref 3.5–5.2)
QT INTERVAL: 437 MS
QTC INTERVAL: 483 MS
RBC # BLD AUTO: 3.69 10*6/MM3 (ref 3.77–5.28)
SAO2 % BLDCOA: 95.3 % (ref 94–98)
SODIUM SERPL-SCNC: 137 MMOL/L (ref 136–145)
WBC NRBC COR # BLD AUTO: 6.77 10*3/MM3 (ref 3.4–10.8)

## 2024-06-03 PROCEDURE — 82803 BLOOD GASES ANY COMBINATION: CPT

## 2024-06-03 PROCEDURE — 25010000002 PROCHLORPERAZINE 10 MG/2ML SOLUTION: Performed by: INTERNAL MEDICINE

## 2024-06-03 PROCEDURE — 85025 COMPLETE CBC W/AUTO DIFF WBC: CPT | Performed by: HOSPITALIST

## 2024-06-03 PROCEDURE — 25010000002 HEPARIN (PORCINE) PER 1000 UNITS: Performed by: HOSPITALIST

## 2024-06-03 PROCEDURE — 84100 ASSAY OF PHOSPHORUS: CPT | Performed by: HOSPITALIST

## 2024-06-03 PROCEDURE — 25810000003 SODIUM CHLORIDE 0.9 % SOLUTION: Performed by: HOSPITALIST

## 2024-06-03 PROCEDURE — 25010000002 ONDANSETRON PER 1 MG: Performed by: FAMILY MEDICINE

## 2024-06-03 PROCEDURE — 83735 ASSAY OF MAGNESIUM: CPT | Performed by: HOSPITALIST

## 2024-06-03 PROCEDURE — 97166 OT EVAL MOD COMPLEX 45 MIN: CPT

## 2024-06-03 PROCEDURE — 97162 PT EVAL MOD COMPLEX 30 MIN: CPT

## 2024-06-03 PROCEDURE — 36600 WITHDRAWAL OF ARTERIAL BLOOD: CPT

## 2024-06-03 PROCEDURE — 80048 BASIC METABOLIC PNL TOTAL CA: CPT | Performed by: HOSPITALIST

## 2024-06-03 RX ORDER — SODIUM BICARBONATE 650 MG/1
650 TABLET ORAL 3 TIMES DAILY
Status: DISCONTINUED | OUTPATIENT
Start: 2024-06-03 | End: 2024-06-04 | Stop reason: HOSPADM

## 2024-06-03 RX ORDER — PROCHLORPERAZINE EDISYLATE 5 MG/ML
10 INJECTION INTRAMUSCULAR; INTRAVENOUS EVERY 6 HOURS PRN
Status: DISCONTINUED | OUTPATIENT
Start: 2024-06-03 | End: 2024-06-04 | Stop reason: HOSPADM

## 2024-06-03 RX ORDER — SODIUM CHLORIDE 9 MG/ML
100 INJECTION, SOLUTION INTRAVENOUS CONTINUOUS
Status: DISPENSED | OUTPATIENT
Start: 2024-06-03 | End: 2024-06-03

## 2024-06-03 RX ORDER — IPRATROPIUM BROMIDE AND ALBUTEROL SULFATE 2.5; .5 MG/3ML; MG/3ML
3 SOLUTION RESPIRATORY (INHALATION) EVERY 4 HOURS PRN
Status: DISCONTINUED | OUTPATIENT
Start: 2024-06-03 | End: 2024-06-04 | Stop reason: HOSPADM

## 2024-06-03 RX ADMIN — ROSUVASTATIN 10 MG: 10 TABLET, FILM COATED ORAL at 20:33

## 2024-06-03 RX ADMIN — ONDANSETRON 4 MG: 2 INJECTION INTRAMUSCULAR; INTRAVENOUS at 09:58

## 2024-06-03 RX ADMIN — HEPARIN SODIUM 5000 UNITS: 5000 INJECTION INTRAVENOUS; SUBCUTANEOUS at 08:36

## 2024-06-03 RX ADMIN — ALPRAZOLAM 0.5 MG: 0.5 TABLET ORAL at 09:58

## 2024-06-03 RX ADMIN — Medication 10 ML: at 20:33

## 2024-06-03 RX ADMIN — CARBIDOPA AND LEVODOPA 1 TABLET: 25; 100 TABLET ORAL at 09:58

## 2024-06-03 RX ADMIN — CARVEDILOL 25 MG: 25 TABLET, FILM COATED ORAL at 08:36

## 2024-06-03 RX ADMIN — PROCHLORPERAZINE EDISYLATE 10 MG: 5 INJECTION INTRAMUSCULAR; INTRAVENOUS at 18:13

## 2024-06-03 RX ADMIN — HEPARIN SODIUM 5000 UNITS: 5000 INJECTION INTRAVENOUS; SUBCUTANEOUS at 20:33

## 2024-06-03 RX ADMIN — GABAPENTIN 100 MG: 100 CAPSULE ORAL at 20:33

## 2024-06-03 RX ADMIN — GABAPENTIN 100 MG: 100 CAPSULE ORAL at 08:36

## 2024-06-03 RX ADMIN — GABAPENTIN 100 MG: 100 CAPSULE ORAL at 15:15

## 2024-06-03 RX ADMIN — CARBIDOPA AND LEVODOPA 1 TABLET: 25; 100 TABLET ORAL at 17:56

## 2024-06-03 RX ADMIN — SODIUM CHLORIDE 150 ML/HR: 9 INJECTION, SOLUTION INTRAVENOUS at 03:27

## 2024-06-03 RX ADMIN — ALPRAZOLAM 0.5 MG: 0.5 TABLET ORAL at 20:33

## 2024-06-03 RX ADMIN — SODIUM BICARBONATE 650 MG: 650 TABLET ORAL at 11:48

## 2024-06-03 RX ADMIN — SODIUM BICARBONATE 650 MG: 650 TABLET ORAL at 15:15

## 2024-06-03 RX ADMIN — QUETIAPINE FUMARATE 100 MG: 100 TABLET ORAL at 20:33

## 2024-06-03 RX ADMIN — CARBIDOPA AND LEVODOPA 1 TABLET: 25; 100 TABLET ORAL at 05:44

## 2024-06-03 RX ADMIN — CARBIDOPA AND LEVODOPA 1 TABLET: 25; 100 TABLET ORAL at 15:15

## 2024-06-03 RX ADMIN — CITALOPRAM HYDROBROMIDE 10 MG: 20 TABLET ORAL at 08:36

## 2024-06-03 RX ADMIN — SODIUM BICARBONATE 650 MG: 650 TABLET ORAL at 20:33

## 2024-06-03 RX ADMIN — Medication 10 ML: at 08:36

## 2024-06-03 NOTE — PROGRESS NOTES
[facility]     Progress Note    Patient Name: Jasmine Cerda  : 1960  MRN: [id number]  Primary Care Physician:  Eleni Miranda APRN  Date of admission: 2024  Service date and time: 24 13:52 EDT  Subjective   Subjective     Chief Complaint: Shortness of breath     HPI:  Patient still complaining of shortness of breath       Objective   Objective     Vitals:   Temp:  [97.3 °F (36.3 °C)-98 °F (36.7 °C)] 97.4 °F (36.3 °C)  Heart Rate:  [62-71] 62  Resp:  [17-22] 18  BP: ()/(53-84) 92/53  Physical Exam    Constitutional: Awake, alert in mild respiratory distress    Eyes: PERRLA, sclerae anicteric, no conjunctival injection   HENT: NCAT, mucous membranes moist   Neck: Supple, no thyromegaly, no lymphadenopathy, trachea midline   Respiratory: diminished breath sounds at the bases     Cardiovascular: RRR, no murmurs, rubs, or gallops, palpable pedal pulses bilaterally   Gastrointestinal: Positive bowel sounds, soft, nontender, nondistended   Musculoskeletal: No bilateral ankle edema, no clubbing or cyanosis to extremities   Psychiatric: Appropriate affect, cooperative   Neurologic: Oriented x 3, strength symmetric in all extremities, Cranial Nerves grossly intact to confrontation, speech clear   Skin: No rashes     Result Review    Result Review:  I have personally reviewed the results from the time of this admission to 6/3/2024 13:52 EDT and agree with these findings:  [x]  Laboratory list / accordion  []  Microbiology  []  Radiology  []  EKG/Telemetry   []  Cardiology/Vascular   []  Pathology  []  Old records  []  Other:  Most notable findings include: BUN 58, creatinine 2.33, glucose 119, potassium 4.8 chloride 111, ABG showed pH 7.39 pCO2 of 24.8, pO2 of 76, magnesium 2.0, phosphorus 3.9      Assessment & Plan   Assessment / Plan       Active Hospital Problems:  Active Hospital Problems    Diagnosis     **Acute kidney injury     Acute kidney injury (nontraumatic)      Plan:    Acute kidney  injury with anion gap metabolic acidosis, likely prerenal with possible component of ATN, IV fluids, avoid NSAID, keep MAP above 65.  Hold diuretic and ACE inhibitor, urine electrolytes and ultrasound kidney.  Improving BUN at 68 and creatinine at 2.83   Will Duoneb breathing treatments      Primary respiratory alkalosis likely from hyperventilation likely related to anxiety, continue to monitor, will repeat blood gas.     History of COPD, no exacerbation, continue home inhalers.     Hypertension, continue home regimen, monitor vitals.  Will hold on ACE inhibitors, will resume beta-blockers.     DVT prophylaxis with heparin subcu    DVT prophylaxis:  Medical DVT prophylaxis orders are present.        CODE STATUS:   Level Of Support Discussed With: Patient  Code Status (Patient has no pulse and is not breathing): CPR (Attempt to Resuscitate)  Medical Interventions (Patient has pulse or is breathing): Full Support    Disposition:  I expect patient to be discharged on 6/5/2024.    Zachariah Meyer MD

## 2024-06-03 NOTE — PLAN OF CARE
Problem: Adult Inpatient Plan of Care  Goal: Plan of Care Review  Outcome: Ongoing, Progressing  Flowsheets (Taken 6/3/2024 0304)  Plan of Care Reviewed With: patient   Goal Outcome Evaluation:  Plan of Care Reviewed With: patient

## 2024-06-03 NOTE — PLAN OF CARE
Goal Outcome Evaluation:  Plan of Care Reviewed With: patient        Progress: no change  Outcome Evaluation: Pt is a 63 y/o F admitted to [facility] 6/2/24 with c/o dyspnea, found to have DENIS. At baseline pt resides with spouse in [facility] with 0 JAG. Pt typically (I) with ADLs, (I) with mobility and does not drive. Pt typically ambulates household distance, furthest distance is to mailbox and back. Pt spouse provides transportation. This date pt A&Ox4 on 2L O2 in supine with spouse at bedside. Pt does not c/o pain, however does c/o nausea. Pt requires SBA with head of bed elevated and extended time for bed mobility, comes to standing with SBA with RW and ambulates with CGA with RW. Pt O2 and HR stable, RR increasing to 34, however visibly SOA, educated on PLB. Pt functioning slightly below baseline, anticipate to progress well once medically stable. Pt likely to dc home with family assistance and HHOT to increase (I) with ADLs and increase activity tolerance for ADL routine, will follow while admitted. Pt may require 6MWT prior to dc to determine O2 requirements.      Anticipated Discharge Disposition (OT): home with assist, home with home health

## 2024-06-03 NOTE — PLAN OF CARE
Goal Outcome Evaluation:  Plan of Care Reviewed With: patient   Pt presents as a 63 y/o F admitted to [facility] 6/2/24 with c/o dyspnea, found to have DENIS. At baseline pt resides with spouse in [facility] with 0 JAG. Pt typically is independent with household mobility without AD ( pt has a rolling walker but does not use it).  Pt A and O x 4 on 2L O2 in supine with spouse at bedside. Pt does not c/o pain, however does c/o nausea. Pt requires SBA with head of bed elevated and extended time for bed mobility, comes to standing with SBA with RW and ambulates 35 feet with CGA with rolling walker. Pt O2 and HR stable, RR increasing to 34, however visibly SOA, pt educated on PLB. Pt functioning slightly below baseline, anticipate to progress well once medically stable. PT will follow pt with recommendation of Home Health Physical therapy and family assist.

## 2024-06-03 NOTE — THERAPY EVALUATION
Patient Name: Jasmine Cerda  : 1960    MRN: [id number]                              Today's Date: 6/3/2024       Admit Date: 2024    Visit Dx:     ICD-10-CM ICD-9-CM   1. Dyspnea, unspecified type  R06.00 786.09   2. Acute renal failure, unspecified acute renal failure type  N17.9 584.9     Patient Active Problem List   Diagnosis    Chronic post-traumatic stress disorder (PTSD)    Severe episode of recurrent major depressive disorder    Asthma    Chronic low back pain    Chronic diastolic heart failure    Coronary heart disease    Degeneration of intervertebral disc of lumbosacral region    Headache, temporal    Psychophysiological insomnia    Hyperlipidemia    Hypertension    Vitamin D deficiency    Other fatigue    Preventative health care    Hyperglycemia, unspecified     Nausea    Stable angina pectoris    Chest pain    Dyspnea    Abnormal nuclear stress test    Acute kidney injury    Acute kidney injury (nontraumatic)     Past Medical History:   Diagnosis Date    Anxiety     Breast cyst     CHF (congestive heart failure)     COPD (chronic obstructive pulmonary disease)     Coronary heart disease     Depression     Hyperlipidemia     Hypertension      Past Surgical History:   Procedure Laterality Date    APPENDECTOMY      BREAST CYST ASPIRATION      CARDIAC CATHETERIZATION  2017    No Stents placed - [facility]    CARDIAC CATHETERIZATION N/A 2023    Procedure: Left Heart Cath possible PCI;  Surgeon: Cuauhtemoc Kwon MD;  Location: [facility] CATH INVASIVE LOCATION;  Service: Cardiovascular;  Laterality: N/A;    CERVICAL FUSION      C6-C7     SECTION      x 2    CHOLECYSTECTOMY      HYSTERECTOMY      partial      General Information       Row Name 24 1542          Physical Therapy Time and Intention    Document Type evaluation  -BR     Mode of Treatment physical therapy  -BR       Row Name 24 1542          General Information    Patient Profile Reviewed yes  -BR     Prior Level  of Function independent:;all household mobility;gait;transfer  Pt hgas a rolling walker at home but does not use it.  -BR     Existing Precautions/Restrictions fall;oxygen therapy device and L/min  -BR       Row Name 06/03/24 1542          Living Environment    People in Home spouse  -BR       Row Name 06/03/24 1542          Home Main Entrance    Number of Stairs, Main Entrance none  -BR       Row Name 06/03/24 1542          Stairs Within Home, Primary    Number of Stairs, Within Home, Primary none  -BR       Row Name 06/03/24 1542          Cognition    Orientation Status (Cognition) oriented x 4  -BR       Row Name 06/03/24 1542          Safety Issues, Functional Mobility    Impairments Affecting Function (Mobility) endurance/activity tolerance;shortness of breath;strength  -BR               User Key  (r) = Recorded By, (t) = Taken By, (c) = Cosigned By      Initials Name Provider Type    BR Nelida Ryan, PT Physical Therapist                   Mobility       Row Name 06/03/24 1544          Bed Mobility    Bed Mobility supine-sit  -BR     Supine-Sit [place] (Bed Mobility) verbal cues;standby assist  -BR     Assistive Device (Bed Mobility) head of bed elevated;bed rails  -BR     Comment, (Bed Mobility) Pt required extended time.  -BR       Row Name 06/03/24 1544          Sit-Stand Transfer    Sit-Stand [place] (Transfers) standby assist;verbal cues  -BR     Assistive Device (Sit-Stand Transfers) walker, front-wheeled  -BR     Comment, (Sit-Stand Transfer) verbal cues given for task segmentation  -BR       Row Name 06/03/24 1544          Gait/Stairs (Locomotion)    [place] Level (Gait) verbal cues;contact guard  -BR     Assistive Device (Gait) walker, front-wheeled  -BR     Patient was able to Ambulate yes  -BR     Distance in Feet (Gait) 35  -BR     Deviations/Abnormal Patterns (Gait) lizeth decreased  -BR     Bilateral Gait Deviations heel strike decreased;forward flexed posture  -BR                User Key  (r) = Recorded By, (t) = Taken By, (c) = Cosigned By      Initials Name Provider Type    Nelida Dunbar PT Physical Therapist                   Obj/Interventions       Row Name 06/03/24 1545          Range of Motion Comprehensive    General Range of Motion bilateral lower extremity ROM WFL  -BR       Row Name 06/03/24 1545          Strength Comprehensive (MMT)    Comment, General Manual Muscle Testing (MMT) Assessment BLE strength grossly 4-/5  -BR       Row Name 06/03/24 1545          Balance    Balance Assessment sitting static balance;sitting dynamic balance;standing static balance  -BR     Static Sitting Balance standby assist  -BR     Dynamic Sitting Balance contact guard  -BR     Position, Sitting Balance unsupported;sitting edge of bed  -BR     Static Standing Balance standby assist  -BR     Position/Device Used, Standing Balance supported;walker, rolling  -BR       Row Name 06/03/24 1545          Sensory Assessment (Somatosensory)    Sensory Assessment (Somatosensory) sensation intact  -BR               User Key  (r) = Recorded By, (t) = Taken By, (c) = Cosigned By      Initials Name Provider Type    Nelida Dunbar PT Physical Therapist                   Goals/Plan       Row Name 06/03/24 1552          Bed Mobility Goal 1 (PT)    Activity/Assistive Device (Bed Mobility Goal 1, PT) bed mobility activities, all  -BR     [place] Level/Cues Needed (Bed Mobility Goal 1, PT) modified independence  -BR     Time Frame (Bed Mobility Goal 1, PT) long term goal (LTG);2 weeks  -BR       Row Name 06/03/24 1552          Transfer Goal 1 (PT)    Activity/Assistive Device (Transfer Goal 1, PT) transfers, all  -BR     [place] Level/Cues Needed (Transfer Goal 1, PT) modified independence  -BR     Time Frame (Transfer Goal 1, PT) long term goal (LTG);2 weeks  -BR       Row Name 06/03/24 1552          Gait Training Goal 1 (PT)    Activity/Assistive Device (Gait Training Goal 1, PT) gait  (walking locomotion);assistive device use  -BR     [place] Level (Gait Training Goal 1, PT) modified independence  -BR     Distance (Gait Training Goal 1, PT) 100  -BR     Time Frame (Gait Training Goal 1, PT) long term goal (LTG);2 weeks  -BR       Row Name 06/03/24 8482          Therapy Assessment/Plan (PT)    Planned Therapy Interventions (PT) balance training;bed mobility training;gait training;patient/family education;transfer training;strengthening  -BR               User Key  (r) = Recorded By, (t) = Taken By, (c) = Cosigned By      Initials Name Provider Type    BR Nelida Ryan, PT Physical Therapist                   Clinical Impression       Row Name 06/03/24 1546          Pain    Pretreatment Pain Rating 0/10 - no pain  -BR     Posttreatment Pain Rating 0/10 - no pain  -BR       Row Name 06/03/24 1542          Plan of Care Review    Plan of Care Reviewed With patient  -BR     Outcome Evaluation Pt presents as a 63 y/o F admitted to [facility] 6/2/24 with c/o dyspnea, found to have DENIS. At baseline pt resides with spouse in [facility] with 0 JAG. Pt typically is independent with household mobility without AD ( pt has a rolling walker but does not use it).  Pt A and O x 4 on 2L O2 in supine with spouse at bedside. Pt does not c/o pain, however does c/o nausea. Pt requires SBA with head of bed elevated and extended time for bed mobility, comes to standing with SBA with RW and ambulates 35 feet with CGA with rolling walker. Pt O2 and HR stable, RR increasing to 34, however visibly SOA, pt educated on PLB. Pt functioning slightly below baseline, anticipate to progress well once medically stable. PT will follow pt with recommendation of Home Health Physical therapy and family assist.  -BR       Row Name 06/03/24 1547          Therapy Assessment/Plan (PT)    Rehab Potential (PT) good, to achieve stated therapy goals  -BR     Criteria for Skilled Interventions Met (PT) yes;meets criteria;skilled treatment is  necessary  -BR     Therapy Frequency (PT) 3 times/wk  -BR     Predicted Duration of Therapy Intervention (PT) until D/c  -BR       Row Name 06/03/24 1546          Vital Signs    Pre Systolic BP Rehab 109  -BR     Pre Treatment Diastolic BP 64  -BR     Post Systolic BP Rehab 109  -BR     Post Treatment Diastolic BP 67  -BR     Pretreatment Heart Rate (beats/min) 67  -BR     Intratreatment Heart Rate (beats/min) 78  -BR     Posttreatment Heart Rate (beats/min) 70  -BR     Pretreatment Resp Rate (breaths/min) 28  -BR     Intratreatment Resp Rate (breaths/min) 34  -BR     Posttreatment Resp Rate (breaths/min) 20  -BR     Pre SpO2 (%) 99  -BR     O2 Delivery Pre Treatment room air  -BR     Post SpO2 (%) 100  -BR     O2 Delivery Post Treatment room air  -BR     Pre Patient Position Supine  -BR     Intra Patient Position Standing  -BR     Post Patient Position Sitting  -BR       Row Name 06/03/24 1546          Positioning and Restraints    Pre-Treatment Position in bed  -BR     Post Treatment Position chair  -BR     In Chair notified nsg;reclined;encouraged to call for assist;call light within reach;exit alarm on;legs elevated  -BR               User Key  (r) = Recorded By, (t) = Taken By, (c) = Cosigned By      Initials Name Provider Type    BR Nelida Ryan, PT Physical Therapist                   Outcome Measures       Row Name 06/03/24 1553 06/03/24 0800       How much help from another person do you currently need...    Turning from your back to your side while in flat bed without using bedrails? 4  -BR 4  -AB    Moving from lying on back to sitting on the side of a flat bed without bedrails? 4  -BR 4  -AB    Moving to and from a bed to a chair (including a wheelchair)? 3  -BR 3  -AB    Standing up from a chair using your arms (e.g., wheelchair, bedside chair)? 3  -BR 3  -AB    Climbing 3-5 steps with a railing? 3  -BR 2  -AB    To walk in hospital room? 3  -BR 2  -AB    AM-PAC 6 Clicks Score (PT) 20  -BR 18  -AB     Highest Level of Mobility Goal 6 --> Walk 10 steps or more  -BR 6 --> Walk 10 steps or more  -AB      Row Name 06/03/24 1553 06/03/24 1510       Functional Assessment    Outcome Measure Options AM-PAC 6 Clicks Basic Mobility (PT)  -BR AM-PAC 6 Clicks Daily Activity (OT)  -MS              User Key  (r) = Recorded By, (t) = Taken By, (c) = Cosigned By      Initials Name Provider Type    AB Areli Fleming RN Registered Nurse    MS Henderson Lesli, OT Occupational Therapist    Nelida Dunbar, PT Physical Therapist                                 Physical Therapy Education       Title: PT OT SLP Therapies (In Progress)       Topic: Physical Therapy (Done)       Point: Mobility training (Done)       Learning Progress Summary             Patient Acceptance, E,D, VU,DU by BR at 6/3/2024 1553   Family Acceptance, E,D, VU,DU by BR at 6/3/2024 1553                         Point: Body mechanics (Done)       Learning Progress Summary             Patient Acceptance, E,D, VU,DU by BR at 6/3/2024 1553   Family Acceptance, E,D, VU,DU by BR at 6/3/2024 1553                         Point: Precautions (Done)       Learning Progress Summary             Patient Acceptance, E,D, VU,DU by BR at 6/3/2024 1553   Family Acceptance, E,D, VU,DU by BR at 6/3/2024 1553                                         User Key       Initials Effective Dates Name Provider Type Discipline     02/01/22 -  Nelida Ryan, PT Physical Therapist PT                  PT Recommendation and Plan  Planned Therapy Interventions (PT): balance training, bed mobility training, gait training, patient/family education, transfer training, strengthening  Plan of Care Reviewed With: patient  Outcome Evaluation: Pt presents as a 63 y/o F admitted to [facility] 6/2/24 with c/o dyspnea, found to have DENIS. At baseline pt resides with spouse in [facility] with 0 JAG. Pt typically is independent with household mobility without AD ( pt has a rolling walker but does not use it).  Pt  A and O x 4 on 2L O2 in supine with spouse at bedside. Pt does not c/o pain, however does c/o nausea. Pt requires SBA with head of bed elevated and extended time for bed mobility, comes to standing with SBA with RW and ambulates 35 feet with CGA with rolling walker. Pt O2 and HR stable, RR increasing to 34, however visibly SOA, pt educated on PLB. Pt functioning slightly below baseline, anticipate to progress well once medically stable. PT will follow pt with recommendation of Home Health Physical therapy and family assist.     Time Calculation:         PT Charges       Row Name 06/03/24 1553             Time Calculation    Start Time 0918  -BR      Stop Time 0939  -BR      Time Calculation (min) 21 min  -BR      PT Received On 06/03/24  -BR      PT - Next Appointment 06/05/24  -BR      PT Goal Re-Cert Due Date 06/17/24  -BR         Time Calculation- PT    Total Timed Code Minutes- PT 0 minute(s)  -BR                User Key  (r) = Recorded By, (t) = Taken By, (c) = Cosigned By      Initials Name Provider Type    BR Nelida Ryan, PT Physical Therapist                  Therapy Charges for Today       Code Description Service Date Service Provider Modifiers Qty    [id number] HC PT EVAL MOD COMPLEXITY 4 6/3/2024 Nelida Ryan, PT GP 1            PT G-Codes  Outcome Measure Options: AM-PAC 6 Clicks Basic Mobility (PT)  AM-PAC 6 Clicks Score (PT): 20  AM-PAC 6 Clicks Score (OT): 21  PT Discharge Summary  Anticipated Discharge Disposition (PT): home with assist, home with home health    Nelida Ryan, DEAN  6/3/2024

## 2024-06-03 NOTE — CASE MANAGEMENT/SOCIAL WORK
Discharge Planning Assessment   [place]     Patient Name: Jasmine Cerda  MRN: [id number]  Today's Date: 6/3/2024    Admit Date: 6/2/2024    Plan: Home with spouse. Juliano .   Discharge Needs Assessment       Row Name 06/03/24 1633       Living Environment    People in Home spouse    Current Living Arrangements home    Potentially Unsafe Housing Conditions none    In the past 12 months has the electric, gas, oil, or water company threatened to shut off services in your home? No    Primary Care Provided by self    Provides Primary Care For no one    Family Caregiver if Needed spouse    Quality of Family Relationships helpful;involved;supportive    Able to Return to Prior Arrangements yes       Resource/Environmental Concerns    Resource/Environmental Concerns none    Transportation Concerns none       Transportation Needs    In the past 12 months, has lack of transportation kept you from medical appointments or from getting medications? no    In the past 12 months, has lack of transportation kept you from meetings, work, or from getting things needed for daily living? No       Food Insecurity    Within the past 12 months, you worried that your food would run out before you got the money to buy more. Never true    Within the past 12 months, the food you bought just didn't last and you didn't have money to get more. Never true       Transition Planning    Patient/Family Anticipates Transition to home with family    Patient/Family Anticipated Services at Transition none    Transportation Anticipated family or friend will provide       Discharge Needs Assessment    Readmission Within the Last 30 Days no previous admission in last 30 days    Equipment Currently Used at Home nebulizer    Concerns to be Addressed discharge planning    Anticipated Changes Related to Illness none    Equipment Needed After Discharge none                   Discharge Plan       Row Name 06/03/24 1629       Plan    Plan Home with spouse.  Declines HH.    Patient/Family in Agreement with Plan yes    Plan Comments Met with patient at bedside.  Lives at home with spouse.  IADL.   Spouse can assist with any other needs.   Has home nebulizer. No home O2.  Uses [facility]. Declines MTB.  Verified PCP.  Barriers to discharge: 2L O2.  Watching BUN/ [employer].  Breathing tx. ABG                  Continued Care and Services - Admitted Since 6/2/2024    No active coordination exists for this encounter.       Expected Discharge Date and Time       Expected Discharge Date Expected Discharge Time    Jun 5, 2024            Demographic Summary       Row Name 06/03/24 1632       General Information    Admission Type inpatient    Arrived From emergency department    Required Notices Provided Important Message from Medicare    Referral Source admission list    Reason for Consult discharge planning    Preferred Language English                   Functional Status       Row Name 06/03/24 1633       Functional Status    Usual Activity Tolerance good    Current Activity Tolerance good       Functional Status, IADL    Medications independent    Meal Preparation independent    Housekeeping independent    Laundry independent    Shopping independent       Mental Status    General Appearance WDL WDL       Mental Status Summary    Recent Changes in Mental Status/Cognitive Functioning no changes           Meron Wick RN     Office Phone [phone]  Office Cell [phone]

## 2024-06-03 NOTE — THERAPY EVALUATION
Patient Name: Jasmine Cerda  : 1960    MRN: [id number]                              Today's Date: 6/3/2024       Admit Date: 2024    Visit Dx:     ICD-10-CM ICD-9-CM   1. Dyspnea, unspecified type  R06.00 786.09   2. Acute renal failure, unspecified acute renal failure type  N17.9 584.9     Patient Active Problem List   Diagnosis    Chronic post-traumatic stress disorder (PTSD)    Severe episode of recurrent major depressive disorder    Asthma    Chronic low back pain    Chronic diastolic heart failure    Coronary heart disease    Degeneration of intervertebral disc of lumbosacral region    Headache, temporal    Psychophysiological insomnia    Hyperlipidemia    Hypertension    Vitamin D deficiency    Other fatigue    Preventative health care    Hyperglycemia, unspecified     Nausea    Stable angina pectoris    Chest pain    Dyspnea    Abnormal nuclear stress test    Acute kidney injury    Acute kidney injury (nontraumatic)     Past Medical History:   Diagnosis Date    Anxiety     Breast cyst     CHF (congestive heart failure)     COPD (chronic obstructive pulmonary disease)     Coronary heart disease     Depression     Hyperlipidemia     Hypertension      Past Surgical History:   Procedure Laterality Date    APPENDECTOMY      BREAST CYST ASPIRATION      CARDIAC CATHETERIZATION  2017    No Stents placed - [facility]    CARDIAC CATHETERIZATION N/A 2023    Procedure: Left Heart Cath possible PCI;  Surgeon: Cuauhtemoc Kown MD;  Location: [facility] CATH INVASIVE LOCATION;  Service: Cardiovascular;  Laterality: N/A;    CERVICAL FUSION      C6-C7     SECTION      x 2    CHOLECYSTECTOMY      HYSTERECTOMY      partial      General Information       Row Name 24 1503          OT Time and Intention    Document Type evaluation  -MS     Mode of Treatment occupational therapy  -MS       Row Name 24 1501          General Information    Patient Profile Reviewed yes  -MS     Prior Level of  Function independent:;ADL's;all household mobility  -MS     Existing Precautions/Restrictions fall;oxygen therapy device and L/min  no home O2, 2L O2 currently  -MS     Barriers to Rehab medically complex  -MS       Row Name 06/03/24 1503          Occupational Profile    Reason for Services/Referral (Occupational Profile) Pt is a 63 y/o F admitted to [facility] 6/2/24 with c/o dyspnea. CXR (-) acute. CXR (-) acute. US renal stephanie indicates suspected small renal cysts. PMHx significant PTSD, MDD, anxiety, CHF, and COPD. At baseline pt resides with spouse in [place] with 0 JAG. Pt typically (I) with ADLs, (I) with mobility and does not drive. Pt typically ambulates household distance, furthest distance is to mailbox and back. Pt spouse provides transportation.  -MS     Environmental Supports and Barriers (Occupational Profile) supportive family  -MS       Row Name 06/03/24 1503          Living Environment    People in Home spouse  -MS       Row Name 06/03/24 1503          Home Main Entrance    Number of Stairs, Main Entrance none  -MS       Row Name 06/03/24 1503          Stairs Within Home, Primary    Number of Stairs, Within Home, Primary none  -MS       Row Name 06/03/24 1503          Cognition    Orientation Status (Cognition) oriented x 4  -MS       Row Name 06/03/24 1503          Safety Issues, Functional Mobility    Impairments Affecting Function (Mobility) endurance/activity tolerance;shortness of breath;strength  -MS               User Key  (r) = Recorded By, (t) = Taken By, (c) = Cosigned By      Initials Name Provider Type    Lesli Daniel OT Occupational Therapist                     Mobility/ADL's       Row Name 06/03/24 1504          Bed Mobility    Bed Mobility supine-sit  -MS     Supine-Sit [place] (Bed Mobility) verbal cues;standby assist  -MS     Assistive Device (Bed Mobility) head of bed elevated;bed rails  -MS     Comment, (Bed Mobility) extended time required  -MS       Row Name 06/03/24 1504           Transfers    Transfers sit-stand transfer  -MS       Row Name 06/03/24 1504          Sit-Stand Transfer    Sit-Stand [place] (Transfers) standby assist;verbal cues  -MS     Assistive Device (Sit-Stand Transfers) walker, front-wheeled  -MS     Comment, (Sit-Stand Transfer) verbal cues for hand placement  -MS               User Key  (r) = Recorded By, (t) = Taken By, (c) = Cosigned By      Initials Name Provider Type    MS Lesli Henderson OT Occupational Therapist                   Obj/Interventions       Row Name 06/03/24 1505          Sensory Assessment (Somatosensory)    Sensory Assessment (Somatosensory) sensation intact  -MS       Row Name 06/03/24 1505          Vision Assessment/Intervention    Visual Impairment/Limitations WNL  -MS       Row Name 06/03/24 1505          Range of Motion Comprehensive    Comment, General Range of Motion BUE WNL  -MS       Row Name 06/03/24 1505          Strength Comprehensive (MMT)    Comment, General Manual Muscle Testing (MMT) Assessment BUE functionally 4-/5  -MS       Row Name 06/03/24 1505          Balance    Balance Assessment sitting static balance;sitting dynamic balance;standing static balance;standing dynamic balance  -MS     Static Sitting Balance standby assist  -MS     Dynamic Sitting Balance contact guard  -MS     Position, Sitting Balance unsupported;sitting edge of bed  -MS     Static Standing Balance standby assist  -MS     Dynamic Standing Balance contact guard  -MS     Position/Device Used, Standing Balance supported;walker, front-wheeled  -MS               User Key  (r) = Recorded By, (t) = Taken By, (c) = Cosigned By      Initials Name Provider Type    MS Lesli Henderson OT Occupational Therapist                   Goals/Plan       Row Name 06/03/24 1509          Bed Mobility Goal 1 (OT)    Activity/Assistive Device (Bed Mobility Goal 1, OT) bed mobility activities, all  -MS     [place] Level/Cues Needed (Bed Mobility Goal 1, OT) modified  independence  -MS     Time Frame (Bed Mobility Goal 1, OT) long term goal (LTG);2 weeks  -MS     Progress/Outcomes (Bed Mobility Goal 1, OT) new goal  -MS       Row Name 06/03/24 1509          Transfer Goal 1 (OT)    Activity/Assistive Device (Transfer Goal 1, OT) transfers, all  -MS     [place] Level/Cues Needed (Transfer Goal 1, OT) modified independence  -MS     Time Frame (Transfer Goal 1, OT) long term goal (LTG);2 weeks  -MS     Progress/Outcome (Transfer Goal 1, OT) new goal  -MS       Row Name 06/03/24 1509          Dressing Goal 1 (OT)    Activity/Device (Dressing Goal 1, OT) lower body dressing  -MS     [place]/Cues Needed (Dressing Goal 1, OT) modified independence  -MS     Time Frame (Dressing Goal 1, OT) long term goal (LTG);2 weeks  -MS     Progress/Outcome (Dressing Goal 1, OT) new goal  -MS       Row Name 06/03/24 1509          Toileting Goal 1 (OT)    Activity/Device (Toileting Goal 1, OT) toileting skills, all  -MS     [place] Level/Cues Needed (Toileting Goal 1, OT) modified independence  -MS     Time Frame (Toileting Goal 1, OT) long term goal (LTG);2 weeks  -MS     Progress/Outcome (Toileting Goal 1, OT) new goal  -MS       Row Name 06/03/24 150          Therapy Assessment/Plan (OT)    Planned Therapy Interventions (OT) activity tolerance training;adaptive equipment training;BADL retraining;functional balance retraining;IADL retraining;occupation/activity based interventions;passive ROM/stretching;patient/caregiver education/training;transfer/mobility retraining;strengthening exercise;ROM/therapeutic exercise  -MS               User Key  (r) = Recorded By, (t) = Taken By, (c) = Cosigned By      Initials Name Provider Type    Lesli Daniel, OT Occupational Therapist                   Clinical Impression       Row Name 06/03/24 1501          Pain Assessment    Pretreatment Pain Rating 0/10 - no pain  -MS     Posttreatment Pain Rating 0/10 - no pain  -MS     Pre/Posttreatment  Pain Comment c/o nausea  -MS     Pain Intervention(s) Repositioned;Emotional support  -MS       Row Name 06/03/24 1505          Plan of Care Review    Plan of Care Reviewed With patient  -MS     Progress no change  -MS     Outcome Evaluation Pt is a 63 y/o F admitted to [facility] 6/2/24 with c/o dyspnea, found to have DENIS. At baseline pt resides with spouse in [facility] with 0 JAG. Pt typically (I) with ADLs, (I) with mobility and does not drive. Pt typically ambulates household distance, furthest distance is to mailbox and back. Pt spouse provides transportation. This date pt A&Ox4 on 2L O2 in supine with spouse at bedside. Pt does not c/o pain, however does c/o nausea. Pt requires SBA with head of bed elevated and extended time for bed mobility, comes to standing with SBA with RW and ambulates with CGA with RW. Pt O2 and HR stable, RR increasing to 34, however visibly SOA, educated on PLB. Pt functioning slightly below baseline, anticipate to progress well once medically stable. Pt likely to dc home with family assistance and HHOT to increase (I) with ADLs and increase activity tolerance for ADL routine, will follow while admitted. Pt may require 6MWT prior to dc to determine O2 requirements.  -MS       Row Name 06/03/24 1501          Therapy Assessment/Plan (OT)    Rehab Potential (OT) good, to achieve stated therapy goals  -MS     Criteria for Skilled Therapeutic Interventions Met (OT) yes;meets criteria;skilled treatment is necessary  -MS     Therapy Frequency (OT) 3 times/wk  -MS     Predicted Duration of Therapy Intervention (OT) until d/c  -MS       Row Name 06/03/24 1506          Therapy Plan Review/Discharge Plan (OT)    Anticipated Discharge Disposition (OT) home with assist;home with home health  -MS       Row Name 06/03/24 1505          Vital Signs    Pre Systolic BP Rehab 109  -MS     Pre Treatment Diastolic BP 64  -MS     Intra Systolic BP Rehab 109  -MS     Intra Treatment Diastolic BP 67  -MS     Pretreatment  Heart Rate (beats/min) 73  -MS     Intratreatment Heart Rate (beats/min) 84  -MS     Posttreatment Heart Rate (beats/min) 69  -MS     Pre SpO2 (%) 97  -MS     O2 Delivery Pre Treatment nasal cannula  -MS     O2 Delivery Intra Treatment nasal cannula  -MS     Post SpO2 (%) 100  -MS     O2 Delivery Post Treatment nasal cannula  -MS     Pre Patient Position Supine  -MS     Intra Patient Position Standing  -MS     Post Patient Position Sitting  -MS       Row Name 06/03/24 1505          Positioning and Restraints    Pre-Treatment Position in bed  -MS     Post Treatment Position chair  -MS     In Chair notified nsg;reclined;call light within reach;encouraged to call for assist;exit alarm on;with family/caregiver;legs elevated  -MS               User Key  (r) = Recorded By, (t) = Taken By, (c) = Cosigned By      Initials Name Provider Type    Lesli Daniel, OT Occupational Therapist                   Outcome Measures       Row Name 06/03/24 1510          How much help from another is currently needed...    Putting on and taking off regular lower body clothing? 3  -MS     Bathing (including washing, rinsing, and drying) 3  -MS     Toileting (which includes using toilet bed pan or urinal) 3  -MS     Putting on and taking off regular upper body clothing 4  -MS     Taking care of personal grooming (such as brushing teeth) 4  -MS     Eating meals 4  -MS     AM-PAC 6 Clicks Score (OT) 21  -MS       Row Name 06/03/24 0800          How much help from another person do you currently need...    Turning from your back to your side while in flat bed without using bedrails? 4  -AB     Moving from lying on back to sitting on the side of a flat bed without bedrails? 4  -AB     Moving to and from a bed to a chair (including a wheelchair)? 3  -AB     Standing up from a chair using your arms (e.g., wheelchair, bedside chair)? 3  -AB     Climbing 3-5 steps with a railing? 2  -AB     To walk in hospital room? 2  -AB     AM-PAC 6 Clicks  Score (PT) 18  -AB     Highest Level of Mobility Goal 6 --> Walk 10 steps or more  -AB       Row Name 06/03/24 1510          Functional Assessment    Outcome Measure Options AM-PAC 6 Clicks Daily Activity (OT)  -MS               User Key  (r) = Recorded By, (t) = Taken By, (c) = Cosigned By      Initials Name Provider Type    AB Areli Fleming, RN Registered Nurse    Lesli Daniel OT Occupational Therapist                    Occupational Therapy Education       Title: PT OT SLP Therapies (In Progress)       Topic: Occupational Therapy (In Progress)       Point: ADL training (Not Started)       Description:   Instruct learner(s) on proper safety adaptation and remediation techniques during self care or transfers.   Instruct in proper use of assistive devices.                  Learner Progress:  Not documented in this visit.              Point: Precautions (Done)       Description:   Instruct learner(s) on prescribed precautions during self-care and functional transfers.                  Learning Progress Summary             Patient Acceptance, E,TB, VU by MS at 6/3/2024 1510                         Point: Body mechanics (Done)       Description:   Instruct learner(s) on proper positioning and spine alignment during self-care, functional mobility activities and/or exercises.                  Learning Progress Summary             Patient Acceptance, E,TB, VU by MS at 6/3/2024 1510                                         User Key       Initials Effective Dates Name Provider Type Discipline    MS 07/13/22 -  Lesli Henderson OT Occupational Therapist OT                  OT Recommendation and Plan  Planned Therapy Interventions (OT): activity tolerance training, adaptive equipment training, BADL retraining, functional balance retraining, IADL retraining, occupation/activity based interventions, passive ROM/stretching, patient/caregiver education/training, transfer/mobility retraining, strengthening exercise,  ROM/therapeutic exercise  Therapy Frequency (OT): 3 times/wk  Plan of Care Review  Plan of Care Reviewed With: patient  Progress: no change  Outcome Evaluation: Pt is a 65 y/o F admitted to [facility] 6/2/24 with c/o dyspnea, found to have DENIS. At baseline pt resides with spouse in [place] with 0 JAG. Pt typically (I) with ADLs, (I) with mobility and does not drive. Pt typically ambulates household distance, furthest distance is to mailbox and back. Pt spouse provides transportation. This date pt A&Ox4 on 2L O2 in supine with spouse at bedside. Pt does not c/o pain, however does c/o nausea. Pt requires SBA with head of bed elevated and extended time for bed mobility, comes to standing with SBA with RW and ambulates with CGA with RW. Pt O2 and HR stable, RR increasing to 34, however visibly SOA, educated on PLB. Pt functioning slightly below baseline, anticipate to progress well once medically stable. Pt likely to dc home with family assistance and HHOT to increase (I) with ADLs and increase activity tolerance for ADL routine, will follow while admitted. Pt may require 6MWT prior to dc to determine O2 requirements.     Time Calculation:                   Lesli Henderson, OT  6/3/2024

## 2024-06-03 NOTE — PROGRESS NOTES
"RENAL/KCC:     LOS: 1 day    Patient Care Team:  Eleni Miranda APRN as PCP - General (Nurse Practitioner)  Sivan Ken MD as Consulting Physician (Psychiatry)  CHASTITY Richardson DPM as Consulting Physician (Podiatry)  Cuauhtemoc Kwon MD as Consulting Physician (Cardiology)    Chief Complaint:  DENIS/CKD    Subjective     Interval History:   Chart reviewed  Feeling better  Non-oliguric  Still feels SOA but not hypoxic    Objective     Vital Sign Min/Max for last 24 hours  Temp  Min: 97.3 °F (36.3 °C)  Max: 98 °F (36.7 °C)   BP  Min: 111/67  Max: 153/78   Pulse  Min: 65  Max: 76   Resp  Min: 16  Max: 28   SpO2  Min: 94 %  Max: 100 %   Flow (L/min)  Min: 2  Max: 2   Weight  Min: 87 kg (191 lb 12.8 oz)  Max: 87 kg (191 lb 12.8 oz)     Flowsheet Rows      Flowsheet Row First Filed Value   Admission Height 157.5 cm (62\") Documented at 06/02/2024 0643   Admission Weight 77.1 kg (170 lb) Documented at 06/02/2024 0643            No intake/output data recorded.  I/O last 3 completed shifts:  In: 740 [P.O.:240; IV Piggyback:500]  Out: 1400 [Urine:1400]    Physical Exam:  GEN: Awake, NAD  ENT: PERRL, EOMI, MMM  NECK: Supple, no JVD  CHEST: CTAB, no W/R/C  CV: RRR, no M/G/R  ABD: Soft, NT, +BS  SKIN: Warm and Dry  NEURO: CN's intact      WBC WBC   Date Value Ref Range Status   06/03/2024 6.77 3.40 - 10.80 10*3/mm3 Final   06/02/2024 12.77 (H) 3.40 - 10.80 10*3/mm3 Final      HGB Hemoglobin   Date Value Ref Range Status   06/03/2024 10.7 (L) 12.0 - 15.9 g/dL Final     Comment:     Result checked     06/02/2024 13.2 12.0 - 15.9 g/dL Final      HCT Hematocrit   Date Value Ref Range Status   06/03/2024 33.4 (L) 34.0 - 46.6 % Final   06/02/2024 39.4 34.0 - 46.6 % Final      Platlets No results found for: \"LABPLAT\"   MCV MCV   Date Value Ref Range Status   06/03/2024 90.5 79.0 - 97.0 fL Final   06/02/2024 86.0 79.0 - 97.0 fL Final          Sodium Sodium   Date Value Ref Range Status   06/03/2024 137 136 - 145 mmol/L " "Final   06/02/2024 136 136 - 145 mmol/L Final      Potassium Potassium   Date Value Ref Range Status   06/03/2024 4.8 3.5 - 5.2 mmol/L Final     Comment:     Specimen hemolyzed.  Result may be falsely elevated.   06/02/2024 4.7 3.5 - 5.2 mmol/L Final      Chloride Chloride   Date Value Ref Range Status   06/03/2024 111 (H) 98 - 107 mmol/L Final   06/02/2024 99 98 - 107 mmol/L Final      CO2 CO2   Date Value Ref Range Status   06/03/2024 14.8 (L) 22.0 - 29.0 mmol/L Final   06/02/2024 14.1 (L) 22.0 - 29.0 mmol/L Final      BUN BUN   Date Value Ref Range Status   06/03/2024 68 (H) 8 - 23 mg/dL Final   06/02/2024 83 (H) 8 - 23 mg/dL Final      Creatinine Creatinine   Date Value Ref Range Status   06/03/2024 2.83 (H) 0.57 - 1.00 mg/dL Final   06/02/2024 4.53 (H) 0.57 - 1.00 mg/dL Final      Calcium Calcium   Date Value Ref Range Status   06/03/2024 9.6 8.6 - 10.5 mg/dL Final   06/02/2024 12.5 (H) 8.6 - 10.5 mg/dL Final      PO4 No results found for: \"CAPO4\"   Albumin Albumin   Date Value Ref Range Status   06/02/2024 5.3 (H) 3.5 - 5.2 g/dL Final      Magnesium Magnesium   Date Value Ref Range Status   06/03/2024 2.0 1.6 - 2.4 mg/dL Final   06/02/2024 2.2 1.6 - 2.4 mg/dL Final      Uric Acid No results found for: \"URICACID\"        Results Review:     I reviewed the patient's new clinical results.    carbidopa-levodopa, 1 tablet, Oral, 4x Daily  carvedilol, 25 mg, Oral, BID With Meals  citalopram, 10 mg, Oral, Daily  gabapentin, 100 mg, Oral, TID  heparin (porcine), 5,000 Units, Subcutaneous, Q12H  QUEtiapine, 100 mg, Oral, Nightly  rosuvastatin, 10 mg, Oral, Nightly  sodium chloride, 10 mL, Intravenous, Q12H      sodium chloride, 150 mL/hr, Last Rate: 150 mL/hr (06/03/24 0327)  sodium chloride, 100 mL/hr        Medication Review: Reviewed    Assessment & Plan     1) DENIS - Pre-renal/ATN from hypotension  2) CKD3a - Past baseline Cr in the low 1's.  CKD from HTN.  3) Mixed acid base disturbance  4) Primary respiratory " alkalosis - ? From anxiety, CXR clear, not hypoxic, D-dimer negative.  Patient does have underlying COPD.  5) HAGMA - Lactic acidosis + DENIS   6) Hypotension  7) Tachypnea - not hypoxic  8) H/O COPD  9) H/O CAD/CHF  10) Anxiety    Plan: Cr improving nicely with IVF.  Keep off ACE (indefinitely).  Repeat ABG shows metabolic acidosis with respiratory compensation.  Will add scheduled PO bicarbonate.  Lytes OK.  D/C IVF after current L.  Will follow closely.      Clarke Balderas MD  [facility]  06/03/24  07:25 EDT

## 2024-06-04 ENCOUNTER — READMISSION MANAGEMENT (OUTPATIENT)
Dept: CALL CENTER | Facility: HOSPITAL | Age: 64
End: 2024-06-04
Payer: MEDICARE

## 2024-06-04 VITALS
WEIGHT: 191.8 LBS | RESPIRATION RATE: 18 BRPM | HEART RATE: 56 BPM | HEIGHT: 62 IN | SYSTOLIC BLOOD PRESSURE: 98 MMHG | OXYGEN SATURATION: 96 % | DIASTOLIC BLOOD PRESSURE: 67 MMHG | TEMPERATURE: 97.4 F | BODY MASS INDEX: 35.3 KG/M2

## 2024-06-04 PROBLEM — N17.9 ACUTE KIDNEY INJURY (NONTRAUMATIC): Status: RESOLVED | Noted: 2024-06-02 | Resolved: 2024-06-04

## 2024-06-04 PROBLEM — N17.9 ACUTE KIDNEY INJURY: Status: RESOLVED | Noted: 2024-06-02 | Resolved: 2024-06-04

## 2024-06-04 LAB
ANION GAP SERPL CALCULATED.3IONS-SCNC: 9.6 MMOL/L (ref 5–15)
BASOPHILS # BLD AUTO: 0.02 10*3/MM3 (ref 0–0.2)
BASOPHILS NFR BLD AUTO: 0.3 % (ref 0–1.5)
BUN SERPL-MCNC: 44 MG/DL (ref 8–23)
BUN/CREAT SERPL: 28 (ref 7–25)
CALCIUM SPEC-SCNC: 9.2 MG/DL (ref 8.6–10.5)
CHLORIDE SERPL-SCNC: 116 MMOL/L (ref 98–107)
CO2 SERPL-SCNC: 17.4 MMOL/L (ref 22–29)
CREAT SERPL-MCNC: 1.57 MG/DL (ref 0.57–1)
DEPRECATED RDW RBC AUTO: 39.6 FL (ref 37–54)
EGFRCR SERPLBLD CKD-EPI 2021: 36.2 ML/MIN/1.73
EOSINOPHIL # BLD AUTO: 0.03 10*3/MM3 (ref 0–0.4)
EOSINOPHIL NFR BLD AUTO: 0.4 % (ref 0.3–6.2)
ERYTHROCYTE [DISTWIDTH] IN BLOOD BY AUTOMATED COUNT: 12 % (ref 12.3–15.4)
GLUCOSE SERPL-MCNC: 93 MG/DL (ref 65–99)
HCT VFR BLD AUTO: 32.9 % (ref 34–46.6)
HGB BLD-MCNC: 10.6 G/DL (ref 12–15.9)
IMM GRANULOCYTES # BLD AUTO: 0.02 10*3/MM3 (ref 0–0.05)
IMM GRANULOCYTES NFR BLD AUTO: 0.3 % (ref 0–0.5)
LYMPHOCYTES # BLD AUTO: 3.02 10*3/MM3 (ref 0.7–3.1)
LYMPHOCYTES NFR BLD AUTO: 43.7 % (ref 19.6–45.3)
MAGNESIUM SERPL-MCNC: 1.8 MG/DL (ref 1.6–2.4)
MCH RBC QN AUTO: 29 PG (ref 26.6–33)
MCHC RBC AUTO-ENTMCNC: 32.2 G/DL (ref 31.5–35.7)
MCV RBC AUTO: 89.9 FL (ref 79–97)
MONOCYTES # BLD AUTO: 0.46 10*3/MM3 (ref 0.1–0.9)
MONOCYTES NFR BLD AUTO: 6.7 % (ref 5–12)
NEUTROPHILS NFR BLD AUTO: 3.36 10*3/MM3 (ref 1.7–7)
NEUTROPHILS NFR BLD AUTO: 48.6 % (ref 42.7–76)
NRBC BLD AUTO-RTO: 0 /100 WBC (ref 0–0.2)
PHOSPHATE SERPL-MCNC: 2.3 MG/DL (ref 2.5–4.5)
PLATELET # BLD AUTO: 189 10*3/MM3 (ref 140–450)
PMV BLD AUTO: 9.4 FL (ref 6–12)
POTASSIUM SERPL-SCNC: 4.3 MMOL/L (ref 3.5–5.2)
RBC # BLD AUTO: 3.66 10*6/MM3 (ref 3.77–5.28)
SODIUM SERPL-SCNC: 143 MMOL/L (ref 136–145)
WBC NRBC COR # BLD AUTO: 6.91 10*3/MM3 (ref 3.4–10.8)

## 2024-06-04 PROCEDURE — 84100 ASSAY OF PHOSPHORUS: CPT | Performed by: INTERNAL MEDICINE

## 2024-06-04 PROCEDURE — 80048 BASIC METABOLIC PNL TOTAL CA: CPT | Performed by: INTERNAL MEDICINE

## 2024-06-04 PROCEDURE — 83735 ASSAY OF MAGNESIUM: CPT | Performed by: INTERNAL MEDICINE

## 2024-06-04 PROCEDURE — 25010000002 HEPARIN (PORCINE) PER 1000 UNITS: Performed by: HOSPITALIST

## 2024-06-04 PROCEDURE — 85025 COMPLETE CBC W/AUTO DIFF WBC: CPT | Performed by: INTERNAL MEDICINE

## 2024-06-04 RX ORDER — SODIUM BICARBONATE 650 MG/1
650 TABLET ORAL 3 TIMES DAILY
Qty: 90 TABLET | Refills: 0 | Status: SHIPPED | OUTPATIENT
Start: 2024-06-04 | End: 2024-07-04

## 2024-06-04 RX ORDER — ONDANSETRON 4 MG/1
TABLET, FILM COATED ORAL
Qty: 45 TABLET | Refills: 0 | OUTPATIENT
Start: 2024-06-04

## 2024-06-04 RX ORDER — ONDANSETRON 4 MG/1
4 TABLET, FILM COATED ORAL EVERY 8 HOURS PRN
Qty: 30 TABLET | Refills: 0 | Status: SHIPPED | OUTPATIENT
Start: 2024-06-04 | End: 2024-06-14

## 2024-06-04 RX ADMIN — HEPARIN SODIUM 5000 UNITS: 5000 INJECTION INTRAVENOUS; SUBCUTANEOUS at 07:45

## 2024-06-04 RX ADMIN — CARBIDOPA AND LEVODOPA 1 TABLET: 25; 100 TABLET ORAL at 06:11

## 2024-06-04 RX ADMIN — CARBIDOPA AND LEVODOPA 1 TABLET: 25; 100 TABLET ORAL at 09:12

## 2024-06-04 RX ADMIN — Medication 10 ML: at 07:45

## 2024-06-04 RX ADMIN — CARVEDILOL 25 MG: 25 TABLET, FILM COATED ORAL at 07:44

## 2024-06-04 RX ADMIN — CITALOPRAM HYDROBROMIDE 10 MG: 20 TABLET ORAL at 07:44

## 2024-06-04 RX ADMIN — GABAPENTIN 100 MG: 100 CAPSULE ORAL at 07:44

## 2024-06-04 RX ADMIN — SODIUM BICARBONATE 650 MG: 650 TABLET ORAL at 07:44

## 2024-06-04 NOTE — DISCHARGE SUMMARY
[facility] Medicine [facility]  Discharge Summary    Date of Service: 24    Patient Name: Jasmine Cerda  : 1960  MRN: [id number]    Date of Admission: 2024  Discharge Diagnosis: DENIS   Date of Discharge:  24    Primary Care Physician: Eleni Miranda APRN      Presenting Problem:   Acute kidney injury (nontraumatic) [N17.9]  Acute kidney injury [N17.9]  Acute renal failure, unspecified acute renal failure type [N17.9]  Dyspnea, unspecified type [R06.00]    Active and Resolved Hospital Problems:  #DENIS present on admission  CKD stage III  #Anion gap metabolic acidosis from above  #Primary respiratory alkalosis  History of COPD stable   Essential hypertension      Hospital Course     HPI:  Per the H&P see HPI      Hospital Course:  Jasmine Cerda is a 64 y.o. female who presented to [facility] on 2024 complaining of increasing shortness of breath for last few days   Patient was started with IV fluid hydration and p.o. bicarb; nephrology team was consulted and medications optimized.  Patient general condition improved and discharged to follow-up with PCP and nephrology as outpatient        DISCHARGE Follow Up Recommendations for labs and diagnostics:   Nephrology within 1 week time as well as PCP      Reasons For Change In Medications and Indications for New Medications:      Day of Discharge     Vital Signs:  Temp:  [97.5 °F (36.4 °C)-98.1 °F (36.7 °C)] 97.7 °F (36.5 °C)  Heart Rate:  [59-68] 62  Resp:  [15-22] 16  BP: ()/(52-79) 113/70    Physical Exam:  Physical Exam  HENT:      Head: Atraumatic.      Mouth/Throat:      Mouth: Mucous membranes are moist.   Eyes:      Pupils: Pupils are equal, round, and reactive to light.   Cardiovascular:      Rate and Rhythm: Normal rate and regular rhythm.   Pulmonary:      Effort: Pulmonary effort is normal.      Breath sounds: Normal breath sounds.   Abdominal:      General: Bowel sounds are normal.      Palpations:  Abdomen is soft.   Musculoskeletal:         General: Normal range of motion.      Cervical back: Neck supple.   Skin:     General: Skin is warm.   Neurological:      General: No focal deficit present.      Mental Status: She is alert and oriented to person, place, and time. Mental status is at baseline.   Psychiatric:         Mood and Affect: Mood normal.              Pertinent  and/or Most Recent Results     LAB RESULTS:      Lab 06/04/24  0153 06/03/24  0327 06/02/24  1045 06/02/24  0739 06/02/24  0658   WBC 6.91 6.77  --   --  12.77*   HEMOGLOBIN 10.6* 10.7*  --   --  13.2   HEMATOCRIT 32.9* 33.4*  --   --  39.4   PLATELETS 189 207  --   --  319   NEUTROS ABS 3.36 5.50  --   --  8.30*   IMMATURE GRANS (ABS) 0.02 0.02  --   --  0.05   LYMPHS ABS 3.02 0.93  --   --  3.78*   MONOS ABS 0.46 0.32  --   --  0.61   EOS ABS 0.03 0.00  --   --  0.00   MCV 89.9 90.5  --   --  86.0   LACTATE  --   --  1.7 2.3*  --          Lab 06/04/24  0153 06/03/24  0327 06/02/24  0658   SODIUM 143 137 136   POTASSIUM 4.3 4.8 4.7   CHLORIDE 116* 111* 99   CO2 17.4* 14.8* 14.1*   ANION GAP 9.6 11.2 22.9*   BUN 44* 68* 83*   CREATININE 1.57* 2.83* 4.53*   EGFR 36.2* 17.9* 10.2*   GLUCOSE 93 119* 103*   CALCIUM 9.2 9.6 12.5*   MAGNESIUM 1.8 2.0 2.2   PHOSPHORUS 2.3* 3.9 3.8         Lab 06/02/24  0658   TOTAL PROTEIN 8.3   ALBUMIN 5.3*   GLOBULIN 3.0   ALT (SGPT) 12   AST (SGOT) 17   BILIRUBIN 0.8   ALK PHOS 56         Lab 06/02/24  0658   PROBNP 278.0   HSTROP T 25*                 Lab 06/03/24  0842 06/02/24  0719   PH, ARTERIAL 7.391 7.604*   PCO2, ARTERIAL 24.8* 11.7*   PO2 ART 76.0* 130.4*   O2 SATURATION ART 95.3 99.5*   FIO2 21 30   HCO3 ART 15.0* 11.6*   BASE EXCESS ART -8.6* -6.4*     Brief Urine Lab Results  (Last result in the past 365 days)        Color   Clarity   Blood   Leuk Est   Nitrite   Protein   CREAT   Urine HCG        06/02/24 0902             151.0         06/02/24 0902 Dark Yellow   Clear   Negative   Trace    Negative   Trace                 Microbiology Results (last 10 days)       Procedure Component Value - Date/Time    Respiratory Panel PCR w/COVID-19(SARS-CoV-2) MICHAEL/ANTONIETA/[facility]/PAD/COR/MYRANDA In-House, NP Swab in UTM/VTM, 2 HR TAT - Swab, Nasopharynx [[id number]]  (Normal) Collected: 06/02/24 0725    Lab Status: Final result Specimen: Swab from Nasopharynx Updated: 06/02/24 0827     ADENOVIRUS, PCR Not Detected     Coronavirus 229E Not Detected     Coronavirus HKU1 Not Detected     Coronavirus NL63 Not Detected     Coronavirus OC43 Not Detected     COVID19 Not Detected     Human Metapneumovirus Not Detected     Human Rhinovirus/Enterovirus Not Detected     Influenza A PCR Not Detected     Influenza B PCR Not Detected     Parainfluenza Virus 1 Not Detected     Parainfluenza Virus 2 Not Detected     Parainfluenza Virus 3 Not Detected     Parainfluenza Virus 4 Not Detected     RSV, PCR Not Detected     Bordetella pertussis pcr Not Detected     Bordetella parapertussis PCR Not Detected     Chlamydophila pneumoniae PCR Not Detected     Mycoplasma pneumo by PCR Not Detected    Narrative:      In the setting of a positive respiratory panel with a viral infection PLUS a negative procalcitonin without other underlying concern for bacterial infection, consider observing off antibiotics or discontinuation of antibiotics and continue supportive care. If the respiratory panel is positive for atypical bacterial infection (Bordetella pertussis, Chlamydophila pneumoniae, or Mycoplasma pneumoniae), consider antibiotic de-escalation to target atypical bacterial infection.    Blood Culture - Blood, Arm, Right [[id number]]  (Normal) Collected: 06/02/24 0721    Lab Status: Preliminary result Specimen: Blood from Arm, Right Updated: 06/04/24 0745     Blood Culture No growth at 2 days    Blood Culture - Blood, Arm, Left [[id number]]  (Normal) Collected: 06/02/24 0721    Lab Status: Preliminary result Specimen: Blood from Arm, Left Updated: 06/04/24  0745     Blood Culture No growth at 2 days    Narrative:      Less than seven (7) mL's of blood was collected.  Insufficient quantity may yield false negative results.            US Renal Bilateral    Result Date: 6/2/2024  Impression: 1.No hydronephrosis. 2.Renal cortical thinning and increased echogenicity which may be seen with chronic kidney disease. 3.Suspected small renal cysts. Electronically Signed: Denny Connor  6/2/2024 1:46 PM EDT  Workstation ID: [id number]    XR Chest 1 View    Result Date: 6/2/2024  Impression: Impression: No acute cardiopulmonary process. Electronically Signed: Radha Godoy MD  6/2/2024 7:30 AM EDT  Workstation ID: [id number]    US Renal Bilateral    Result Date: 5/13/2024  Impression: Impression: Bilateral renal cortical thinning. Tiny right renal cyst Electronically Signed: Faizan Plaza MD  5/13/2024 8:30 AM EDT  Workstation ID: [id number]             Results for orders placed during the hospital encounter of 05/23/23    Adult Transthoracic Echo Complete W/ Cont if Necessary Per Protocol    Interpretation Summary    Left ventricular systolic function is normal. Calculated left ventricular EF = 56.2% Left ventricular ejection fraction appears to be 56 - 60%.    Left ventricular wall thickness is consistent with mild concentric hypertrophy.    Left ventricular diastolic function is consistent with (grade I) impaired relaxation.    The left atrial cavity is moderately dilated.    Estimated right ventricular systolic pressure from tricuspid regurgitation is normal (<35 mmHg).      Labs Pending at Discharge:  Pending Labs       Order Current Status    Blood Culture - Blood, Arm, Left Preliminary result    Blood Culture - Blood, Arm, Right Preliminary result            Procedures Performed           Consults:   Consults       Date and Time Order Name Status Description    6/2/2024  9:27 AM Nephrology (on -call MD unless specified) Completed     6/2/2024  9:27 AM Hospitalist (on-call MD  "unless specified)                Discharge Details        Discharge Medications        New Medications        Instructions Start Date   sodium bicarbonate 650 MG tablet   650 mg, Oral, 3 Times Daily             Changes to Medications        Instructions Start Date   ondansetron 4 MG tablet  Commonly known as: ZOFRAN  What changed: See the new instructions.   4 mg, Oral, Every 8 Hours PRN             Continue These Medications        Instructions Start Date   ALPRAZolam 0.5 MG tablet  Commonly known as: XANAX   0.5 mg, Oral, 3 Times Daily PRN, for anxiety      Breztri Aerosphere 160-9-4.8 MCG/ACT aerosol inhaler  Generic drug: Budeson-Glycopyrrol-Formoterol   2 puffs, Inhalation, 2 Times Daily      carbidopa-levodopa  MG per tablet  Commonly known as: SINEMET   Take 1 tab at: 6 am, 10 am, 2pm, 7 pm      carvedilol 25 MG tablet  Commonly known as: COREG   25 mg, Oral, 2 Times Daily      citalopram 40 MG tablet  Commonly known as: CeleXA   40 mg, Oral, Every Morning      cyanocobalamin 1000 MCG/ML injection   1,000 mcg, Intramuscular, Every 28 Days      gabapentin 300 MG capsule  Commonly known as: NEURONTIN   300 mg, Oral, 3 Times Daily      hydrOXYzine 25 MG tablet  Commonly known as: ATARAX   25 mg, Oral, 3 Times Daily PRN      mirtazapine 30 MG tablet  Commonly known as: REMERON   30 mg, Oral, Every Night at Bedtime      Multivitamin Adult tablet tablet  Generic drug: multivitamin with minerals   1 tablet, Oral, Every 24 Hours, With Omega XL      nitroglycerin 0.4 MG SL tablet  Commonly known as: NITROSTAT   0.4 mg, Sublingual, Every 5 Minutes PRN, Take no more than 3 doses in 15 minutes.      pantoprazole 40 MG EC tablet  Commonly known as: PROTONIX   40 mg, Oral, Daily      QUEtiapine 100 MG tablet  Commonly known as: SEROquel   100 mg, Oral, Every Night at Bedtime      rosuvastatin 40 MG tablet  Commonly known as: CRESTOR   40 mg, Oral, Every Evening      Syringe/Needle (Disp) 23G X 1\" 3 ML misc   Use to " inject B12 every 30 days intramuscularly      vitamin D 1.25 MG (92655 UT) capsule capsule  Commonly known as: ERGOCALCIFEROL   50,000 Units, Oral, Weekly             Stop These Medications      bumetanide 2 MG tablet  Commonly known as: BUMEX     lisinopril 10 MG tablet  Commonly known as: PRINIVIL,ZESTRIL     potassium chloride 10 MEQ CR tablet  Commonly known as: KLOR-CON              No Known Allergies      Discharge Disposition:   Home or Self Care    Diet:  Hospital:  Diet Order   Procedures    Diet: Cardiac; Healthy Heart (2-3 Na+); Fluid Consistency: Thin (IDDSI 0)         Discharge Activity:         CODE STATUS:  Code Status and Medical Interventions:   Ordered at: 06/03/24 0804     Level Of Support Discussed With:    Patient     Code Status (Patient has no pulse and is not breathing):    CPR (Attempt to Resuscitate)     Medical Interventions (Patient has pulse or is breathing):    Full Support         Future Appointments   Date Time Provider Department Center   6/17/2024  8:45 AM Eleni Miranda APRN [facility]   6/19/2024  9:00 AM Sivan Ken MD [facility]   7/17/2024  8:45 AM [facility]   7/24/2024  8:30 AM Eleni Miranda APRN [facility]           Time spent on Discharge including face to face service:  >30 minutes    Signature: Electronically signed by Fransisco Templeton MD, 06/04/24, 10:40 EDT.  [facility] Hospitalist Team

## 2024-06-04 NOTE — PLAN OF CARE
Goal Outcome Evaluation:  Plan of Care Reviewed With: patient     Problem: Adult Inpatient Plan of Care  Goal: Plan of Care Review  Outcome: Ongoing, Progressing  Flowsheets (Taken 6/4/2024 5046)  Progress: improving  Plan of Care Reviewed With: patient        Progress: improving

## 2024-06-04 NOTE — PROGRESS NOTES
"RENAL/KCC:     LOS: 2 days    Patient Care Team:  Eleni Miranda APRN as PCP - General (Nurse Practitioner)  Sivan Ken MD as Consulting Physician (Psychiatry)  CHASTITY Richardson DPM as Consulting Physician (Podiatry)  Cuauhtemoc Kwon MD as Consulting Physician (Cardiology)    Chief Complaint:  DENIS/CKD    Subjective     Interval History:   Chart reviewed  Looks and feels well    Objective     Vital Sign Min/Max for last 24 hours  Temp  Min: 97.4 °F (36.3 °C)  Max: 98.1 °F (36.7 °C)   BP  Min: 88/52  Max: 135/79   Pulse  Min: 59  Max: 69   Resp  Min: 15  Max: 22   SpO2  Min: 95 %  Max: 100 %   No data recorded   No data recorded     Flowsheet Rows      Flowsheet Row First Filed Value   Admission Height 157.5 cm (62\") Documented at 06/02/2024 0643   Admission Weight 77.1 kg (170 lb) Documented at 06/02/2024 0643            I/O this shift:  In: -   Out: 1 [Stool:1]  I/O last 3 completed shifts:  In: 720 [P.O.:720]  Out: 1800 [Urine:1800]    Physical Exam:  GEN: Awake, NAD  ENT: PERRL, EOMI, MMM  NECK: Supple, no JVD  CHEST: CTAB, no W/R/C  CV: RRR, no M/G/R  ABD: Soft, NT, +BS  SKIN: Warm and Dry  NEURO: CN's intact      WBC WBC   Date Value Ref Range Status   06/04/2024 6.91 3.40 - 10.80 10*3/mm3 Final   06/03/2024 6.77 3.40 - 10.80 10*3/mm3 Final   06/02/2024 12.77 (H) 3.40 - 10.80 10*3/mm3 Final      HGB Hemoglobin   Date Value Ref Range Status   06/04/2024 10.6 (L) 12.0 - 15.9 g/dL Final   06/03/2024 10.7 (L) 12.0 - 15.9 g/dL Final     Comment:     Result checked     06/02/2024 13.2 12.0 - 15.9 g/dL Final      HCT Hematocrit   Date Value Ref Range Status   06/04/2024 32.9 (L) 34.0 - 46.6 % Final   06/03/2024 33.4 (L) 34.0 - 46.6 % Final   06/02/2024 39.4 34.0 - 46.6 % Final      Platlets No results found for: \"LABPLAT\"   MCV MCV   Date Value Ref Range Status   06/04/2024 89.9 79.0 - 97.0 fL Final   06/03/2024 90.5 79.0 - 97.0 fL Final   06/02/2024 86.0 79.0 - 97.0 fL Final          Sodium Sodium " "  Date Value Ref Range Status   06/04/2024 143 136 - 145 mmol/L Final   06/03/2024 137 136 - 145 mmol/L Final   06/02/2024 136 136 - 145 mmol/L Final      Potassium Potassium   Date Value Ref Range Status   06/04/2024 4.3 3.5 - 5.2 mmol/L Final   06/03/2024 4.8 3.5 - 5.2 mmol/L Final     Comment:     Specimen hemolyzed.  Result may be falsely elevated.   06/02/2024 4.7 3.5 - 5.2 mmol/L Final      Chloride Chloride   Date Value Ref Range Status   06/04/2024 116 (H) 98 - 107 mmol/L Final   06/03/2024 111 (H) 98 - 107 mmol/L Final   06/02/2024 99 98 - 107 mmol/L Final      CO2 CO2   Date Value Ref Range Status   06/04/2024 17.4 (L) 22.0 - 29.0 mmol/L Final   06/03/2024 14.8 (L) 22.0 - 29.0 mmol/L Final   06/02/2024 14.1 (L) 22.0 - 29.0 mmol/L Final      BUN BUN   Date Value Ref Range Status   06/04/2024 44 (H) 8 - 23 mg/dL Final   06/03/2024 68 (H) 8 - 23 mg/dL Final   06/02/2024 83 (H) 8 - 23 mg/dL Final      Creatinine Creatinine   Date Value Ref Range Status   06/04/2024 1.57 (H) 0.57 - 1.00 mg/dL Final   06/03/2024 2.83 (H) 0.57 - 1.00 mg/dL Final   06/02/2024 4.53 (H) 0.57 - 1.00 mg/dL Final      Calcium Calcium   Date Value Ref Range Status   06/04/2024 9.2 8.6 - 10.5 mg/dL Final   06/03/2024 9.6 8.6 - 10.5 mg/dL Final   06/02/2024 12.5 (H) 8.6 - 10.5 mg/dL Final      PO4 No results found for: \"CAPO4\"   Albumin Albumin   Date Value Ref Range Status   06/02/2024 5.3 (H) 3.5 - 5.2 g/dL Final      Magnesium Magnesium   Date Value Ref Range Status   06/04/2024 1.8 1.6 - 2.4 mg/dL Final   06/03/2024 2.0 1.6 - 2.4 mg/dL Final   06/02/2024 2.2 1.6 - 2.4 mg/dL Final      Uric Acid No results found for: \"URICACID\"        Results Review:     I reviewed the patient's new clinical results.    carbidopa-levodopa, 1 tablet, Oral, 4x Daily  carvedilol, 25 mg, Oral, BID With Meals  citalopram, 10 mg, Oral, Daily  gabapentin, 100 mg, Oral, TID  heparin (porcine), 5,000 Units, Subcutaneous, Q12H  QUEtiapine, 100 mg, Oral, " Nightly  rosuvastatin, 10 mg, Oral, Nightly  sodium bicarbonate, 650 mg, Oral, TID  sodium chloride, 10 mL, Intravenous, Q12H             Medication Review: Reviewed    Assessment & Plan     1) DENIS - Pre-renal/ATN from hypotension  2) CKD3a - Past baseline Cr in the low 1's.  CKD from HTN.  3) Mixed acid base disturbance  4) Primary respiratory alkalosis - Likely anxiety, CXR clear, not hypoxic, D-dimer negative.  Patient does have underlying COPD.  5) HAGMA - Lactic acidosis + DENIS - resolving  6) Hypotension  7) Tachypnea - not hypoxic  8) H/O COPD  9) H/O CAD/CHF  10) Anxiety    Plan: Cr improving nicely with IVF.  Keep off ACE (indefinitely).  BP stable on Coreg.  Continue scheduled PO bicarbonate.  Lytes OK.  OK for D/C from a renal standpoint.  Will need close outpatient follow-up with Dr. Allen.  Could also keep Bumex on hold until she is seen in the office.      Clarke Balderas MD  [facility]  06/04/24  08:29 EDT

## 2024-06-04 NOTE — OUTREACH NOTE
Prep Survey      Flowsheet Row Responses   [facility] [place] patient discharged from? [facility]   Is LACE score < 7 ? No   Eligibility [facility]   Date of Admission 06/02/24   Date of Discharge 06/04/24   Discharge Disposition Home or Self Care   Discharge diagnosis Acute kidney injury   Does the patient have one of the following disease processes/diagnoses(primary or secondary)? Other   Does the patient have Home health ordered? No   Is there a DME ordered? No   Medication alerts for this patient see AVS   Prep survey completed? Yes            Netta ARMSTRONG - Registered Nurse

## 2024-06-05 ENCOUNTER — TELEPHONE (OUTPATIENT)
Dept: FAMILY MEDICINE CLINIC | Facility: CLINIC | Age: 64
End: 2024-06-05
Payer: MEDICARE

## 2024-06-05 ENCOUNTER — TRANSITIONAL CARE MANAGEMENT TELEPHONE ENCOUNTER (OUTPATIENT)
Dept: CALL CENTER | Facility: HOSPITAL | Age: 64
End: 2024-06-05
Payer: MEDICARE

## 2024-06-05 NOTE — TELEPHONE ENCOUNTER
Pt has PCP appt on 6/17/24 845 am, not listed as HOSP DC FU and is 15 mins, . Offered different appt for HOSP DC FU and Pt declined. TCM complete.       Per hub...

## 2024-06-05 NOTE — OUTREACH NOTE
Call Center TCM Note      Flowsheet Row Responses   [facility] patient discharged from? [facility]   Does the patient have one of the following disease processes/diagnoses(primary or secondary)? Other   TCM attempt successful? Yes  [VR- Spouse]   Call start time 0919   Call end time 0921   Discharge diagnosis Acute kidney injury   Meds reviewed with patient/caregiver? Yes   Is the patient having any side effects they believe may be caused by any medication additions or changes? No   Does the patient have all medications ordered at discharge? Yes   Is the patient taking all medications as directed (includes completed medication regime)? Yes   Comments Pt has PCP appt on 6/17/24 845 am, not listed as HOSP DC FU and is 15 mins,  . Offered different appt for HOSP DC FU and Pt declined.   Does the patient have an appointment with their PCP within 7-14 days of discharge? Yes   Has home health visited the patient within 72 hours of discharge? N/A   Psychosocial issues? No   Did the patient receive a copy of their discharge instructions? Yes   Nursing interventions Reviewed instructions with patient   What is the patient's perception of their health status since discharge? Improving   Is the patient/caregiver able to teach back signs and symptoms related to disease process for when to call PCP? Yes   Is the patient/caregiver able to teach back signs and symptoms related to disease process for when to call 911? Yes   Is the patient/caregiver able to teach back the hierarchy of who to call/visit for symptoms/problems? PCP, Specialist, Home health nurse, Urgent Care, ED, 911 Yes   TCM call completed? Yes   Wrap up additional comments Pt reports improvement. Pt did not want to change PCP appt that she had.   Call end time 0921            Joy Calderón RN    6/5/2024, 09:23 EDT

## 2024-06-06 DIAGNOSIS — F43.12 CHRONIC POST-TRAUMATIC STRESS DISORDER (PTSD): Chronic | ICD-10-CM

## 2024-06-06 RX ORDER — ALPRAZOLAM 0.5 MG/1
0.5 TABLET ORAL 3 TIMES DAILY PRN
Qty: 90 TABLET | Refills: 0 | Status: SHIPPED | OUTPATIENT
Start: 2024-06-06

## 2024-06-06 NOTE — TELEPHONE ENCOUNTER
Rx Refill Note  Requested Prescriptions     Pending Prescriptions Disp Refills    ALPRAZolam (XANAX) 0.5 MG tablet [Pharmacy Med Name: ALPRAZolam 0.5 MG Oral Tablet] 90 tablet 0     Sig: TAKE 1 TABLET BY MOUTH THREE TIMES DAILY AS NEEDED FOR ANXIETY      Last office visit with prescribing clinician: 2/29/2024   Last telemedicine visit with prescribing clinician: Visit date not found   Next office visit with prescribing clinician: 6/19/2024   Office Visit with Sivan Ken MD (02/29/2024)   [facility] - LABS (01/05/2023)                     Would you like a call back once the refill request has been completed: [] Yes [] No    If the office needs to give you a call back, can they leave a voicemail: [] Yes [] No    Shahida Cerda MA  06/06/24, 09:09 EDT

## 2024-06-07 LAB
BACTERIA SPEC AEROBE CULT: NORMAL
BACTERIA SPEC AEROBE CULT: NORMAL

## 2024-06-17 ENCOUNTER — OFFICE VISIT (OUTPATIENT)
Dept: FAMILY MEDICINE CLINIC | Facility: CLINIC | Age: 64
End: 2024-06-17
Payer: MEDICARE

## 2024-06-17 ENCOUNTER — LAB (OUTPATIENT)
Dept: FAMILY MEDICINE CLINIC | Facility: CLINIC | Age: 64
End: 2024-06-17
Payer: MEDICARE

## 2024-06-17 VITALS
BODY MASS INDEX: 35.07 KG/M2 | WEIGHT: 190.6 LBS | HEIGHT: 62 IN | SYSTOLIC BLOOD PRESSURE: 158 MMHG | TEMPERATURE: 98.6 F | HEART RATE: 71 BPM | OXYGEN SATURATION: 99 % | DIASTOLIC BLOOD PRESSURE: 92 MMHG

## 2024-06-17 DIAGNOSIS — F33.2 SEVERE EPISODE OF RECURRENT MAJOR DEPRESSIVE DISORDER, WITHOUT PSYCHOTIC FEATURES: ICD-10-CM

## 2024-06-17 DIAGNOSIS — R11.14 BILIOUS VOMITING WITH NAUSEA: Primary | ICD-10-CM

## 2024-06-17 DIAGNOSIS — E55.9 VITAMIN D DEFICIENCY: ICD-10-CM

## 2024-06-17 DIAGNOSIS — J44.9 CHRONIC OBSTRUCTIVE PULMONARY DISEASE, UNSPECIFIED COPD TYPE: ICD-10-CM

## 2024-06-17 DIAGNOSIS — K29.70 GASTRITIS WITHOUT BLEEDING, UNSPECIFIED CHRONICITY, UNSPECIFIED GASTRITIS TYPE: ICD-10-CM

## 2024-06-17 DIAGNOSIS — E78.2 MIXED HYPERLIPIDEMIA: ICD-10-CM

## 2024-06-17 DIAGNOSIS — I10 PRIMARY HYPERTENSION: ICD-10-CM

## 2024-06-17 DIAGNOSIS — N18.4 CKD (CHRONIC KIDNEY DISEASE) STAGE 4, GFR 15-29 ML/MIN: ICD-10-CM

## 2024-06-17 DIAGNOSIS — E53.8 B12 DEFICIENCY: ICD-10-CM

## 2024-06-17 DIAGNOSIS — E87.5 HYPERKALEMIA: ICD-10-CM

## 2024-06-17 LAB
ALBUMIN SERPL-MCNC: 4.3 G/DL (ref 3.5–5.2)
ALBUMIN/GLOB SERPL: 2 G/DL
ALP SERPL-CCNC: 58 U/L (ref 39–117)
ALT SERPL W P-5'-P-CCNC: 9 U/L (ref 1–33)
ANION GAP SERPL CALCULATED.3IONS-SCNC: 11.6 MMOL/L (ref 5–15)
AST SERPL-CCNC: 18 U/L (ref 1–32)
BILIRUB SERPL-MCNC: 0.7 MG/DL (ref 0–1.2)
BUN SERPL-MCNC: 7 MG/DL (ref 8–23)
BUN/CREAT SERPL: 6 (ref 7–25)
CALCIUM SPEC-SCNC: 10.3 MG/DL (ref 8.6–10.5)
CHLORIDE SERPL-SCNC: 108 MMOL/L (ref 98–107)
CHOLEST SERPL-MCNC: 233 MG/DL (ref 0–200)
CO2 SERPL-SCNC: 23.4 MMOL/L (ref 22–29)
CREAT SERPL-MCNC: 1.16 MG/DL (ref 0.57–1)
EGFRCR SERPLBLD CKD-EPI 2021: 52.1 ML/MIN/1.73
GLOBULIN UR ELPH-MCNC: 2.1 GM/DL
GLUCOSE SERPL-MCNC: 96 MG/DL (ref 65–99)
HDLC SERPL-MCNC: 41 MG/DL (ref 40–60)
LDLC SERPL CALC-MCNC: 156 MG/DL (ref 0–100)
LDLC/HDLC SERPL: 3.74 {RATIO}
LIPASE SERPL-CCNC: 46 U/L (ref 13–60)
POTASSIUM SERPL-SCNC: 3.7 MMOL/L (ref 3.5–5.2)
PROT SERPL-MCNC: 6.4 G/DL (ref 6–8.5)
SODIUM SERPL-SCNC: 143 MMOL/L (ref 136–145)
TRIGL SERPL-MCNC: 194 MG/DL (ref 0–150)
TSH SERPL DL<=0.05 MIU/L-ACNC: 0.97 UIU/ML (ref 0.27–4.2)
VLDLC SERPL-MCNC: 36 MG/DL (ref 5–40)

## 2024-06-17 PROCEDURE — 82607 VITAMIN B-12: CPT | Performed by: NURSE PRACTITIONER

## 2024-06-17 PROCEDURE — 36415 COLL VENOUS BLD VENIPUNCTURE: CPT

## 2024-06-17 PROCEDURE — 99214 OFFICE O/P EST MOD 30 MIN: CPT | Performed by: NURSE PRACTITIONER

## 2024-06-17 PROCEDURE — 82306 VITAMIN D 25 HYDROXY: CPT | Performed by: NURSE PRACTITIONER

## 2024-06-17 PROCEDURE — 80061 LIPID PANEL: CPT | Performed by: NURSE PRACTITIONER

## 2024-06-17 PROCEDURE — 3077F SYST BP >= 140 MM HG: CPT | Performed by: NURSE PRACTITIONER

## 2024-06-17 PROCEDURE — 80053 COMPREHEN METABOLIC PANEL: CPT | Performed by: NURSE PRACTITIONER

## 2024-06-17 PROCEDURE — 3080F DIAST BP >= 90 MM HG: CPT | Performed by: NURSE PRACTITIONER

## 2024-06-17 PROCEDURE — 84443 ASSAY THYROID STIM HORMONE: CPT | Performed by: NURSE PRACTITIONER

## 2024-06-17 PROCEDURE — 85027 COMPLETE CBC AUTOMATED: CPT | Performed by: NURSE PRACTITIONER

## 2024-06-17 PROCEDURE — 83690 ASSAY OF LIPASE: CPT | Performed by: NURSE PRACTITIONER

## 2024-06-17 PROCEDURE — 1125F AMNT PAIN NOTED PAIN PRSNT: CPT | Performed by: NURSE PRACTITIONER

## 2024-06-17 RX ORDER — PANTOPRAZOLE SODIUM 40 MG/1
40 TABLET, DELAYED RELEASE ORAL 2 TIMES DAILY
Qty: 180 TABLET | Refills: 1 | Status: SHIPPED | OUTPATIENT
Start: 2024-06-17

## 2024-06-17 RX ORDER — ONDANSETRON 4 MG/1
4 TABLET, ORALLY DISINTEGRATING ORAL EVERY 8 HOURS PRN
Qty: 60 TABLET | Refills: 2 | Status: SHIPPED | OUTPATIENT
Start: 2024-06-17

## 2024-06-17 RX ORDER — METOCLOPRAMIDE 5 MG/1
5 TABLET ORAL
Qty: 90 TABLET | Refills: 2 | Status: SHIPPED | OUTPATIENT
Start: 2024-06-17

## 2024-06-17 NOTE — PROGRESS NOTES
Chief Complaint  Chief Complaint   Patient presents with    Hospital Follow Up Visit    Nausea    Chills           Subjective          Jasmine Cerda presents to [facility] for   History of Present Illness      Pt presented to ED on 24 for soa, creatinine initially found to be 4.53.  Bumex, potassium and lisinopril discontinued. Today she c/o nausea/vomiting. Her stomach is sore, she has been vomiting every day, immediately upon waking.     She has an appt with Dr. Allen next Tuesday.         The following portions of the patient's history were reviewed and updated as appropriate: allergies, current medications, past family history, past medical history, past social history, past surgical history and problem list.    Past Medical History:   Diagnosis Date    Anxiety     Breast cyst     CHF (congestive heart failure)     COPD (chronic obstructive pulmonary disease)     Coronary heart disease     Depression     Hyperlipidemia     Hypertension      Past Surgical History:   Procedure Laterality Date    APPENDECTOMY      BREAST CYST ASPIRATION      CARDIAC CATHETERIZATION  2017    No Stents placed - [facility]    CARDIAC CATHETERIZATION N/A 2023    Procedure: Left Heart Cath possible PCI;  Surgeon: Cuauhtemoc Kwon MD;  Location: [facility] CATH INVASIVE LOCATION;  Service: Cardiovascular;  Laterality: N/A;    CERVICAL FUSION      C6-C7     SECTION      x 2    CHOLECYSTECTOMY      HYSTERECTOMY      partial     Family History   Problem Relation Age of Onset    Colon cancer Mother     Diabetes Father     Pulmonary embolism Brother     Heart failure Brother     Breast cancer Maternal Aunt      Social History     Tobacco Use    Smoking status: Never    Smokeless tobacco: Never    Tobacco comments:     Passive Smoke: N   Substance Use Topics    Alcohol use: No       Current Outpatient Medications:     Budeson-Glycopyrrol-Formoterol (Breztri Aerosphere) 160-9-4.8 MCG/ACT aerosol  "inhaler, Inhale 2 puffs 2 (Two) Times a Day., Disp: 2 each, Rfl: 0    carbidopa-levodopa (SINEMET)  MG per tablet, Take 1 tab at: 6 am, 10 am, 2pm, 7 pm, Disp: 120 tablet, Rfl: 5    carvedilol (COREG) 25 MG tablet, Take 1 tablet by mouth 2 (Two) Times a Day., Disp: 180 tablet, Rfl: 3    cyanocobalamin 1000 MCG/ML injection, INJECT 1 ML INTO THE APPROPRIATE MUSCLE AS DIRECTED BY PRESCRIBER EVERY 28 (TWENTY-EIGHT) DAYS., Disp: 3 mL, Rfl: 1    gabapentin (NEURONTIN) 300 MG capsule, Take 1 capsule by mouth 3 (Three) Times a Day., Disp: 270 capsule, Rfl: 1    hydrOXYzine (ATARAX) 25 MG tablet, Take 1 tablet by mouth 3 (Three) Times a Day As Needed for Anxiety., Disp: 90 tablet, Rfl: 3    Multiple Vitamins-Minerals (MULTIVITAMIN ADULT) tablet, Take 1 tablet by mouth Daily. With Omega XL, Disp: , Rfl:     nitroglycerin (NITROSTAT) 0.4 MG SL tablet, Place 1 tablet under the tongue Every 5 (Five) Minutes As Needed for Chest Pain. Take no more than 3 doses in 15 minutes., Disp: 25 tablet, Rfl: 2    pantoprazole (PROTONIX) 40 MG EC tablet, Take 1 tablet by mouth 2 (Two) Times a Day., Disp: 180 tablet, Rfl: 1    rosuvastatin (CRESTOR) 40 MG tablet, Take 1 tablet by mouth Every Evening., Disp: 90 tablet, Rfl: 1    sodium bicarbonate 650 MG tablet, Take 1 tablet by mouth 3 (Three) Times a Day for 30 days., Disp: 90 tablet, Rfl: 0    Syringe/Needle, Disp, 23G X 1\" 3 ML misc, Use to inject B12 every 30 days intramuscularly, Disp: 12 each, Rfl: 0    vitamin D (ERGOCALCIFEROL) 1.25 MG (18627 UT) capsule capsule, Take 1 capsule by mouth 1 (One) Time Per Week., Disp: 15 capsule, Rfl: 3    ALPRAZolam (XANAX) 0.5 MG tablet, Take 1 tablet by mouth 3 (Three) Times a Day As Needed for Anxiety. for anxiety, Disp: 90 tablet, Rfl: 2    citalopram (CeleXA) 40 MG tablet, Take 1 tablet by mouth Every Morning., Disp: 90 tablet, Rfl: 1    metoclopramide (Reglan) 5 MG tablet, Take 1 tablet by mouth 3 (Three) Times a Day With Meals., Disp: 90 " "tablet, Rfl: 2    mirtazapine (REMERON) 30 MG tablet, Take 1 tablet by mouth every night at bedtime., Disp: 90 tablet, Rfl: 1    ondansetron ODT (ZOFRAN-ODT) 4 MG disintegrating tablet, Place 1 tablet on the tongue Every 8 (Eight) Hours As Needed for Nausea or Vomiting., Disp: 60 tablet, Rfl: 2    QUEtiapine (SEROquel) 100 MG tablet, Take 1 tablet by mouth every night at bedtime., Disp: 90 tablet, Rfl: 1    Objective   Vital Signs:   /92 (BP Location: Left arm, Patient Position: Sitting, Cuff Size: Adult)   Pulse 71   Temp 98.6 °F (37 °C) (Temporal)   Ht 157.5 cm (62\")   Wt 86.5 kg (190 lb 9.6 oz)   SpO2 99%   BMI 34.86 kg/m²           Physical Exam  Constitutional:       General: She is not in acute distress.     Appearance: Normal appearance. She is well-developed. She is ill-appearing (acutely). She is not diaphoretic.   HENT:      Head: Normocephalic.   Eyes:      Conjunctiva/sclera: Conjunctivae normal.      Pupils: Pupils are equal, round, and reactive to light.   Neck:      Thyroid: No thyromegaly.      Vascular: No JVD.   Cardiovascular:      Rate and Rhythm: Normal rate and regular rhythm.      Heart sounds: Normal heart sounds. No murmur heard.  Pulmonary:      Effort: Pulmonary effort is normal. No respiratory distress.      Breath sounds: Normal breath sounds. No wheezing or rhonchi.   Abdominal:      General: Bowel sounds are normal. There is no distension.      Palpations: Abdomen is soft.      Tenderness: There is abdominal tenderness (generalized).   Musculoskeletal:         General: No swelling or tenderness. Normal range of motion.      Cervical back: Normal range of motion and neck supple. No tenderness.   Lymphadenopathy:      Cervical: No cervical adenopathy.   Skin:     General: Skin is warm and dry.      Coloration: Skin is not jaundiced.      Findings: No erythema or rash.   Neurological:      General: No focal deficit present.      Mental Status: She is alert and oriented to " person, place, and time. Mental status is at baseline.      Sensory: No sensory deficit.      Motor: No weakness.      Gait: Gait normal.   Psychiatric:         Mood and Affect: Mood normal.         Behavior: Behavior normal.         Thought Content: Thought content normal.         Judgment: Judgment normal.          Result Review :     Office Visit on 06/17/2024   Component Date Value Ref Range Status    Lipase 06/17/2024 46  13 - 60 U/L Final   Lab on 06/17/2024   Component Date Value Ref Range Status    Total Cholesterol 06/17/2024 233 (H)  0 - 200 mg/dL Final    Triglycerides 06/17/2024 194 (H)  0 - 150 mg/dL Final    HDL Cholesterol 06/17/2024 41  40 - 60 mg/dL Final    LDL Cholesterol  06/17/2024 156 (H)  0 - 100 mg/dL Final    VLDL Cholesterol 06/17/2024 36  5 - 40 mg/dL Final    LDL/HDL Ratio 06/17/2024 3.74   Final    Glucose 06/17/2024 96  65 - 99 mg/dL Final    BUN 06/17/2024 7 (L)  8 - 23 mg/dL Final    Creatinine 06/17/2024 1.16 (H)  0.57 - 1.00 mg/dL Final    Sodium 06/17/2024 143  136 - 145 mmol/L Final    Potassium 06/17/2024 3.7  3.5 - 5.2 mmol/L Final    Chloride 06/17/2024 108 (H)  98 - 107 mmol/L Final    CO2 06/17/2024 23.4  22.0 - 29.0 mmol/L Final    Calcium 06/17/2024 10.3  8.6 - 10.5 mg/dL Final    Total Protein 06/17/2024 6.4  6.0 - 8.5 g/dL Final    Albumin 06/17/2024 4.3  3.5 - 5.2 g/dL Final    ALT (SGPT) 06/17/2024 9  1 - 33 U/L Final    AST (SGOT) 06/17/2024 18  1 - 32 U/L Final    Alkaline Phosphatase 06/17/2024 58  39 - 117 U/L Final    Total Bilirubin 06/17/2024 0.7  0.0 - 1.2 mg/dL Final    Globulin 06/17/2024 2.1  gm/dL Final    A/G Ratio 06/17/2024 2.0  g/dL Final    BUN/Creatinine Ratio 06/17/2024 6.0 (L)  7.0 - 25.0 Final    Anion Gap 06/17/2024 11.6  5.0 - 15.0 mmol/L Final    eGFR 06/17/2024 52.1 (L)  >60.0 mL/min/1.73 Final    WBC 06/17/2024 7.10  3.40 - 10.80 10*3/mm3 Final    RBC 06/17/2024 4.07  3.77 - 5.28 10*6/mm3 Final    Hemoglobin 06/17/2024 11.9 (L)  12.0 - 15.9  g/dL Final    Hematocrit 06/17/2024 36.0  34.0 - 46.6 % Final    MCV 06/17/2024 88.5  79.0 - 97.0 fL Final    MCH 06/17/2024 29.2  26.6 - 33.0 pg Final    MCHC 06/17/2024 33.1  31.5 - 35.7 g/dL Final    RDW 06/17/2024 14.1  12.3 - 15.4 % Final    RDW-SD 06/17/2024 44.5  37.0 - 54.0 fl Final    MPV 06/17/2024 9.3  6.0 - 12.0 fL Final    Platelets 06/17/2024 236  140 - 450 10*3/mm3 Final    TSH 06/17/2024 0.972  0.270 - 4.200 uIU/mL Final    25 Hydroxy, Vitamin D 06/17/2024 66.2  30.0 - 100.0 ng/ml Final    Vitamin B-12 06/17/2024 1,370 (H)  211 - 946 pg/mL Final                  BMI is >= 30 and <35. (Class 1 Obesity). The following options were offered after discussion;: exercise counseling/recommendations and nutrition counseling/recommendations           Assessment and Plan    Diagnoses and all orders for this visit:    1. Bilious vomiting with nausea (Primary)  -     Ambulatory Referral to Gastroenterology  -     Lipase    2. Gastritis without bleeding, unspecified chronicity, unspecified gastritis type  -     Ambulatory Referral to Gastroenterology    3. CKD (chronic kidney disease) stage 4, GFR 15-29 ml/min    4. Hyperkalemia    5. Primary hypertension    6. Severe episode of recurrent major depressive disorder, without psychotic features    7. Chronic obstructive pulmonary disease, unspecified COPD type    Other orders  -     ondansetron ODT (ZOFRAN-ODT) 4 MG disintegrating tablet; Place 1 tablet on the tongue Every 8 (Eight) Hours As Needed for Nausea or Vomiting.  Dispense: 60 tablet; Refill: 2  -     pantoprazole (PROTONIX) 40 MG EC tablet; Take 1 tablet by mouth 2 (Two) Times a Day.  Dispense: 180 tablet; Refill: 1  -     metoclopramide (Reglan) 5 MG tablet; Take 1 tablet by mouth 3 (Three) Times a Day With Meals.  Dispense: 90 tablet; Refill: 2        I spent 30 minutes caring for Jasmine Cerda on this date of service. This time includes time spent by me in the following activities: preparing for the  visit, reviewing tests, performing a medically appropriate examination and/or evaluation , counseling and educating the patient/family/caregiver, ordering medications, tests, or procedures and documenting information in the medical record        Follow Up     Return in about 3 months (around 9/17/2024), or if symptoms worsen or fail to improve, for Recheck. HTN panel prior. .  Patient was given instructions and counseling regarding her condition or for health maintenance advice. Please see specific information pulled into the AVS if appropriate.        Part of this note may be an electronic transcription/translation of spoken language to printed text using the Dragon Dictation System

## 2024-06-18 ENCOUNTER — TELEPHONE (OUTPATIENT)
Dept: FAMILY MEDICINE CLINIC | Facility: CLINIC | Age: 64
End: 2024-06-18

## 2024-06-18 LAB
25(OH)D3 SERPL-MCNC: 66.2 NG/ML (ref 30–100)
DEPRECATED RDW RBC AUTO: 44.5 FL (ref 37–54)
ERYTHROCYTE [DISTWIDTH] IN BLOOD BY AUTOMATED COUNT: 14.1 % (ref 12.3–15.4)
HCT VFR BLD AUTO: 36 % (ref 34–46.6)
HGB BLD-MCNC: 11.9 G/DL (ref 12–15.9)
MCH RBC QN AUTO: 29.2 PG (ref 26.6–33)
MCHC RBC AUTO-ENTMCNC: 33.1 G/DL (ref 31.5–35.7)
MCV RBC AUTO: 88.5 FL (ref 79–97)
PLATELET # BLD AUTO: 236 10*3/MM3 (ref 140–450)
PMV BLD AUTO: 9.3 FL (ref 6–12)
RBC # BLD AUTO: 4.07 10*6/MM3 (ref 3.77–5.28)
VIT B12 BLD-MCNC: 1370 PG/ML (ref 211–946)
WBC NRBC COR # BLD AUTO: 7.1 10*3/MM3 (ref 3.4–10.8)

## 2024-06-18 NOTE — TELEPHONE ENCOUNTER
Caller: JARED    Relationship: Other    Best call back number: [phone]     What form or medical record are you requesting: RESULTS FROM MOST RECENT LABS DRAWN    Who is requesting this form or medical record from you: [facility]    How would you like to receive the form or medical records (pick-up, mail, fax): FAX    If fax, what is the fax number: [phone]    Timeframe paperwork needed: ASAP    Additional notes: PATIENT HAS APPOINTMENT ON 06/25/24 AND THEY ARE NEEDING THE RESULTS OF HER MOST RECENT LABS.     PLEASE CALL TO ADVISE WHEN THESE HAVE BEEN FAXED OVER TO THEM.

## 2024-06-19 ENCOUNTER — OFFICE VISIT (OUTPATIENT)
Dept: PSYCHIATRY | Facility: CLINIC | Age: 64
End: 2024-06-19
Payer: MEDICARE

## 2024-06-19 DIAGNOSIS — Z79.899 ENCOUNTER FOR LONG-TERM (CURRENT) USE OF OTHER MEDICATIONS: ICD-10-CM

## 2024-06-19 DIAGNOSIS — F43.12 CHRONIC POST-TRAUMATIC STRESS DISORDER (PTSD): Chronic | ICD-10-CM

## 2024-06-19 DIAGNOSIS — F51.04 PSYCHOPHYSIOLOGICAL INSOMNIA: Chronic | ICD-10-CM

## 2024-06-19 DIAGNOSIS — F33.2 SEVERE EPISODE OF RECURRENT MAJOR DEPRESSIVE DISORDER, WITHOUT PSYCHOTIC FEATURES: Primary | Chronic | ICD-10-CM

## 2024-06-19 PROCEDURE — 1159F MED LIST DOCD IN RCRD: CPT | Performed by: PSYCHIATRY & NEUROLOGY

## 2024-06-19 PROCEDURE — 1160F RVW MEDS BY RX/DR IN RCRD: CPT | Performed by: PSYCHIATRY & NEUROLOGY

## 2024-06-19 PROCEDURE — 99214 OFFICE O/P EST MOD 30 MIN: CPT | Performed by: PSYCHIATRY & NEUROLOGY

## 2024-06-19 RX ORDER — ALPRAZOLAM 0.5 MG/1
0.5 TABLET ORAL 3 TIMES DAILY PRN
Qty: 90 TABLET | Refills: 2 | Status: SHIPPED | OUTPATIENT
Start: 2024-06-19

## 2024-06-19 RX ORDER — MIRTAZAPINE 30 MG/1
30 TABLET, FILM COATED ORAL
Qty: 90 TABLET | Refills: 1 | Status: SHIPPED | OUTPATIENT
Start: 2024-06-19

## 2024-06-19 RX ORDER — CITALOPRAM 40 MG/1
40 TABLET ORAL EVERY MORNING
Qty: 90 TABLET | Refills: 1 | Status: SHIPPED | OUTPATIENT
Start: 2024-06-19

## 2024-06-19 RX ORDER — QUETIAPINE FUMARATE 100 MG/1
100 TABLET, FILM COATED ORAL
Qty: 90 TABLET | Refills: 1 | Status: SHIPPED | OUTPATIENT
Start: 2024-06-19

## 2024-06-19 NOTE — PROGRESS NOTES
Subjective   Jasmine Cerda is a 64 y.o. female who presents today for follow up     Chief Complaint:   depression anxiety insomnia fatigue     History of Present Illness:   The pt suffers from depression for a long time, was on multiple meds    The pt was more depressed last few months 2ry to family situation , her son attempted suicide last year and  relapsed on drugs  Last visit Seroquel was increased to 60  mg  ,   The pt is still on celexa ,remeron,  trazodone,  xanax and gabapentin   Neurologist - no concerns about psych meds, she was dsd with parkinsons     Today the pt reported still feeling   depressed, anxious, not feeling well physically, she was admitted to the hospital with acute kidney injury, will see nephrology   The pt  can not stop worrying about her son who is using drugs and  homeless, he lives in the car   Depression is rated as 8/10, denied SI/HI   Depression is associated with anhedonia , no desires to do anything,  decreased E level   poor sleep  , frequent awakening, total 2-3 hrs per night      Precipitating and Ameliorating Factors: relationship problems   Alleviating factors - to spend time with her grand daughter         PAST PSYCHIATRIC HISTORY      Previous Psychiatric Diagnoses   Axis I: Anxiety/Panic Disorder     Past Hospitalizations or Residential Treatment   Locations\Providers: none      Past Outpatient Treatment   Diagnosis Treated: Anxiety/Panic Dis.  Treatment Type: Medication Management  Location: with PCP, Dr Manning      Prior Psychiatric Medications   Comments: xanax - effective      celexa- not effective   zoloft - not effective  cymbalta - not effective      Prozac - GI sxs   ambien - side effects   Risperidone - not effective and side effects   Hydroxyzine - not effective  Abilify - not effective   Trazodone - not effective     Consequences of Mental Disorder   Consequences: emotional distress     SOCIAL HISTORY     Number of children: 2  Number of grandkids:  1  Current Relationship is: supportive  Family of Origin is: supportive  Comments: Patient has never smoked.  Passive Smoke: N  Alcohol Use: N  Drug Use: N  HIV/High Risk: N        Current Living Situation   Lives with: spouse     Education   Level: high school graduate     Employment   Job Status: disabled     Hobbies and Leisure Activities   Activity Type: quiet activities     Smoking History   Smoking Hx: Never smoker     Exercise   Exercise sessions (per wk): 1     Illicit Drug Use   Illicit Drugs used: no     FAMILY HISTORY OF MENTAL DISORDERS   fh Grandparents: Non-contributory  fh Mother: Non-contributory  fh Father: Non-contributory  fh Siblings: Non-contributory  fh Other: Non-contributory  The following portions of the patient's history were reviewed and updated as appropriate: allergies, current medications, past family history, past medical history, past social history, past surgical history and problem list.            Interval History  No changes, still depressed  and anxious       Side Effects  Denied       Past Medical History:  Past Medical History:   Diagnosis Date    Anxiety     Breast cyst     CHF (congestive heart failure)     COPD (chronic obstructive pulmonary disease)     Coronary heart disease     Depression     Hyperlipidemia     Hypertension        Social History:  Social History     Socioeconomic History    Marital status:    Tobacco Use    Smoking status: Never    Smokeless tobacco: Never    Tobacco comments:     Passive Smoke: N   Vaping Use    Vaping status: Never Used   Substance and Sexual Activity    Alcohol use: No    Drug use: No    Sexual activity: Defer       Family History:  Family History   Problem Relation Age of Onset    Colon cancer Mother     Diabetes Father     Pulmonary embolism Brother     Heart failure Brother     Breast cancer Maternal Aunt        Past Surgical History:  Past Surgical History:   Procedure Laterality Date    APPENDECTOMY      BREAST CYST  ASPIRATION      CARDIAC CATHETERIZATION  2017    No Stents placed - [facility]    CARDIAC CATHETERIZATION N/A 2023    Procedure: Left Heart Cath possible PCI;  Surgeon: Cuauhtemoc Kwon MD;  Location: [facility] CATH INVASIVE LOCATION;  Service: Cardiovascular;  Laterality: N/A;    CERVICAL FUSION      C6-C7     SECTION      x 2    CHOLECYSTECTOMY      HYSTERECTOMY      partial       Problem List:  Patient Active Problem List   Diagnosis    Chronic post-traumatic stress disorder (PTSD)    Severe episode of recurrent major depressive disorder    Asthma    Chronic low back pain    Chronic diastolic heart failure    Coronary heart disease    Degeneration of intervertebral disc of lumbosacral region    Headache, temporal    Psychophysiological insomnia    Hyperlipidemia    Hypertension    Vitamin D deficiency    Other fatigue    Preventative health care    Hyperglycemia, unspecified     Nausea    Stable angina pectoris    Chest pain    Dyspnea    Abnormal nuclear stress test       Allergy:   No Known Allergies     Discontinued Medications:  Medications Discontinued During This Encounter   Medication Reason    citalopram (CeleXA) 40 MG tablet Reorder    mirtazapine (REMERON) 30 MG tablet Reorder    QUEtiapine (SEROquel) 100 MG tablet Reorder    ALPRAZolam (XANAX) 0.5 MG tablet Reorder               Current Medications:   Current Outpatient Medications   Medication Sig Dispense Refill    ALPRAZolam (XANAX) 0.5 MG tablet Take 1 tablet by mouth 3 (Three) Times a Day As Needed for Anxiety. for anxiety 90 tablet 2    citalopram (CeleXA) 40 MG tablet Take 1 tablet by mouth Every Morning. 90 tablet 1    mirtazapine (REMERON) 30 MG tablet Take 1 tablet by mouth every night at bedtime. 90 tablet 1    QUEtiapine (SEROquel) 100 MG tablet Take 1 tablet by mouth every night at bedtime. 90 tablet 1    Budeson-Glycopyrrol-Formoterol (Breztri Aerosphere) 160-9-4.8 MCG/ACT aerosol inhaler Inhale 2 puffs 2 (Two) Times a Day. 2  "each 0    carbidopa-levodopa (SINEMET)  MG per tablet Take 1 tab at: 6 am, 10 am, 2pm, 7 pm 120 tablet 5    carvedilol (COREG) 25 MG tablet Take 1 tablet by mouth 2 (Two) Times a Day. 180 tablet 3    cyanocobalamin 1000 MCG/ML injection INJECT 1 ML INTO THE APPROPRIATE MUSCLE AS DIRECTED BY PRESCRIBER EVERY 28 (TWENTY-EIGHT) DAYS. 3 mL 1    gabapentin (NEURONTIN) 300 MG capsule Take 1 capsule by mouth 3 (Three) Times a Day. 270 capsule 1    hydrOXYzine (ATARAX) 25 MG tablet Take 1 tablet by mouth 3 (Three) Times a Day As Needed for Anxiety. 90 tablet 3    metoclopramide (Reglan) 5 MG tablet Take 1 tablet by mouth 3 (Three) Times a Day With Meals. 90 tablet 2    Multiple Vitamins-Minerals (MULTIVITAMIN ADULT) tablet Take 1 tablet by mouth Daily. With Omega XL      nitroglycerin (NITROSTAT) 0.4 MG SL tablet Place 1 tablet under the tongue Every 5 (Five) Minutes As Needed for Chest Pain. Take no more than 3 doses in 15 minutes. 25 tablet 2    ondansetron ODT (ZOFRAN-ODT) 4 MG disintegrating tablet Place 1 tablet on the tongue Every 8 (Eight) Hours As Needed for Nausea or Vomiting. 60 tablet 2    pantoprazole (PROTONIX) 40 MG EC tablet Take 1 tablet by mouth 2 (Two) Times a Day. 180 tablet 1    rosuvastatin (CRESTOR) 40 MG tablet Take 1 tablet by mouth Every Evening. 90 tablet 1    sodium bicarbonate 650 MG tablet Take 1 tablet by mouth 3 (Three) Times a Day for 30 days. 90 tablet 0    Syringe/Needle, Disp, 23G X 1\" 3 ML misc Use to inject B12 every 30 days intramuscularly 12 each 0    vitamin D (ERGOCALCIFEROL) 1.25 MG (27115 UT) capsule capsule Take 1 capsule by mouth 1 (One) Time Per Week. 15 capsule 3     No current facility-administered medications for this visit.         Review of Symptoms:    Psychiatric/Behavioral: Negative for agitation, behavioral problems, confusion, decreased concentration, dysphoric mood, hallucinations, self-injury, sleep disturbance and suicidal ideas.   The patient is  Still   " depressed,  Still very  nervous/anxious and is not hyperactive.        Physical Exam:   There were no vitals taken for this visit.    Mental Status Exam:   Hygiene:   good  Cooperation:  Cooperative  Eye Contact:  Good  Psychomotor Behavior:  Appropriate  Affect:  Appropriate    Mood: depressed and fluctates,  Anxious    Hopelessness: Denies  Speech:  Normal  Thought Process:  Goal directed and Linear  Thought Content:  Normal  Suicidal:  None  Homicidal:  None  Hallucinations:  None  Delusion:  None  Memory:  Intact  Orientation:  Person, Place, Time and Situation  Reliability:  fair  Insight:  Good  Judgement:  Good  Impulse Control:  Good  Physical/Medical Issues:  Yes GI issues         MSE from 2/29/24    reviewed and accepted with changes     PHQ-9 Depression Screening  Little interest or pleasure in doing things? 2-->more than half the days   Feeling down, depressed, or hopeless? 2-->more than half the days   Trouble falling or staying asleep, or sleeping too much? 1-->several days   Feeling tired or having little energy? 3-->nearly every day   Poor appetite or overeating? 2-->more than half the days   Feeling bad about yourself - or that you are a failure or have let yourself or your family down? 2-->more than half the days   Trouble concentrating on things, such as reading the newspaper or watching television? 1-->several days   Moving or speaking so slowly that other people could have noticed? Or the opposite - being so fidgety or restless that you have been moving around a lot more than usual? 1-->several days   Thoughts that you would be better off dead, or of hurting yourself in some way? 0-->not at all   PHQ-9 Total Score 14   If you checked off any problems, how difficult have these problems made it for you to do your work, take care of things at home, or get along with other people? very difficult       Never smoker    I advised Jasmine of the risks of tobacco use.     Lab Results:   Office Visit on  06/17/2024   Component Date Value Ref Range Status    Lipase 06/17/2024 46  13 - 60 U/L Final   Lab on 06/17/2024   Component Date Value Ref Range Status    Total Cholesterol 06/17/2024 233 (H)  0 - 200 mg/dL Final    Triglycerides 06/17/2024 194 (H)  0 - 150 mg/dL Final    HDL Cholesterol 06/17/2024 41  40 - 60 mg/dL Final    LDL Cholesterol  06/17/2024 156 (H)  0 - 100 mg/dL Final    VLDL Cholesterol 06/17/2024 36  5 - 40 mg/dL Final    LDL/HDL Ratio 06/17/2024 3.74   Final    Glucose 06/17/2024 96  65 - 99 mg/dL Final    BUN 06/17/2024 7 (L)  8 - 23 mg/dL Final    Creatinine 06/17/2024 1.16 (H)  0.57 - 1.00 mg/dL Final    Sodium 06/17/2024 143  136 - 145 mmol/L Final    Potassium 06/17/2024 3.7  3.5 - 5.2 mmol/L Final    Chloride 06/17/2024 108 (H)  98 - 107 mmol/L Final    CO2 06/17/2024 23.4  22.0 - 29.0 mmol/L Final    Calcium 06/17/2024 10.3  8.6 - 10.5 mg/dL Final    Total Protein 06/17/2024 6.4  6.0 - 8.5 g/dL Final    Albumin 06/17/2024 4.3  3.5 - 5.2 g/dL Final    ALT (SGPT) 06/17/2024 9  1 - 33 U/L Final    AST (SGOT) 06/17/2024 18  1 - 32 U/L Final    Alkaline Phosphatase 06/17/2024 58  39 - 117 U/L Final    Total Bilirubin 06/17/2024 0.7  0.0 - 1.2 mg/dL Final    Globulin 06/17/2024 2.1  gm/dL Final    A/G Ratio 06/17/2024 2.0  g/dL Final    BUN/Creatinine Ratio 06/17/2024 6.0 (L)  7.0 - 25.0 Final    Anion Gap 06/17/2024 11.6  5.0 - 15.0 mmol/L Final    eGFR 06/17/2024 52.1 (L)  >60.0 mL/min/1.73 Final    WBC 06/17/2024 7.10  3.40 - 10.80 10*3/mm3 Final    RBC 06/17/2024 4.07  3.77 - 5.28 10*6/mm3 Final    Hemoglobin 06/17/2024 11.9 (L)  12.0 - 15.9 g/dL Final    Hematocrit 06/17/2024 36.0  34.0 - 46.6 % Final    MCV 06/17/2024 88.5  79.0 - 97.0 fL Final    MCH 06/17/2024 29.2  26.6 - 33.0 pg Final    MCHC 06/17/2024 33.1  31.5 - 35.7 g/dL Final    RDW 06/17/2024 14.1  12.3 - 15.4 % Final    RDW-SD 06/17/2024 44.5  37.0 - 54.0 fl Final    MPV 06/17/2024 9.3  6.0 - 12.0 fL Final    Platelets 06/17/2024  236  140 - 450 10*3/mm3 Final    TSH 06/17/2024 0.972  0.270 - 4.200 uIU/mL Final    25 Hydroxy, Vitamin D 06/17/2024 66.2  30.0 - 100.0 ng/ml Final    Vitamin B-12 06/17/2024 1,370 (H)  211 - 946 pg/mL Final   No results displayed because visit has over 200 results.      Lab on 05/07/2024   Component Date Value Ref Range Status    Glucose 05/07/2024 96  65 - 99 mg/dL Final    BUN 05/07/2024 36 (H)  8 - 23 mg/dL Final    Creatinine 05/07/2024 2.01 (H)  0.57 - 1.00 mg/dL Final    Sodium 05/07/2024 137  136 - 145 mmol/L Final    Potassium 05/07/2024 4.6  3.5 - 5.2 mmol/L Final    Chloride 05/07/2024 98  98 - 107 mmol/L Final    CO2 05/07/2024 22.0  22.0 - 29.0 mmol/L Final    Calcium 05/07/2024 10.5  8.6 - 10.5 mg/dL Final    BUN/Creatinine Ratio 05/07/2024 17.9  7.0 - 25.0 Final    Anion Gap 05/07/2024 17.0 (H)  5.0 - 15.0 mmol/L Final    eGFR 05/07/2024 27.1 (L)  >60.0 mL/min/1.73 Final    Protein/Creatinine Ratio, Urine 05/07/2024    Final    Unable to calculate    Creatinine, Urine 05/07/2024 13.9  mg/dL Final    Total Protein, Urine 05/07/2024 <4.0  mg/dL Final    Color, UA 05/07/2024 Yellow  Yellow, Straw Final    Appearance, UA 05/07/2024 Clear  Clear Final    pH, UA 05/07/2024 8.0  5.0 - 8.0 Final    Specific Gravity, UA 05/07/2024 1.006  1.005 - 1.030 Final    Glucose, UA 05/07/2024 Negative  Negative Final    Ketones, UA 05/07/2024 Negative  Negative Final    Bilirubin, UA 05/07/2024 Negative  Negative Final    Blood, UA 05/07/2024 Negative  Negative Final    Protein, UA 05/07/2024 Negative  Negative Final    Leuk Esterase, UA 05/07/2024 Negative  Negative Final    Nitrite, UA 05/07/2024 Negative  Negative Final    Urobilinogen, UA 05/07/2024 0.2 E.U./dL  0.2 - 1.0 E.U./dL Final    RBC, UA 05/07/2024 0-2  None Seen, 0-2 /HPF Final    WBC, UA 05/07/2024 0-2  None Seen, 0-2 /HPF Final    Bacteria, UA 05/07/2024 None Seen  None Seen /HPF Final    Squamous Epithelial Cells, UA 05/07/2024 0-2  None Seen, 0-2 /HPF  Final    Hyaline KYLE Malhotra 05/07/2024 None Seen  None Seen /LPF Final    Methodology 05/07/2024 Automated Microscopy   Final       Assessment & Plan   Problems Addressed this Visit       Chronic post-traumatic stress disorder (PTSD) (Chronic)    Relevant Medications    QUEtiapine (SEROquel) 100 MG tablet    mirtazapine (REMERON) 30 MG tablet    citalopram (CeleXA) 40 MG tablet    ALPRAZolam (XANAX) 0.5 MG tablet    Other Relevant Orders    Tox[employer]ure Flex 22, Ur w/DL -    Severe episode of recurrent major depressive disorder - Primary (Chronic)    Relevant Medications    QUEtiapine (SEROquel) 100 MG tablet    mirtazapine (REMERON) 30 MG tablet    citalopram (CeleXA) 40 MG tablet    ALPRAZolam (XANAX) 0.5 MG tablet    Other Relevant Orders    ToxAssure Flex 22, Ur w/DL -    Psychophysiological insomnia (Chronic)    Relevant Medications    QUEtiapine (SEROquel) 100 MG tablet    mirtazapine (REMERON) 30 MG tablet    citalopram (CeleXA) 40 MG tablet    ALPRAZolam (XANAX) 0.5 MG tablet    Other Relevant Orders    ToxAssure Flex 22, Ur w/DL -     Other Visit Diagnoses       Encounter for long-term (current) use of other medications        Relevant Orders    ToxAssure Flex 22, Ur w/DL -          Diagnoses         Codes Comments    Severe episode of recurrent major depressive disorder, without psychotic features    -  Primary ICD-10-CM: F33.2  ICD-9-CM: 296.33     Chronic post-traumatic stress disorder (PTSD)     ICD-10-CM: F43.12  ICD-9-CM: 309.81     Psychophysiological insomnia     ICD-10-CM: F51.04  ICD-9-CM: 307.42     Encounter for long-term (current) use of other medications     ICD-10-CM: Z79.899  ICD-9-CM: V58.69             Visit Diagnoses:    ICD-10-CM ICD-9-CM   1. Severe episode of recurrent major depressive disorder, without psychotic features  F33.2 296.33   2. Chronic post-traumatic stress disorder (PTSD)  F43.12 309.81   3. Psychophysiological insomnia  F51.04 307.42   4. Encounter for long-term (current)  use of other medications  Z79.899 V58.69               TREATMENT PLAN/GOALS: Continue supportive psychotherapy efforts and medications as indicated. Treatment and medication options discussed during today's visit. Patient ackowledged and verbally consented to continue with current treatment plan and was educated on the importance of compliance with treatment and follow-up appointments.    MEDICATION ISSUES:  1. MDD - cont celexa 40 mg ,   cont   seroquel 100 mg po QHS for depression, anxiety, insomnia, reported good response   Neurology was ok with her meds   Cont  remeron   30 mg po QHS for depression and insomnia   2. PTSD - cont celexa 40 mg  (Qtc 464) , Xanax - low dose 0.5 mg TID, no increase,  long term benzo use discussed again   hydroxyzine - not effective   3. Insomnia -  cont  seroquel 100 mg po QHS    Cont  remeron and increase to 30 mg po QHS      4. Long term therapeutic drug monitoring - UDS  8/31/2021 - consistent  1/5/23 - consistent   LABORATORY - SCAN - [facility], [facility], 1/5/2023 (01/05/2023) , will repeat today        EKG 5/15/23 Qtc 464     Psychotherapy was recommended , the is reluctant to start now, will put on he list for [facility]onson supportive therapy, [facility]     INSPECT reviewed as expected, last xanax 0.5 mg # 90 for 30 days refill was on 6/9/24     Patient screened positive for depression based on a PHQ-9 score of 14 on 6/19/2024. Follow-up recommendations include: Prescribed antidepressant medication treatment.    PHQ scored 14 and indicated moderate depression   DWIGHT 7 scored 15      Discussed medication options and treatment plan of prescribed medication as well as the risks, benefits, and side effects including potential falls, possible impaired driving and metabolic adversities among others. Patient is agreeable to call the office with any worsening of symptoms or onset of side effects. Patient is agreeable to call 911 or go to the nearest ER should he/she begin  having SI/HI. No medication side effects or related complaints today.     MEDS ORDERED DURING VISIT:  New Medications Ordered This Visit   Medications    QUEtiapine (SEROquel) 100 MG tablet     Sig: Take 1 tablet by mouth every night at bedtime.     Dispense:  90 tablet     Refill:  1    mirtazapine (REMERON) 30 MG tablet     Sig: Take 1 tablet by mouth every night at bedtime.     Dispense:  90 tablet     Refill:  1    citalopram (CeleXA) 40 MG tablet     Sig: Take 1 tablet by mouth Every Morning.     Dispense:  90 tablet     Refill:  1    ALPRAZolam (XANAX) 0.5 MG tablet     Sig: Take 1 tablet by mouth 3 (Three) Times a Day As Needed for Anxiety. for anxiety     Dispense:  90 tablet     Refill:  2     Please dispense only when it is due, last fill was on 6/9/24       Return in about 4 months (around 10/19/2024).         This document has been electronically signed by Sivan Ken MD  June 19, 2024 09:09 EDT

## 2024-06-26 LAB
1OH-MIDAZOLAM UR QL SCN: NOT DETECTED NG/MG CREAT
6MAM UR QL SCN: NEGATIVE NG/ML
7AMINOCLONAZEPAM/CREAT UR: NOT DETECTED NG/MG CREAT
8OH-AMOXAPINE UR QL: NOT DETECTED
8OH-LOXAPINE UR QL SCN: NOT DETECTED
A-OH ALPRAZ/CREAT UR: 357 NG/MG CREAT
A-OH-TRIAZOLAM/CREAT UR CFM: NOT DETECTED NG/MG CREAT
ALPRAZ/CREAT UR CFM: 110 NG/MG CREAT
AMITRIP UR-MCNC: NOT DETECTED NG/ML
AMOXAPINE UR QL: NOT DETECTED
AMPHETAMINES UR QL SCN: NEGATIVE NG/ML
ANTICONVULSANTS UR: NEGATIVE
ANTIPSYCHOTICS UR: NEGATIVE
ARIPIPRAZOLE UR QL SCN: NOT DETECTED
ASENAPINE UR QL CFM: NOT DETECTED
BARBITURATES UR QL SCN: NEGATIVE NG/ML
BENZODIAZ SCN METH UR: NORMAL
BUPRENORPHINE UR QL SCN: NEGATIVE NG/ML
BUPROPION UR QL: NOT DETECTED
CANNABINOIDS UR QL SCN: NEGATIVE NG/ML
CARISOPRODOL UR QL: NEGATIVE NG/ML
CHLORPROMAZINE UR QL: NOT DETECTED
CITALOPRAM, UR: PRESENT
CLOMIPRAMINE UR-MCNC: NOT DETECTED NG/ML
CLONAZEPAM/CREAT UR CFM: NOT DETECTED NG/MG CREAT
CLOZAPINE UR QL: NOT DETECTED
COCAINE+BZE UR QL SCN: NEGATIVE NG/ML
CREAT UR-MCNC: 60 MG/DL
DESALKYLFLURAZ/CREAT UR: NOT DETECTED NG/MG CREAT
DESIPRAMINE UR-MCNC: NOT DETECTED NG/ML
DIAZEPAM/CREAT UR: NOT DETECTED NG/MG CREAT
DOXEPIN UR-MCNC: NOT DETECTED NG/ML
DULOXETINE UR QL: NOT DETECTED
ETHANOL UR QL SCN: NEGATIVE NG/ML
FENTANYL UR QL SCN: NEGATIVE NG/ML
FLUNITRAZEPAM UR QL SCN: NOT DETECTED NG/MG CREAT
FLUOXETINE UR QL SCN: NOT DETECTED
FLUPHENAZINE UR-MCNC: NOT DETECTED NG/ML
FLUVOXAMINE UR QL: NOT DETECTED
GABAPENTIN UR-MCNC: NEGATIVE UG/ML
HALOPERIDOL UR QL: NOT DETECTED
ILOPERIDONE UR QL CFM: NOT DETECTED
IMIPRAMINE UR-MCNC: NOT DETECTED NG/ML
KRATOM IA, UR: NEGATIVE NG/ML
LORAZEPAM/CREAT UR: NOT DETECTED NG/MG CREAT
LOXAPINE UR QL: NOT DETECTED
LURASIDONE UR QL CFM: NOT DETECTED
MAPROTILINE UR QL: NOT DETECTED
ME-PHENIDATE UR QL SCN: NEGATIVE NG/ML
MESORIDAZINE UR QL: NOT DETECTED
METHADONE UR QL SCN: NEGATIVE NG/ML
METHADONE+METAB UR QL SCN: NEGATIVE NG/ML
MIDAZOLAM/CREAT UR CFM: NOT DETECTED NG/MG CREAT
MIRTAZAPINE UR-MCNC: NOT DETECTED UG/ML
MOLINDONE UR QL SCN: NOT DETECTED
NEFAZODONE UR QL: NOT DETECTED
NORCITALOPRAM UR QL: PRESENT
NORCLOMIPRAMINE UR QL: NOT DETECTED
NORCLOZAPINE UR QL: NOT DETECTED
NORDIAZEPAM/CREAT UR: NOT DETECTED NG/MG CREAT
NORDOXEPIN UR QL: NOT DETECTED
NORFLUNITRAZEPAM UR-MCNC: NOT DETECTED NG/MG CREAT
NORFLUOXETINE UR-MCNC: NOT DETECTED NG/ML
NORSERTRALINE UR QL: NOT DETECTED
NORTRIP UR-MCNC: NOT DETECTED NG/ML
ODV UR-MCNC: NOT DETECTED NG/ML
OH-BUPROPION UR-MCNC: NOT DETECTED NG/ML
OLANZAPINE UR CFM-MCNC: NOT DETECTED NG/ML
OPIATES UR SCN-MCNC: NEGATIVE NG/ML
OXAZEPAM/CREAT UR: NOT DETECTED NG/MG CREAT
OXYCODONE CTO UR SCN-MCNC: NEGATIVE NG/ML
PAROXETINE UR-MCNC: NOT DETECTED NG/L
PCP UR QL SCN: NEGATIVE NG/ML
PERPHENAZINE UR QL: NOT DETECTED
PIMOZIDE, UR: NOT DETECTED
PREGABALIN UR QL SCN: NOT DETECTED
PRESCRIBED MEDICATIONS: NORMAL
PROCHLORPERAZINE UR QL: NOT DETECTED
PROPOXYPH UR QL SCN: NEGATIVE NG/ML
PROTRIP UR QL: NOT DETECTED
QUETIAPINE CTO UR CFM-MCNC: NOT DETECTED NG/ML
RISPERIDONE UR QL: NOT DETECTED
SERTRALINE UR-MCNC: NOT DETECTED NG/ML
TAPENTADOL CTO UR SCN-MCNC: NEGATIVE NG/ML
TEMAZEPAM/CREAT UR: NOT DETECTED NG/MG CREAT
THIORIDAZINE UR-MCNC: NOT DETECTED UG/ML
THIOTHIXENE UR QL: NOT DETECTED
TRAMADOL UR QL SCN: NEGATIVE NG/ML
TRAZODONE UR QL: NOT DETECTED
TRAZODONE UR-MCNC: NOT DETECTED UG/ML
TRICYCLICS TESTED UR SCN: NORMAL
TRIFPERAZINE UR QL: NOT DETECTED
TRIMIPRAMINE UR QL: NOT DETECTED
VENLAFAXINE UR QL: NOT DETECTED
VILAZ UR QL SCN: NOT DETECTED
ZIPRASIDONE UR QL SCN: NOT DETECTED

## 2024-08-21 ENCOUNTER — LAB (OUTPATIENT)
Dept: FAMILY MEDICINE CLINIC | Facility: CLINIC | Age: 64
End: 2024-08-21
Payer: MEDICARE

## 2024-08-21 ENCOUNTER — OFFICE (AMBULATORY)
Age: 64
End: 2024-08-21

## 2024-08-21 ENCOUNTER — OFFICE (AMBULATORY)
Dept: URBAN - METROPOLITAN AREA CLINIC 64 | Facility: CLINIC | Age: 64
End: 2024-08-21

## 2024-08-21 VITALS
HEART RATE: 64 BPM | HEART RATE: 64 BPM | HEIGHT: 63 IN | HEART RATE: 64 BPM | HEART RATE: 64 BPM | SYSTOLIC BLOOD PRESSURE: 101 MMHG | HEIGHT: 63 IN | DIASTOLIC BLOOD PRESSURE: 69 MMHG | DIASTOLIC BLOOD PRESSURE: 69 MMHG | DIASTOLIC BLOOD PRESSURE: 69 MMHG | SYSTOLIC BLOOD PRESSURE: 101 MMHG | HEART RATE: 64 BPM | HEIGHT: 63 IN | WEIGHT: 194 LBS | SYSTOLIC BLOOD PRESSURE: 101 MMHG | WEIGHT: 194 LBS | SYSTOLIC BLOOD PRESSURE: 101 MMHG | DIASTOLIC BLOOD PRESSURE: 69 MMHG | WEIGHT: 194 LBS | SYSTOLIC BLOOD PRESSURE: 101 MMHG | DIASTOLIC BLOOD PRESSURE: 69 MMHG | HEIGHT: 63 IN | WEIGHT: 194 LBS | WEIGHT: 194 LBS | HEIGHT: 63 IN | SYSTOLIC BLOOD PRESSURE: 101 MMHG | HEIGHT: 63 IN | HEART RATE: 64 BPM | SYSTOLIC BLOOD PRESSURE: 101 MMHG | WEIGHT: 194 LBS | DIASTOLIC BLOOD PRESSURE: 69 MMHG | HEIGHT: 63 IN | WEIGHT: 194 LBS | DIASTOLIC BLOOD PRESSURE: 69 MMHG | HEART RATE: 64 BPM

## 2024-08-21 DIAGNOSIS — K92.1 MELENA: ICD-10-CM

## 2024-08-21 DIAGNOSIS — K59.00 CONSTIPATION, UNSPECIFIED: ICD-10-CM

## 2024-08-21 DIAGNOSIS — R11.2 NAUSEA WITH VOMITING, UNSPECIFIED: ICD-10-CM

## 2024-08-21 DIAGNOSIS — R10.10 UPPER ABDOMINAL PAIN, UNSPECIFIED: ICD-10-CM

## 2024-08-21 DIAGNOSIS — N18.30 STAGE 3 CHRONIC KIDNEY DISEASE, UNSPECIFIED WHETHER STAGE 3A OR 3B CKD: Primary | ICD-10-CM

## 2024-08-21 PROCEDURE — 82570 ASSAY OF URINE CREATININE: CPT | Performed by: INTERNAL MEDICINE

## 2024-08-21 PROCEDURE — 99204 OFFICE O/P NEW MOD 45 MIN: CPT | Performed by: NURSE PRACTITIONER

## 2024-08-21 PROCEDURE — 80048 BASIC METABOLIC PNL TOTAL CA: CPT | Performed by: INTERNAL MEDICINE

## 2024-08-21 PROCEDURE — 36415 COLL VENOUS BLD VENIPUNCTURE: CPT

## 2024-08-21 PROCEDURE — 85025 COMPLETE CBC W/AUTO DIFF WBC: CPT | Performed by: INTERNAL MEDICINE

## 2024-08-21 PROCEDURE — 84156 ASSAY OF PROTEIN URINE: CPT | Performed by: INTERNAL MEDICINE

## 2024-08-21 RX ORDER — PROMETHAZINE HYDROCHLORIDE 25 MG/1
TABLET ORAL
Qty: 30 | Refills: 6 | Status: COMPLETED
Start: 2024-08-21 | End: 2024-11-14

## 2024-08-21 RX ORDER — PANTOPRAZOLE SODIUM 40 MG/1
80 TABLET, DELAYED RELEASE ORAL
Qty: 180 | Refills: 3 | Status: ACTIVE
Start: 2024-08-21

## 2024-08-22 LAB
ANION GAP SERPL CALCULATED.3IONS-SCNC: 10 MMOL/L (ref 5–15)
BASOPHILS # BLD AUTO: 0.09 10*3/MM3 (ref 0–0.2)
BASOPHILS NFR BLD AUTO: 1.2 % (ref 0–1.5)
BUN SERPL-MCNC: 21 MG/DL (ref 8–23)
BUN/CREAT SERPL: 16.3 (ref 7–25)
CALCIUM SPEC-SCNC: 11.1 MG/DL (ref 8.6–10.5)
CHLORIDE SERPL-SCNC: 103 MMOL/L (ref 98–107)
CO2 SERPL-SCNC: 23 MMOL/L (ref 22–29)
CREAT SERPL-MCNC: 1.29 MG/DL (ref 0.57–1)
CREAT UR-MCNC: 178.7 MG/DL
DEPRECATED RDW RBC AUTO: 45.4 FL (ref 37–54)
EGFRCR SERPLBLD CKD-EPI 2021: 45.9 ML/MIN/1.73
EOSINOPHIL # BLD AUTO: 0.17 10*3/MM3 (ref 0–0.4)
EOSINOPHIL NFR BLD AUTO: 2.3 % (ref 0.3–6.2)
ERYTHROCYTE [DISTWIDTH] IN BLOOD BY AUTOMATED COUNT: 14 % (ref 12.3–15.4)
GLUCOSE SERPL-MCNC: 86 MG/DL (ref 65–99)
HCT VFR BLD AUTO: 41.6 % (ref 34–46.6)
HGB BLD-MCNC: 13.6 G/DL (ref 12–15.9)
IMM GRANULOCYTES # BLD AUTO: 0.01 10*3/MM3 (ref 0–0.05)
IMM GRANULOCYTES NFR BLD AUTO: 0.1 % (ref 0–0.5)
LYMPHOCYTES # BLD AUTO: 3.32 10*3/MM3 (ref 0.7–3.1)
LYMPHOCYTES NFR BLD AUTO: 45.6 % (ref 19.6–45.3)
MCH RBC QN AUTO: 29.1 PG (ref 26.6–33)
MCHC RBC AUTO-ENTMCNC: 32.7 G/DL (ref 31.5–35.7)
MCV RBC AUTO: 88.9 FL (ref 79–97)
MONOCYTES # BLD AUTO: 0.53 10*3/MM3 (ref 0.1–0.9)
MONOCYTES NFR BLD AUTO: 7.3 % (ref 5–12)
NEUTROPHILS NFR BLD AUTO: 3.16 10*3/MM3 (ref 1.7–7)
NEUTROPHILS NFR BLD AUTO: 43.5 % (ref 42.7–76)
NRBC BLD AUTO-RTO: 0 /100 WBC (ref 0–0.2)
PLATELET # BLD AUTO: 251 10*3/MM3 (ref 140–450)
PMV BLD AUTO: 10.2 FL (ref 6–12)
POTASSIUM SERPL-SCNC: 6 MMOL/L (ref 3.5–5.2)
PROT ?TM UR-MCNC: 20.8 MG/DL
PROT/CREAT UR: 116.4 MG/G CREA (ref 0–200)
RBC # BLD AUTO: 4.68 10*6/MM3 (ref 3.77–5.28)
SODIUM SERPL-SCNC: 136 MMOL/L (ref 136–145)
WBC NRBC COR # BLD AUTO: 7.28 10*3/MM3 (ref 3.4–10.8)

## 2024-08-27 ENCOUNTER — LAB (OUTPATIENT)
Dept: FAMILY MEDICINE CLINIC | Facility: CLINIC | Age: 64
End: 2024-08-27
Payer: MEDICARE

## 2024-08-27 DIAGNOSIS — E87.5 SERUM POTASSIUM ELEVATED: Primary | ICD-10-CM

## 2024-08-27 PROCEDURE — 36415 COLL VENOUS BLD VENIPUNCTURE: CPT

## 2024-08-27 PROCEDURE — 80048 BASIC METABOLIC PNL TOTAL CA: CPT | Performed by: INTERNAL MEDICINE

## 2024-08-28 ENCOUNTER — PRIOR AUTHORIZATION (OUTPATIENT)
Dept: FAMILY MEDICINE CLINIC | Facility: CLINIC | Age: 64
End: 2024-08-28
Payer: MEDICARE

## 2024-08-28 LAB
ANION GAP SERPL CALCULATED.3IONS-SCNC: 14.1 MMOL/L (ref 5–15)
BUN SERPL-MCNC: 22 MG/DL (ref 8–23)
BUN/CREAT SERPL: 15.7 (ref 7–25)
CALCIUM SPEC-SCNC: 11.3 MG/DL (ref 8.6–10.5)
CHLORIDE SERPL-SCNC: 107 MMOL/L (ref 98–107)
CO2 SERPL-SCNC: 18.9 MMOL/L (ref 22–29)
CREAT SERPL-MCNC: 1.4 MG/DL (ref 0.57–1)
EGFRCR SERPLBLD CKD-EPI 2021: 41.6 ML/MIN/1.73
GLUCOSE SERPL-MCNC: 87 MG/DL (ref 65–99)
POTASSIUM SERPL-SCNC: 4.9 MMOL/L (ref 3.5–5.2)
SODIUM SERPL-SCNC: 140 MMOL/L (ref 136–145)

## 2024-08-30 RX ORDER — ONDANSETRON 4 MG/1
4 TABLET, ORALLY DISINTEGRATING ORAL EVERY 8 HOURS PRN
Qty: 60 TABLET | Refills: 0 | Status: SHIPPED | OUTPATIENT
Start: 2024-08-30

## 2024-09-11 DIAGNOSIS — I10 PRIMARY HYPERTENSION: ICD-10-CM

## 2024-09-11 DIAGNOSIS — E78.2 MIXED HYPERLIPIDEMIA: Primary | ICD-10-CM

## 2024-09-18 ENCOUNTER — LAB (OUTPATIENT)
Dept: FAMILY MEDICINE CLINIC | Facility: CLINIC | Age: 64
End: 2024-09-18
Payer: MEDICARE

## 2024-09-18 PROCEDURE — 36415 COLL VENOUS BLD VENIPUNCTURE: CPT | Performed by: NURSE PRACTITIONER

## 2024-09-18 PROCEDURE — 80053 COMPREHEN METABOLIC PANEL: CPT | Performed by: NURSE PRACTITIONER

## 2024-09-18 PROCEDURE — 80061 LIPID PANEL: CPT | Performed by: NURSE PRACTITIONER

## 2024-09-18 PROCEDURE — 84443 ASSAY THYROID STIM HORMONE: CPT | Performed by: NURSE PRACTITIONER

## 2024-09-18 PROCEDURE — 85027 COMPLETE CBC AUTOMATED: CPT | Performed by: NURSE PRACTITIONER

## 2024-09-18 RX ORDER — ONDANSETRON 4 MG/1
4 TABLET, ORALLY DISINTEGRATING ORAL EVERY 8 HOURS PRN
Qty: 60 TABLET | Refills: 0 | Status: SHIPPED | OUTPATIENT
Start: 2024-09-18

## 2024-09-19 DIAGNOSIS — E53.8 B12 DEFICIENCY: ICD-10-CM

## 2024-09-19 LAB
ALBUMIN SERPL-MCNC: 4.3 G/DL (ref 3.5–5.2)
ALBUMIN/GLOB SERPL: 1.9 G/DL
ALP SERPL-CCNC: 56 U/L (ref 39–117)
ALT SERPL W P-5'-P-CCNC: <5 U/L (ref 1–33)
ANION GAP SERPL CALCULATED.3IONS-SCNC: 11.2 MMOL/L (ref 5–15)
AST SERPL-CCNC: 12 U/L (ref 1–32)
BILIRUB SERPL-MCNC: 0.5 MG/DL (ref 0–1.2)
BUN SERPL-MCNC: 10 MG/DL (ref 8–23)
BUN/CREAT SERPL: 7.7 (ref 7–25)
CALCIUM SPEC-SCNC: 10.2 MG/DL (ref 8.6–10.5)
CHLORIDE SERPL-SCNC: 109 MMOL/L (ref 98–107)
CHOLEST SERPL-MCNC: 114 MG/DL (ref 0–200)
CO2 SERPL-SCNC: 24.8 MMOL/L (ref 22–29)
CREAT SERPL-MCNC: 1.3 MG/DL (ref 0.57–1)
DEPRECATED RDW RBC AUTO: 44 FL (ref 37–54)
EGFRCR SERPLBLD CKD-EPI 2021: 45.4 ML/MIN/1.73
ERYTHROCYTE [DISTWIDTH] IN BLOOD BY AUTOMATED COUNT: 14.1 % (ref 12.3–15.4)
GLOBULIN UR ELPH-MCNC: 2.3 GM/DL
GLUCOSE SERPL-MCNC: 85 MG/DL (ref 65–99)
HCT VFR BLD AUTO: 39.7 % (ref 34–46.6)
HDLC SERPL-MCNC: 46 MG/DL (ref 40–60)
HGB BLD-MCNC: 13.5 G/DL (ref 12–15.9)
LDLC SERPL CALC-MCNC: 40 MG/DL (ref 0–100)
LDLC/HDLC SERPL: 0.74 {RATIO}
MCH RBC QN AUTO: 29.5 PG (ref 26.6–33)
MCHC RBC AUTO-ENTMCNC: 34 G/DL (ref 31.5–35.7)
MCV RBC AUTO: 86.9 FL (ref 79–97)
PLATELET # BLD AUTO: 238 10*3/MM3 (ref 140–450)
PMV BLD AUTO: 9.9 FL (ref 6–12)
POTASSIUM SERPL-SCNC: 3.4 MMOL/L (ref 3.5–5.2)
PROT SERPL-MCNC: 6.6 G/DL (ref 6–8.5)
RBC # BLD AUTO: 4.57 10*6/MM3 (ref 3.77–5.28)
SODIUM SERPL-SCNC: 145 MMOL/L (ref 136–145)
TRIGL SERPL-MCNC: 169 MG/DL (ref 0–150)
TSH SERPL DL<=0.05 MIU/L-ACNC: 1.35 UIU/ML (ref 0.27–4.2)
VLDLC SERPL-MCNC: 28 MG/DL (ref 5–40)
WBC NRBC COR # BLD AUTO: 6.74 10*3/MM3 (ref 3.4–10.8)

## 2024-09-19 RX ORDER — CYANOCOBALAMIN 1000 UG/ML
INJECTION, SOLUTION INTRAMUSCULAR; SUBCUTANEOUS
Qty: 3 ML | Refills: 3 | Status: SHIPPED | OUTPATIENT
Start: 2024-09-19

## 2024-09-23 ENCOUNTER — HOSPITAL ENCOUNTER (EMERGENCY)
Facility: HOSPITAL | Age: 64
Discharge: HOME | End: 2024-09-23
Admitting: EMERGENCY MEDICINE
Payer: MEDICARE

## 2024-09-23 ENCOUNTER — TELEPHONE (OUTPATIENT)
Dept: FAMILY MEDICINE CLINIC | Facility: CLINIC | Age: 64
End: 2024-09-23
Payer: MEDICARE

## 2024-09-23 VITALS
SYSTOLIC BLOOD PRESSURE: 120 MMHG | WEIGHT: 185 LBS | TEMPERATURE: 98.9 F | RESPIRATION RATE: 18 BRPM | BODY MASS INDEX: 34.04 KG/M2 | OXYGEN SATURATION: 94 % | HEIGHT: 62 IN | DIASTOLIC BLOOD PRESSURE: 66 MMHG | HEART RATE: 79 BPM

## 2024-09-23 DIAGNOSIS — M79.673 ACUTE FOOT PAIN, UNSPECIFIED LATERALITY: Primary | ICD-10-CM

## 2024-09-23 DIAGNOSIS — M25.562 ACUTE PAIN OF LEFT KNEE: ICD-10-CM

## 2024-09-23 LAB
ALBUMIN SERPL-MCNC: 4.3 G/DL (ref 3.5–5.2)
ALBUMIN/GLOB SERPL: 1.7 G/DL
ALP SERPL-CCNC: 48 U/L (ref 39–117)
ALT SERPL W P-5'-P-CCNC: 6 U/L (ref 1–33)
ANION GAP SERPL CALCULATED.3IONS-SCNC: 10.6 MMOL/L (ref 5–15)
AST SERPL-CCNC: 15 U/L (ref 1–32)
BASOPHILS # BLD AUTO: 0.06 10*3/MM3 (ref 0–0.2)
BASOPHILS NFR BLD AUTO: 0.6 % (ref 0–1.5)
BILIRUB SERPL-MCNC: 1.4 MG/DL (ref 0–1.2)
BUN SERPL-MCNC: 11 MG/DL (ref 8–23)
BUN/CREAT SERPL: 7.7 (ref 7–25)
CALCIUM SPEC-SCNC: 10.1 MG/DL (ref 8.6–10.5)
CHLORIDE SERPL-SCNC: 104 MMOL/L (ref 98–107)
CO2 SERPL-SCNC: 25.4 MMOL/L (ref 22–29)
CREAT SERPL-MCNC: 1.42 MG/DL (ref 0.57–1)
CRP SERPL-MCNC: 7.26 MG/DL (ref 0–0.5)
DEPRECATED RDW RBC AUTO: 42 FL (ref 37–54)
EGFRCR SERPLBLD CKD-EPI 2021: 40.9 ML/MIN/1.73
EOSINOPHIL # BLD AUTO: 0.03 10*3/MM3 (ref 0–0.4)
EOSINOPHIL NFR BLD AUTO: 0.3 % (ref 0.3–6.2)
ERYTHROCYTE [DISTWIDTH] IN BLOOD BY AUTOMATED COUNT: 13.5 % (ref 12.3–15.4)
ERYTHROCYTE [SEDIMENTATION RATE] IN BLOOD: 10 MM/HR (ref 0–30)
GLOBULIN UR ELPH-MCNC: 2.6 GM/DL
GLUCOSE SERPL-MCNC: 107 MG/DL (ref 65–99)
HCT VFR BLD AUTO: 38.3 % (ref 34–46.6)
HGB BLD-MCNC: 13.1 G/DL (ref 12–15.9)
IMM GRANULOCYTES # BLD AUTO: 0.02 10*3/MM3 (ref 0–0.05)
IMM GRANULOCYTES NFR BLD AUTO: 0.2 % (ref 0–0.5)
LYMPHOCYTES # BLD AUTO: 2.06 10*3/MM3 (ref 0.7–3.1)
LYMPHOCYTES NFR BLD AUTO: 19.9 % (ref 19.6–45.3)
MCH RBC QN AUTO: 29.1 PG (ref 26.6–33)
MCHC RBC AUTO-ENTMCNC: 34.2 G/DL (ref 31.5–35.7)
MCV RBC AUTO: 85.1 FL (ref 79–97)
MONOCYTES # BLD AUTO: 1.02 10*3/MM3 (ref 0.1–0.9)
MONOCYTES NFR BLD AUTO: 9.8 % (ref 5–12)
NEUTROPHILS NFR BLD AUTO: 69.2 % (ref 42.7–76)
NEUTROPHILS NFR BLD AUTO: 7.17 10*3/MM3 (ref 1.7–7)
NRBC BLD AUTO-RTO: 0 /100 WBC (ref 0–0.2)
PLATELET # BLD AUTO: 169 10*3/MM3 (ref 140–450)
PMV BLD AUTO: 9.6 FL (ref 6–12)
POTASSIUM SERPL-SCNC: 3.4 MMOL/L (ref 3.5–5.2)
PROT SERPL-MCNC: 6.9 G/DL (ref 6–8.5)
RBC # BLD AUTO: 4.5 10*6/MM3 (ref 3.77–5.28)
SODIUM SERPL-SCNC: 140 MMOL/L (ref 136–145)
URATE SERPL-MCNC: 2.8 MG/DL (ref 2.4–5.7)
WBC NRBC COR # BLD AUTO: 10.36 10*3/MM3 (ref 3.4–10.8)

## 2024-09-23 PROCEDURE — 84550 ASSAY OF BLOOD/URIC ACID: CPT | Performed by: PHYSICIAN ASSISTANT

## 2024-09-23 PROCEDURE — 80053 COMPREHEN METABOLIC PANEL: CPT

## 2024-09-23 PROCEDURE — 96374 THER/PROPH/DIAG INJ IV PUSH: CPT

## 2024-09-23 PROCEDURE — 85025 COMPLETE CBC W/AUTO DIFF WBC: CPT

## 2024-09-23 PROCEDURE — 99283 EMERGENCY DEPT VISIT LOW MDM: CPT

## 2024-09-23 PROCEDURE — 86140 C-REACTIVE PROTEIN: CPT | Performed by: PHYSICIAN ASSISTANT

## 2024-09-23 PROCEDURE — 85652 RBC SED RATE AUTOMATED: CPT | Performed by: PHYSICIAN ASSISTANT

## 2024-09-23 PROCEDURE — 25010000002 DEXAMETHASONE SODIUM PHOSPHATE 10 MG/ML SOLUTION: Performed by: PHYSICIAN ASSISTANT

## 2024-09-23 RX ORDER — PREDNISONE 20 MG/1
20 TABLET ORAL DAILY
Qty: 3 TABLET | Refills: 0 | Status: SHIPPED | OUTPATIENT
Start: 2024-09-23 | End: 2024-09-23 | Stop reason: SDUPTHER

## 2024-09-23 RX ORDER — PREDNISONE 20 MG/1
20 TABLET ORAL 2 TIMES DAILY
Qty: 6 TABLET | Refills: 0 | Status: SHIPPED | OUTPATIENT
Start: 2024-09-23 | End: 2024-09-26

## 2024-09-23 RX ORDER — HYDROCODONE BITARTRATE AND ACETAMINOPHEN 5; 325 MG/1; MG/1
1 TABLET ORAL ONCE
Status: COMPLETED | OUTPATIENT
Start: 2024-09-23 | End: 2024-09-23

## 2024-09-23 RX ORDER — COLCHICINE 0.6 MG/1
0.6 TABLET ORAL 2 TIMES DAILY PRN
Qty: 30 TABLET | Refills: 0 | Status: SHIPPED | OUTPATIENT
Start: 2024-09-23

## 2024-09-23 RX ORDER — DEXAMETHASONE SODIUM PHOSPHATE 10 MG/ML
10 INJECTION, SOLUTION INTRAMUSCULAR; INTRAVENOUS ONCE
Status: COMPLETED | OUTPATIENT
Start: 2024-09-23 | End: 2024-09-23

## 2024-09-23 RX ORDER — SODIUM CHLORIDE 0.9 % (FLUSH) 0.9 %
10 SYRINGE (ML) INJECTION AS NEEDED
Status: DISCONTINUED | OUTPATIENT
Start: 2024-09-23 | End: 2024-09-23 | Stop reason: HOSPADM

## 2024-09-23 RX ORDER — HYDROCODONE BITARTRATE AND ACETAMINOPHEN 5; 325 MG/1; MG/1
1 TABLET ORAL EVERY 6 HOURS PRN
Qty: 12 TABLET | Refills: 0 | Status: SHIPPED | OUTPATIENT
Start: 2024-09-23 | End: 2024-09-25 | Stop reason: SDUPTHER

## 2024-09-23 RX ADMIN — DEXAMETHASONE SODIUM PHOSPHATE 10 MG: 10 INJECTION, SOLUTION INTRAMUSCULAR; INTRAVENOUS at 13:38

## 2024-09-23 RX ADMIN — HYDROCODONE BITARTRATE AND ACETAMINOPHEN 1 TABLET: 5; 325 TABLET ORAL at 13:37

## 2024-09-25 ENCOUNTER — OFFICE VISIT (OUTPATIENT)
Dept: FAMILY MEDICINE CLINIC | Facility: CLINIC | Age: 64
End: 2024-09-25
Payer: MEDICARE

## 2024-09-25 VITALS
OXYGEN SATURATION: 97 % | BODY MASS INDEX: 34.08 KG/M2 | RESPIRATION RATE: 22 BRPM | TEMPERATURE: 97.6 F | WEIGHT: 185.2 LBS | HEIGHT: 62 IN | DIASTOLIC BLOOD PRESSURE: 70 MMHG | HEART RATE: 61 BPM | SYSTOLIC BLOOD PRESSURE: 108 MMHG

## 2024-09-25 DIAGNOSIS — M10.39 ACUTE GOUT DUE TO RENAL IMPAIRMENT INVOLVING MULTIPLE SITES: ICD-10-CM

## 2024-09-25 DIAGNOSIS — F43.12 CHRONIC POST-TRAUMATIC STRESS DISORDER (PTSD): Chronic | ICD-10-CM

## 2024-09-25 DIAGNOSIS — K21.00 GASTROESOPHAGEAL REFLUX DISEASE WITH ESOPHAGITIS WITHOUT HEMORRHAGE: ICD-10-CM

## 2024-09-25 DIAGNOSIS — I10 PRIMARY HYPERTENSION: ICD-10-CM

## 2024-09-25 DIAGNOSIS — Z23 NEED FOR TDAP VACCINATION: ICD-10-CM

## 2024-09-25 DIAGNOSIS — M79.673 ACUTE FOOT PAIN, UNSPECIFIED LATERALITY: ICD-10-CM

## 2024-09-25 DIAGNOSIS — E78.2 MIXED HYPERLIPIDEMIA: Primary | ICD-10-CM

## 2024-09-25 DIAGNOSIS — J44.1 CHRONIC OBSTRUCTIVE PULMONARY DISEASE WITH ACUTE EXACERBATION: ICD-10-CM

## 2024-09-25 DIAGNOSIS — M54.42 CHRONIC LEFT-SIDED LOW BACK PAIN WITH LEFT-SIDED SCIATICA: ICD-10-CM

## 2024-09-25 DIAGNOSIS — G89.29 CHRONIC LEFT-SIDED LOW BACK PAIN WITH LEFT-SIDED SCIATICA: ICD-10-CM

## 2024-09-25 DIAGNOSIS — N18.32 STAGE 3B CHRONIC KIDNEY DISEASE: ICD-10-CM

## 2024-09-25 PROCEDURE — 1159F MED LIST DOCD IN RCRD: CPT | Performed by: NURSE PRACTITIONER

## 2024-09-25 PROCEDURE — 1160F RVW MEDS BY RX/DR IN RCRD: CPT | Performed by: NURSE PRACTITIONER

## 2024-09-25 PROCEDURE — 90715 TDAP VACCINE 7 YRS/> IM: CPT | Performed by: NURSE PRACTITIONER

## 2024-09-25 PROCEDURE — 3074F SYST BP LT 130 MM HG: CPT | Performed by: NURSE PRACTITIONER

## 2024-09-25 PROCEDURE — 3078F DIAST BP <80 MM HG: CPT | Performed by: NURSE PRACTITIONER

## 2024-09-25 PROCEDURE — 1126F AMNT PAIN NOTED NONE PRSNT: CPT | Performed by: NURSE PRACTITIONER

## 2024-09-25 PROCEDURE — 90471 IMMUNIZATION ADMIN: CPT | Performed by: NURSE PRACTITIONER

## 2024-09-25 PROCEDURE — 99215 OFFICE O/P EST HI 40 MIN: CPT | Performed by: NURSE PRACTITIONER

## 2024-09-25 RX ORDER — VONOPRAZAN FUMARATE 13.36 MG/1
10 TABLET ORAL DAILY
Qty: 5 TABLET | Refills: 0 | COMMUNITY
Start: 2024-09-25

## 2024-09-25 RX ORDER — HYDROCODONE BITARTRATE AND ACETAMINOPHEN 5; 325 MG/1; MG/1
1 TABLET ORAL EVERY 6 HOURS PRN
Qty: 30 TABLET | Refills: 0 | Status: SHIPPED | OUTPATIENT
Start: 2024-09-25

## 2024-09-25 RX ORDER — GABAPENTIN 300 MG/1
300 CAPSULE ORAL 3 TIMES DAILY
Qty: 270 CAPSULE | Refills: 1 | Status: SHIPPED | OUTPATIENT
Start: 2024-09-25

## 2024-09-25 RX ORDER — BUDESONIDE, GLYCOPYRROLATE, AND FORMOTEROL FUMARATE 160; 9; 4.8 UG/1; UG/1; UG/1
2 AEROSOL, METERED RESPIRATORY (INHALATION) 2 TIMES DAILY
Qty: 2 EACH | Refills: 0 | COMMUNITY
Start: 2024-09-25

## 2024-09-25 RX ORDER — ROSUVASTATIN CALCIUM 40 MG/1
40 TABLET, COATED ORAL EVERY EVENING
Qty: 90 TABLET | Refills: 1 | Status: SHIPPED | OUTPATIENT
Start: 2024-09-25

## 2024-09-25 RX ORDER — METOCLOPRAMIDE 5 MG/1
5 TABLET ORAL
Qty: 90 TABLET | Refills: 2 | Status: SHIPPED | OUTPATIENT
Start: 2024-09-25

## 2024-10-02 DIAGNOSIS — F43.12 CHRONIC POST-TRAUMATIC STRESS DISORDER (PTSD): Chronic | ICD-10-CM

## 2024-10-02 NOTE — TELEPHONE ENCOUNTER
Rx Refill Note  Requested Prescriptions     Pending Prescriptions Disp Refills    ALPRAZolam (XANAX) 0.5 MG tablet [Pharmacy Med Name: ALPRAZolam 0.5 MG Oral Tablet] 90 tablet 0     Sig: TAKE 1 TABLET BY MOUTH THREE TIMES DAILY AS NEEDED FOR ANXIETY      Last office visit with prescribing clinician: 6/19/2024   Last telemedicine visit with prescribing clinician: Visit date not found   Next office visit with prescribing clinician: 10/16/2024   Office Visit with Sivan Ken MD (06/19/2024)   ToxAssure Flex 22, Ur w/DL - Urine, Random Void (06/19/2024 09:13)                     Would you like a call back once the refill request has been completed: [] Yes [] No    If the office needs to give you a call back, can they leave a voicemail: [] Yes [] No    Shahida Cerda MA  10/02/24, 15:03 EDT    UPLOADED

## 2024-10-03 RX ORDER — ALPRAZOLAM 0.5 MG
0.5 TABLET ORAL 3 TIMES DAILY PRN
Qty: 90 TABLET | Refills: 0 | Status: SHIPPED | OUTPATIENT
Start: 2024-10-03

## 2024-10-04 DIAGNOSIS — F43.12 CHRONIC POST-TRAUMATIC STRESS DISORDER (PTSD): Chronic | ICD-10-CM

## 2024-10-04 RX ORDER — ALPRAZOLAM 0.5 MG
0.5 TABLET ORAL 3 TIMES DAILY PRN
Qty: 90 TABLET | Refills: 0 | OUTPATIENT
Start: 2024-10-04

## 2024-10-08 DIAGNOSIS — F43.12 CHRONIC POST-TRAUMATIC STRESS DISORDER (PTSD): Chronic | ICD-10-CM

## 2024-10-08 RX ORDER — ONDANSETRON 4 MG/1
4 TABLET, ORALLY DISINTEGRATING ORAL EVERY 8 HOURS PRN
Qty: 60 TABLET | Refills: 0 | Status: SHIPPED | OUTPATIENT
Start: 2024-10-08

## 2024-10-08 RX ORDER — ALPRAZOLAM 0.5 MG
0.5 TABLET ORAL 3 TIMES DAILY PRN
Qty: 90 TABLET | Refills: 0 | OUTPATIENT
Start: 2024-10-08

## 2024-10-08 NOTE — TELEPHONE ENCOUNTER
ALPRAZolam (XANAX) 0.5 MG tablet        Sig: TAKE 1 TABLET BY MOUTH THREE TIMES DAILY AS NEEDED FOR ANXIETY        Sent to pharmacy as: ALPRAZolam 0.5 MG Oral Tablet (XANAX)        Class: Normal        Notes to Pharmacy: Please dispense only when it is due        Route: Oral        E-Prescribing Status: Receipt confirmed by pharmacy (10/3/2024  3:55 PM EDT)

## 2024-10-16 ENCOUNTER — OFFICE VISIT (OUTPATIENT)
Dept: PSYCHIATRY | Facility: CLINIC | Age: 64
End: 2024-10-16
Payer: MEDICARE

## 2024-10-16 DIAGNOSIS — F43.12 CHRONIC POST-TRAUMATIC STRESS DISORDER (PTSD): Chronic | ICD-10-CM

## 2024-10-16 DIAGNOSIS — F33.2 SEVERE EPISODE OF RECURRENT MAJOR DEPRESSIVE DISORDER, WITHOUT PSYCHOTIC FEATURES: Primary | Chronic | ICD-10-CM

## 2024-10-16 DIAGNOSIS — F51.04 PSYCHOPHYSIOLOGICAL INSOMNIA: Chronic | ICD-10-CM

## 2024-10-16 RX ORDER — CITALOPRAM HYDROBROMIDE 40 MG/1
40 TABLET ORAL EVERY MORNING
Qty: 90 TABLET | Refills: 1 | Status: SHIPPED | OUTPATIENT
Start: 2024-10-16

## 2024-10-16 RX ORDER — QUETIAPINE FUMARATE 100 MG/1
100 TABLET, FILM COATED ORAL
Qty: 90 TABLET | Refills: 1 | Status: SHIPPED | OUTPATIENT
Start: 2024-10-16

## 2024-10-16 RX ORDER — ALPRAZOLAM 0.5 MG
0.5 TABLET ORAL 3 TIMES DAILY PRN
Qty: 90 TABLET | Refills: 3 | Status: SHIPPED | OUTPATIENT
Start: 2024-10-16

## 2024-10-16 RX ORDER — HYDROXYZINE HYDROCHLORIDE 25 MG/1
25 TABLET, FILM COATED ORAL 3 TIMES DAILY PRN
Qty: 90 TABLET | Refills: 3 | Status: SHIPPED | OUTPATIENT
Start: 2024-10-16

## 2024-10-16 RX ORDER — MIRTAZAPINE 30 MG/1
30 TABLET, FILM COATED ORAL
Qty: 90 TABLET | Refills: 1 | Status: SHIPPED | OUTPATIENT
Start: 2024-10-16

## 2024-10-16 NOTE — PROGRESS NOTES
"Subjective   Jasmine Cerda is a 64 y.o. female who presents today for follow up     Chief Complaint:   \"not doing well\"    History of Present Illness:   The pt suffers from depression for a long time, was on multiple meds    The pt was more depressed last few months 2ry to family situation , her son attempted suicide last year and  relapsed on drugs  Last visit Seroquel was increased to 60  mg  ,   The pt is still on celexa ,remeron,  trazodone,  xanax and gabapentin   Neurologist - no concerns about psych meds, she was dsd with parkinsons     Today the pt reported severe   depression mainly related to family situation and her son's health who is in [facility] now and pt's  is not taking her there,   Anxiety is intense and persistent, associated with  GI sxs, the pt will have endoscopy in November, creatinine is elevated   Depression is rated as 8/10, denied SI/HI   Depression is associated with anhedonia , no desires to do anything,  decreased E level   poor sleep  , frequent awakening, total 2-3 hrs per night      Precipitating and Ameliorating Factors: relationship problems   Alleviating factors - to spend time with her grand daughter         PAST PSYCHIATRIC HISTORY      Previous Psychiatric Diagnoses   Axis I: Anxiety/Panic Disorder     Past Hospitalizations or Residential Treatment   Locations\Providers: none      Past Outpatient Treatment   Diagnosis Treated: Anxiety/Panic Dis.  Treatment Type: Medication Management  Location: with PCP, Dr Manning      Prior Psychiatric Medications   Comments: xanax - effective      celexa- not effective   zoloft - not effective  cymbalta - not effective      Prozac - GI sxs   ambien - side effects   Risperidone - not effective and side effects   Hydroxyzine - not effective  Abilify - not effective   Trazodone - not effective     Consequences of Mental Disorder   Consequences: emotional distress     SOCIAL HISTORY     Number of children: 2  Number of " grandkids: 1  Current Relationship is: supportive  Family of Origin is: supportive  Comments: Patient has never smoked.  Passive Smoke: N  Alcohol Use: N  Drug Use: N  HIV/High Risk: N        Current Living Situation   Lives with: spouse     Education   Level: high school graduate     Employment   Job Status: disabled     Hobbies and Leisure Activities   Activity Type: quiet activities     Smoking History   Smoking Hx: Never smoker     Exercise   Exercise sessions (per wk): 1     Illicit Drug Use   Illicit Drugs used: no     FAMILY HISTORY OF MENTAL DISORDERS   fh Grandparents: Non-contributory  fh Mother: Non-contributory  fh Father: Non-contributory  fh Siblings: Non-contributory  fh Other: Non-contributory  The following portions of the patient's history were reviewed and updated as appropriate: allergies, current medications, past family history, past medical history, past social history, past surgical history and problem list.            Interval History  No changes, still depressed  and anxious       Side Effects  Denied       Past Medical History:  Past Medical History:   Diagnosis Date    Anxiety     Breast cyst     CHF (congestive heart failure)     COPD (chronic obstructive pulmonary disease)     Coronary heart disease     Depression     Hyperlipidemia     Hypertension        Social History:  Social History     Socioeconomic History    Marital status:    Tobacco Use    Smoking status: Never    Smokeless tobacco: Never    Tobacco comments:     Passive Smoke: N   Vaping Use    Vaping status: Never Used   Substance and Sexual Activity    Alcohol use: No    Drug use: No    Sexual activity: Defer       Family History:  Family History   Problem Relation Age of Onset    Colon cancer Mother     Diabetes Father     Pulmonary embolism Brother     Heart failure Brother     Breast cancer Maternal Aunt        Past Surgical History:  Past Surgical History:   Procedure Laterality Date    APPENDECTOMY      BREAST  CYST ASPIRATION      CARDIAC CATHETERIZATION  2017    No Stents placed - [facility]    CARDIAC CATHETERIZATION N/A 2023    Procedure: Left Heart Cath possible PCI;  Surgeon: Cuauhtemoc Kwon MD;  Location: [facility] CATH INVASIVE LOCATION;  Service: Cardiovascular;  Laterality: N/A;    CERVICAL FUSION      C6-C7     SECTION      x 2    CHOLECYSTECTOMY      HYSTERECTOMY      partial       Problem List:  Patient Active Problem List   Diagnosis    Chronic post-traumatic stress disorder (PTSD)    Severe episode of recurrent major depressive disorder, without psychotic features    Asthma    Chronic low back pain    Chronic diastolic heart failure    Coronary heart disease    Degeneration of intervertebral disc of lumbosacral region    Headache, temporal    Psychophysiological insomnia    Hyperlipidemia    Hypertension    Vitamin D deficiency    Other fatigue    Preventative health care    Hyperglycemia, unspecified     Nausea    Stable angina pectoris    Chest pain    Dyspnea    Abnormal nuclear stress test       Allergy:   No Known Allergies     Discontinued Medications:  Medications Discontinued During This Encounter   Medication Reason    hydrOXYzine (ATARAX) 25 MG tablet Reorder    QUEtiapine (SEROquel) 100 MG tablet Reorder    mirtazapine (REMERON) 30 MG tablet Reorder    citalopram (CeleXA) 40 MG tablet Reorder    ALPRAZolam (XANAX) 0.5 MG tablet Reorder                 Current Medications:   Current Outpatient Medications   Medication Sig Dispense Refill    ALPRAZolam (XANAX) 0.5 MG tablet Take 1 tablet by mouth 3 (Three) Times a Day As Needed for Anxiety. for anxiety 90 tablet 3    citalopram (CeleXA) 40 MG tablet Take 1 tablet by mouth Every Morning. 90 tablet 1    hydrOXYzine (ATARAX) 25 MG tablet Take 1 tablet by mouth 3 (Three) Times a Day As Needed for Anxiety. 90 tablet 3    mirtazapine (REMERON) 30 MG tablet Take 1 tablet by mouth every night at bedtime. 90 tablet 1    QUEtiapine (SEROquel)  "100 MG tablet Take 1 tablet by mouth every night at bedtime. 90 tablet 1    Budeson-Glycopyrrol-Formoterol (Breztri Aerosphere) 160-9-4.8 MCG/ACT aerosol inhaler Inhale 2 puffs 2 (Two) Times a Day. 2 each 0    carbidopa-levodopa (SINEMET)  MG per tablet Take 1 tab at: 6 am, 10 am, 2pm, 7 pm 120 tablet 5    carvedilol (COREG) 25 MG tablet Take 1 tablet by mouth 2 (Two) Times a Day. 180 tablet 3    colchicine 0.6 MG tablet Take 1 tablet by mouth 2 (Two) Times a Day As Needed (Gout flareup). 30 tablet 0    cyanocobalamin 1000 MCG/ML injection INJECT 1 ML INTO THE APPROPRIATE MUSCLE AS DIRECTED EVERY 28 DAYS 3 mL 3    gabapentin (NEURONTIN) 300 MG capsule Take 1 capsule by mouth 3 (Three) Times a Day. 270 capsule 1    HYDROcodone-acetaminophen (NORCO) 5-325 MG per tablet Take 1 tablet by mouth Every 6 (Six) Hours As Needed for Severe Pain. 30 tablet 0    metoclopramide (Reglan) 5 MG tablet Take 1 tablet by mouth 3 (Three) Times a Day With Meals. 90 tablet 2    Multiple Vitamins-Minerals (MULTIVITAMIN ADULT) tablet Take 1 tablet by mouth Daily. With Omega XL      nitroglycerin (NITROSTAT) 0.4 MG SL tablet Place 1 tablet under the tongue Every 5 (Five) Minutes As Needed for Chest Pain. Take no more than 3 doses in 15 minutes. 25 tablet 2    ondansetron ODT (ZOFRAN-ODT) 4 MG disintegrating tablet DISSOLVE 1 TABLET IN MOUTH EVERY 8 HOURS AS NEEDED FOR NAUSEA AND VOMITING 60 tablet 0    pantoprazole (PROTONIX) 40 MG EC tablet Take 1 tablet by mouth 2 (Two) Times a Day. 180 tablet 1    rosuvastatin (CRESTOR) 40 MG tablet Take 1 tablet by mouth Every Evening. 90 tablet 1    Syringe/Needle, Disp, 23G X 1\" 3 ML misc Use to inject B12 every 30 days intramuscularly 12 each 0    vitamin D (ERGOCALCIFEROL) 1.25 MG (80252 UT) capsule capsule Take 1 capsule by mouth 1 (One) Time Per Week. 15 capsule 3    Vonoprazan Fumarate (Voquezna) 10 MG tablet Take 1 tablet by mouth Daily. 5 tablet 0     No current facility-administered " medications for this visit.         Review of Symptoms:    Psychiatric/Behavioral: Negative for agitation, behavioral problems, confusion, decreased concentration, dysphoric mood, hallucinations, self-injury, sleep disturbance and suicidal ideas.   The patient is  Still   depressed,  Still very  nervous/anxious and is not hyperactive.        Physical Exam:   There were no vitals taken for this visit.    Mental Status Exam:   Hygiene:   good  Cooperation:  Cooperative  Eye Contact:  Good  Psychomotor Behavior:  Appropriate  Affect:  Appropriate    Mood: depressed and fluctates,  Anxious    Hopelessness: Denies  Speech:  Normal  Thought Process:  Goal directed and Linear  Thought Content:  Normal  Suicidal:  None  Homicidal:  None  Hallucinations:  None  Delusion:  None  Memory:  Intact  Orientation:  Person, Place, Time and Situation  Reliability:  fair  Insight:  Good  Judgement:  Good  Impulse Control:  Good  Physical/Medical Issues:  Yes GI issues         MSE from 6/29/24    reviewed and accepted with changes     PHQ-9 Depression Screening  Little interest or pleasure in doing things? Almost all   Feeling down, depressed, or hopeless? Almost all   Trouble falling or staying asleep, or sleeping too much? Over half   Feeling tired or having little energy? Over half   Poor appetite or overeating? Not at all   Feeling bad about yourself - or that you are a failure or have let yourself or your family down? Almost all   Trouble concentrating on things, such as reading the newspaper or watching television? Several days   Moving or speaking so slowly that other people could have noticed? Or the opposite - being so fidgety or restless that you have been moving around a lot more than usual? Not at all   Thoughts that you would be better off dead, or of hurting yourself in some way? Not at all   PHQ-9 Total Score 14   If you checked off any problems, how difficult have these problems made it for you to do your work, take  care of things at home, or get along with other people? Very difficult      Over the last two weeks, how often have you been bothered by the following problems?  Feeling nervous, anxious or on edge: Nearly every day  Not being able to stop or control worrying: Nearly every day  Worrying too much about different things: Nearly every day  Trouble Relaxing: Several days  Being so restless that it is hard to sit still: Several days  Becoming easily annoyed or irritable: Several days  Feeling afraid as if something awful might happen: Nearly every day  DWIGHT 7 Total Score: 15  If you checked any problems, how difficult have these problems made it for you to do your work, take care of things at home, or get along with other people: Extremely difficult         Never smoker    I advised Jasmine of the risks of tobacco use.     Lab Results:   Admission on 09/23/2024, Discharged on 09/23/2024   Component Date Value Ref Range Status    Glucose 09/23/2024 107 (H)  65 - 99 mg/dL Final    BUN 09/23/2024 11  8 - 23 mg/dL Final    Creatinine 09/23/2024 1.42 (H)  0.57 - 1.00 mg/dL Final    Sodium 09/23/2024 140  136 - 145 mmol/L Final    Potassium 09/23/2024 3.4 (L)  3.5 - 5.2 mmol/L Final    Chloride 09/23/2024 104  98 - 107 mmol/L Final    CO2 09/23/2024 25.4  22.0 - 29.0 mmol/L Final    Calcium 09/23/2024 10.1  8.6 - 10.5 mg/dL Final    Total Protein 09/23/2024 6.9  6.0 - 8.5 g/dL Final    Albumin 09/23/2024 4.3  3.5 - 5.2 g/dL Final    ALT (SGPT) 09/23/2024 6  1 - 33 U/L Final    AST (SGOT) 09/23/2024 15  1 - 32 U/L Final    Alkaline Phosphatase 09/23/2024 48  39 - 117 U/L Final    Total Bilirubin 09/23/2024 1.4 (H)  0.0 - 1.2 mg/dL Final    Globulin 09/23/2024 2.6  gm/dL Final    A/G Ratio 09/23/2024 1.7  g/dL Final    BUN/Creatinine Ratio 09/23/2024 7.7  7.0 - 25.0 Final    Anion Gap 09/23/2024 10.6  5.0 - 15.0 mmol/L Final    eGFR 09/23/2024 40.9 (L)  >60.0 mL/min/1.73 Final    WBC 09/23/2024 10.36  3.40 - 10.80 10*3/mm3 Final     RBC 09/23/2024 4.50  3.77 - 5.28 10*6/mm3 Final    Hemoglobin 09/23/2024 13.1  12.0 - 15.9 g/dL Final    Hematocrit 09/23/2024 38.3  34.0 - 46.6 % Final    MCV 09/23/2024 85.1  79.0 - 97.0 fL Final    MCH 09/23/2024 29.1  26.6 - 33.0 pg Final    MCHC 09/23/2024 34.2  31.5 - 35.7 g/dL Final    RDW 09/23/2024 13.5  12.3 - 15.4 % Final    RDW-SD 09/23/2024 42.0  37.0 - 54.0 fl Final    MPV 09/23/2024 9.6  6.0 - 12.0 fL Final    Platelets 09/23/2024 169  140 - 450 10*3/mm3 Final    Neutrophil % 09/23/2024 69.2  42.7 - 76.0 % Final    Lymphocyte % 09/23/2024 19.9  19.6 - 45.3 % Final    Monocyte % 09/23/2024 9.8  5.0 - 12.0 % Final    Eosinophil % 09/23/2024 0.3  0.3 - 6.2 % Final    Basophil % 09/23/2024 0.6  0.0 - 1.5 % Final    Immature Grans % 09/23/2024 0.2  0.0 - 0.5 % Final    Neutrophils, Absolute 09/23/2024 7.17 (H)  1.70 - 7.00 10*3/mm3 Final    Lymphocytes, Absolute 09/23/2024 2.06  0.70 - 3.10 10*3/mm3 Final    Monocytes, Absolute 09/23/2024 1.02 (H)  0.10 - 0.90 10*3/mm3 Final    Eosinophils, Absolute 09/23/2024 0.03  0.00 - 0.40 10*3/mm3 Final    Basophils, Absolute 09/23/2024 0.06  0.00 - 0.20 10*3/mm3 Final    Immature Grans, Absolute 09/23/2024 0.02  0.00 - 0.05 10*3/mm3 Final    nRBC 09/23/2024 0.0  0.0 - 0.2 /100 WBC Final    Uric Acid 09/23/2024 2.8  2.4 - 5.7 mg/dL Final    Sed Rate 09/23/2024 10  0 - 30 mm/hr Final    C-Reactive Protein 09/23/2024 7.26 (H)  0.00 - 0.50 mg/dL Final   Orders Only on 09/11/2024   Component Date Value Ref Range Status    WBC 09/18/2024 6.74  3.40 - 10.80 10*3/mm3 Final    RBC 09/18/2024 4.57  3.77 - 5.28 10*6/mm3 Final    Hemoglobin 09/18/2024 13.5  12.0 - 15.9 g/dL Final    Hematocrit 09/18/2024 39.7  34.0 - 46.6 % Final    MCV 09/18/2024 86.9  79.0 - 97.0 fL Final    MCH 09/18/2024 29.5  26.6 - 33.0 pg Final    MCHC 09/18/2024 34.0  31.5 - 35.7 g/dL Final    RDW 09/18/2024 14.1  12.3 - 15.4 % Final    RDW-SD 09/18/2024 44.0  37.0 - 54.0 fl Final    MPV 09/18/2024 9.9   6.0 - 12.0 fL Final    Platelets 09/18/2024 238  140 - 450 10*3/mm3 Final    Glucose 09/18/2024 85  65 - 99 mg/dL Final    BUN 09/18/2024 10  8 - 23 mg/dL Final    Creatinine 09/18/2024 1.30 (H)  0.57 - 1.00 mg/dL Final    Sodium 09/18/2024 145  136 - 145 mmol/L Final    Potassium 09/18/2024 3.4 (L)  3.5 - 5.2 mmol/L Final    Chloride 09/18/2024 109 (H)  98 - 107 mmol/L Final    CO2 09/18/2024 24.8  22.0 - 29.0 mmol/L Final    Calcium 09/18/2024 10.2  8.6 - 10.5 mg/dL Final    Total Protein 09/18/2024 6.6  6.0 - 8.5 g/dL Final    Albumin 09/18/2024 4.3  3.5 - 5.2 g/dL Final    ALT (SGPT) 09/18/2024 <5  1 - 33 U/L Final    AST (SGOT) 09/18/2024 12  1 - 32 U/L Final    Alkaline Phosphatase 09/18/2024 56  39 - 117 U/L Final    Total Bilirubin 09/18/2024 0.5  0.0 - 1.2 mg/dL Final    Globulin 09/18/2024 2.3  gm/dL Final    A/G Ratio 09/18/2024 1.9  g/dL Final    BUN/Creatinine Ratio 09/18/2024 7.7  7.0 - 25.0 Final    Anion Gap 09/18/2024 11.2  5.0 - 15.0 mmol/L Final    eGFR 09/18/2024 45.4 (L)  >60.0 mL/min/1.73 Final    Total Cholesterol 09/18/2024 114  0 - 200 mg/dL Final    Triglycerides 09/18/2024 169 (H)  0 - 150 mg/dL Final    HDL Cholesterol 09/18/2024 46  40 - 60 mg/dL Final    LDL Cholesterol  09/18/2024 40  0 - 100 mg/dL Final    VLDL Cholesterol 09/18/2024 28  5 - 40 mg/dL Final    LDL/HDL Ratio 09/18/2024 0.74   Final    TSH 09/18/2024 1.350  0.270 - 4.200 uIU/mL Final   Lab on 08/27/2024   Component Date Value Ref Range Status    Glucose 08/27/2024 87  65 - 99 mg/dL Final    BUN 08/27/2024 22  8 - 23 mg/dL Final    Creatinine 08/27/2024 1.40 (H)  0.57 - 1.00 mg/dL Final    Sodium 08/27/2024 140  136 - 145 mmol/L Final    Potassium 08/27/2024 4.9  3.5 - 5.2 mmol/L Final    Chloride 08/27/2024 107  98 - 107 mmol/L Final    CO2 08/27/2024 18.9 (L)  22.0 - 29.0 mmol/L Final    Calcium 08/27/2024 11.3 (H)  8.6 - 10.5 mg/dL Final    BUN/Creatinine Ratio 08/27/2024 15.7  7.0 - 25.0 Final    Anion Gap 08/27/2024  14.1  5.0 - 15.0 mmol/L Final    eGFR 08/27/2024 41.6 (L)  >60.0 mL/min/1.73 Final   Lab on 08/21/2024   Component Date Value Ref Range Status    Glucose 08/21/2024 86  65 - 99 mg/dL Final    BUN 08/21/2024 21  8 - 23 mg/dL Final    Creatinine 08/21/2024 1.29 (H)  0.57 - 1.00 mg/dL Final    Sodium 08/21/2024 136  136 - 145 mmol/L Final    Potassium 08/21/2024 6.0 (H)  3.5 - 5.2 mmol/L Final    Chloride 08/21/2024 103  98 - 107 mmol/L Final    CO2 08/21/2024 23.0  22.0 - 29.0 mmol/L Final    Calcium 08/21/2024 11.1 (H)  8.6 - 10.5 mg/dL Final    BUN/Creatinine Ratio 08/21/2024 16.3  7.0 - 25.0 Final    Anion Gap 08/21/2024 10.0  5.0 - 15.0 mmol/L Final    eGFR 08/21/2024 45.9 (L)  >60.0 mL/min/1.73 Final    Protein/Creatinine Ratio, Urine 08/21/2024 116.4  0.0 - 200.0 mg/G Crea Final    Creatinine, Urine 08/21/2024 178.7  mg/dL Final    Total Protein, Urine 08/21/2024 20.8  mg/dL Final    WBC 08/21/2024 7.28  3.40 - 10.80 10*3/mm3 Final    RBC 08/21/2024 4.68  3.77 - 5.28 10*6/mm3 Final    Hemoglobin 08/21/2024 13.6  12.0 - 15.9 g/dL Final    Hematocrit 08/21/2024 41.6  34.0 - 46.6 % Final    MCV 08/21/2024 88.9  79.0 - 97.0 fL Final    MCH 08/21/2024 29.1  26.6 - 33.0 pg Final    MCHC 08/21/2024 32.7  31.5 - 35.7 g/dL Final    RDW 08/21/2024 14.0  12.3 - 15.4 % Final    RDW-SD 08/21/2024 45.4  37.0 - 54.0 fl Final    MPV 08/21/2024 10.2  6.0 - 12.0 fL Final    Platelets 08/21/2024 251  140 - 450 10*3/mm3 Final    Neutrophil % 08/21/2024 43.5  42.7 - 76.0 % Final    Lymphocyte % 08/21/2024 45.6 (H)  19.6 - 45.3 % Final    Monocyte % 08/21/2024 7.3  5.0 - 12.0 % Final    Eosinophil % 08/21/2024 2.3  0.3 - 6.2 % Final    Basophil % 08/21/2024 1.2  0.0 - 1.5 % Final    Immature Grans % 08/21/2024 0.1  0.0 - 0.5 % Final    Neutrophils, Absolute 08/21/2024 3.16  1.70 - 7.00 10*3/mm3 Final    Lymphocytes, Absolute 08/21/2024 3.32 (H)  0.70 - 3.10 10*3/mm3 Final    Monocytes, Absolute 08/21/2024 0.53  0.10 - 0.90 10*3/mm3  Final    Eosinophils, Absolute 08/21/2024 0.17  0.00 - 0.40 10*3/mm3 Final    Basophils, Absolute 08/21/2024 0.09  0.00 - 0.20 10*3/mm3 Final    Immature Grans, Absolute 08/21/2024 0.01  0.00 - 0.05 10*3/mm3 Final    nRBC 08/21/2024 0.0  0.0 - 0.2 /100 WBC Final       Assessment & Plan   Problems Addressed this Visit       Chronic post-traumatic stress disorder (PTSD) (Chronic)    Relevant Medications    hydrOXYzine (ATARAX) 25 MG tablet    QUEtiapine (SEROquel) 100 MG tablet    mirtazapine (REMERON) 30 MG tablet    ALPRAZolam (XANAX) 0.5 MG tablet    citalopram (CeleXA) 40 MG tablet    Severe episode of recurrent major depressive disorder, without psychotic features - Primary (Chronic)    Relevant Medications    hydrOXYzine (ATARAX) 25 MG tablet    QUEtiapine (SEROquel) 100 MG tablet    mirtazapine (REMERON) 30 MG tablet    ALPRAZolam (XANAX) 0.5 MG tablet    citalopram (CeleXA) 40 MG tablet    Psychophysiological insomnia (Chronic)    Relevant Medications    hydrOXYzine (ATARAX) 25 MG tablet    QUEtiapine (SEROquel) 100 MG tablet    mirtazapine (REMERON) 30 MG tablet    ALPRAZolam (XANAX) 0.5 MG tablet    citalopram (CeleXA) 40 MG tablet     Diagnoses         Codes Comments    Severe episode of recurrent major depressive disorder, without psychotic features    -  Primary ICD-10-CM: F33.2  ICD-9-CM: 296.33     Chronic post-traumatic stress disorder (PTSD)     ICD-10-CM: F43.12  ICD-9-CM: 309.81     Psychophysiological insomnia     ICD-10-CM: F51.04  ICD-9-CM: 307.42             Visit Diagnoses:    ICD-10-CM ICD-9-CM   1. Severe episode of recurrent major depressive disorder, without psychotic features  F33.2 296.33   2. Chronic post-traumatic stress disorder (PTSD)  F43.12 309.81   3. Psychophysiological insomnia  F51.04 307.42                 TREATMENT PLAN/GOALS: Continue supportive psychotherapy efforts and medications as indicated. Treatment and medication options discussed during today's visit. Patient  ackowledged and verbally consented to continue with current treatment plan and was educated on the importance of compliance with treatment and follow-up appointments.    MEDICATION ISSUES:  1. MDD - cont celexa 40 mg ,   cont   seroquel 100 mg po QHS for depression, anxiety, insomnia, reported good response , QTC  483   Neurology was ok with her meds   Cont  remeron   30 mg po QHS for depression and insomnia   2. PTSD - cont celexa 40 mg  (Qtc 483) , Xanax - low dose 0.5 mg TID, no increase,  long term benzo use discussed again, Cont  hydroxyzine PRN with alprazolam    3. Insomnia -  cont  seroquel 100 mg po QHS    Cont  remeron   30 mg po QHS      4. Long term therapeutic drug monitoring - UDS  8/31/2021 - consistent  1/5/23 - consistent   LABORATORY - SCAN - [facility], [facility], 1/5/2023 (01/05/2023) ,   UDS 6/19/24 - consistent for alprazolam   [employer] Flex 22, Ur w/DL - Urine, Random Void (06/19/2024 09:13)        EKG 5/15/23 Qtc 464   EKG 6/2/24   HEART RATE= 73  bpm  RR Interval= 820  ms  OK Interval= 153  ms  P Horizontal Axis= -11  deg  P Front Axis= 5  deg  QRSD Interval= 105  ms  QT Interval= 437  ms  QTcB= 483  ms  QRS Axis= -15  deg      Psychotherapy was recommended , the is reluctant to start now, will put on he list for [facility]   cont supportive therapy, ALONON     INSPECT reviewed as expected, last xanax 0.5 mg # 90 for 30 days refill was on 10/8/24     Patient screened positive for depression based on a PHQ-9 score of 14 on 10/16/2024. Follow-up recommendations include: Prescribed antidepressant medication treatment.    PHQ scored 14 and indicated moderate depression   DWIGHT 7 scored 15  - moderate anxiety     Discussed medication options and treatment plan of prescribed medication as well as the risks, benefits, and side effects including potential falls, possible impaired driving and metabolic adversities among others. Patient is agreeable to call the office with any worsening of  symptoms or onset of side effects. Patient is agreeable to call 911 or go to the nearest ER should he/she begin having SI/HI. No medication side effects or related complaints today.     MEDS ORDERED DURING VISIT:  New Medications Ordered This Visit   Medications    hydrOXYzine (ATARAX) 25 MG tablet     Sig: Take 1 tablet by mouth 3 (Three) Times a Day As Needed for Anxiety.     Dispense:  90 tablet     Refill:  3     Please cancel previous rx for hydroxyzine PRN for ITCHING    QUEtiapine (SEROquel) 100 MG tablet     Sig: Take 1 tablet by mouth every night at bedtime.     Dispense:  90 tablet     Refill:  1    mirtazapine (REMERON) 30 MG tablet     Sig: Take 1 tablet by mouth every night at bedtime.     Dispense:  90 tablet     Refill:  1    ALPRAZolam (XANAX) 0.5 MG tablet     Sig: Take 1 tablet by mouth 3 (Three) Times a Day As Needed for Anxiety. for anxiety     Dispense:  90 tablet     Refill:  3     Please dispense only when it is due, last fill was on 10/8/24    citalopram (CeleXA) 40 MG tablet     Sig: Take 1 tablet by mouth Every Morning.     Dispense:  90 tablet     Refill:  1       Return in about 4 months (around 2/16/2025).         This document has been electronically signed by Sivan Ken MD  October 16, 2024 09:03 EDT

## 2024-10-29 RX ORDER — COLCHICINE 0.6 MG/1
0.6 TABLET ORAL 2 TIMES DAILY PRN
Qty: 30 TABLET | Refills: 0 | Status: SHIPPED | OUTPATIENT
Start: 2024-10-29

## 2024-11-01 RX ORDER — ONDANSETRON 4 MG/1
4 TABLET, ORALLY DISINTEGRATING ORAL EVERY 8 HOURS PRN
Qty: 60 TABLET | Refills: 0 | Status: SHIPPED | OUTPATIENT
Start: 2024-11-01

## 2024-11-06 ENCOUNTER — TELEPHONE (OUTPATIENT)
Dept: FAMILY MEDICINE CLINIC | Facility: CLINIC | Age: 64
End: 2024-11-06

## 2024-11-06 RX ORDER — POTASSIUM CHLORIDE 750 MG/1
10 TABLET, EXTENDED RELEASE ORAL DAILY PRN
Qty: 90 TABLET | Refills: 0 | Status: SHIPPED | OUTPATIENT
Start: 2024-11-06

## 2024-11-06 NOTE — TELEPHONE ENCOUNTER
Spoke with patient. She has Lasix yet, but is requesting a potassium supplement sent to her pharmacy

## 2024-11-06 NOTE — TELEPHONE ENCOUNTER
Caller: Anish Cerda    Relationship: Self    Best call back number: [phone]     What is the best time to reach you: ANY    Who are you requesting to speak with (clinical staff, provider,  specific staff member): CLINICAL    Do you know the name of the person who called: ANIHS    What was the call regarding: PATIENT IS CALLING ABOUT SWELLING COMING BACK. PATIENT IS ASKING IF SHE CAN START BACK ON LASIX. PLEASE CALL AND ADVISE.     Is it okay if the provider responds through MyChart:

## 2024-11-14 ENCOUNTER — OFFICE (AMBULATORY)
Age: 64
End: 2024-11-14
Payer: COMMERCIAL

## 2024-11-14 ENCOUNTER — ON CAMPUS - OUTPATIENT (AMBULATORY)
Age: 64
End: 2024-11-14
Payer: COMMERCIAL

## 2024-11-14 ENCOUNTER — OFFICE (AMBULATORY)
Dept: URBAN - METROPOLITAN AREA PATHOLOGY 19 | Facility: PATHOLOGY | Age: 64
End: 2024-11-14
Payer: COMMERCIAL

## 2024-11-14 ENCOUNTER — ON CAMPUS - OUTPATIENT (AMBULATORY)
Dept: URBAN - METROPOLITAN AREA HOSPITAL 2 | Facility: HOSPITAL | Age: 64
End: 2024-11-14
Payer: COMMERCIAL

## 2024-11-14 VITALS
SYSTOLIC BLOOD PRESSURE: 132 MMHG | OXYGEN SATURATION: 99 % | RESPIRATION RATE: 17 BRPM | HEART RATE: 66 BPM | HEART RATE: 81 BPM | HEART RATE: 72 BPM | RESPIRATION RATE: 18 BRPM | HEIGHT: 63 IN | HEART RATE: 67 BPM | OXYGEN SATURATION: 100 % | SYSTOLIC BLOOD PRESSURE: 101 MMHG | DIASTOLIC BLOOD PRESSURE: 66 MMHG | OXYGEN SATURATION: 99 % | DIASTOLIC BLOOD PRESSURE: 79 MMHG | DIASTOLIC BLOOD PRESSURE: 68 MMHG | SYSTOLIC BLOOD PRESSURE: 125 MMHG | HEART RATE: 72 BPM | RESPIRATION RATE: 16 BRPM | HEART RATE: 81 BPM | DIASTOLIC BLOOD PRESSURE: 85 MMHG | RESPIRATION RATE: 17 BRPM | OXYGEN SATURATION: 97 % | HEART RATE: 72 BPM | RESPIRATION RATE: 18 BRPM | TEMPERATURE: 97.2 F | OXYGEN SATURATION: 97 % | OXYGEN SATURATION: 99 % | HEART RATE: 73 BPM | HEART RATE: 67 BPM | OXYGEN SATURATION: 97 % | SYSTOLIC BLOOD PRESSURE: 102 MMHG | HEART RATE: 81 BPM | TEMPERATURE: 97.2 F | DIASTOLIC BLOOD PRESSURE: 65 MMHG | SYSTOLIC BLOOD PRESSURE: 125 MMHG | SYSTOLIC BLOOD PRESSURE: 102 MMHG | TEMPERATURE: 97.2 F | HEART RATE: 67 BPM | HEART RATE: 73 BPM | OXYGEN SATURATION: 96 % | DIASTOLIC BLOOD PRESSURE: 68 MMHG | OXYGEN SATURATION: 100 % | WEIGHT: 206 LBS | DIASTOLIC BLOOD PRESSURE: 92 MMHG | SYSTOLIC BLOOD PRESSURE: 104 MMHG | SYSTOLIC BLOOD PRESSURE: 101 MMHG | HEART RATE: 67 BPM | DIASTOLIC BLOOD PRESSURE: 79 MMHG | SYSTOLIC BLOOD PRESSURE: 102 MMHG | WEIGHT: 206 LBS | SYSTOLIC BLOOD PRESSURE: 132 MMHG | OXYGEN SATURATION: 96 % | SYSTOLIC BLOOD PRESSURE: 104 MMHG | SYSTOLIC BLOOD PRESSURE: 132 MMHG | WEIGHT: 206 LBS | SYSTOLIC BLOOD PRESSURE: 104 MMHG | OXYGEN SATURATION: 100 % | DIASTOLIC BLOOD PRESSURE: 66 MMHG | HEART RATE: 73 BPM | RESPIRATION RATE: 18 BRPM | SYSTOLIC BLOOD PRESSURE: 101 MMHG | TEMPERATURE: 97.2 F | RESPIRATION RATE: 17 BRPM | OXYGEN SATURATION: 100 % | OXYGEN SATURATION: 99 % | HEART RATE: 67 BPM | DIASTOLIC BLOOD PRESSURE: 92 MMHG | TEMPERATURE: 97.2 F | RESPIRATION RATE: 16 BRPM | TEMPERATURE: 97.2 F | DIASTOLIC BLOOD PRESSURE: 92 MMHG | OXYGEN SATURATION: 96 % | RESPIRATION RATE: 17 BRPM | WEIGHT: 206 LBS | SYSTOLIC BLOOD PRESSURE: 125 MMHG | HEIGHT: 63 IN | RESPIRATION RATE: 17 BRPM | OXYGEN SATURATION: 99 % | HEART RATE: 73 BPM | HEART RATE: 66 BPM | DIASTOLIC BLOOD PRESSURE: 68 MMHG | DIASTOLIC BLOOD PRESSURE: 79 MMHG | DIASTOLIC BLOOD PRESSURE: 79 MMHG | SYSTOLIC BLOOD PRESSURE: 102 MMHG | SYSTOLIC BLOOD PRESSURE: 101 MMHG | SYSTOLIC BLOOD PRESSURE: 132 MMHG | SYSTOLIC BLOOD PRESSURE: 125 MMHG | DIASTOLIC BLOOD PRESSURE: 85 MMHG | HEART RATE: 66 BPM | HEART RATE: 73 BPM | DIASTOLIC BLOOD PRESSURE: 65 MMHG | DIASTOLIC BLOOD PRESSURE: 79 MMHG | WEIGHT: 206 LBS | HEART RATE: 72 BPM | DIASTOLIC BLOOD PRESSURE: 85 MMHG | RESPIRATION RATE: 16 BRPM | DIASTOLIC BLOOD PRESSURE: 92 MMHG | SYSTOLIC BLOOD PRESSURE: 104 MMHG | OXYGEN SATURATION: 97 % | SYSTOLIC BLOOD PRESSURE: 102 MMHG | SYSTOLIC BLOOD PRESSURE: 132 MMHG | OXYGEN SATURATION: 97 % | HEART RATE: 72 BPM | OXYGEN SATURATION: 99 % | HEART RATE: 67 BPM | SYSTOLIC BLOOD PRESSURE: 132 MMHG | HEART RATE: 66 BPM | DIASTOLIC BLOOD PRESSURE: 65 MMHG | DIASTOLIC BLOOD PRESSURE: 66 MMHG | HEIGHT: 63 IN | OXYGEN SATURATION: 100 % | DIASTOLIC BLOOD PRESSURE: 66 MMHG | HEART RATE: 73 BPM | RESPIRATION RATE: 17 BRPM | DIASTOLIC BLOOD PRESSURE: 65 MMHG | WEIGHT: 206 LBS | HEART RATE: 72 BPM | OXYGEN SATURATION: 96 % | HEART RATE: 66 BPM | SYSTOLIC BLOOD PRESSURE: 104 MMHG | OXYGEN SATURATION: 96 % | OXYGEN SATURATION: 99 % | DIASTOLIC BLOOD PRESSURE: 79 MMHG | RESPIRATION RATE: 16 BRPM | HEART RATE: 81 BPM | DIASTOLIC BLOOD PRESSURE: 65 MMHG | SYSTOLIC BLOOD PRESSURE: 125 MMHG | DIASTOLIC BLOOD PRESSURE: 92 MMHG | DIASTOLIC BLOOD PRESSURE: 85 MMHG | HEIGHT: 63 IN | HEART RATE: 66 BPM | HEART RATE: 81 BPM | DIASTOLIC BLOOD PRESSURE: 66 MMHG | OXYGEN SATURATION: 96 % | DIASTOLIC BLOOD PRESSURE: 92 MMHG | OXYGEN SATURATION: 100 % | DIASTOLIC BLOOD PRESSURE: 68 MMHG | SYSTOLIC BLOOD PRESSURE: 104 MMHG | RESPIRATION RATE: 18 BRPM | OXYGEN SATURATION: 100 % | RESPIRATION RATE: 18 BRPM | DIASTOLIC BLOOD PRESSURE: 68 MMHG | SYSTOLIC BLOOD PRESSURE: 125 MMHG | TEMPERATURE: 97.2 F | HEIGHT: 63 IN | SYSTOLIC BLOOD PRESSURE: 132 MMHG | DIASTOLIC BLOOD PRESSURE: 66 MMHG | SYSTOLIC BLOOD PRESSURE: 102 MMHG | RESPIRATION RATE: 16 BRPM | HEART RATE: 67 BPM | WEIGHT: 206 LBS | SYSTOLIC BLOOD PRESSURE: 101 MMHG | SYSTOLIC BLOOD PRESSURE: 102 MMHG | RESPIRATION RATE: 18 BRPM | DIASTOLIC BLOOD PRESSURE: 85 MMHG | SYSTOLIC BLOOD PRESSURE: 104 MMHG | OXYGEN SATURATION: 97 % | HEART RATE: 81 BPM | OXYGEN SATURATION: 96 % | DIASTOLIC BLOOD PRESSURE: 85 MMHG | SYSTOLIC BLOOD PRESSURE: 101 MMHG | RESPIRATION RATE: 16 BRPM | HEIGHT: 63 IN | DIASTOLIC BLOOD PRESSURE: 68 MMHG | DIASTOLIC BLOOD PRESSURE: 92 MMHG | DIASTOLIC BLOOD PRESSURE: 65 MMHG | HEART RATE: 73 BPM | DIASTOLIC BLOOD PRESSURE: 65 MMHG | DIASTOLIC BLOOD PRESSURE: 68 MMHG | OXYGEN SATURATION: 97 % | HEIGHT: 63 IN | SYSTOLIC BLOOD PRESSURE: 101 MMHG | DIASTOLIC BLOOD PRESSURE: 85 MMHG | SYSTOLIC BLOOD PRESSURE: 125 MMHG | HEART RATE: 72 BPM | RESPIRATION RATE: 16 BRPM | DIASTOLIC BLOOD PRESSURE: 79 MMHG | HEART RATE: 66 BPM | HEART RATE: 81 BPM | RESPIRATION RATE: 18 BRPM | RESPIRATION RATE: 17 BRPM | DIASTOLIC BLOOD PRESSURE: 66 MMHG

## 2024-11-14 DIAGNOSIS — K31.89 OTHER DISEASES OF STOMACH AND DUODENUM: ICD-10-CM

## 2024-11-14 DIAGNOSIS — K25.9 GASTRIC ULCER, UNSPECIFIED AS ACUTE OR CHRONIC, WITHOUT HEMO: ICD-10-CM

## 2024-11-14 DIAGNOSIS — R11.2 NAUSEA WITH VOMITING, UNSPECIFIED: ICD-10-CM

## 2024-11-14 DIAGNOSIS — R10.10 UPPER ABDOMINAL PAIN, UNSPECIFIED: ICD-10-CM

## 2024-11-14 DIAGNOSIS — K29.70 GASTRITIS, UNSPECIFIED, WITHOUT BLEEDING: ICD-10-CM

## 2024-11-14 LAB
GI HISTOLOGY: A. STOMACH ANTRUM: (no result)
GI HISTOLOGY: PDF REPORT: (no result)

## 2024-11-14 PROCEDURE — 88305 TISSUE EXAM BY PATHOLOGIST: CPT | Mod: 26 | Performed by: PATHOLOGY

## 2024-11-14 PROCEDURE — 43239 EGD BIOPSY SINGLE/MULTIPLE: CPT | Performed by: INTERNAL MEDICINE

## 2024-11-14 PROCEDURE — 88342 IMHCHEM/IMCYTCHM 1ST ANTB: CPT | Mod: 26 | Performed by: PATHOLOGY

## 2024-11-18 ENCOUNTER — LAB (OUTPATIENT)
Dept: FAMILY MEDICINE CLINIC | Facility: CLINIC | Age: 64
End: 2024-11-18
Payer: MEDICARE

## 2024-11-18 DIAGNOSIS — N18.30 STAGE 3 CHRONIC KIDNEY DISEASE, UNSPECIFIED WHETHER STAGE 3A OR 3B CKD: Primary | ICD-10-CM

## 2024-11-18 LAB
BILIRUB UR QL STRIP: NEGATIVE
CLARITY UR: CLEAR
COLOR UR: YELLOW
GLUCOSE UR STRIP-MCNC: NEGATIVE MG/DL
HGB UR QL STRIP.AUTO: NEGATIVE
HOLD SPECIMEN: NORMAL
KETONES UR QL STRIP: NEGATIVE
LEUKOCYTE ESTERASE UR QL STRIP.AUTO: NEGATIVE
NITRITE UR QL STRIP: NEGATIVE
PH UR STRIP.AUTO: 7 [PH] (ref 5–8)
PROT UR QL STRIP: NEGATIVE
SP GR UR STRIP: 1.01 (ref 1–1.03)
UROBILINOGEN UR QL STRIP: NORMAL

## 2024-11-18 PROCEDURE — 85025 COMPLETE CBC W/AUTO DIFF WBC: CPT | Performed by: INTERNAL MEDICINE

## 2024-11-18 PROCEDURE — 36415 COLL VENOUS BLD VENIPUNCTURE: CPT

## 2024-11-18 PROCEDURE — 84156 ASSAY OF PROTEIN URINE: CPT | Performed by: INTERNAL MEDICINE

## 2024-11-18 PROCEDURE — 80048 BASIC METABOLIC PNL TOTAL CA: CPT | Performed by: INTERNAL MEDICINE

## 2024-11-18 PROCEDURE — 82570 ASSAY OF URINE CREATININE: CPT | Performed by: INTERNAL MEDICINE

## 2024-11-18 PROCEDURE — 81003 URINALYSIS AUTO W/O SCOPE: CPT | Performed by: INTERNAL MEDICINE

## 2024-11-19 LAB
ANION GAP SERPL CALCULATED.3IONS-SCNC: 8 MMOL/L (ref 5–15)
BASOPHILS # BLD AUTO: 0.1 10*3/MM3 (ref 0–0.2)
BASOPHILS NFR BLD AUTO: 1 % (ref 0–1.5)
BUN SERPL-MCNC: 24 MG/DL (ref 8–23)
BUN/CREAT SERPL: 17.6 (ref 7–25)
CALCIUM SPEC-SCNC: 10.3 MG/DL (ref 8.6–10.5)
CHLORIDE SERPL-SCNC: 100 MMOL/L (ref 98–107)
CO2 SERPL-SCNC: 31 MMOL/L (ref 22–29)
CREAT SERPL-MCNC: 1.36 MG/DL (ref 0.57–1)
CREAT UR-MCNC: 17.6 MG/DL
DEPRECATED RDW RBC AUTO: 42.1 FL (ref 37–54)
EGFRCR SERPLBLD CKD-EPI 2021: 43 ML/MIN/1.73
EOSINOPHIL # BLD AUTO: 0.31 10*3/MM3 (ref 0–0.4)
EOSINOPHIL NFR BLD AUTO: 3.2 % (ref 0.3–6.2)
ERYTHROCYTE [DISTWIDTH] IN BLOOD BY AUTOMATED COUNT: 13.2 % (ref 12.3–15.4)
GLUCOSE SERPL-MCNC: 104 MG/DL (ref 65–99)
HCT VFR BLD AUTO: 40.3 % (ref 34–46.6)
HGB BLD-MCNC: 13.3 G/DL (ref 12–15.9)
IMM GRANULOCYTES # BLD AUTO: 0.04 10*3/MM3 (ref 0–0.05)
IMM GRANULOCYTES NFR BLD AUTO: 0.4 % (ref 0–0.5)
LYMPHOCYTES # BLD AUTO: 3.8 10*3/MM3 (ref 0.7–3.1)
LYMPHOCYTES NFR BLD AUTO: 39.5 % (ref 19.6–45.3)
MCH RBC QN AUTO: 29.2 PG (ref 26.6–33)
MCHC RBC AUTO-ENTMCNC: 33 G/DL (ref 31.5–35.7)
MCV RBC AUTO: 88.4 FL (ref 79–97)
MONOCYTES # BLD AUTO: 0.86 10*3/MM3 (ref 0.1–0.9)
MONOCYTES NFR BLD AUTO: 8.9 % (ref 5–12)
NEUTROPHILS NFR BLD AUTO: 4.5 10*3/MM3 (ref 1.7–7)
NEUTROPHILS NFR BLD AUTO: 47 % (ref 42.7–76)
NRBC BLD AUTO-RTO: 0 /100 WBC (ref 0–0.2)
PLATELET # BLD AUTO: 235 10*3/MM3 (ref 140–450)
PMV BLD AUTO: 10.2 FL (ref 6–12)
POTASSIUM SERPL-SCNC: 4.2 MMOL/L (ref 3.5–5.2)
PROT ?TM UR-MCNC: 4.5 MG/DL
PROT/CREAT UR: 255.7 MG/G CREA (ref 0–200)
RBC # BLD AUTO: 4.56 10*6/MM3 (ref 3.77–5.28)
SODIUM SERPL-SCNC: 139 MMOL/L (ref 136–145)
WBC NRBC COR # BLD AUTO: 9.61 10*3/MM3 (ref 3.4–10.8)

## 2024-11-20 ENCOUNTER — TRANSCRIBE ORDERS (OUTPATIENT)
Dept: ADMINISTRATIVE | Facility: HOSPITAL | Age: 64
End: 2024-11-20
Payer: MEDICARE

## 2024-11-20 DIAGNOSIS — R11.2 INTRACTABLE NAUSEA AND VOMITING: Primary | ICD-10-CM

## 2024-11-21 ENCOUNTER — OFFICE (AMBULATORY)
Age: 64
End: 2024-11-21
Payer: MEDICARE

## 2024-11-21 ENCOUNTER — OFFICE (AMBULATORY)
Dept: URBAN - METROPOLITAN AREA CLINIC 64 | Facility: CLINIC | Age: 64
End: 2024-11-21
Payer: MEDICARE

## 2024-11-21 VITALS
SYSTOLIC BLOOD PRESSURE: 110 MMHG | HEIGHT: 63 IN | DIASTOLIC BLOOD PRESSURE: 79 MMHG | WEIGHT: 203 LBS | SYSTOLIC BLOOD PRESSURE: 110 MMHG | SYSTOLIC BLOOD PRESSURE: 110 MMHG | WEIGHT: 203 LBS | DIASTOLIC BLOOD PRESSURE: 79 MMHG | WEIGHT: 203 LBS | DIASTOLIC BLOOD PRESSURE: 79 MMHG | HEART RATE: 79 BPM | HEART RATE: 79 BPM | WEIGHT: 203 LBS | SYSTOLIC BLOOD PRESSURE: 110 MMHG | HEIGHT: 63 IN | HEART RATE: 79 BPM | HEIGHT: 63 IN | HEART RATE: 79 BPM | WEIGHT: 203 LBS | HEIGHT: 63 IN | SYSTOLIC BLOOD PRESSURE: 110 MMHG | HEART RATE: 79 BPM | HEIGHT: 63 IN | HEIGHT: 63 IN | DIASTOLIC BLOOD PRESSURE: 79 MMHG | DIASTOLIC BLOOD PRESSURE: 79 MMHG | SYSTOLIC BLOOD PRESSURE: 110 MMHG | WEIGHT: 203 LBS | HEIGHT: 63 IN | SYSTOLIC BLOOD PRESSURE: 110 MMHG | WEIGHT: 203 LBS | HEART RATE: 79 BPM | DIASTOLIC BLOOD PRESSURE: 79 MMHG | HEART RATE: 79 BPM | DIASTOLIC BLOOD PRESSURE: 79 MMHG

## 2024-11-21 DIAGNOSIS — K21.9 GASTRO-ESOPHAGEAL REFLUX DISEASE WITHOUT ESOPHAGITIS: ICD-10-CM

## 2024-11-21 DIAGNOSIS — R19.7 DIARRHEA, UNSPECIFIED: ICD-10-CM

## 2024-11-21 DIAGNOSIS — K59.00 CONSTIPATION, UNSPECIFIED: ICD-10-CM

## 2024-11-21 DIAGNOSIS — R11.2 NAUSEA WITH VOMITING, UNSPECIFIED: ICD-10-CM

## 2024-11-21 PROCEDURE — 99214 OFFICE O/P EST MOD 30 MIN: CPT | Performed by: NURSE PRACTITIONER

## 2024-11-21 RX ORDER — PROMETHAZINE HYDROCHLORIDE 25 MG/1
TABLET ORAL
Qty: 30 | Refills: 6 | Status: ACTIVE
Start: 2024-11-21

## 2024-11-21 RX ORDER — SCOLOPAMINE TRANSDERMAL SYSTEM 1 MG/1
PATCH, EXTENDED RELEASE TRANSDERMAL
Qty: 10 | Refills: 3 | Status: ACTIVE
Start: 2024-11-21

## 2024-11-21 RX ORDER — ERYTHROMYCIN 250 MG/1
1000 TABLET, FILM COATED ORAL
Qty: 120 | Refills: 0 | Status: ACTIVE
Start: 2024-11-21

## 2024-11-27 RX ORDER — ONDANSETRON 4 MG/1
4 TABLET, ORALLY DISINTEGRATING ORAL EVERY 8 HOURS PRN
Qty: 60 TABLET | Refills: 0 | Status: SHIPPED | OUTPATIENT
Start: 2024-11-27

## 2024-12-02 ENCOUNTER — OFFICE VISIT (OUTPATIENT)
Dept: FAMILY MEDICINE CLINIC | Facility: CLINIC | Age: 64
End: 2024-12-02
Payer: MEDICARE

## 2024-12-02 VITALS
HEIGHT: 62 IN | SYSTOLIC BLOOD PRESSURE: 138 MMHG | TEMPERATURE: 98.6 F | DIASTOLIC BLOOD PRESSURE: 88 MMHG | WEIGHT: 200.8 LBS | OXYGEN SATURATION: 95 % | HEART RATE: 73 BPM | BODY MASS INDEX: 36.95 KG/M2

## 2024-12-02 DIAGNOSIS — J44.1 CHRONIC OBSTRUCTIVE PULMONARY DISEASE WITH ACUTE EXACERBATION: Primary | ICD-10-CM

## 2024-12-02 PROCEDURE — 3075F SYST BP GE 130 - 139MM HG: CPT | Performed by: NURSE PRACTITIONER

## 2024-12-02 PROCEDURE — 99213 OFFICE O/P EST LOW 20 MIN: CPT | Performed by: NURSE PRACTITIONER

## 2024-12-02 PROCEDURE — 3079F DIAST BP 80-89 MM HG: CPT | Performed by: NURSE PRACTITIONER

## 2024-12-02 PROCEDURE — 1160F RVW MEDS BY RX/DR IN RCRD: CPT | Performed by: NURSE PRACTITIONER

## 2024-12-02 PROCEDURE — 1126F AMNT PAIN NOTED NONE PRSNT: CPT | Performed by: NURSE PRACTITIONER

## 2024-12-02 PROCEDURE — 1159F MED LIST DOCD IN RCRD: CPT | Performed by: NURSE PRACTITIONER

## 2024-12-02 RX ORDER — AZITHROMYCIN 250 MG/1
TABLET, FILM COATED ORAL
Qty: 6 TABLET | Refills: 0 | Status: SHIPPED | OUTPATIENT
Start: 2024-12-02

## 2024-12-02 RX ORDER — PROMETHAZINE HYDROCHLORIDE 25 MG/1
TABLET ORAL
COMMUNITY

## 2024-12-02 RX ORDER — ASPIRIN 81 MG/1
TABLET ORAL
COMMUNITY

## 2024-12-02 RX ORDER — HYDRALAZINE HYDROCHLORIDE 25 MG/1
TABLET, FILM COATED ORAL DAILY
COMMUNITY

## 2024-12-02 RX ORDER — AMLODIPINE BESYLATE 5 MG/1
TABLET ORAL
COMMUNITY

## 2024-12-02 RX ORDER — BENZONATATE 200 MG/1
200 CAPSULE ORAL 3 TIMES DAILY PRN
Qty: 30 CAPSULE | Refills: 0 | Status: SHIPPED | OUTPATIENT
Start: 2024-12-02

## 2024-12-02 RX ORDER — METHYLPREDNISOLONE 4 MG/1
TABLET ORAL
Qty: 21 TABLET | Refills: 0 | Status: SHIPPED | OUTPATIENT
Start: 2024-12-02

## 2024-12-02 RX ORDER — CLONIDINE HYDROCHLORIDE 0.2 MG/1
TABLET ORAL
COMMUNITY

## 2024-12-02 RX ORDER — ARIPIPRAZOLE 10 MG/1
TABLET ORAL
COMMUNITY

## 2024-12-02 RX ORDER — SCOLOPAMINE TRANSDERMAL SYSTEM 1 MG/1
10 PATCH, EXTENDED RELEASE TRANSDERMAL
COMMUNITY
Start: 2024-11-20

## 2024-12-02 RX ORDER — BUMETANIDE 2 MG/1
TABLET ORAL
COMMUNITY

## 2024-12-02 NOTE — PROGRESS NOTES
Chief Complaint  Chief Complaint   Patient presents with    Cough     Dry, unproductive with chest congestion, fever, shortness of air, wheeze and rattle           Subjective          Jasmine Cerda presents to [facility] for   History of Present Illness      Patient presents for acute same-day visit for cough, wheezing, congestion, shortness of air.  Cough is nonproductive and tight, had fever of 101 yesterday.  Her  was sick last week. She has  been using inhalers and nebulizers as directed, TheraFlu and DayQuil/NyQuil as needed    The following portions of the patient's history were reviewed and updated as appropriate: allergies, current medications, past family history, past medical history, past social history, past surgical history and problem list.    Past Medical History:   Diagnosis Date    Anxiety     Breast cyst     CHF (congestive heart failure)     COPD (chronic obstructive pulmonary disease)     Coronary heart disease     Depression     Hyperlipidemia     Hypertension      Past Surgical History:   Procedure Laterality Date    APPENDECTOMY      BREAST CYST ASPIRATION      CARDIAC CATHETERIZATION  2017    No Stents placed - [facility]    CARDIAC CATHETERIZATION N/A 2023    Procedure: Left Heart Cath possible PCI;  Surgeon: Cuauhtemoc Kwon MD;  Location: [facility] CATH INVASIVE LOCATION;  Service: Cardiovascular;  Laterality: N/A;    CERVICAL FUSION      C6-C7     SECTION      x 2    CHOLECYSTECTOMY      HYSTERECTOMY      partial     Family History   Problem Relation Age of Onset    Colon cancer Mother     Diabetes Father     Pulmonary embolism Brother     Heart failure Brother     Breast cancer Maternal Aunt      Social History     Tobacco Use    Smoking status: Never    Smokeless tobacco: Never    Tobacco comments:     Passive Smoke: N   Substance Use Topics    Alcohol use: No       Current Outpatient Medications:     ALPRAZolam (XANAX) 0.5 MG tablet,  Take 1 tablet by mouth 3 (Three) Times a Day As Needed for Anxiety. for anxiety, Disp: 90 tablet, Rfl: 3    amLODIPine (NORVASC) 5 MG tablet, 90, Disp: , Rfl:     ARIPiprazole (ABILIFY) 10 MG tablet, 30, Disp: , Rfl:     aspirin 81 MG EC tablet, 0, Disp: , Rfl:     Budeson-Glycopyrrol-Formoterol (Breztri Aerosphere) 160-9-4.8 MCG/ACT aerosol inhaler, Inhale 2 puffs 2 (Two) Times a Day., Disp: 2 each, Rfl: 0    bumetanide (BUMEX) 2 MG tablet, 180, Disp: , Rfl:     carbidopa-levodopa (SINEMET)  MG per tablet, Take 1 tab at: 6 am, 10 am, 2pm, 7 pm, Disp: 120 tablet, Rfl: 5    carvedilol (COREG) 25 MG tablet, Take 1 tablet by mouth 2 (Two) Times a Day., Disp: 180 tablet, Rfl: 3    citalopram (CeleXA) 40 MG tablet, Take 1 tablet by mouth Every Morning., Disp: 90 tablet, Rfl: 1    cloNIDine (CATAPRES) 0.2 MG tablet, 0, Disp: , Rfl:     colchicine 0.6 MG tablet, Take 1 tablet by mouth twice daily as needed, Disp: 30 tablet, Rfl: 0    cyanocobalamin 1000 MCG/ML injection, INJECT 1 ML INTO THE APPROPRIATE MUSCLE AS DIRECTED EVERY 28 DAYS, Disp: 3 mL, Rfl: 3    gabapentin (NEURONTIN) 300 MG capsule, Take 1 capsule by mouth 3 (Three) Times a Day., Disp: 270 capsule, Rfl: 1    hydrALAZINE (APRESOLINE) 25 MG tablet, Daily., Disp: , Rfl:     HYDROcodone-acetaminophen (NORCO) 5-325 MG per tablet, Take 1 tablet by mouth Every 6 (Six) Hours As Needed for Severe Pain., Disp: 30 tablet, Rfl: 0    hydrOXYzine (ATARAX) 25 MG tablet, Take 1 tablet by mouth 3 (Three) Times a Day As Needed for Anxiety., Disp: 90 tablet, Rfl: 3    magnesium hydroxide (MILK OF MAGNESIA) 400 MG/5ML suspension, 0, Disp: , Rfl:     metoclopramide (Reglan) 5 MG tablet, Take 1 tablet by mouth 3 (Three) Times a Day With Meals., Disp: 90 tablet, Rfl: 2    mirtazapine (REMERON) 30 MG tablet, Take 1 tablet by mouth every night at bedtime., Disp: 90 tablet, Rfl: 1    Multiple Vitamins-Minerals (MULTIVITAMIN ADULT) tablet, Take 1 tablet by mouth Daily. With Omega  "XL, Disp: , Rfl:     nitroglycerin (NITROSTAT) 0.4 MG SL tablet, Place 1 tablet under the tongue Every 5 (Five) Minutes As Needed for Chest Pain. Take no more than 3 doses in 15 minutes., Disp: 25 tablet, Rfl: 2    ondansetron ODT (ZOFRAN-ODT) 4 MG disintegrating tablet, DISSOLVE 1 TABLET IN MOUTH EVERY 8 HOURS AS NEEDED FOR NAUSEA AND VOMITING, Disp: 60 tablet, Rfl: 0    pantoprazole (PROTONIX) 40 MG EC tablet, Take 1 tablet by mouth 2 (Two) Times a Day., Disp: 180 tablet, Rfl: 1    potassium chloride (KLOR-CON) 10 MEQ CR tablet, Take 1 tablet by mouth Daily As Needed (If takes Lasix)., Disp: 90 tablet, Rfl: 0    promethazine (PHENERGAN) 25 MG tablet, TAKE 1 TABLET BY MOUTH 4 TIMES DAILY AS NEEDED FOR NAUSEA AND VOMITING, Disp: , Rfl:     QUEtiapine (SEROquel) 100 MG tablet, Take 1 tablet by mouth every night at bedtime., Disp: 90 tablet, Rfl: 1    rosuvastatin (CRESTOR) 40 MG tablet, Take 1 tablet by mouth Every Evening., Disp: 90 tablet, Rfl: 1    Scopolamine 1 MG/3DAYS patch, 10 patches., Disp: , Rfl:     Syringe/Needle, Disp, 23G X 1\" 3 ML misc, Use to inject B12 every 30 days intramuscularly, Disp: 12 each, Rfl: 0    vitamin D (ERGOCALCIFEROL) 1.25 MG (41651 UT) capsule capsule, Take 1 capsule by mouth 1 (One) Time Per Week., Disp: 15 capsule, Rfl: 3    Vonoprazan Fumarate (Voquezna) 10 MG tablet, Take 1 tablet by mouth Daily., Disp: 5 tablet, Rfl: 0    azithromycin (Zithromax) 250 MG tablet, Take 2 tablets the first day, then 1 tablet daily for 4 days., Disp: 6 tablet, Rfl: 0    benzonatate (TESSALON) 200 MG capsule, Take 1 capsule by mouth 3 (Three) Times a Day As Needed for Cough., Disp: 30 capsule, Rfl: 0    methylPREDNISolone (MEDROL) 4 MG dose pack, Take as directed on package instructions., Disp: 21 tablet, Rfl: 0    Objective   Vital Signs:   Vitals:    12/02/24 0836   BP: 138/88   BP Location: Right arm   Patient Position: Sitting   Cuff Size: Adult   Pulse: 73   Temp: 98.6 °F (37 °C)   TempSrc: Oral " "  SpO2: 95%   Weight: 91.1 kg (200 lb 12.8 oz)   Height: 157.5 cm (62\")       Body mass index is 36.73 kg/m².    Physical Exam  Constitutional:       General: She is not in acute distress.     Appearance: Normal appearance. She is well-developed. She is ill-appearing. She is not diaphoretic.   HENT:      Head: Normocephalic.   Eyes:      Conjunctiva/sclera: Conjunctivae normal.      Pupils: Pupils are equal, round, and reactive to light.   Neck:      Thyroid: No thyromegaly.      Vascular: No JVD.   Cardiovascular:      Rate and Rhythm: Normal rate and regular rhythm.      Heart sounds: Normal heart sounds. No murmur heard.  Pulmonary:      Effort: Pulmonary effort is normal. No respiratory distress.      Breath sounds: Wheezing and rhonchi present.      Comments: Dry hacking, nonproductive, tight cough  Abdominal:      General: Bowel sounds are normal. There is no distension.      Palpations: Abdomen is soft.      Tenderness: There is no abdominal tenderness.   Musculoskeletal:         General: No swelling or tenderness. Normal range of motion.      Cervical back: Normal range of motion and neck supple. No tenderness.   Lymphadenopathy:      Cervical: No cervical adenopathy.   Skin:     General: Skin is warm and dry.      Coloration: Skin is not jaundiced.      Findings: No erythema or rash.   Neurological:      General: No focal deficit present.      Mental Status: She is alert and oriented to person, place, and time. Mental status is at baseline.      Sensory: No sensory deficit.      Motor: No weakness.      Gait: Gait normal.   Psychiatric:         Mood and Affect: Mood normal.         Behavior: Behavior normal.         Thought Content: Thought content normal.         Judgment: Judgment normal.          Result Review :     No visits with results within 7 Day(s) from this visit.   Latest known visit with results is:   Lab on 11/18/2024   Component Date Value Ref Range Status    Glucose 11/18/2024 104 (H)  65 - " 99 mg/dL Final    BUN 11/18/2024 24 (H)  8 - 23 mg/dL Final    Creatinine 11/18/2024 1.36 (H)  0.57 - 1.00 mg/dL Final    Sodium 11/18/2024 139  136 - 145 mmol/L Final    Potassium 11/18/2024 4.2  3.5 - 5.2 mmol/L Final    Chloride 11/18/2024 100  98 - 107 mmol/L Final    CO2 11/18/2024 31.0 (H)  22.0 - 29.0 mmol/L Final    Calcium 11/18/2024 10.3  8.6 - 10.5 mg/dL Final    BUN/Creatinine Ratio 11/18/2024 17.6  7.0 - 25.0 Final    Anion Gap 11/18/2024 8.0  5.0 - 15.0 mmol/L Final    eGFR 11/18/2024 43.0 (L)  >60.0 mL/min/1.73 Final    Color, UA 11/18/2024 Yellow  Yellow, Straw Final    Appearance, UA 11/18/2024 Clear  Clear Final    pH, UA 11/18/2024 7.0  5.0 - 8.0 Final    Specific Gravity, UA 11/18/2024 1.007  1.005 - 1.030 Final    Glucose, UA 11/18/2024 Negative  Negative Final    Ketones, UA 11/18/2024 Negative  Negative Final    Bilirubin, UA 11/18/2024 Negative  Negative Final    Blood, UA 11/18/2024 Negative  Negative Final    Protein, UA 11/18/2024 Negative  Negative Final    Leuk Esterase, UA 11/18/2024 Negative  Negative Final    Nitrite, UA 11/18/2024 Negative  Negative Final    Urobilinogen, UA 11/18/2024 0.2 E.U./dL  0.2 - 1.0 E.U./dL Final    Protein/Creatinine Ratio, Urine 11/18/2024 255.7 (H)  0.0 - 200.0 mg/G Crea Final    Creatinine, Urine 11/18/2024 17.6  mg/dL Final    Total Protein, Urine 11/18/2024 4.5  mg/dL Final    WBC 11/18/2024 9.61  3.40 - 10.80 10*3/mm3 Final    RBC 11/18/2024 4.56  3.77 - 5.28 10*6/mm3 Final    Hemoglobin 11/18/2024 13.3  12.0 - 15.9 g/dL Final    Hematocrit 11/18/2024 40.3  34.0 - 46.6 % Final    MCV 11/18/2024 88.4  79.0 - 97.0 fL Final    MCH 11/18/2024 29.2  26.6 - 33.0 pg Final    MCHC 11/18/2024 33.0  31.5 - 35.7 g/dL Final    RDW 11/18/2024 13.2  12.3 - 15.4 % Final    RDW-SD 11/18/2024 42.1  37.0 - 54.0 fl Final    MPV 11/18/2024 10.2  6.0 - 12.0 fL Final    Platelets 11/18/2024 235  140 - 450 10*3/mm3 Final    Neutrophil % 11/18/2024 47.0  42.7 - 76.0 % Final     Lymphocyte % 11/18/2024 39.5  19.6 - 45.3 % Final    Monocyte % 11/18/2024 8.9  5.0 - 12.0 % Final    Eosinophil % 11/18/2024 3.2  0.3 - 6.2 % Final    Basophil % 11/18/2024 1.0  0.0 - 1.5 % Final    Immature Grans % 11/18/2024 0.4  0.0 - 0.5 % Final    Neutrophils, Absolute 11/18/2024 4.50  1.70 - 7.00 10*3/mm3 Final    Lymphocytes, Absolute 11/18/2024 3.80 (H)  0.70 - 3.10 10*3/mm3 Final    Monocytes, Absolute 11/18/2024 0.86  0.10 - 0.90 10*3/mm3 Final    Eosinophils, Absolute 11/18/2024 0.31  0.00 - 0.40 10*3/mm3 Final    Basophils, Absolute 11/18/2024 0.10  0.00 - 0.20 10*3/mm3 Final    Immature Grans, Absolute 11/18/2024 0.04  0.00 - 0.05 10*3/mm3 Final    nRBC 11/18/2024 0.0  0.0 - 0.2 /100 WBC Final    Extra Tube 11/18/2024 Hold for add-ons.   Final    Auto resulted.                              Assessment and Plan    Diagnoses and all orders for this visit:    1. Chronic obstructive pulmonary disease with acute exacerbation (Primary)    Other orders  -     methylPREDNISolone (MEDROL) 4 MG dose pack; Take as directed on package instructions.  Dispense: 21 tablet; Refill: 0  -     azithromycin (Zithromax) 250 MG tablet; Take 2 tablets the first day, then 1 tablet daily for 4 days.  Dispense: 6 tablet; Refill: 0  -     benzonatate (TESSALON) 200 MG capsule; Take 1 capsule by mouth 3 (Three) Times a Day As Needed for Cough.  Dispense: 30 capsule; Refill: 0      Start medications as above  Patient is awaiting Diaz through mail order patient assistance, provided sample today  Cont nebs, theraflu, dayquil or nyquil prn        I spent 20 minutes caring for Jasmine Cerda on this date of service. This time includes time spent by me in the following activities: preparing for the visit, reviewing tests, performing a medically appropriate examination and/or evaluation , counseling and educating the patient/family/caregiver, ordering medications, tests, or procedures and documenting information in the medical  record        Follow Up     Return for Next scheduled follow up or earlier if needed.  Patient was given instructions and counseling regarding her condition or for health maintenance advice. Please see specific information pulled into the AVS if appropriate.        Part of this note may be an electronic transcription/translation of spoken language to printed text using the Dragon Dictation System

## 2024-12-09 ENCOUNTER — HOSPITAL ENCOUNTER (OUTPATIENT)
Dept: NUCLEAR MEDICINE | Facility: HOSPITAL | Age: 64
Discharge: HOME | End: 2024-12-09
Payer: MEDICARE

## 2024-12-09 DIAGNOSIS — R11.2 INTRACTABLE NAUSEA AND VOMITING: ICD-10-CM

## 2024-12-09 PROCEDURE — 78264 GASTRIC EMPTYING IMG STUDY: CPT

## 2024-12-09 PROCEDURE — A9541 TC99M SULFUR COLLOID: HCPCS | Performed by: INTERNAL MEDICINE

## 2024-12-09 PROCEDURE — 34310000005 TECHNETIUM SULFUR COLLOID: Performed by: INTERNAL MEDICINE

## 2024-12-09 RX ADMIN — TECHNETIUM TC 99M SULFUR COLLOID 1 DOSE: KIT at 07:28

## 2024-12-13 DIAGNOSIS — J44.1 CHRONIC OBSTRUCTIVE PULMONARY DISEASE WITH ACUTE EXACERBATION: ICD-10-CM

## 2024-12-13 RX ORDER — BUDESONIDE, GLYCOPYRROLATE, AND FORMOTEROL FUMARATE 160; 9; 4.8 UG/1; UG/1; UG/1
2 AEROSOL, METERED RESPIRATORY (INHALATION) 2 TIMES DAILY
Qty: 2 EACH | Refills: 0 | COMMUNITY
Start: 2024-12-13

## 2024-12-13 NOTE — TELEPHONE ENCOUNTER
Caller: CerdaJasmine    Relationship: Self    Best call back number:     [phone]       Requested Prescriptions:   Requested Prescriptions     Pending Prescriptions Disp Refills    Budeson-Glycopyrrol-Formoterol (Breztri Aerosphere) 160-9-4.8 MCG/ACT aerosol inhaler 2 each 0     Sig: Inhale 2 puffs 2 (Two) Times a Day.        Pharmacy where request should be sent: [facility] [id number] [place], [place] [phone] FX     Last office visit with prescribing clinician: 12/2/2024   Last telemedicine visit with prescribing clinician: Visit date not found   Next office visit with prescribing clinician: 1/28/2025     Additional details provided by patient:     Does the patient have less than a 3 day supply:  [x] Yes  [] No    Would you like a call back once the refill request has been completed: [] Yes [] No    If the office needs to give you a call back, can they leave a voicemail: [] Yes [] No    Kailash Hampton Rep   12/13/24 10:26 EST

## 2024-12-15 ENCOUNTER — HOSPITAL ENCOUNTER (EMERGENCY)
Facility: HOSPITAL | Age: 64
Discharge: HOME | End: 2024-12-15
Attending: EMERGENCY MEDICINE | Admitting: EMERGENCY MEDICINE
Payer: MEDICARE

## 2024-12-15 ENCOUNTER — APPOINTMENT (OUTPATIENT)
Dept: CARDIOLOGY | Facility: HOSPITAL | Age: 64
End: 2024-12-15
Payer: MEDICARE

## 2024-12-15 ENCOUNTER — APPOINTMENT (OUTPATIENT)
Dept: GENERAL RADIOLOGY | Facility: HOSPITAL | Age: 64
End: 2024-12-15
Payer: MEDICARE

## 2024-12-15 VITALS
TEMPERATURE: 98.4 F | OXYGEN SATURATION: 91 % | HEART RATE: 74 BPM | HEIGHT: 62 IN | SYSTOLIC BLOOD PRESSURE: 119 MMHG | BODY MASS INDEX: 36.11 KG/M2 | WEIGHT: 196.21 LBS | DIASTOLIC BLOOD PRESSURE: 70 MMHG | RESPIRATION RATE: 16 BRPM

## 2024-12-15 DIAGNOSIS — M79.672 LEFT FOOT PAIN: Primary | ICD-10-CM

## 2024-12-15 DIAGNOSIS — E87.6 HYPOKALEMIA: ICD-10-CM

## 2024-12-15 LAB
ANION GAP SERPL CALCULATED.3IONS-SCNC: 13.1 MMOL/L (ref 5–15)
BASOPHILS # BLD AUTO: 0.06 10*3/MM3 (ref 0–0.2)
BASOPHILS NFR BLD AUTO: 0.4 % (ref 0–1.5)
BH CV LOWER VASCULAR LEFT COMMON FEMORAL AUGMENT: NORMAL
BH CV LOWER VASCULAR LEFT COMMON FEMORAL COMPETENT: NORMAL
BH CV LOWER VASCULAR LEFT COMMON FEMORAL COMPRESS: NORMAL
BH CV LOWER VASCULAR LEFT COMMON FEMORAL PHASIC: NORMAL
BH CV LOWER VASCULAR LEFT COMMON FEMORAL SPONT: NORMAL
BH CV LOWER VASCULAR LEFT DISTAL FEMORAL COMPRESS: NORMAL
BH CV LOWER VASCULAR LEFT GASTRONEMIUS COMPRESS: NORMAL
BH CV LOWER VASCULAR LEFT GREATER SAPH AK COMPRESS: NORMAL
BH CV LOWER VASCULAR LEFT GREATER SAPH BK COMPRESS: NORMAL
BH CV LOWER VASCULAR LEFT LESSER SAPH COMPRESS: NORMAL
BH CV LOWER VASCULAR LEFT MID FEMORAL AUGMENT: NORMAL
BH CV LOWER VASCULAR LEFT MID FEMORAL COMPETENT: NORMAL
BH CV LOWER VASCULAR LEFT MID FEMORAL COMPRESS: NORMAL
BH CV LOWER VASCULAR LEFT MID FEMORAL PHASIC: NORMAL
BH CV LOWER VASCULAR LEFT MID FEMORAL SPONT: NORMAL
BH CV LOWER VASCULAR LEFT PERONEAL COMPRESS: NORMAL
BH CV LOWER VASCULAR LEFT POPLITEAL AUGMENT: NORMAL
BH CV LOWER VASCULAR LEFT POPLITEAL COMPETENT: NORMAL
BH CV LOWER VASCULAR LEFT POPLITEAL COMPRESS: NORMAL
BH CV LOWER VASCULAR LEFT POPLITEAL PHASIC: NORMAL
BH CV LOWER VASCULAR LEFT POPLITEAL SPONT: NORMAL
BH CV LOWER VASCULAR LEFT POSTERIOR TIBIAL COMPRESS: NORMAL
BH CV LOWER VASCULAR LEFT PROXIMAL FEMORAL COMPRESS: NORMAL
BH CV LOWER VASCULAR LEFT SAPHENOFEMORAL JUNCTION COMPRESS: NORMAL
BH CV LOWER VASCULAR RIGHT COMMON FEMORAL AUGMENT: NORMAL
BH CV LOWER VASCULAR RIGHT COMMON FEMORAL COMPETENT: NORMAL
BH CV LOWER VASCULAR RIGHT COMMON FEMORAL COMPRESS: NORMAL
BH CV LOWER VASCULAR RIGHT COMMON FEMORAL PHASIC: NORMAL
BH CV LOWER VASCULAR RIGHT COMMON FEMORAL SPONT: NORMAL
BH CV VAS PRELIMINARY FINDINGS SCRIPTING: 1
BUN SERPL-MCNC: 7 MG/DL (ref 8–23)
BUN/CREAT SERPL: 5.6 (ref 7–25)
CALCIUM SPEC-SCNC: 10 MG/DL (ref 8.6–10.5)
CHLORIDE SERPL-SCNC: 105 MMOL/L (ref 98–107)
CO2 SERPL-SCNC: 24.9 MMOL/L (ref 22–29)
CREAT SERPL-MCNC: 1.25 MG/DL (ref 0.57–1)
DEPRECATED RDW RBC AUTO: 41.8 FL (ref 37–54)
EGFRCR SERPLBLD CKD-EPI 2021: 47.6 ML/MIN/1.73
EOSINOPHIL # BLD AUTO: 0.04 10*3/MM3 (ref 0–0.4)
EOSINOPHIL NFR BLD AUTO: 0.3 % (ref 0.3–6.2)
ERYTHROCYTE [DISTWIDTH] IN BLOOD BY AUTOMATED COUNT: 13.2 % (ref 12.3–15.4)
GLUCOSE SERPL-MCNC: 109 MG/DL (ref 65–99)
HCT VFR BLD AUTO: 38.8 % (ref 34–46.6)
HGB BLD-MCNC: 13.1 G/DL (ref 12–15.9)
HOLD SPECIMEN: NORMAL
IMM GRANULOCYTES # BLD AUTO: 0.05 10*3/MM3 (ref 0–0.05)
IMM GRANULOCYTES NFR BLD AUTO: 0.3 % (ref 0–0.5)
LYMPHOCYTES # BLD AUTO: 2.45 10*3/MM3 (ref 0.7–3.1)
LYMPHOCYTES NFR BLD AUTO: 16 % (ref 19.6–45.3)
MCH RBC QN AUTO: 29.4 PG (ref 26.6–33)
MCHC RBC AUTO-ENTMCNC: 33.8 G/DL (ref 31.5–35.7)
MCV RBC AUTO: 87 FL (ref 79–97)
MONOCYTES # BLD AUTO: 1.28 10*3/MM3 (ref 0.1–0.9)
MONOCYTES NFR BLD AUTO: 8.4 % (ref 5–12)
NEUTROPHILS NFR BLD AUTO: 11.42 10*3/MM3 (ref 1.7–7)
NEUTROPHILS NFR BLD AUTO: 74.6 % (ref 42.7–76)
NRBC BLD AUTO-RTO: 0 /100 WBC (ref 0–0.2)
PLATELET # BLD AUTO: 271 10*3/MM3 (ref 140–450)
PMV BLD AUTO: 8.8 FL (ref 6–12)
POTASSIUM SERPL-SCNC: 2.9 MMOL/L (ref 3.5–5.2)
RBC # BLD AUTO: 4.46 10*6/MM3 (ref 3.77–5.28)
SODIUM SERPL-SCNC: 143 MMOL/L (ref 136–145)
WBC NRBC COR # BLD AUTO: 15.3 10*3/MM3 (ref 3.4–10.8)
WHOLE BLOOD HOLD COAG: NORMAL

## 2024-12-15 PROCEDURE — 85025 COMPLETE CBC W/AUTO DIFF WBC: CPT | Performed by: EMERGENCY MEDICINE

## 2024-12-15 PROCEDURE — 73630 X-RAY EXAM OF FOOT: CPT

## 2024-12-15 PROCEDURE — 99284 EMERGENCY DEPT VISIT MOD MDM: CPT

## 2024-12-15 PROCEDURE — 93971 EXTREMITY STUDY: CPT

## 2024-12-15 PROCEDURE — 93971 EXTREMITY STUDY: CPT | Performed by: SURGERY

## 2024-12-15 PROCEDURE — 80048 BASIC METABOLIC PNL TOTAL CA: CPT | Performed by: EMERGENCY MEDICINE

## 2024-12-15 RX ORDER — HYDROCODONE BITARTRATE AND ACETAMINOPHEN 7.5; 325 MG/1; MG/1
1 TABLET ORAL ONCE
Status: COMPLETED | OUTPATIENT
Start: 2024-12-15 | End: 2024-12-15

## 2024-12-15 RX ORDER — CEPHALEXIN 500 MG/1
500 CAPSULE ORAL 3 TIMES DAILY
Qty: 21 CAPSULE | Refills: 0 | Status: SHIPPED | OUTPATIENT
Start: 2024-12-15

## 2024-12-15 RX ORDER — METHYLPREDNISOLONE 4 MG/1
TABLET ORAL
Qty: 1 EACH | Refills: 0 | Status: SHIPPED | OUTPATIENT
Start: 2024-12-15 | End: 2024-12-23

## 2024-12-15 RX ORDER — HYDROCODONE BITARTRATE AND ACETAMINOPHEN 7.5; 325 MG/1; MG/1
1 TABLET ORAL EVERY 4 HOURS PRN
Qty: 5 TABLET | Refills: 0 | Status: SHIPPED | OUTPATIENT
Start: 2024-12-15 | End: 2024-12-23 | Stop reason: SDUPTHER

## 2024-12-15 RX ORDER — POTASSIUM CHLORIDE 1500 MG/1
40 TABLET, EXTENDED RELEASE ORAL ONCE
Status: COMPLETED | OUTPATIENT
Start: 2024-12-15 | End: 2024-12-15

## 2024-12-15 RX ADMIN — POTASSIUM CHLORIDE 40 MEQ: 1500 TABLET, EXTENDED RELEASE ORAL at 10:56

## 2024-12-15 RX ADMIN — HYDROCODONE BITARTRATE AND ACETAMINOPHEN 1 TABLET: 7.5; 325 TABLET ORAL at 09:48

## 2024-12-15 NOTE — DISCHARGE INSTRUCTIONS
Rest and elevate the extremity.  Continue colchicine.  Start Medrol Dosepak and Keflex.  Norco as needed for pain.  Follow-up with your doctor tomorrow.  Return for increased pain, fever, vomiting or any other concerns  Eat potassium rich food daily as your potassium level today was low.

## 2024-12-15 NOTE — ED PROVIDER NOTES
Subjective   History of Present Illness  64-year-old female with reported history of gout complains of some left foot pain and swelling over the last 3 days.  States pain does radiate up into her leg.  She complains of increased pain with walking.  She denies trauma she reports no fevers or chills or numbness or weakness.  Review of Systems    Past Medical History:   Diagnosis Date    Anxiety     Breast cyst     CHF (congestive heart failure)     COPD (chronic obstructive pulmonary disease)     Coronary heart disease     Depression     Hyperlipidemia     Hypertension        No Known Allergies    Past Surgical History:   Procedure Laterality Date    APPENDECTOMY      BREAST CYST ASPIRATION      CARDIAC CATHETERIZATION  2017    No Stents placed - [facility]    CARDIAC CATHETERIZATION N/A 2023    Procedure: Left Heart Cath possible PCI;  Surgeon: Cuauhtemoc Kwon MD;  Location: [facility] CATH INVASIVE LOC[facility];  Service: Cardiovascular;  Laterality: N/A;    CERVICAL FUSION      C6-C7     SECTION      x 2    CHOLECYSTECTOMY      HYSTERECTOMY      partial       Family History   Problem Relation Age of Onset    Colon cancer Mother     Diabetes Father     Pulmonary embolism Brother     Heart failure Brother     Breast cancer Maternal Aunt        Social History     Socioeconomic History    Marital status:    Tobacco Use    Smoking status: Never    Smokeless tobacco: Never    Tobacco comments:     Passive Smoke: N   Vaping Use    Vaping status: Never Used   Substance and Sexual Activity    Alcohol use: No    Drug use: No    Sexual activity: Defer       Prior to Admission medications    Medication Sig Start Date End Date Taking? Authorizing Provider   ALPRAZolam (XANAX) 0.5 MG tablet Take 1 tablet by mouth 3 (Three) Times a Day As Needed for Anxiety. for anxiety 10/16/24   Sivan Ken MD   amLODIPine (NORVASC) 5 MG tablet 90    ProviderJulieta MD   ARIPiprazole (ABILIFY) 10 MG tablet 30     Julieta Koehler MD   aspirin 81 MG EC tablet 0    Julieta Koehelr MD   azithromycin (Zithromax) 250 MG tablet Take 2 tablets the first day, then 1 tablet daily for 4 days. 12/2/24   Eleni Miranda APRN   benzonatate (TESSALON) 200 MG capsule Take 1 capsule by mouth 3 (Three) Times a Day As Needed for Cough. 12/2/24   Eleni Miranda APRN   Budeson-Glycopyrrol-Formoterol (Breztri Aerosphere) 160-9-4.8 MCG/ACT aerosol inhaler Inhale 2 puffs 2 (Two) Times a Day. 12/13/24   Eleni Miranda APRN   bumetanide (BUMEX) 2 MG tablet 180    Julieta Koehler MD   carbidopa-levodopa (SINEMET)  MG per tablet Take 1 tab at: 6 am, 10 am, 2pm, 7 pm 1/24/24   Eleni Miranda APRN   carvedilol (COREG) 25 MG tablet Take 1 tablet by mouth 2 (Two) Times a Day. 2/16/24   Cuauhtemoc Kwon MD   citalopram (CeleXA) 40 MG tablet Take 1 tablet by mouth Every Morning. 10/16/24   Sivan Ken MD   cloNIDine (CATAPRES) 0.2 MG tablet 0    Julieta Koehler MD   colchicine 0.6 MG tablet Take 1 tablet by mouth twice daily as needed 10/29/24   Eleni Miranda APRN   cyanocobalamin 1000 MCG/ML injection INJECT 1 ML INTO THE APPROPRIATE MUSCLE AS DIRECTED EVERY 28 DAYS 9/19/24   Eleni Miranda APRN   gabapentin (NEURONTIN) 300 MG capsule Take 1 capsule by mouth 3 (Three) Times a Day. 9/25/24   Eleni Miranda APRN   hydrALAZINE (APRESOLINE) 25 MG tablet Daily.    Julieta Koehler MD   HYDROcodone-acetaminophen (NORCO) 5-325 MG per tablet Take 1 tablet by mouth Every 6 (Six) Hours As Needed for Severe Pain. 9/25/24   Eleni Miranda APRN   hydrOXYzine (ATARAX) 25 MG tablet Take 1 tablet by mouth 3 (Three) Times a Day As Needed for Anxiety. 10/16/24   Sivan Ken MD   magnesium hydroxide (MILK OF MAGNESIA) 400 MG/5ML suspension 0    ProviderJulieta MD   methylPREDNISolone (MEDROL) 4 MG dose pack Take as directed on package instructions. 12/2/24   Eleni Miranda APRN   metoclopramide (Reglan) 5 MG  "tablet Take 1 tablet by mouth 3 (Three) Times a Day With Meals. 9/25/24   Eleni Miranda APRN   mirtazapine (REMERON) 30 MG tablet Take 1 tablet by mouth every night at bedtime. 10/16/24   Sivan Ken MD   Multiple Vitamins-Minerals (MULTIVITAMIN ADULT) tablet Take 1 tablet by mouth Daily. With Omega XL 1/24/19   Julieta Koehler MD   nitroglycerin (NITROSTAT) 0.4 MG SL tablet Place 1 tablet under the tongue Every 5 (Five) Minutes As Needed for Chest Pain. Take no more than 3 doses in 15 minutes. 5/15/23   Autumn Stephen APRN   ondansetron ODT (ZOFRAN-ODT) 4 MG disintegrating tablet DISSOLVE 1 TABLET IN MOUTH EVERY 8 HOURS AS NEEDED FOR NAUSEA AND VOMITING 11/27/24   Eleni Miranda APRN   pantoprazole (PROTONIX) 40 MG EC tablet Take 1 tablet by mouth 2 (Two) Times a Day. 6/17/24   Eleni Miranda APRN   potassium chloride (KLOR-CON) 10 MEQ CR tablet Take 1 tablet by mouth Daily As Needed (If takes Lasix). 11/6/24   Eleni Miranda APRN   promethazine (PHENERGAN) 25 MG tablet TAKE 1 TABLET BY MOUTH 4 TIMES DAILY AS NEEDED FOR NAUSEA AND VOMITING    ProviderJulieta MD   QUEtiapine (SEROquel) 100 MG tablet Take 1 tablet by mouth every night at bedtime. 10/16/24   Sivan Ken MD   rosuvastatin (CRESTOR) 40 MG tablet Take 1 tablet by mouth Every Evening. 9/25/24   Eleni Miranda APRN   Scopolamine 1 MG/3DAYS patch 10 patches. 11/20/24   ProviderJulieta MD   Syringe/Needle, Disp, 23G X 1\" 3 ML misc Use to inject B12 every 30 days intramuscularly 1/24/24   Eleni Miranda APRN   vitamin D (ERGOCALCIFEROL) 1.25 MG (42061 UT) capsule capsule Take 1 capsule by mouth 1 (One) Time Per Week. 2/6/24   Eleni Miranda APRN   Vonoprazan Fumarate (Voquezna) 10 MG tablet Take 1 tablet by mouth Daily. 9/25/24   Eleni Miranda, APRN     /74   Pulse 76   Temp 98.4 °F (36.9 °C) (Oral)   Resp 16   Ht 157.5 cm (62\")   Wt 89 kg (196 lb 3.4 oz)   SpO2 91%   BMI 35.89 kg/m²       Objective "   Physical Exam  General: Well-appearing, no acute distress  Psych: Oriented, pleasant affect  Respirations: Clear, nonlabored respirations  Abdomen soft nontender  Extremity: There is some mild soft tissue swelling and erythema in the dorsal medial left midfoot with some tenderness in that area, there is no crepitus or fluctuance, she has normal pulses in the foot and normal brisk cap refill distally, she has normal flexion extension function of the toes, there is no ankle joint swelling, calves are symmetric and thighs are symmetric and nontender  Skin: No rash, normal color  Procedures           ED Course        Results for orders placed or performed during the hospital encounter of 12/15/24   Basic Metabolic Panel    Collection Time: 12/15/24  9:56 AM    Specimen: Blood   Result Value Ref Range    Glucose 109 (H) 65 - 99 mg/dL    BUN 7 (L) 8 - 23 mg/dL    Creatinine 1.25 (H) 0.57 - 1.00 mg/dL    Sodium 143 136 - 145 mmol/L    Potassium 2.9 (L) 3.5 - 5.2 mmol/L    Chloride 105 98 - 107 mmol/L    CO2 24.9 22.0 - 29.0 mmol/L    Calcium 10.0 8.6 - 10.5 mg/dL    BUN/Creatinine Ratio 5.6 (L) 7.0 - 25.0    Anion Gap 13.1 5.0 - 15.0 mmol/L    eGFR 47.6 (L) >60.0 mL/min/1.73   CBC Auto Differential    Collection Time: 12/15/24  9:56 AM    Specimen: Blood   Result Value Ref Range    WBC 15.30 (H) 3.40 - 10.80 10*3/mm3    RBC 4.46 3.77 - 5.28 10*6/mm3    Hemoglobin 13.1 12.0 - 15.9 g/dL    Hematocrit 38.8 34.0 - 46.6 %    MCV 87.0 79.0 - 97.0 fL    MCH 29.4 26.6 - 33.0 pg    MCHC 33.8 31.5 - 35.7 g/dL    RDW 13.2 12.3 - 15.4 %    RDW-SD 41.8 37.0 - 54.0 fl    MPV 8.8 6.0 - 12.0 fL    Platelets 271 140 - 450 10*3/mm3    Neutrophil % 74.6 42.7 - 76.0 %    Lymphocyte % 16.0 (L) 19.6 - 45.3 %    Monocyte % 8.4 5.0 - 12.0 %    Eosinophil % 0.3 0.3 - 6.2 %    Basophil % 0.4 0.0 - 1.5 %    Immature Grans % 0.3 0.0 - 0.5 %    Neutrophils, Absolute 11.42 (H) 1.70 - 7.00 10*3/mm3    Lymphocytes, Absolute 2.45 0.70 - 3.10 10*3/mm3     Monocytes, Absolute 1.28 (H) 0.10 - 0.90 10*3/mm3    Eosinophils, Absolute 0.04 0.00 - 0.40 10*3/mm3    Basophils, Absolute 0.06 0.00 - 0.20 10*3/mm3    Immature Grans, Absolute 0.05 0.00 - 0.05 10*3/mm3    nRBC 0.0 0.0 - 0.2 /100 WBC   Gold Top - SST    Collection Time: 12/15/24  9:56 AM   Result Value Ref Range    Extra Tube Hold for add-ons.    Light Blue Top    Collection Time: 12/15/24  9:56 AM   Result Value Ref Range    Extra Tube Hold for add-ons.      XR Foot 3+ View Left    Result Date: 12/15/2024  Impression: No acute osseous abnormality is identified of the left foot. Electronically Signed: Belkis Goldstein  12/15/2024 10:10 AM EST  Workstation ID: [id number]                                                  Medical Decision Making  Differential diagnosis including ischemia, gout, cellulitis, trauma    Patient has normal perfusion of the extremity, she does have a history of gout this may be a gouty flare however she does have some mild leukocytosis which could reflect some cellulitis.  She was advised this finding she was given Norco for some acute pain management.  X-ray shows no signs of acute trauma.  The Doppler was negative for DVT.  She was prescribed Keflex for possible cellulitis but was prescribed Medrol Dosepak for her gouty flare and Norco for discomfort.  We discussed warning signs for return and she was discharged good condition.  Notably patient also ordered some oral potassium for her hypokalemia.    Problems Addressed:  Hypokalemia: complicated acute illness or injury  Left foot pain: complicated acute illness or injury    Amount and/or Complexity of Data Reviewed  Labs: ordered. Decision-making details documented in ED Course.     Details: CBC shows a mild leukocytosis, Chem-7 shows a renal insufficiency and some hypokalemia  Radiology: ordered and independent interpretation performed.     Details: My independent interpretation of x-ray image of the left foot no apparent acute  abnormality    Risk  Prescription drug management.        Final diagnoses:   Left foot pain   Hypokalemia       ED Disposition  ED Disposition       ED Disposition   Discharge    Condition   Stable    Comment   --               Eleni Miranda, APRN  [address]  [place] [id number] [phone]    In 1 day           Medication List        New Prescriptions      cephalexin 500 MG capsule  Commonly known as: KEFLEX  Take 1 capsule by mouth 3 (Three) Times a Day.     methylPREDNISolone 4 MG dose pack  Commonly known as: MEDROL  Take as directed on package instructions.            Changed      * HYDROcodone-acetaminophen 5-325 MG per tablet  Commonly known as: NORCO  Take 1 tablet by mouth Every 6 (Six) Hours As Needed for Severe Pain.  What changed: Another medication with the same name was added. Make sure you understand how and when to take each.     * HYDROcodone-acetaminophen 7.5-325 MG per tablet  Commonly known as: NORCO  Take 1 tablet by mouth Every 4 (Four) Hours As Needed for Severe Pain.  What changed: You were already taking a medication with the same name, and this prescription was added. Make sure you understand how and when to take each.           * This list has 2 medication(s) that are the same as other medications prescribed for you. Read the directions carefully, and ask your doctor or other care provider to review them with you.                   Where to Get Your Medications        These medications were sent to [facility] [address] [place] [address] [place] - [address] [facility] [phone] [place] [phone]   [address] [facility] [place] [place] [address]      Phone: [phone]   cephalexin 500 MG capsule  HYDROcodone-acetaminophen 7.5-325 MG per tablet  methylPREDNISolone 4 MG dose pack            Cooper Cabello MD  12/15/24 9818

## 2024-12-16 ENCOUNTER — TELEPHONE (OUTPATIENT)
Dept: FAMILY MEDICINE CLINIC | Facility: CLINIC | Age: 64
End: 2024-12-16

## 2024-12-16 NOTE — TELEPHONE ENCOUNTER
Caller: Jasmine Cedra    Relationship: Self    Best call back number: [phone]     What is the best time to reach you: ANY    Who are you requesting to speak with (clinical staff, provider,  specific staff member): PCP    Do you know the name of the person who called:     What was the call regarding: PATIENT JUST WANTED PCP TO KNOW THAT SHE WAS IN THE HOSPITAL YESTERDAY 12/15/24.    Is it okay if the provider responds through MyChart:

## 2024-12-17 RX ORDER — ONDANSETRON 4 MG/1
4 TABLET, ORALLY DISINTEGRATING ORAL EVERY 8 HOURS PRN
Qty: 60 TABLET | Refills: 0 | Status: SHIPPED | OUTPATIENT
Start: 2024-12-17

## 2024-12-18 DIAGNOSIS — E87.6 HYPOKALEMIA: Primary | ICD-10-CM

## 2024-12-18 NOTE — TELEPHONE ENCOUNTER
Spoke with patient, she is no longer taking a diuretic following nephrologist orders. Also has not been taking any potassium supplement. Will come in for repeat bmp on Monday.

## 2024-12-23 ENCOUNTER — TELEPHONE (OUTPATIENT)
Dept: FAMILY MEDICINE CLINIC | Facility: CLINIC | Age: 64
End: 2024-12-23
Payer: MEDICARE

## 2024-12-23 ENCOUNTER — LAB (OUTPATIENT)
Dept: FAMILY MEDICINE CLINIC | Facility: CLINIC | Age: 64
End: 2024-12-23
Payer: MEDICARE

## 2024-12-23 DIAGNOSIS — E87.6 HYPOKALEMIA: ICD-10-CM

## 2024-12-23 DIAGNOSIS — M79.672 LEFT FOOT PAIN: ICD-10-CM

## 2024-12-23 LAB
ANION GAP SERPL CALCULATED.3IONS-SCNC: 12.6 MMOL/L (ref 5–15)
BUN SERPL-MCNC: 12 MG/DL (ref 8–23)
BUN/CREAT SERPL: 7.5 (ref 7–25)
CALCIUM SPEC-SCNC: 10.3 MG/DL (ref 8.6–10.5)
CHLORIDE SERPL-SCNC: 107 MMOL/L (ref 98–107)
CO2 SERPL-SCNC: 25.4 MMOL/L (ref 22–29)
CREAT SERPL-MCNC: 1.6 MG/DL (ref 0.57–1)
EGFRCR SERPLBLD CKD-EPI 2021: 35.4 ML/MIN/1.73
GLUCOSE SERPL-MCNC: 98 MG/DL (ref 65–99)
POTASSIUM SERPL-SCNC: 3.1 MMOL/L (ref 3.5–5.2)
SODIUM SERPL-SCNC: 145 MMOL/L (ref 136–145)
URATE SERPL-MCNC: 3.9 MG/DL (ref 2.4–5.7)

## 2024-12-23 PROCEDURE — 80048 BASIC METABOLIC PNL TOTAL CA: CPT | Performed by: NURSE PRACTITIONER

## 2024-12-23 PROCEDURE — 84550 ASSAY OF BLOOD/URIC ACID: CPT | Performed by: NURSE PRACTITIONER

## 2024-12-23 PROCEDURE — 36415 COLL VENOUS BLD VENIPUNCTURE: CPT

## 2024-12-23 RX ORDER — PREDNISONE 10 MG/1
TABLET ORAL
Qty: 33 TABLET | Refills: 0 | Status: SHIPPED | OUTPATIENT
Start: 2024-12-23

## 2024-12-23 RX ORDER — COLCHICINE 0.6 MG/1
0.6 TABLET ORAL 2 TIMES DAILY PRN
Qty: 30 TABLET | Refills: 0 | Status: SHIPPED | OUTPATIENT
Start: 2024-12-23

## 2024-12-23 RX ORDER — HYDROCODONE BITARTRATE AND ACETAMINOPHEN 7.5; 325 MG/1; MG/1
1 TABLET ORAL EVERY 4 HOURS PRN
Qty: 5 TABLET | Refills: 0 | Status: SHIPPED | OUTPATIENT
Start: 2024-12-23

## 2024-12-23 RX ORDER — ALLOPURINOL 100 MG/1
100 TABLET ORAL DAILY
Qty: 30 TABLET | Refills: 2 | Status: SHIPPED | OUTPATIENT
Start: 2024-12-23

## 2024-12-23 NOTE — TELEPHONE ENCOUNTER
Patient came to office today for recheck BMP after ER visit last week. She is limping with severe left foot and great toe pain.  She completed colchicine and Medrol Dosepak provided from the hospital but symptoms have returned.

## 2024-12-23 NOTE — TELEPHONE ENCOUNTER
[facility] in [place] is calling regarding prescriptions sent over for gout.    Needed to verify that provider is aware of hydrocodone w xanax use.    Colchicine + Allopurinol.  Ok with allopurinol. Colchicine level 1 interaction with Carvedilol (increase strength) and level 1 with use of Zpack (QT prolonging).    Needing approval to dispense and verify that ok with both colchicine and allopurinol together.    Please Advise.

## 2024-12-30 ENCOUNTER — PRIOR AUTHORIZATION (OUTPATIENT)
Dept: PSYCHIATRY | Facility: CLINIC | Age: 64
End: 2024-12-30
Payer: MEDICARE

## 2024-12-30 NOTE — TELEPHONE ENCOUNTER
"PA for hydroxyzine 25 mg TID started with [employer] ID [id number].  [employer] at [phone].     Msg on Covermymeds:  \"Our records show the patient is taking PROMETHAZINE 25 MG TABLET that can increase the risk of adverse outcomes, including falls and fractures, cognitive decline, and/or delirium, when combined with the requested drug(s), according to the American Ger[employer] Beers criteria.\"      Pt informed; she says she would like to take both if possible.  She says her doctor is running tests, and not sure why she is always nauseous.  Are you okay with her taking both?  "

## 2025-01-01 ENCOUNTER — OFFICE VISIT (OUTPATIENT)
Dept: CARDIOLOGY | Facility: CLINIC | Age: 65
End: 2025-01-01
Payer: MEDICARE

## 2025-01-01 ENCOUNTER — HOSPITAL ENCOUNTER (EMERGENCY)
Facility: HOSPITAL | Age: 65
End: 2025-07-17
Attending: EMERGENCY MEDICINE
Payer: MEDICARE

## 2025-01-01 VITALS
WEIGHT: 201 LBS | SYSTOLIC BLOOD PRESSURE: 119 MMHG | DIASTOLIC BLOOD PRESSURE: 84 MMHG | BODY MASS INDEX: 36.99 KG/M2 | OXYGEN SATURATION: 97 % | HEART RATE: 94 BPM | RESPIRATION RATE: 16 BRPM | HEIGHT: 62 IN

## 2025-01-01 VITALS — HEART RATE: 106 BPM | OXYGEN SATURATION: 90 %

## 2025-01-01 DIAGNOSIS — I25.10 CORONARY ARTERY DISEASE INVOLVING NATIVE CORONARY ARTERY OF NATIVE HEART WITHOUT ANGINA PECTORIS: ICD-10-CM

## 2025-01-01 DIAGNOSIS — I47.10 PAROXYSMAL SVT (SUPRAVENTRICULAR TACHYCARDIA): Primary | ICD-10-CM

## 2025-01-01 DIAGNOSIS — I47.20 VENTRICULAR TACHYCARDIA (PAROXYSMAL): ICD-10-CM

## 2025-01-01 DIAGNOSIS — R42 DIZZINESS: ICD-10-CM

## 2025-01-01 DIAGNOSIS — I46.9 CARDIAC ARREST: Primary | ICD-10-CM

## 2025-01-01 DIAGNOSIS — R55 NEAR SYNCOPE: ICD-10-CM

## 2025-01-01 PROCEDURE — 3074F SYST BP LT 130 MM HG: CPT | Performed by: NURSE PRACTITIONER

## 2025-01-01 PROCEDURE — 1160F RVW MEDS BY RX/DR IN RCRD: CPT | Performed by: NURSE PRACTITIONER

## 2025-01-01 PROCEDURE — 99285 EMERGENCY DEPT VISIT HI MDM: CPT

## 2025-01-01 PROCEDURE — 1159F MED LIST DOCD IN RCRD: CPT | Performed by: NURSE PRACTITIONER

## 2025-01-01 PROCEDURE — 94799 UNLISTED PULMONARY SVC/PX: CPT

## 2025-01-01 PROCEDURE — 25010000002 EPINEPHRINE 1 MG/10ML SOLUTION PREFILLED SYRINGE: Performed by: EMERGENCY MEDICINE

## 2025-01-01 PROCEDURE — 31500 INSERT EMERGENCY AIRWAY: CPT

## 2025-01-01 PROCEDURE — 99214 OFFICE O/P EST MOD 30 MIN: CPT | Performed by: NURSE PRACTITIONER

## 2025-01-01 PROCEDURE — 3079F DIAST BP 80-89 MM HG: CPT | Performed by: NURSE PRACTITIONER

## 2025-01-01 RX ORDER — VERAPAMIL HYDROCHLORIDE 180 MG/1
180 TABLET, FILM COATED, EXTENDED RELEASE ORAL NIGHTLY
Qty: 60 TABLET | Refills: 2 | Status: SHIPPED | OUTPATIENT
Start: 2025-01-01 | End: 2025-07-19

## 2025-01-01 RX ADMIN — EPINEPHRINE 1 MG: 0.1 INJECTION, SOLUTION ENDOTRACHEAL; INTRACARDIAC; INTRAVENOUS at 15:27

## 2025-01-01 RX ADMIN — EPINEPHRINE 1 MG: 0.1 INJECTION, SOLUTION ENDOTRACHEAL; INTRACARDIAC; INTRAVENOUS at 15:20

## 2025-01-01 RX ADMIN — EPINEPHRINE 1 MG: 0.1 INJECTION, SOLUTION ENDOTRACHEAL; INTRACARDIAC; INTRAVENOUS at 15:24

## 2025-01-02 RX ORDER — ONDANSETRON 4 MG/1
4 TABLET, ORALLY DISINTEGRATING ORAL EVERY 8 HOURS PRN
Qty: 60 TABLET | Refills: 0 | Status: SHIPPED | OUTPATIENT
Start: 2025-01-02

## 2025-01-02 NOTE — TELEPHONE ENCOUNTER
Caller: Jasmine Cerda    Relationship: Self    Best call back number: [phone]    Requested Prescriptions:   Requested Prescriptions     Pending Prescriptions Disp Refills    ondansetron ODT (ZOFRAN-ODT) 4 MG disintegrating tablet 60 tablet 0     Sig: Take 1 tablet by mouth Every 8 (Eight) Hours As Needed for Nausea or Vomiting.        Pharmacy where request should be sent: [facility] [address] [place], [place] [phone] [place], [place] [phone] FX     Last office visit with prescribing clinician: 12/2/2024   Last telemedicine visit with prescribing clinician: Visit date not found   Next office visit with prescribing clinician: 1/28/2025     Additional details provided by patient: HAS ONE PILL LEFT     Does the patient have less than a 3 day supply:  [x] Yes  [] No    Would you like a call back once the refill request has been completed: [] Yes [x] No    If the office needs to give you a call back, can they leave a voicemail: [] Yes [x] No    Ronen Simms   01/02/25 08:19 EST

## 2025-01-03 RX ORDER — POTASSIUM CHLORIDE 750 MG/1
10 TABLET, EXTENDED RELEASE ORAL DAILY PRN
Qty: 90 TABLET | Refills: 0 | Status: SHIPPED | OUTPATIENT
Start: 2025-01-03

## 2025-01-13 ENCOUNTER — TELEPHONE (OUTPATIENT)
Dept: FAMILY MEDICINE CLINIC | Facility: CLINIC | Age: 65
End: 2025-01-13
Payer: MEDICARE

## 2025-01-13 NOTE — TELEPHONE ENCOUNTER
Caller: Jasmine Cerda    Relationship: Self    Best call back number: [phone]    What is the best time to reach you: ANY    Who are you requesting to speak with (clinical staff, provider,  specific staff member): CLINICAL    Do you know the name of the person who called: N/A    What was the call regarding: PATIENT WAS ADVISED TO TAKE POTASSIUM AGAIN, BUT A PRESCRIPTION WAS NEVER SENT IN. REQUESTING TO KNOW IF ITS OKAY TO TAKE OVER THE COUNTER MEDICATION.    Is it okay if the provider responds through MyChart: N/A

## 2025-01-13 NOTE — TELEPHONE ENCOUNTER
Left detailed voice message stating prescription was sent to pharmacy on 01/03. Would just need to call pharmacy to get together for patient.    Relay

## 2025-01-14 RX ORDER — SYRINGE WITH NEEDLE, 1 ML 25GX5/8"
SYRINGE, EMPTY DISPOSABLE MISCELLANEOUS
Qty: 12 EACH | Refills: 0 | Status: SHIPPED | OUTPATIENT
Start: 2025-01-14

## 2025-01-15 DIAGNOSIS — I10 PRIMARY HYPERTENSION: ICD-10-CM

## 2025-01-15 DIAGNOSIS — N18.30 STAGE 3 CHRONIC KIDNEY DISEASE, UNSPECIFIED WHETHER STAGE 3A OR 3B CKD: Primary | ICD-10-CM

## 2025-01-15 DIAGNOSIS — E78.2 MIXED HYPERLIPIDEMIA: ICD-10-CM

## 2025-01-17 RX ORDER — COLCHICINE 0.6 MG/1
TABLET ORAL
Qty: 30 TABLET | Refills: 2 | Status: SHIPPED | OUTPATIENT
Start: 2025-01-17

## 2025-01-21 ENCOUNTER — CLINICAL SUPPORT (OUTPATIENT)
Dept: FAMILY MEDICINE CLINIC | Facility: CLINIC | Age: 65
End: 2025-01-21
Payer: MEDICARE

## 2025-01-21 ENCOUNTER — TELEPHONE (OUTPATIENT)
Dept: FAMILY MEDICINE CLINIC | Facility: CLINIC | Age: 65
End: 2025-01-21

## 2025-01-21 ENCOUNTER — LAB (OUTPATIENT)
Dept: FAMILY MEDICINE CLINIC | Facility: CLINIC | Age: 65
End: 2025-01-21
Payer: MEDICARE

## 2025-01-21 VITALS — DIASTOLIC BLOOD PRESSURE: 100 MMHG | SYSTOLIC BLOOD PRESSURE: 172 MMHG

## 2025-01-21 DIAGNOSIS — I10 PRIMARY HYPERTENSION: Primary | ICD-10-CM

## 2025-01-21 PROCEDURE — 85027 COMPLETE CBC AUTOMATED: CPT | Performed by: NURSE PRACTITIONER

## 2025-01-21 PROCEDURE — 36415 COLL VENOUS BLD VENIPUNCTURE: CPT | Performed by: NURSE PRACTITIONER

## 2025-01-21 PROCEDURE — 84443 ASSAY THYROID STIM HORMONE: CPT | Performed by: NURSE PRACTITIONER

## 2025-01-21 PROCEDURE — 80053 COMPREHEN METABOLIC PANEL: CPT | Performed by: NURSE PRACTITIONER

## 2025-01-21 PROCEDURE — 80061 LIPID PANEL: CPT | Performed by: NURSE PRACTITIONER

## 2025-01-21 RX ORDER — HYDRALAZINE HYDROCHLORIDE 25 MG/1
25 TABLET, FILM COATED ORAL 2 TIMES DAILY
Qty: 60 TABLET | Refills: 0 | Status: SHIPPED | OUTPATIENT
Start: 2025-01-21

## 2025-01-21 RX ORDER — ONDANSETRON 4 MG/1
TABLET, ORALLY DISINTEGRATING ORAL
Qty: 60 TABLET | Refills: 0 | Status: SHIPPED | OUTPATIENT
Start: 2025-01-21

## 2025-01-21 NOTE — TELEPHONE ENCOUNTER
Pt stated she is taking the carvedilol twice daily, but she did not know about the hydralazine. She stated that she does not have any of that medication and asked to have it called to her pharmacy.

## 2025-01-21 NOTE — TELEPHONE ENCOUNTER
Patient came in today to have her blood pressure checked. She stated that she stopped her medication because she was advised to do this. She stated that it was stopped because it did not seem to be working. For the last week it has been more elevated at home and she has been having severe headaches. Today her BP was 172/100. She is wondering what to do now. She is not feeling well at all with her BP being this elevated.

## 2025-01-21 NOTE — PROGRESS NOTES
Patient came in for a blood pressure check. She stated that she stopped taking her medication because it did not seem to be helping. For the last week her BP has been elevated and she has had a horrible headache. Blood pressure taken and results sent to provider for review.     MATILDA Ramirez

## 2025-01-22 LAB
ALBUMIN SERPL-MCNC: 4.1 G/DL (ref 3.5–5.2)
ALBUMIN/GLOB SERPL: 1.5 G/DL
ALP SERPL-CCNC: 102 U/L (ref 39–117)
ALT SERPL W P-5'-P-CCNC: 18 U/L (ref 1–33)
ANION GAP SERPL CALCULATED.3IONS-SCNC: 11 MMOL/L (ref 5–15)
AST SERPL-CCNC: 15 U/L (ref 1–32)
BILIRUB SERPL-MCNC: 0.3 MG/DL (ref 0–1.2)
BUN SERPL-MCNC: 13 MG/DL (ref 8–23)
BUN/CREAT SERPL: 10.3 (ref 7–25)
CALCIUM SPEC-SCNC: 10.1 MG/DL (ref 8.6–10.5)
CHLORIDE SERPL-SCNC: 107 MMOL/L (ref 98–107)
CHOLEST SERPL-MCNC: 146 MG/DL (ref 0–200)
CO2 SERPL-SCNC: 24 MMOL/L (ref 22–29)
CREAT SERPL-MCNC: 1.26 MG/DL (ref 0.57–1)
DEPRECATED RDW RBC AUTO: 45.9 FL (ref 37–54)
EGFRCR SERPLBLD CKD-EPI 2021: 47.2 ML/MIN/1.73
ERYTHROCYTE [DISTWIDTH] IN BLOOD BY AUTOMATED COUNT: 14.2 % (ref 12.3–15.4)
GLOBULIN UR ELPH-MCNC: 2.7 GM/DL
GLUCOSE SERPL-MCNC: 72 MG/DL (ref 65–99)
HCT VFR BLD AUTO: 39.8 % (ref 34–46.6)
HDLC SERPL-MCNC: 67 MG/DL (ref 40–60)
HGB BLD-MCNC: 13 G/DL (ref 12–15.9)
LDLC SERPL CALC-MCNC: 59 MG/DL (ref 0–100)
LDLC/HDLC SERPL: 0.85 {RATIO}
MCH RBC QN AUTO: 29.1 PG (ref 26.6–33)
MCHC RBC AUTO-ENTMCNC: 32.7 G/DL (ref 31.5–35.7)
MCV RBC AUTO: 89 FL (ref 79–97)
PLATELET # BLD AUTO: 347 10*3/MM3 (ref 140–450)
PMV BLD AUTO: 9.9 FL (ref 6–12)
POTASSIUM SERPL-SCNC: 5.8 MMOL/L (ref 3.5–5.2)
PROT SERPL-MCNC: 6.8 G/DL (ref 6–8.5)
RBC # BLD AUTO: 4.47 10*6/MM3 (ref 3.77–5.28)
SODIUM SERPL-SCNC: 142 MMOL/L (ref 136–145)
TRIGL SERPL-MCNC: 110 MG/DL (ref 0–150)
TSH SERPL DL<=0.05 MIU/L-ACNC: 4.48 UIU/ML (ref 0.27–4.2)
VLDLC SERPL-MCNC: 20 MG/DL (ref 5–40)
WBC NRBC COR # BLD AUTO: 6.35 10*3/MM3 (ref 3.4–10.8)

## 2025-01-23 ENCOUNTER — TELEPHONE (OUTPATIENT)
Dept: FAMILY MEDICINE CLINIC | Facility: CLINIC | Age: 65
End: 2025-01-23
Payer: MEDICARE

## 2025-01-23 RX ORDER — CLONIDINE HYDROCHLORIDE 0.1 MG/1
0.1 TABLET ORAL EVERY 8 HOURS PRN
Qty: 90 TABLET | Refills: 0 | Status: SHIPPED | OUTPATIENT
Start: 2025-01-23

## 2025-01-23 RX ORDER — AMLODIPINE BESYLATE 10 MG/1
10 TABLET ORAL DAILY
Qty: 90 TABLET | Refills: 0 | Status: SHIPPED | OUTPATIENT
Start: 2025-01-23

## 2025-01-23 NOTE — TELEPHONE ENCOUNTER
Jasmine states that her blood pressure is still recording elevated. B/p 165/100 HR 83 at 6:00 and 153/93 HR 98 roughly 1hr ago. She complains of SOA, headaches and trouble comprehending     Current medications:  Amlodipine 5mg Daily  Hydralazine 25mg BID  Carvedilol 25mg BID    *does not have clonidine that is listed on med list*

## 2025-01-28 ENCOUNTER — OFFICE VISIT (OUTPATIENT)
Dept: FAMILY MEDICINE CLINIC | Facility: CLINIC | Age: 65
End: 2025-01-28
Payer: MEDICARE

## 2025-01-28 VITALS
SYSTOLIC BLOOD PRESSURE: 158 MMHG | BODY MASS INDEX: 35.51 KG/M2 | OXYGEN SATURATION: 93 % | WEIGHT: 193 LBS | DIASTOLIC BLOOD PRESSURE: 102 MMHG | HEIGHT: 62 IN | HEART RATE: 68 BPM

## 2025-01-28 DIAGNOSIS — J44.9 CHRONIC OBSTRUCTIVE PULMONARY DISEASE, UNSPECIFIED COPD TYPE: ICD-10-CM

## 2025-01-28 DIAGNOSIS — M10.9 GOUTY ARTHRITIS: ICD-10-CM

## 2025-01-28 DIAGNOSIS — I10 PRIMARY HYPERTENSION: ICD-10-CM

## 2025-01-28 DIAGNOSIS — R42 DIZZINESS: ICD-10-CM

## 2025-01-28 DIAGNOSIS — R41.89 COGNITIVE CHANGES: ICD-10-CM

## 2025-01-28 DIAGNOSIS — Z00.00 MEDICARE ANNUAL WELLNESS VISIT, SUBSEQUENT: Primary | ICD-10-CM

## 2025-01-28 DIAGNOSIS — E78.2 MIXED HYPERLIPIDEMIA: ICD-10-CM

## 2025-01-28 DIAGNOSIS — N18.30 STAGE 3 CHRONIC KIDNEY DISEASE, UNSPECIFIED WHETHER STAGE 3A OR 3B CKD: ICD-10-CM

## 2025-01-28 DIAGNOSIS — R51.9 NONINTRACTABLE EPISODIC HEADACHE, UNSPECIFIED HEADACHE TYPE: ICD-10-CM

## 2025-01-28 DIAGNOSIS — R42 DIZZINESS AND GIDDINESS: ICD-10-CM

## 2025-01-28 DIAGNOSIS — I10 UNCONTROLLED HYPERTENSION: ICD-10-CM

## 2025-01-28 PROCEDURE — 3077F SYST BP >= 140 MM HG: CPT | Performed by: NURSE PRACTITIONER

## 2025-01-28 PROCEDURE — 99214 OFFICE O/P EST MOD 30 MIN: CPT | Performed by: NURSE PRACTITIONER

## 2025-01-28 PROCEDURE — 1126F AMNT PAIN NOTED NONE PRSNT: CPT | Performed by: NURSE PRACTITIONER

## 2025-01-28 PROCEDURE — G0439 PPPS, SUBSEQ VISIT: HCPCS | Performed by: NURSE PRACTITIONER

## 2025-01-28 PROCEDURE — 3080F DIAST BP >= 90 MM HG: CPT | Performed by: NURSE PRACTITIONER

## 2025-01-28 RX ORDER — HYDRALAZINE HYDROCHLORIDE 25 MG/1
25 TABLET, FILM COATED ORAL 3 TIMES DAILY
Qty: 90 TABLET | Refills: 0 | Status: SHIPPED | OUTPATIENT
Start: 2025-01-28

## 2025-01-28 RX ORDER — PANTOPRAZOLE SODIUM 40 MG/1
40 TABLET, DELAYED RELEASE ORAL 2 TIMES DAILY
Qty: 180 TABLET | Refills: 1 | Status: SHIPPED | OUTPATIENT
Start: 2025-01-28

## 2025-01-28 NOTE — PROGRESS NOTES
The ABCs of the Annual Wellness Visit  Subsequent Medicare Wellness Visit    Chief Complaint   Patient presents with    Medicare Wellness-subsequent     Pt states doing well overall, BP have been running high  Labs done 1/21/25    Hypertension     Pt brought BP log with her today.      Subjective     History of Present Illness:  Jasmine Cerda is a 64 y.o. female who presents for a Subsequent Medicare Wellness Visit and follow up on chronic conditions.  HPI    HTN, BP has been running high, patient called 1/23/25 and amlodipine was increased to 10 mg, hydralazine 25 mg twice daily and carvedilol 25 mg twice daily continued, clonidine added to use for systolic over 160.  She presents a blood pressure log today which does show improvement in blood pressure for the last 2 days, although highest up to 190/117, she complains of severe anxiety, headaches and when her blood pressure goes up her anxiety does as well.  Denies chest pain, shortness of air, palpitations and swelling of extremities.     GERD, with nausea, underwent gastric emptying study 12/24 and normal, stable on medication, denies nausea, vomiting, constipation, abdominal pain and diarrhea.    Stage III CKD, gout, follows with nephrology, she is on allopurinol, had a recent ER visit and potassium was found to be 3.1, she is on KCl 10meq now daily, however bumex was stopped by nephrology.  Reports gout of the left great toe has improved with allopurinol, she is using colchicine as needed    COPD, using Breztri 2 puffs twice daily and being provided through patient assist program    Tremor, patient is no longer following with neurology, she is on Sinemet 4 times per day, MRI of the brain was normal with mild chronic microvascular disease features 12/22/2022. She reports improvement in tremor, now some days w/o tremor.      Anxiety/depression/insomnia, following with Dr. Ken, anxiety is high and not controlled, she complains of forgetfulness, dizziness,  unsteadiness becomes frustrated because she cannot get her words out and this causes severe anxiety.  She is taking Celexa, hydroxyzine and alprazolam.  Her grandmother had dementia     Hyperlipidemia/CAD, nonobstructive coronary arteries per cardiac cath 2023.  The patient denies muscle aches, constipation, diarrhea, GI upset, fatigue, chest pain/pressure, exercise intolerance, dyspnea, palpitations, syncope and pedal edema.       Here to review labs      The following portions of the patient's history were reviewed and   updated as appropriate: allergies, current medications, past family history, past medical history, past social history, past surgical history, and problem list.      Compared to one year ago, the patient feels her physical   health is worse.    Compared to one year ago, the patient feels her mental   health is worse.    Recent Hospitalizations:  This patient has had [facility] [place] admission record on file within the last 365 days.    Current Medical Providers:  Patient Care Team:  Eleni Miranda APRN as PCP - General (Nurse Practitioner)  Sivan Ken MD as Consulting Physician (Psychiatry)  CHASTITY Richardson DPM as Consulting Physician (Podiatry)  Cuauhtemoc Kwon MD as Consulting Physician (Cardiology)    Outpatient Medications Prior to Visit   Medication Sig Dispense Refill    allopurinol (Zyloprim) 100 MG tablet Take 1 tablet by mouth Daily. 30 tablet 2    ALPRAZolam (XANAX) 0.5 MG tablet Take 1 tablet by mouth 3 (Three) Times a Day As Needed for Anxiety. for anxiety 90 tablet 3    amLODIPine (NORVASC) 10 MG tablet Take 1 tablet by mouth Daily. 90 tablet 0    ARIPiprazole (ABILIFY) 10 MG tablet 30      aspirin 81 MG EC tablet 0      Budeson-Glycopyrrol-Formoterol (Breztri Aerosphere) 160-9-4.8 MCG/ACT aerosol inhaler Inhale 2 puffs 2 (Two) Times a Day. 2 each 0    carbidopa-levodopa (SINEMET)  MG per tablet Take 1 tab at: 6 am, 10 am, 2pm, 7 pm 120 tablet 5    carvedilol  (COREG) 25 MG tablet Take 1 tablet by mouth 2 (Two) Times a Day. 180 tablet 3    citalopram (CeleXA) 40 MG tablet Take 1 tablet by mouth Every Morning. 90 tablet 1    cloNIDine (CATAPRES) 0.1 MG tablet Take 1 tablet by mouth Every 8 (Eight) Hours As Needed for High Blood Pressure (for systolic over 160). 90 tablet 0    colchicine 0.6 MG tablet TAKE 1 TABLET BY MOUTH TWICE DAILY AS NEEDED FOR  GOUT  FLARE  UP 30 tablet 2    cyanocobalamin 1000 MCG/ML injection INJECT 1 ML INTO THE APPROPRIATE MUSCLE AS DIRECTED EVERY 28 DAYS 3 mL 3    gabapentin (NEURONTIN) 300 MG capsule Take 1 capsule by mouth 3 (Three) Times a Day. 270 capsule 1    HYDROcodone-acetaminophen (NORCO) 7.5-325 MG per tablet Take 1 tablet by mouth Every 4 (Four) Hours As Needed for Severe Pain. 5 tablet 0    hydrOXYzine (ATARAX) 25 MG tablet Take 1 tablet by mouth 3 (Three) Times a Day As Needed for Anxiety. 90 tablet 3    magnesium hydroxide (MILK OF MAGNESIA) 400 MG/5ML suspension 0      metoclopramide (Reglan) 5 MG tablet Take 1 tablet by mouth 3 (Three) Times a Day With Meals. 90 tablet 2    mirtazapine (REMERON) 30 MG tablet Take 1 tablet by mouth every night at bedtime. 90 tablet 1    Multiple Vitamins-Minerals (MULTIVITAMIN ADULT) tablet Take 1 tablet by mouth Daily. With Omega XL      nitroglycerin (NITROSTAT) 0.4 MG SL tablet Place 1 tablet under the tongue Every 5 (Five) Minutes As Needed for Chest Pain. Take no more than 3 doses in 15 minutes. 25 tablet 2    ondansetron ODT (ZOFRAN-ODT) 4 MG disintegrating tablet DISSOLVE 1 TABLET IN MOUTH EVERY 8 HOURS AS NEEDED FOR NAUSEA FOR VOMITING 60 tablet 0    potassium chloride (KLOR-CON) 10 MEQ CR tablet Take 1 tablet by mouth Daily As Needed (If takes Lasix). 90 tablet 0    QUEtiapine (SEROquel) 100 MG tablet Take 1 tablet by mouth every night at bedtime. 90 tablet 1    rosuvastatin (CRESTOR) 40 MG tablet Take 1 tablet by mouth Every Evening. 90 tablet 1    Syringe/Needle, Disp, (B-D 3CC LUER-MAI  "SYR 23GX1\") 23G X 1\" 3 ML misc USE 1 SYRINGE ONCE EVERY MONTH FOR  B12  INJECTION INTRAMUSCULARLY 12 each 0    vitamin D (ERGOCALCIFEROL) 1.25 MG (65175 UT) capsule capsule Take 1 capsule by mouth 1 (One) Time Per Week. 15 capsule 3    bumetanide (BUMEX) 2 MG tablet 180      hydrALAZINE (APRESOLINE) 25 MG tablet Take 1 tablet by mouth 2 (Two) Times a Day. 60 tablet 0    pantoprazole (PROTONIX) 40 MG EC tablet Take 1 tablet by mouth 2 (Two) Times a Day. 180 tablet 1    Vonoprazan Fumarate (Voquezna) 10 MG tablet Take 1 tablet by mouth Daily. 5 tablet 0    azithromycin (Zithromax) 250 MG tablet Take 2 tablets the first day, then 1 tablet daily for 4 days. 6 tablet 0    benzonatate (TESSALON) 200 MG capsule Take 1 capsule by mouth 3 (Three) Times a Day As Needed for Cough. 30 capsule 0    cephalexin (KEFLEX) 500 MG capsule Take 1 capsule by mouth 3 (Three) Times a Day. 21 capsule 0    predniSONE (DELTASONE) 10 MG tablet Take 5 po for 2 days, Take 4 for 2 days, then 3 for 2 days, then 2 for 2 days, then 1 for 5 days, then stop 33 tablet 0    promethazine (PHENERGAN) 25 MG tablet TAKE 1 TABLET BY MOUTH 4 TIMES DAILY AS NEEDED FOR NAUSEA AND VOMITING      Scopolamine 1 MG/3DAYS patch 10 patches.       No facility-administered medications prior to visit.       Opioid medication/s are on active medication list.  and I have evaluated her active treatment plan and pain score trends (see table).  Vitals:    01/28/25 0857   PainSc: 0-No pain     I have reviewed the chart for potential of high risk medication and harmful drug interactions in the elderly.          Aspirin is on active medication list. Aspirin use is indicated based on review of current medical condition/s. Pros and cons of this therapy have been discussed today. Benefits of this medication outweigh potential harm.  Patient has been encouraged to continue taking this medication.  .      Patient Active Problem List   Diagnosis    Chronic post-traumatic stress " "disorder (PTSD)    Severe episode of recurrent major depressive disorder, without psychotic features    Asthma    Chronic low back pain    Chronic diastolic heart failure    Coronary heart disease    Degeneration of intervertebral disc of lumbosacral region    Headache, temporal    Psychophysiological insomnia    Hyperlipidemia    Hypertension    Vitamin D deficiency    Other fatigue    Preventative health care    Hyperglycemia, unspecified     Nausea    Stable angina pectoris    Chest pain    Dyspnea    Abnormal nuclear stress test       Advance Care Planning   Advance Directive is not on file.  ACP discussion was held with the patient during this visit. Patient does not have an advance directive, information provided.    Reviewed chart for potential of high risk medication in the elderly: yes  Reviewed chart for potential of harmful drug interactions in the elderly:yes       Objective       Vitals:    01/28/25 0857   BP: (!) 158/102   BP Location: Left arm   Patient Position: Sitting   Cuff Size: Adult   Pulse: 68   SpO2: 93%   Weight: 87.5 kg (193 lb)   Height: 157.5 cm (62\")   PainSc: 0-No pain     BMI Readings from Last 1 Encounters:   01/28/25 35.30 kg/m²   BMI is within normal parameters. No follow-up required.  BMI Readings from Last 1 Encounters:   01/28/25 35.30 kg/m²   BMI is above normal parameters. Recommendations include: exercise counseling and nutrition counseling    Does the patient have evidence of cognitive impairment? Yes    Physical Exam  Constitutional:       General: She is not in acute distress.     Appearance: Normal appearance. She is well-developed. She is not ill-appearing or diaphoretic.   HENT:      Head: Normocephalic.   Eyes:      Conjunctiva/sclera: Conjunctivae normal.      Pupils: Pupils are equal, round, and reactive to light.   Neck:      Thyroid: No thyromegaly.      Vascular: No JVD.   Cardiovascular:      Rate and Rhythm: Normal rate and regular rhythm.      Heart sounds: " Normal heart sounds. No murmur heard.  Pulmonary:      Effort: Pulmonary effort is normal. No respiratory distress.      Breath sounds: Normal breath sounds. No wheezing or rhonchi.   Abdominal:      General: Bowel sounds are normal. There is no distension.      Palpations: Abdomen is soft.      Tenderness: There is no abdominal tenderness.   Musculoskeletal:         General: No swelling or tenderness. Normal range of motion.      Cervical back: Normal range of motion and neck supple. No tenderness.   Lymphadenopathy:      Cervical: No cervical adenopathy.   Skin:     General: Skin is warm and dry.      Coloration: Skin is not jaundiced.      Findings: No erythema or rash.   Neurological:      General: No focal deficit present.      Mental Status: She is alert and oriented to person, place, and time. Mental status is at baseline.      Sensory: No sensory deficit.      Motor: Weakness and tremor present.      Gait: Gait abnormal.   Psychiatric:         Mood and Affect: Mood normal.         Behavior: Behavior normal.         Thought Content: Thought content normal.         Cognition and Memory: Memory is impaired.         Judgment: Judgment normal.       Lab Results   Component Value Date    TRIG 110 01/21/2025    HDL 67 (H) 01/21/2025    LDL 59 01/21/2025    VLDL 20 01/21/2025          HEALTH RISK ASSESSMENT    Smoking Status:  Social History     Tobacco Use   Smoking Status Never   Smokeless Tobacco Never   Tobacco Comments    Passive Smoke: N     Alcohol Consumption:  Social History     Substance and Sexual Activity   Alcohol Use No     Fall Risk Screen:    DIANE Fall Risk Assessment was completed, and patient is at MODERATE risk for falls. Assessment completed on:1/28/2025    Depression Screen:       1/28/2025     8:49 AM   PHQ-2/PHQ-9 Depression Screening   Little interest or pleasure in doing things Over half   Feeling down, depressed, or hopeless Several days   Trouble falling or staying asleep, or sleeping  too much Not at all   Feeling tired or having little energy Several days   Poor appetite or overeating Not at all   Feeling bad about yourself - or that you are a failure or have let yourself or your family down Over half   Trouble concentrating on things, such as reading the newspaper or watching television Over half   Moving or speaking so slowly that other people could have noticed? Or the opposite - being so fidgety or restless that you have been moving around a lot more than usual. Several days   Thoughts that you would be better off dead or hurting yourself in some way Not at all   Patient Health Questionnaire-9 Score 9   How difficult have these problems made it for you to do your work, take care of things at home, or get along with other people? Somewhat difficult       Health Habits and Functional and Cognitive Screenin/28/2025     8:00 AM   Functional & Cognitive Status   Do you have difficulty preparing food and eating? No   Do you have difficulty bathing yourself, getting dressed or grooming yourself? No   Do you have difficulty using the toilet? No   Do you have difficulty moving around from place to place? Yes   Do you have trouble with steps or getting out of a bed or a chair? Yes   Current Diet Well Balanced Diet   Dental Exam Up to date   Eye Exam Not up to date   Exercise (times per week) 0 times per week   Current Exercises Include No Regular Exercise   Do you need help using the phone?  No   Are you deaf or do you have serious difficulty hearing?  No   Do you need help to go to places out of walking distance? Yes   Do you need help shopping? Yes   Do you need help preparing meals?  Yes   Do you need help with housework?  Yes   Do you need help with laundry? Yes   Do you need help taking your medications? Yes   Do you need help managing money? Yes   Do you ever drive or ride in a car without wearing a seat belt? No   Have you felt unusual stress, anger or loneliness in the last month? Yes    Who do you live with? Spouse   If you need help, do you have trouble finding someone available to you? No   Have you been bothered in the last four weeks by sexual problems? No   Do you have difficulty concentrating, remembering or making decisions? Yes       ATTENTION  What is the year: correct  What is the month of the year: correct  What is the day of the week?: correct  What is the date?: correct  MEMORY  Repeat address three times, only score third attempt: [address], [place]: 4  HOW MANY ANIMALS DID THE PATIENT NAME  Verbal Fluency -- Animal Names (0-25): 17-21  CLOCK DRAWING  Clock Drawing: Clock Hands  MEMORY RECALL  Tell me what you remember about that name and address we were repeating at the beginnin  ACE TOTAL SCORE  Total ACE Score - <25/30 strongly suggests cognitive impairment; <21/30 almost certainly shows dementia: 21    Age-appropriate Screening Schedule:  Refer to the list below for future screening recommendations based on patient's age, sex and/or medical conditions. Orders for these recommended tests are listed in the plan section. The patient has been provided with a written plan.    Health Maintenance   Topic Date Due    ZOSTER VACCINE (2 of 2) 2024    ANNUAL WELLNESS VISIT  2025    COLORECTAL CANCER SCREENING  2025    MAMMOGRAM  2025    DXA SCAN  2025    LIPID PANEL  2026    BMI FOLLOWUP  2026    Pneumococcal Vaccine 65+ (3 of 3 - PCV20 or PCV21) 2026    TDAP/TD VACCINES (2 - Td or Tdap) 2034    HEPATITIS C SCREENING  Completed    COVID-19 Vaccine  Completed    INFLUENZA VACCINE  Completed    PAP SMEAR  Discontinued            Assessment & Plan      CMS Preventative Services Quick Reference  Risk Factors Identified During Encounter      Immunizations Discussed/Encouraged: Shingrix      The above risks/problems have been discussed with the patient.  Follow up actions/plans if indicated are seen below in  the Assessment/Plan Section.  Pertinent information has been shared with the patient in the After Visit Summary.    Diagnoses and all orders for this visit:    1. Medicare annual wellness visit, subsequent (Primary)    2. Primary hypertension    3. Stage 3 chronic kidney disease, unspecified whether stage 3a or 3b CKD  Comments:  cut back on potassium to only on mondays/fridays    4. Cognitive changes  -     Cancel: US Carotid Bilateral; Future  -     Duplex Carotid Ultrasound CAR; Future    5. Nonintractable episodic headache, unspecified headache type  -     Cancel: US Carotid Bilateral; Future    6. Uncontrolled hypertension  -     Cancel: US Carotid Bilateral; Future  -     Duplex Carotid Ultrasound CAR; Future    7. Dizziness  -     Cancel: US Carotid Bilateral; Future    8. Mixed hyperlipidemia    9. Chronic obstructive pulmonary disease, unspecified COPD type    10. Gouty arthritis    11. Dizziness and giddiness  -     Duplex Carotid Ultrasound CAR; Future    Other orders  -     hydrALAZINE (APRESOLINE) 25 MG tablet; Take 1 tablet by mouth 3 (Three) Times a Day.  Dispense: 90 tablet; Refill: 0  -     pantoprazole (PROTONIX) 40 MG EC tablet; Take 1 tablet by mouth 2 (Two) Times a Day.  Dispense: 180 tablet; Refill: 1      -BP not controlled, continue carvedilol twice daily, increase hydralazine to 3 times daily, continue amlodipine, continue clonidine as needed systolic >160  -When having severe episode of anxiety and worry over hypertension or other issues recommend take alprazolam and clonidine together  -Check carotid u/s  -Rec discussion with Dr. Suellen souza severe anxiety, forgetfulness, dizziness, concern for medication side effects and not controlling her symptoms currently  -rf pantoprazole  -Continue otherwise current medication regimen  -Labs reviewed, mostly stable except elevated K, chg kcl to mon/Friday  -concern for dementia, rec control anxiety and recheck acemini  -Follow-up with gastro,  colonoscopy is due this year  -Follow-up with nephrology, psychiatry and recommend cardiology follow-up  -Age appropriate preventative counseling provided, including healthy lifestyle modifications and exercise      Follow Up:   Return in about 3 months (around 4/28/2025) for Recheck.     An After Visit Summary and PPPS were given to the patient.        I spent 45 minutes caring for Jasmine on this date of service. This time includes time spent by me in the following activities:preparing for the visit, reviewing tests, obtaining and/or reviewing a separately obtained history, performing a medically appropriate examination and/or evaluation , counseling and educating the patient/family/caregiver, ordering medications, tests, or procedures, documenting information in the medical record, and independently interpreting results and communicating that information with the patient/family/caregiver    EMR Dragon transcription disclaimer:  Part of this note may be an electronic transcription/translation of spoken language to printed text using the Dragon Dictation System.

## 2025-02-06 RX ORDER — ONDANSETRON 4 MG/1
TABLET, ORALLY DISINTEGRATING ORAL
Qty: 60 TABLET | Refills: 0 | Status: SHIPPED | OUTPATIENT
Start: 2025-02-06

## 2025-02-11 ENCOUNTER — OFFICE VISIT (OUTPATIENT)
Dept: PSYCHIATRY | Facility: CLINIC | Age: 65
End: 2025-02-11
Payer: MEDICARE

## 2025-02-11 DIAGNOSIS — F33.2 SEVERE EPISODE OF RECURRENT MAJOR DEPRESSIVE DISORDER, WITHOUT PSYCHOTIC FEATURES: Primary | Chronic | ICD-10-CM

## 2025-02-11 DIAGNOSIS — F51.04 PSYCHOPHYSIOLOGICAL INSOMNIA: Chronic | ICD-10-CM

## 2025-02-11 DIAGNOSIS — F43.12 CHRONIC POST-TRAUMATIC STRESS DISORDER (PTSD): Chronic | ICD-10-CM

## 2025-02-11 RX ORDER — HYDROXYZINE HYDROCHLORIDE 25 MG/1
25 TABLET, FILM COATED ORAL 3 TIMES DAILY PRN
Qty: 90 TABLET | Refills: 3 | Status: SHIPPED | OUTPATIENT
Start: 2025-02-11

## 2025-02-11 RX ORDER — CITALOPRAM HYDROBROMIDE 40 MG/1
40 TABLET ORAL EVERY MORNING
Qty: 90 TABLET | Refills: 1 | Status: SHIPPED | OUTPATIENT
Start: 2025-02-11

## 2025-02-11 RX ORDER — ARIPIPRAZOLE 10 MG/1
10 TABLET ORAL DAILY
Qty: 90 TABLET | Refills: 1 | Status: SHIPPED | OUTPATIENT
Start: 2025-02-11

## 2025-02-11 RX ORDER — ALPRAZOLAM 0.5 MG
0.5 TABLET ORAL 3 TIMES DAILY PRN
Qty: 90 TABLET | Refills: 3 | Status: SHIPPED | OUTPATIENT
Start: 2025-02-11

## 2025-02-11 RX ORDER — QUETIAPINE FUMARATE 100 MG/1
100 TABLET, FILM COATED ORAL
Qty: 90 TABLET | Refills: 1 | Status: SHIPPED | OUTPATIENT
Start: 2025-02-11

## 2025-02-11 RX ORDER — MIRTAZAPINE 30 MG/1
30 TABLET, FILM COATED ORAL
Qty: 90 TABLET | Refills: 1 | Status: SHIPPED | OUTPATIENT
Start: 2025-02-11

## 2025-02-11 NOTE — PROGRESS NOTES
Subjective   Jasmine Cerda is a 64 y.o. female who presents today for follow up     Chief Complaint:  depression anxiety     History of Present Illness:   The pt suffers from depression for a long time, was on multiple meds with limited response     The pt was more depressed last year  2ry to family situation , her son attempted suicide last year and  relapsed on drugs, now he is in and out of the hospital, has CHF and she has no contact with him   The pt is still on celexa ,remeron,  trazodone,  xanax, seroquel  and gabapentin   Neurologist - no concerns about psych meds, she was dsd with parkinsons     Last visit - no med changes     Today the pt keeps c/o severe   depression mainly related to family situation and her son's health who is in [facility] now and pt's  is not taking her there  Depression 8/10, almost every day , depression is associated with crying spells , low self esteem, guilt feelings    Anxiety is intense and persistent, associated with  GI sxs, BP elevation   denied SI/HI   Denied AVH, no elicited delusions   Psychotherapy was offered, the pt has no transportation, no phone to do virtual sessions   Precipitating and Ameliorating Factors: relationship problems   Alleviating factors - to spend time with her grand daughter         PAST PSYCHIATRIC HISTORY      Previous Psychiatric Diagnoses   Axis I: Anxiety/Panic Disorder     Past Hospitalizations or Residential Treatment   Locations\Providers: none      Past Outpatient Treatment   Diagnosis Treated: Anxiety/Panic Dis.  Treatment Type: Medication Management  Location: with PCP, Dr Manning      Prior Psychiatric Medications   Comments: xanax - effective      celexa- not effective   zoloft - not effective  cymbalta - not effective      Prozac - GI sxs   ambien - side effects   Risperidone - not effective and side effects   Hydroxyzine - not effective  Abilify - not effective   Trazodone - not effective     Consequences of Mental Disorder    Consequences: emotional distress     SOCIAL HISTORY     Number of children: 2  Number of grandkids: 1  Current Relationship is: supportive  Family of Origin is: supportive  Comments: Patient has never smoked.  Passive Smoke: N  Alcohol Use: N  Drug Use: N  HIV/High Risk: N        Current Living Situation   Lives with: spouse     Education   Level: high school graduate     Employment   Job Status: disabled     Hobbies and Leisure Activities   Activity Type: quiet activities     Smoking History   Smoking Hx: Never smoker     Exercise   Exercise sessions (per wk): 1     Illicit Drug Use   Illicit Drugs used: no     FAMILY HISTORY OF MENTAL DISORDERS   fh Grandparents: Non-contributory  fh Mother: Non-contributory  fh Father: Non-contributory  fh Siblings: Non-contributory  fh Other: Non-contributory  The following portions of the patient's history were reviewed and updated as appropriate: allergies, current medications, past family history, past medical history, past social history, past surgical history and problem list.            Interval History  No changes, still depressed  and anxious       Side Effects  Denied       Past Medical History:  Past Medical History:   Diagnosis Date    Anxiety     Breast cyst     CHF (congestive heart failure)     COPD (chronic obstructive pulmonary disease)     Coronary heart disease     Depression     Hyperlipidemia     Hypertension        Social History:  Social History     Socioeconomic History    Marital status:    Tobacco Use    Smoking status: Never    Smokeless tobacco: Never    Tobacco comments:     Passive Smoke: N   Vaping Use    Vaping status: Never Used   Substance and Sexual Activity    Alcohol use: No    Drug use: No    Sexual activity: Defer       Family History:  Family History   Problem Relation Age of Onset    Colon cancer Mother     Diabetes Father     Pulmonary embolism Brother     Heart failure Brother     Breast cancer Maternal Aunt        Past  Surgical History:  Past Surgical History:   Procedure Laterality Date    APPENDECTOMY      BREAST CYST ASPIRATION      CARDIAC CATHETERIZATION  2017    No Stents placed - [facility]    CARDIAC CATHETERIZATION N/A 2023    Procedure: Left Heart Cath possible PCI;  Surgeon: Cuauhtemoc Kwon MD;  Location: [facility] CATH INVASIVE LOCATION;  Service: Cardiovascular;  Laterality: N/A;    CERVICAL FUSION      C6-C7     SECTION      x 2    CHOLECYSTECTOMY      HYSTERECTOMY      partial       Problem List:  Patient Active Problem List   Diagnosis    Chronic post-traumatic stress disorder (PTSD)    Severe episode of recurrent major depressive disorder, without psychotic features    Asthma    Chronic low back pain    Chronic diastolic heart failure    Coronary heart disease    Degeneration of intervertebral disc of lumbosacral region    Headache, temporal    Psychophysiological insomnia    Hyperlipidemia    Hypertension    Vitamin D deficiency    Other fatigue    Preventative health care    Hyperglycemia, unspecified     Nausea    Stable angina pectoris    Chest pain    Dyspnea    Abnormal nuclear stress test       Allergy:   No Known Allergies     Discontinued Medications:  Medications Discontinued During This Encounter   Medication Reason    hydrOXYzine (ATARAX) 25 MG tablet Reorder    QUEtiapine (SEROquel) 100 MG tablet Reorder    mirtazapine (REMERON) 30 MG tablet Reorder    ALPRAZolam (XANAX) 0.5 MG tablet Reorder    citalopram (CeleXA) 40 MG tablet Reorder    ARIPiprazole (ABILIFY) 10 MG tablet Reorder                   Current Medications:   Current Outpatient Medications   Medication Sig Dispense Refill    ALPRAZolam (XANAX) 0.5 MG tablet Take 1 tablet by mouth 3 (Three) Times a Day As Needed for Anxiety. for anxiety 90 tablet 3    ARIPiprazole (ABILIFY) 10 MG tablet Take 1 tablet by mouth Daily. 90 tablet 1    citalopram (CeleXA) 40 MG tablet Take 1 tablet by mouth Every Morning. 90 tablet 1     hydrOXYzine (ATARAX) 25 MG tablet Take 1 tablet by mouth 3 (Three) Times a Day As Needed for Anxiety. 90 tablet 3    mirtazapine (REMERON) 30 MG tablet Take 1 tablet by mouth every night at bedtime. 90 tablet 1    QUEtiapine (SEROquel) 100 MG tablet Take 1 tablet by mouth every night at bedtime. 90 tablet 1    allopurinol (Zyloprim) 100 MG tablet Take 1 tablet by mouth Daily. 30 tablet 2    amLODIPine (NORVASC) 10 MG tablet Take 1 tablet by mouth Daily. 90 tablet 0    aspirin 81 MG EC tablet 0      Budeson-Glycopyrrol-Formoterol (Breztri Aerosphere) 160-9-4.8 MCG/ACT aerosol inhaler Inhale 2 puffs 2 (Two) Times a Day. 2 each 0    carbidopa-levodopa (SINEMET)  MG per tablet Take 1 tab at: 6 am, 10 am, 2pm, 7 pm 120 tablet 5    carvedilol (COREG) 25 MG tablet Take 1 tablet by mouth 2 (Two) Times a Day. 180 tablet 3    cloNIDine (CATAPRES) 0.1 MG tablet Take 1 tablet by mouth Every 8 (Eight) Hours As Needed for High Blood Pressure (for systolic over 160). 90 tablet 0    colchicine 0.6 MG tablet TAKE 1 TABLET BY MOUTH TWICE DAILY AS NEEDED FOR  GOUT  FLARE  UP 30 tablet 2    cyanocobalamin 1000 MCG/ML injection INJECT 1 ML INTO THE APPROPRIATE MUSCLE AS DIRECTED EVERY 28 DAYS 3 mL 3    gabapentin (NEURONTIN) 300 MG capsule Take 1 capsule by mouth 3 (Three) Times a Day. 270 capsule 1    hydrALAZINE (APRESOLINE) 25 MG tablet Take 1 tablet by mouth 3 (Three) Times a Day. 90 tablet 0    HYDROcodone-acetaminophen (NORCO) 7.5-325 MG per tablet Take 1 tablet by mouth Every 4 (Four) Hours As Needed for Severe Pain. 5 tablet 0    magnesium hydroxide (MILK OF MAGNESIA) 400 MG/5ML suspension 0      metoclopramide (Reglan) 5 MG tablet Take 1 tablet by mouth 3 (Three) Times a Day With Meals. 90 tablet 2    Multiple Vitamins-Minerals (MULTIVITAMIN ADULT) tablet Take 1 tablet by mouth Daily. With Omega XL      nitroglycerin (NITROSTAT) 0.4 MG SL tablet Place 1 tablet under the tongue Every 5 (Five) Minutes As Needed for Chest  "Pain. Take no more than 3 doses in 15 minutes. 25 tablet 2    ondansetron ODT (ZOFRAN-ODT) 4 MG disintegrating tablet DISSOLVE 1 TABLET IN MOUTH EVERY 8 HOURS AS NEEDED FOR NAUSEA FOR VOMITING 60 tablet 0    pantoprazole (PROTONIX) 40 MG EC tablet Take 1 tablet by mouth 2 (Two) Times a Day. 180 tablet 1    potassium chloride (KLOR-CON) 10 MEQ CR tablet Take 1 tablet by mouth Daily As Needed (If takes Lasix). 90 tablet 0    rosuvastatin (CRESTOR) 40 MG tablet Take 1 tablet by mouth Every Evening. 90 tablet 1    Syringe/Needle, Disp, (B-D 3CC LUER-MAI SYR 23GX1\") 23G X 1\" 3 ML misc USE 1 SYRINGE ONCE EVERY MONTH FOR  B12  INJECTION INTRAMUSCULARLY 12 each 0    vitamin D (ERGOCALCIFEROL) 1.25 MG (90277 UT) capsule capsule Take 1 capsule by mouth 1 (One) Time Per Week. 15 capsule 3     No current facility-administered medications for this visit.         Review of Symptoms:    Psychiatric/Behavioral: Negative for agitation, behavioral problems, confusion, decreased concentration, dysphoric mood, hallucinations, self-injury, sleep disturbance and suicidal ideas.   The patient is  Still   depressed,  Still very  nervous/anxious and is not hyperactive.        Physical Exam:   There were no vitals taken for this visit.    Mental Status Exam:   Hygiene:   good  Cooperation:  Cooperative  Eye Contact:  Good  Psychomotor Behavior:  Appropriate  Affect:  Appropriate    Mood: depressed and fluctates,  Anxious    Hopelessness: Denies  Speech:  Normal  Thought Process:  Goal directed and Linear  Thought Content:  Normal  Suicidal:  None  Homicidal:  None  Hallucinations:  None  Delusion:  None  Memory:  Intact  Orientation:  Person, Place, Time and Situation  Reliability:  fair  Insight:  Good  Judgement:  Good  Impulse Control:  Good  Physical/Medical Issues:  Yes GI issues         MSE from 10/16/24    reviewed and accepted without changes     PHQ-9 Depression Screening  Little interest or pleasure in doing things? Almost all "   Feeling down, depressed, or hopeless? Over half   Trouble falling or staying asleep, or sleeping too much? Over half   Feeling tired or having little energy? Over half   Poor appetite or overeating? Not at all   Feeling bad about yourself - or that you are a failure or have let yourself or your family down? Several days   Trouble concentrating on things, such as reading the newspaper or watching television? Several days   Moving or speaking so slowly that other people could have noticed? Or the opposite - being so fidgety or restless that you have been moving around a lot more than usual? Not at all   Thoughts that you would be better off dead, or of hurting yourself in some way? Not at all   PHQ-9 Total Score 11   If you checked off any problems, how difficult have these problems made it for you to do your work, take care of things at home, or get along with other people? Very difficult      Over the last two weeks, how often have you been bothered by the following problems?  Feeling nervous, anxious or on edge: Nearly every day  Not being able to stop or control worrying: More than half the days  Worrying too much about different things: Several days  Trouble Relaxing: More than half the days  Being so restless that it is hard to sit still: Several days  Becoming easily annoyed or irritable: Several days  Feeling afraid as if something awful might happen: Nearly every day  DWIGHT 7 Total Score: 13  If you checked any problems, how difficult have these problems made it for you to do your work, take care of things at home, or get along with other people: Very difficult         Never smoker    I advised Jasmine of the risks of tobacco use.     Lab Results:   Orders Only on 01/15/2025   Component Date Value Ref Range Status    Total Cholesterol 01/21/2025 146  0 - 200 mg/dL Final    Triglycerides 01/21/2025 110  0 - 150 mg/dL Final    HDL Cholesterol 01/21/2025 67 (H)  40 - 60 mg/dL Final    LDL Cholesterol  01/21/2025 59   0 - 100 mg/dL Final    VLDL Cholesterol 01/21/2025 20  5 - 40 mg/dL Final    LDL/HDL Ratio 01/21/2025 0.85   Final    Glucose 01/21/2025 72  65 - 99 mg/dL Final    BUN 01/21/2025 13  8 - 23 mg/dL Final    Creatinine 01/21/2025 1.26 (H)  0.57 - 1.00 mg/dL Final    Sodium 01/21/2025 142  136 - 145 mmol/L Final    Potassium 01/21/2025 5.8 (H)  3.5 - 5.2 mmol/L Final    Chloride 01/21/2025 107  98 - 107 mmol/L Final    CO2 01/21/2025 24.0  22.0 - 29.0 mmol/L Final    Calcium 01/21/2025 10.1  8.6 - 10.5 mg/dL Final    Total Protein 01/21/2025 6.8  6.0 - 8.5 g/dL Final    Albumin 01/21/2025 4.1  3.5 - 5.2 g/dL Final    ALT (SGPT) 01/21/2025 18  1 - 33 U/L Final    AST (SGOT) 01/21/2025 15  1 - 32 U/L Final    Alkaline Phosphatase 01/21/2025 102  39 - 117 U/L Final    Total Bilirubin 01/21/2025 0.3  0.0 - 1.2 mg/dL Final    Globulin 01/21/2025 2.7  gm/dL Final    A/G Ratio 01/21/2025 1.5  g/dL Final    BUN/Creatinine Ratio 01/21/2025 10.3  7.0 - 25.0 Final    Anion Gap 01/21/2025 11.0  5.0 - 15.0 mmol/L Final    eGFR 01/21/2025 47.2 (L)  >60.0 mL/min/1.73 Final    WBC 01/21/2025 6.35  3.40 - 10.80 10*3/mm3 Final    RBC 01/21/2025 4.47  3.77 - 5.28 10*6/mm3 Final    Hemoglobin 01/21/2025 13.0  12.0 - 15.9 g/dL Final    Hematocrit 01/21/2025 39.8  34.0 - 46.6 % Final    MCV 01/21/2025 89.0  79.0 - 97.0 fL Final    MCH 01/21/2025 29.1  26.6 - 33.0 pg Final    MCHC 01/21/2025 32.7  31.5 - 35.7 g/dL Final    RDW 01/21/2025 14.2  12.3 - 15.4 % Final    RDW-SD 01/21/2025 45.9  37.0 - 54.0 fl Final    MPV 01/21/2025 9.9  6.0 - 12.0 fL Final    Platelets 01/21/2025 347  140 - 450 10*3/mm3 Final    TSH 01/21/2025 4.480 (H)  0.270 - 4.200 uIU/mL Final   Lab on 12/23/2024   Component Date Value Ref Range Status    Glucose 12/23/2024 98  65 - 99 mg/dL Final    BUN 12/23/2024 12  8 - 23 mg/dL Final    Creatinine 12/23/2024 1.60 (H)  0.57 - 1.00 mg/dL Final    Sodium 12/23/2024 145  136 - 145 mmol/L Final    Potassium 12/23/2024 3.1 (L)   3.5 - 5.2 mmol/L Final    Chloride 12/23/2024 107  98 - 107 mmol/L Final    CO2 12/23/2024 25.4  22.0 - 29.0 mmol/L Final    Calcium 12/23/2024 10.3  8.6 - 10.5 mg/dL Final    BUN/Creatinine Ratio 12/23/2024 7.5  7.0 - 25.0 Final    Anion Gap 12/23/2024 12.6  5.0 - 15.0 mmol/L Final    eGFR 12/23/2024 35.4 (L)  >60.0 mL/min/1.73 Final    Uric Acid 12/23/2024 3.9  2.4 - 5.7 mg/dL Final   Admission on 12/15/2024, Discharged on 12/15/2024   Component Date Value Ref Range Status    Glucose 12/15/2024 109 (H)  65 - 99 mg/dL Final    BUN 12/15/2024 7 (L)  8 - 23 mg/dL Final    Creatinine 12/15/2024 1.25 (H)  0.57 - 1.00 mg/dL Final    Sodium 12/15/2024 143  136 - 145 mmol/L Final    Potassium 12/15/2024 2.9 (L)  3.5 - 5.2 mmol/L Final    Chloride 12/15/2024 105  98 - 107 mmol/L Final    CO2 12/15/2024 24.9  22.0 - 29.0 mmol/L Final    Calcium 12/15/2024 10.0  8.6 - 10.5 mg/dL Final    BUN/Creatinine Ratio 12/15/2024 5.6 (L)  7.0 - 25.0 Final    Anion Gap 12/15/2024 13.1  5.0 - 15.0 mmol/L Final    eGFR 12/15/2024 47.6 (L)  >60.0 mL/min/1.73 Final    Right Common Femoral Spont 12/15/2024 Y   Final    Right Common Femoral Competent 12/15/2024 Y   Final    Right Common Femoral Phasic 12/15/2024 Y   Final    Right Common Femoral Compress 12/15/2024 C   Final    Right Common Femoral Augment 12/15/2024 Y   Final    Left Common Femoral Spont 12/15/2024 Y   Final    Left Common Femoral Competent 12/15/2024 Y   Final    Left Common Femoral Phasic 12/15/2024 Y   Final    Left Common Femoral Compress 12/15/2024 C   Final    Left Common Femoral Augment 12/15/2024 Y   Final    Left Saphenofemoral Junction Compr* 12/15/2024 C   Final    Left Proximal Femoral Compress 12/15/2024 C   Final    Left Mid Femoral Spont 12/15/2024 Y   Final    Left Mid Femoral Competent 12/15/2024 Y   Final    Left Mid Femoral Phasic 12/15/2024 Y   Final    Left Mid Femoral Compress 12/15/2024 C   Final    Left Mid Femoral Augment 12/15/2024 Y   Final     Left Distal Femoral Compress 12/15/2024 C   Final    Left Popliteal Spont 12/15/2024 Y   Final    Left Popliteal Competent 12/15/2024 Y   Final    Left Popliteal Phasic 12/15/2024 Y   Final    Left Popliteal Compress 12/15/2024 C   Final    Left Popliteal Augment 12/15/2024 Y   Final    Left Posterior Tibial Compress 12/15/2024 C   Final    Left Peroneal Compress 12/15/2024 C   Final    Left Gastronemius Compress 12/15/2024 C   Final    Left Greater Saph AK Compress 12/15/2024 C   Final    Left Greater Saph BK Compress 12/15/2024 C   Final    Left Lesser Saph Compress 12/15/2024 C   Final    BH CV VAS PRELIMINARY FINDINGS SCR* 12/15/2024 1.0   Final    WBC 12/15/2024 15.30 (H)  3.40 - 10.80 10*3/mm3 Final    RBC 12/15/2024 4.46  3.77 - 5.28 10*6/mm3 Final    Hemoglobin 12/15/2024 13.1  12.0 - 15.9 g/dL Final    Hematocrit 12/15/2024 38.8  34.0 - 46.6 % Final    MCV 12/15/2024 87.0  79.0 - 97.0 fL Final    MCH 12/15/2024 29.4  26.6 - 33.0 pg Final    MCHC 12/15/2024 33.8  31.5 - 35.7 g/dL Final    RDW 12/15/2024 13.2  12.3 - 15.4 % Final    RDW-SD 12/15/2024 41.8  37.0 - 54.0 fl Final    MPV 12/15/2024 8.8  6.0 - 12.0 fL Final    Platelets 12/15/2024 271  140 - 450 10*3/mm3 Final    Neutrophil % 12/15/2024 74.6  42.7 - 76.0 % Final    Lymphocyte % 12/15/2024 16.0 (L)  19.6 - 45.3 % Final    Monocyte % 12/15/2024 8.4  5.0 - 12.0 % Final    Eosinophil % 12/15/2024 0.3  0.3 - 6.2 % Final    Basophil % 12/15/2024 0.4  0.0 - 1.5 % Final    Immature Grans % 12/15/2024 0.3  0.0 - 0.5 % Final    Neutrophils, Absolute 12/15/2024 11.42 (H)  1.70 - 7.00 10*3/mm3 Final    Lymphocytes, Absolute 12/15/2024 2.45  0.70 - 3.10 10*3/mm3 Final    Monocytes, Absolute 12/15/2024 1.28 (H)  0.10 - 0.90 10*3/mm3 Final    Eosinophils, Absolute 12/15/2024 0.04  0.00 - 0.40 10*3/mm3 Final    Basophils, Absolute 12/15/2024 0.06  0.00 - 0.20 10*3/mm3 Final    Immature Grans, Absolute 12/15/2024 0.05  0.00 - 0.05 10*3/mm3 Final    nRBC 12/15/2024  0.0  0.0 - 0.2 /100 WBC Final    Extra Tube 12/15/2024 Hold for add-ons.   Final    Auto resulted.    Extra Tube 12/15/2024 Hold for add-ons.   Final    Auto resulted   Lab on 11/18/2024   Component Date Value Ref Range Status    Glucose 11/18/2024 104 (H)  65 - 99 mg/dL Final    BUN 11/18/2024 24 (H)  8 - 23 mg/dL Final    Creatinine 11/18/2024 1.36 (H)  0.57 - 1.00 mg/dL Final    Sodium 11/18/2024 139  136 - 145 mmol/L Final    Potassium 11/18/2024 4.2  3.5 - 5.2 mmol/L Final    Chloride 11/18/2024 100  98 - 107 mmol/L Final    CO2 11/18/2024 31.0 (H)  22.0 - 29.0 mmol/L Final    Calcium 11/18/2024 10.3  8.6 - 10.5 mg/dL Final    BUN/Creatinine Ratio 11/18/2024 17.6  7.0 - 25.0 Final    Anion Gap 11/18/2024 8.0  5.0 - 15.0 mmol/L Final    eGFR 11/18/2024 43.0 (L)  >60.0 mL/min/1.73 Final    Color, UA 11/18/2024 Yellow  Yellow, Straw Final    Appearance, UA 11/18/2024 Clear  Clear Final    pH, UA 11/18/2024 7.0  5.0 - 8.0 Final    Specific Gravity, UA 11/18/2024 1.007  1.005 - 1.030 Final    Glucose, UA 11/18/2024 Negative  Negative Final    Ketones, UA 11/18/2024 Negative  Negative Final    Bilirubin, UA 11/18/2024 Negative  Negative Final    Blood, UA 11/18/2024 Negative  Negative Final    Protein, UA 11/18/2024 Negative  Negative Final    Leuk Esterase, UA 11/18/2024 Negative  Negative Final    Nitrite, UA 11/18/2024 Negative  Negative Final    Urobilinogen, UA 11/18/2024 0.2 E.U./dL  0.2 - 1.0 E.U./dL Final    Protein/Creatinine Ratio, Urine 11/18/2024 255.7 (H)  0.0 - 200.0 mg/G Crea Final    Creatinine, Urine 11/18/2024 17.6  mg/dL Final    Total Protein, Urine 11/18/2024 4.5  mg/dL Final    WBC 11/18/2024 9.61  3.40 - 10.80 10*3/mm3 Final    RBC 11/18/2024 4.56  3.77 - 5.28 10*6/mm3 Final    Hemoglobin 11/18/2024 13.3  12.0 - 15.9 g/dL Final    Hematocrit 11/18/2024 40.3  34.0 - 46.6 % Final    MCV 11/18/2024 88.4  79.0 - 97.0 fL Final    MCH 11/18/2024 29.2  26.6 - 33.0 pg Final    MCHC 11/18/2024 33.0   31.5 - 35.7 g/dL Final    RDW 11/18/2024 13.2  12.3 - 15.4 % Final    RDW-SD 11/18/2024 42.1  37.0 - 54.0 fl Final    MPV 11/18/2024 10.2  6.0 - 12.0 fL Final    Platelets 11/18/2024 235  140 - 450 10*3/mm3 Final    Neutrophil % 11/18/2024 47.0  42.7 - 76.0 % Final    Lymphocyte % 11/18/2024 39.5  19.6 - 45.3 % Final    Monocyte % 11/18/2024 8.9  5.0 - 12.0 % Final    Eosinophil % 11/18/2024 3.2  0.3 - 6.2 % Final    Basophil % 11/18/2024 1.0  0.0 - 1.5 % Final    Immature Grans % 11/18/2024 0.4  0.0 - 0.5 % Final    Neutrophils, Absolute 11/18/2024 4.50  1.70 - 7.00 10*3/mm3 Final    Lymphocytes, Absolute 11/18/2024 3.80 (H)  0.70 - 3.10 10*3/mm3 Final    Monocytes, Absolute 11/18/2024 0.86  0.10 - 0.90 10*3/mm3 Final    Eosinophils, Absolute 11/18/2024 0.31  0.00 - 0.40 10*3/mm3 Final    Basophils, Absolute 11/18/2024 0.10  0.00 - 0.20 10*3/mm3 Final    Immature Grans, Absolute 11/18/2024 0.04  0.00 - 0.05 10*3/mm3 Final    nRBC 11/18/2024 0.0  0.0 - 0.2 /100 WBC Final    Extra Tube 11/18/2024 Hold for add-ons.   Final    Auto resulted.       Assessment & Plan   Problems Addressed this Visit       Chronic post-traumatic stress disorder (PTSD) (Chronic)    Relevant Medications    mirtazapine (REMERON) 30 MG tablet    QUEtiapine (SEROquel) 100 MG tablet    hydrOXYzine (ATARAX) 25 MG tablet    citalopram (CeleXA) 40 MG tablet    ARIPiprazole (ABILIFY) 10 MG tablet    ALPRAZolam (XANAX) 0.5 MG tablet    Severe episode of recurrent major depressive disorder, without psychotic features - Primary (Chronic)    Relevant Medications    mirtazapine (REMERON) 30 MG tablet    QUEtiapine (SEROquel) 100 MG tablet    hydrOXYzine (ATARAX) 25 MG tablet    citalopram (CeleXA) 40 MG tablet    ARIPiprazole (ABILIFY) 10 MG tablet    ALPRAZolam (XANAX) 0.5 MG tablet    Psychophysiological insomnia (Chronic)    Relevant Medications    mirtazapine (REMERON) 30 MG tablet    QUEtiapine (SEROquel) 100 MG tablet    hydrOXYzine (ATARAX) 25 MG  tablet    citalopram (CeleXA) 40 MG tablet    ARIPiprazole (ABILIFY) 10 MG tablet    ALPRAZolam (XANAX) 0.5 MG tablet     Diagnoses         Codes Comments    Severe episode of recurrent major depressive disorder, without psychotic features    -  Primary ICD-10-CM: F33.2  ICD-9-CM: 296.33     Chronic post-traumatic stress disorder (PTSD)     ICD-10-CM: F43.12  ICD-9-CM: 309.81     Psychophysiological insomnia     ICD-10-CM: F51.04  ICD-9-CM: 307.42             Visit Diagnoses:    ICD-10-CM ICD-9-CM   1. Severe episode of recurrent major depressive disorder, without psychotic features  F33.2 296.33   2. Chronic post-traumatic stress disorder (PTSD)  F43.12 309.81   3. Psychophysiological insomnia  F51.04 307.42                   TREATMENT PLAN/GOALS: Continue supportive psychotherapy efforts and medications as indicated. Treatment and medication options discussed during today's visit. Patient ackowledged and verbally consented to continue with current treatment plan and was educated on the importance of compliance with treatment and follow-up appointments.    MEDICATION ISSUES:  1. MDD - cont celexa 40 mg ,   cont   seroquel 100 mg po QHS for depression, anxiety, insomnia, reported good response , QTC  483   Neurology was ok with her meds   Seroquel can not be increased 2ry to AM sedation   The pt maxed on celexa   Cont  remeron   30 mg po QHS for depression and insomnia   2. PTSD - cont celexa 40 mg  (Qtc 483) , Xanax - low dose 0.5 mg TID, NO  increase recommended to avoid rebound anxiety ,  long term benzo use discussed again,   Cont  hydroxyzine PRN with alprazolam    3. Insomnia -  cont  seroquel 100 mg po QHS    Cont  remeron   30 mg po QHS      4. Long term therapeutic drug monitoring - UDS  8/31/2021 - consistent  1/5/23 - consistent   LABORATORY - SCAN - [facility] DRUG SCREEN, [facility], 1/5/2023 (01/05/2023) ,   UDS 6/19/24 - consistent for alprazolam   Tox[employer] Flex 22, Ur w/DL - Urine, Random Void  (06/19/2024 09:13)        EKG 5/15/23 Qtc 464     EKG 6/2/24   HEART RATE= 73  bpm  RR Interval= 820  ms  [place] Interval= 153  ms  P Horizontal Axis= -11  deg  P Front Axis= 5  deg  QRSD Interval= 105  ms  QT Interval= 437  ms  QTcB= 483  ms  QRS Axis= -15  deg          Psychotherapy was recommended , the is reluctant to start due to no transportation and no smart phone   cont supportive therapy, [facility]  - did not contact yet     INSPECT reviewed as expected, last xanax 0.5 mg # 90 for 30 days refill was on 2/7/25      Patient screened positive for depression based on a PHQ-9 score of 11 on 2/11/2025. Follow-up recommendations include: Prescribed antidepressant medication treatment.    PHQ scored 11 and indicated moderate depression   DWIGHT 7 scored 13  - moderate anxiety     Discussed medication options and treatment plan of prescribed medication as well as the risks, benefits, and side effects including potential falls, possible impaired driving and metabolic adversities among others. Patient is agreeable to call the office with any worsening of symptoms or onset of side effects. Patient is agreeable to call 911 or go to the nearest ER should he/she begin having SI/HI. No medication side effects or related complaints today.     MEDS ORDERED DURING VISIT:  New Medications Ordered This Visit   Medications    mirtazapine (REMERON) 30 MG tablet     Sig: Take 1 tablet by mouth every night at bedtime.     Dispense:  90 tablet     Refill:  1    QUEtiapine (SEROquel) 100 MG tablet     Sig: Take 1 tablet by mouth every night at bedtime.     Dispense:  90 tablet     Refill:  1    hydrOXYzine (ATARAX) 25 MG tablet     Sig: Take 1 tablet by mouth 3 (Three) Times a Day As Needed for Anxiety.     Dispense:  90 tablet     Refill:  3    citalopram (CeleXA) 40 MG tablet     Sig: Take 1 tablet by mouth Every Morning.     Dispense:  90 tablet     Refill:  1    ARIPiprazole (ABILIFY) 10 MG tablet     Sig: Take 1 tablet by mouth Daily.      Dispense:  90 tablet     Refill:  1    ALPRAZolam (XANAX) 0.5 MG tablet     Sig: Take 1 tablet by mouth 3 (Three) Times a Day As Needed for Anxiety. for anxiety     Dispense:  90 tablet     Refill:  3     Please dispense only when it is due, last fill was on 2/7/25       Return in about 4 months (around 6/11/2025).         This document has been electronically signed by Sivan Ken MD  February 11, 2025 08:14 EST

## 2025-02-12 DIAGNOSIS — E87.6 HYPOKALEMIA: Primary | ICD-10-CM

## 2025-02-14 ENCOUNTER — HOSPITAL ENCOUNTER (OUTPATIENT)
Dept: CARDIOLOGY | Facility: HOSPITAL | Age: 65
Discharge: HOME | End: 2025-02-14
Payer: MEDICARE

## 2025-02-14 DIAGNOSIS — I10 UNCONTROLLED HYPERTENSION: ICD-10-CM

## 2025-02-14 DIAGNOSIS — R41.89 COGNITIVE CHANGES: ICD-10-CM

## 2025-02-14 DIAGNOSIS — R42 DIZZINESS AND GIDDINESS: ICD-10-CM

## 2025-02-14 LAB
BH CV XLRA MEAS LEFT DIST CCA EDV: 23.7 CM/SEC
BH CV XLRA MEAS LEFT DIST CCA PSV: 68.9 CM/SEC
BH CV XLRA MEAS LEFT DIST ICA EDV: -30.3 CM/SEC
BH CV XLRA MEAS LEFT DIST ICA PSV: -75.9 CM/SEC
BH CV XLRA MEAS LEFT ICA/CCA RATIO: -0.8
BH CV XLRA MEAS LEFT PROX CCA EDV: 21 CM/SEC
BH CV XLRA MEAS LEFT PROX CCA PSV: 94.6 CM/SEC
BH CV XLRA MEAS LEFT PROX ECA PSV: -67.2 CM/SEC
BH CV XLRA MEAS LEFT PROX ICA EDV: -18 CM/SEC
BH CV XLRA MEAS LEFT PROX ICA PSV: -50.9 CM/SEC
BH CV XLRA MEAS LEFT PROX SCLA PSV: 104 CM/SEC
BH CV XLRA MEAS LEFT VERTEBRAL A EDV: 16.1 CM/SEC
BH CV XLRA MEAS LEFT VERTEBRAL A PSV: 41 CM/SEC
BH CV XLRA MEAS RIGHT DIST CCA EDV: 20.5 CM/SEC
BH CV XLRA MEAS RIGHT DIST CCA PSV: 78.3 CM/SEC
BH CV XLRA MEAS RIGHT DIST ICA EDV: -17.7 CM/SEC
BH CV XLRA MEAS RIGHT DIST ICA PSV: -56.5 CM/SEC
BH CV XLRA MEAS RIGHT ICA/CCA RATIO: 0.72
BH CV XLRA MEAS RIGHT PROX CCA EDV: -12.1 CM/SEC
BH CV XLRA MEAS RIGHT PROX CCA PSV: -78.8 CM/SEC
BH CV XLRA MEAS RIGHT PROX ECA PSV: -90.4 CM/SEC
BH CV XLRA MEAS RIGHT PROX ICA EDV: -13.3 CM/SEC
BH CV XLRA MEAS RIGHT PROX ICA PSV: -52.6 CM/SEC
BH CV XLRA MEAS RIGHT PROX SCLA PSV: 140 CM/SEC
BH CV XLRA MEAS RIGHT VERTEBRAL A EDV: -15.7 CM/SEC
BH CV XLRA MEAS RIGHT VERTEBRAL A PSV: -36.4 CM/SEC

## 2025-02-14 PROCEDURE — 93880 EXTRACRANIAL BILAT STUDY: CPT

## 2025-02-18 ENCOUNTER — LAB (OUTPATIENT)
Dept: FAMILY MEDICINE CLINIC | Facility: CLINIC | Age: 65
End: 2025-02-18
Payer: MEDICARE

## 2025-02-18 DIAGNOSIS — E55.9 VITAMIN D DEFICIENCY: ICD-10-CM

## 2025-02-18 DIAGNOSIS — E87.6 HYPOKALEMIA: Primary | ICD-10-CM

## 2025-02-18 DIAGNOSIS — N18.30 STAGE 3 CHRONIC KIDNEY DISEASE, UNSPECIFIED WHETHER STAGE 3A OR 3B CKD: ICD-10-CM

## 2025-02-18 LAB
BILIRUB UR QL STRIP: NEGATIVE
CLARITY UR: CLEAR
COLOR UR: YELLOW
GLUCOSE UR STRIP-MCNC: NEGATIVE MG/DL
HGB UR QL STRIP.AUTO: NEGATIVE
KETONES UR QL STRIP: NEGATIVE
LEUKOCYTE ESTERASE UR QL STRIP.AUTO: ABNORMAL
NITRITE UR QL STRIP: NEGATIVE
PH UR STRIP.AUTO: 5.5 [PH] (ref 5–8)
PROT UR QL STRIP: NEGATIVE
SP GR UR STRIP: 1.01 (ref 1–1.03)
UROBILINOGEN UR QL STRIP: ABNORMAL

## 2025-02-18 PROCEDURE — 82306 VITAMIN D 25 HYDROXY: CPT | Performed by: INTERNAL MEDICINE

## 2025-02-18 PROCEDURE — 85025 COMPLETE CBC W/AUTO DIFF WBC: CPT | Performed by: INTERNAL MEDICINE

## 2025-02-18 PROCEDURE — 80048 BASIC METABOLIC PNL TOTAL CA: CPT | Performed by: INTERNAL MEDICINE

## 2025-02-18 PROCEDURE — 36415 COLL VENOUS BLD VENIPUNCTURE: CPT

## 2025-02-18 PROCEDURE — 84156 ASSAY OF PROTEIN URINE: CPT | Performed by: INTERNAL MEDICINE

## 2025-02-18 PROCEDURE — 81001 URINALYSIS AUTO W/SCOPE: CPT | Performed by: INTERNAL MEDICINE

## 2025-02-18 PROCEDURE — 82330 ASSAY OF CALCIUM: CPT | Performed by: INTERNAL MEDICINE

## 2025-02-18 PROCEDURE — 82570 ASSAY OF URINE CREATININE: CPT | Performed by: INTERNAL MEDICINE

## 2025-02-18 PROCEDURE — 83970 ASSAY OF PARATHORMONE: CPT | Performed by: INTERNAL MEDICINE

## 2025-02-19 LAB
25(OH)D3 SERPL-MCNC: 50.1 NG/ML (ref 30–100)
ANION GAP SERPL CALCULATED.3IONS-SCNC: 15 MMOL/L (ref 5–15)
BACTERIA UR QL AUTO: ABNORMAL /HPF
BASOPHILS # BLD AUTO: 0.07 10*3/MM3 (ref 0–0.2)
BASOPHILS NFR BLD AUTO: 0.9 % (ref 0–1.5)
BUN SERPL-MCNC: 38 MG/DL (ref 8–23)
BUN/CREAT SERPL: 16.3 (ref 7–25)
CA-I SERPL ISE-MCNC: 1.29 MMOL/L (ref 1.15–1.35)
CALCIUM SPEC-SCNC: 10 MG/DL (ref 8.6–10.5)
CHLORIDE SERPL-SCNC: 94 MMOL/L (ref 98–107)
CO2 SERPL-SCNC: 28 MMOL/L (ref 22–29)
COD CRY URNS QL: PRESENT /HPF
CREAT SERPL-MCNC: 2.33 MG/DL (ref 0.57–1)
CREAT UR-MCNC: 82.5 MG/DL
DEPRECATED RDW RBC AUTO: 40.5 FL (ref 37–54)
EGFRCR SERPLBLD CKD-EPI 2021: 22.6 ML/MIN/1.73
EOSINOPHIL # BLD AUTO: 0.41 10*3/MM3 (ref 0–0.4)
EOSINOPHIL NFR BLD AUTO: 5.2 % (ref 0.3–6.2)
ERYTHROCYTE [DISTWIDTH] IN BLOOD BY AUTOMATED COUNT: 13.1 % (ref 12.3–15.4)
GLUCOSE SERPL-MCNC: 98 MG/DL (ref 65–99)
HCT VFR BLD AUTO: 40.6 % (ref 34–46.6)
HGB BLD-MCNC: 13.3 G/DL (ref 12–15.9)
HYALINE CASTS UR QL AUTO: ABNORMAL /LPF
IMM GRANULOCYTES # BLD AUTO: 0.02 10*3/MM3 (ref 0–0.05)
IMM GRANULOCYTES NFR BLD AUTO: 0.3 % (ref 0–0.5)
LYMPHOCYTES # BLD AUTO: 2.9 10*3/MM3 (ref 0.7–3.1)
LYMPHOCYTES NFR BLD AUTO: 36.7 % (ref 19.6–45.3)
MCH RBC QN AUTO: 28.3 PG (ref 26.6–33)
MCHC RBC AUTO-ENTMCNC: 32.8 G/DL (ref 31.5–35.7)
MCV RBC AUTO: 86.4 FL (ref 79–97)
MONOCYTES # BLD AUTO: 0.79 10*3/MM3 (ref 0.1–0.9)
MONOCYTES NFR BLD AUTO: 10 % (ref 5–12)
NEUTROPHILS NFR BLD AUTO: 3.72 10*3/MM3 (ref 1.7–7)
NEUTROPHILS NFR BLD AUTO: 46.9 % (ref 42.7–76)
NRBC BLD AUTO-RTO: 0 /100 WBC (ref 0–0.2)
PLATELET # BLD AUTO: 196 10*3/MM3 (ref 140–450)
PMV BLD AUTO: 10.9 FL (ref 6–12)
POTASSIUM SERPL-SCNC: 4.4 MMOL/L (ref 3.5–5.2)
PROT ?TM UR-MCNC: 6.3 MG/DL
PROT/CREAT UR: 76.4 MG/G CREA (ref 0–200)
PTH-INTACT SERPL-MCNC: 299 PG/ML (ref 15–65)
RBC # BLD AUTO: 4.7 10*6/MM3 (ref 3.77–5.28)
RBC # UR STRIP: ABNORMAL /HPF
REF LAB TEST METHOD: ABNORMAL
SODIUM SERPL-SCNC: 137 MMOL/L (ref 136–145)
SQUAMOUS #/AREA URNS HPF: ABNORMAL /HPF
WBC # UR STRIP: ABNORMAL /HPF
WBC NRBC COR # BLD AUTO: 7.91 10*3/MM3 (ref 3.4–10.8)

## 2025-02-24 RX ORDER — POTASSIUM CHLORIDE 750 MG/1
TABLET, EXTENDED RELEASE ORAL
Qty: 90 TABLET | Refills: 0 | Status: SHIPPED | OUTPATIENT
Start: 2025-02-24 | End: 2025-02-28 | Stop reason: HOSPADM

## 2025-02-25 ENCOUNTER — APPOINTMENT (OUTPATIENT)
Dept: CARDIOLOGY | Facility: HOSPITAL | Age: 65
End: 2025-02-25
Payer: MEDICARE

## 2025-02-25 ENCOUNTER — APPOINTMENT (OUTPATIENT)
Dept: GENERAL RADIOLOGY | Facility: HOSPITAL | Age: 65
End: 2025-02-25
Payer: MEDICARE

## 2025-02-25 ENCOUNTER — HOSPITAL ENCOUNTER (INPATIENT)
Facility: HOSPITAL | Age: 65
LOS: 2 days | Discharge: HOME | End: 2025-02-28
Attending: EMERGENCY MEDICINE | Admitting: INTERNAL MEDICINE
Payer: MEDICARE

## 2025-02-25 ENCOUNTER — APPOINTMENT (OUTPATIENT)
Dept: CT IMAGING | Facility: HOSPITAL | Age: 65
End: 2025-02-25
Payer: MEDICARE

## 2025-02-25 DIAGNOSIS — I50.9 ACUTE ON CHRONIC CONGESTIVE HEART FAILURE, UNSPECIFIED HEART FAILURE TYPE: Primary | ICD-10-CM

## 2025-02-25 LAB
ALBUMIN SERPL-MCNC: 4.3 G/DL (ref 3.5–5.2)
ALBUMIN/GLOB SERPL: 2.2 G/DL
ALP SERPL-CCNC: 86 U/L (ref 39–117)
ALT SERPL W P-5'-P-CCNC: 30 U/L (ref 1–33)
ANION GAP SERPL CALCULATED.3IONS-SCNC: 8.9 MMOL/L (ref 5–15)
AST SERPL-CCNC: 25 U/L (ref 1–32)
BASOPHILS # BLD AUTO: 0.06 10*3/MM3 (ref 0–0.2)
BASOPHILS NFR BLD AUTO: 0.8 % (ref 0–1.5)
BILIRUB SERPL-MCNC: 0.3 MG/DL (ref 0–1.2)
BILIRUB UR QL STRIP: NEGATIVE
BUN SERPL-MCNC: 21 MG/DL (ref 8–23)
BUN/CREAT SERPL: 15.6 (ref 7–25)
CALCIUM SPEC-SCNC: 10 MG/DL (ref 8.6–10.5)
CHLORIDE SERPL-SCNC: 106 MMOL/L (ref 98–107)
CK SERPL-CCNC: 68 U/L (ref 20–180)
CLARITY UR: CLEAR
CO2 SERPL-SCNC: 22.1 MMOL/L (ref 22–29)
COLOR UR: YELLOW
CREAT SERPL-MCNC: 1.35 MG/DL (ref 0.57–1)
DEPRECATED RDW RBC AUTO: 41.2 FL (ref 37–54)
EGFRCR SERPLBLD CKD-EPI 2021: 43.4 ML/MIN/1.73
EOSINOPHIL # BLD AUTO: 0.21 10*3/MM3 (ref 0–0.4)
EOSINOPHIL NFR BLD AUTO: 2.7 % (ref 0.3–6.2)
ERYTHROCYTE [DISTWIDTH] IN BLOOD BY AUTOMATED COUNT: 13.1 % (ref 12.3–15.4)
FLUAV SUBTYP SPEC NAA+PROBE: NOT DETECTED
FLUBV RNA ISLT QL NAA+PROBE: NOT DETECTED
GEN 5 1HR TROPONIN T REFLEX: 13 NG/L
GLOBULIN UR ELPH-MCNC: 2 GM/DL
GLUCOSE SERPL-MCNC: 90 MG/DL (ref 65–99)
GLUCOSE UR STRIP-MCNC: NEGATIVE MG/DL
HCT VFR BLD AUTO: 36.9 % (ref 34–46.6)
HGB BLD-MCNC: 11.8 G/DL (ref 12–15.9)
HGB UR QL STRIP.AUTO: NEGATIVE
IMM GRANULOCYTES # BLD AUTO: 0.02 10*3/MM3 (ref 0–0.05)
IMM GRANULOCYTES NFR BLD AUTO: 0.3 % (ref 0–0.5)
KETONES UR QL STRIP: NEGATIVE
LEUKOCYTE ESTERASE UR QL STRIP.AUTO: NEGATIVE
LYMPHOCYTES # BLD AUTO: 2.91 10*3/MM3 (ref 0.7–3.1)
LYMPHOCYTES NFR BLD AUTO: 37.7 % (ref 19.6–45.3)
MCH RBC QN AUTO: 27.6 PG (ref 26.6–33)
MCHC RBC AUTO-ENTMCNC: 32 G/DL (ref 31.5–35.7)
MCV RBC AUTO: 86.2 FL (ref 79–97)
MONOCYTES # BLD AUTO: 0.49 10*3/MM3 (ref 0.1–0.9)
MONOCYTES NFR BLD AUTO: 6.4 % (ref 5–12)
NEUTROPHILS NFR BLD AUTO: 4.02 10*3/MM3 (ref 1.7–7)
NEUTROPHILS NFR BLD AUTO: 52.1 % (ref 42.7–76)
NITRITE UR QL STRIP: NEGATIVE
NRBC BLD AUTO-RTO: 0 /100 WBC (ref 0–0.2)
NT-PROBNP SERPL-MCNC: 3724 PG/ML (ref 0–900)
PH UR STRIP.AUTO: 7 [PH] (ref 5–8)
PLATELET # BLD AUTO: 248 10*3/MM3 (ref 140–450)
PMV BLD AUTO: 9.4 FL (ref 6–12)
POTASSIUM SERPL-SCNC: 4.8 MMOL/L (ref 3.5–5.2)
PROT SERPL-MCNC: 6.3 G/DL (ref 6–8.5)
PROT UR QL STRIP: NEGATIVE
RBC # BLD AUTO: 4.28 10*6/MM3 (ref 3.77–5.28)
RSV RNA NPH QL NAA+NON-PROBE: NOT DETECTED
SARS-COV-2 RNA RESP QL NAA+PROBE: NOT DETECTED
SODIUM SERPL-SCNC: 137 MMOL/L (ref 136–145)
SP GR UR STRIP: 1.01 (ref 1–1.03)
T4 FREE SERPL-MCNC: 1.06 NG/DL (ref 0.93–1.7)
TROPONIN T NUMERIC DELTA: 1 NG/L
TROPONIN T SERPL HS-MCNC: 12 NG/L
TSH SERPL DL<=0.05 MIU/L-ACNC: 5.3 UIU/ML (ref 0.27–4.2)
UROBILINOGEN UR QL STRIP: NORMAL
WBC NRBC COR # BLD AUTO: 7.71 10*3/MM3 (ref 3.4–10.8)

## 2025-02-25 PROCEDURE — 93005 ELECTROCARDIOGRAM TRACING: CPT

## 2025-02-25 PROCEDURE — 25010000002 FUROSEMIDE PER 20 MG: Performed by: EMERGENCY MEDICINE

## 2025-02-25 PROCEDURE — 74176 CT ABD & PELVIS W/O CONTRAST: CPT

## 2025-02-25 PROCEDURE — 81003 URINALYSIS AUTO W/O SCOPE: CPT | Performed by: EMERGENCY MEDICINE

## 2025-02-25 PROCEDURE — 99285 EMERGENCY DEPT VISIT HI MDM: CPT

## 2025-02-25 PROCEDURE — 71045 X-RAY EXAM CHEST 1 VIEW: CPT

## 2025-02-25 PROCEDURE — 84443 ASSAY THYROID STIM HORMONE: CPT | Performed by: EMERGENCY MEDICINE

## 2025-02-25 PROCEDURE — 82550 ASSAY OF CK (CPK): CPT | Performed by: EMERGENCY MEDICINE

## 2025-02-25 PROCEDURE — 93306 TTE W/DOPPLER COMPLETE: CPT | Performed by: INTERNAL MEDICINE

## 2025-02-25 PROCEDURE — 93005 ELECTROCARDIOGRAM TRACING: CPT | Performed by: EMERGENCY MEDICINE

## 2025-02-25 PROCEDURE — 93356 MYOCRD STRAIN IMG SPCKL TRCK: CPT

## 2025-02-25 PROCEDURE — G0378 HOSPITAL OBSERVATION PER HR: HCPCS

## 2025-02-25 PROCEDURE — 93306 TTE W/DOPPLER COMPLETE: CPT

## 2025-02-25 PROCEDURE — 93356 MYOCRD STRAIN IMG SPCKL TRCK: CPT | Performed by: INTERNAL MEDICINE

## 2025-02-25 PROCEDURE — 83880 ASSAY OF NATRIURETIC PEPTIDE: CPT | Performed by: EMERGENCY MEDICINE

## 2025-02-25 PROCEDURE — 84484 ASSAY OF TROPONIN QUANT: CPT | Performed by: EMERGENCY MEDICINE

## 2025-02-25 PROCEDURE — 80053 COMPREHEN METABOLIC PANEL: CPT | Performed by: EMERGENCY MEDICINE

## 2025-02-25 PROCEDURE — 85025 COMPLETE CBC W/AUTO DIFF WBC: CPT | Performed by: EMERGENCY MEDICINE

## 2025-02-25 PROCEDURE — 36415 COLL VENOUS BLD VENIPUNCTURE: CPT

## 2025-02-25 PROCEDURE — 84439 ASSAY OF FREE THYROXINE: CPT | Performed by: EMERGENCY MEDICINE

## 2025-02-25 RX ORDER — POLYETHYLENE GLYCOL 3350 17 G/17G
17 POWDER, FOR SOLUTION ORAL DAILY PRN
Status: DISCONTINUED | OUTPATIENT
Start: 2025-02-25 | End: 2025-02-28 | Stop reason: HOSPADM

## 2025-02-25 RX ORDER — FUROSEMIDE 10 MG/ML
80 INJECTION INTRAMUSCULAR; INTRAVENOUS ONCE
Status: COMPLETED | OUTPATIENT
Start: 2025-02-25 | End: 2025-02-25

## 2025-02-25 RX ORDER — FUROSEMIDE 10 MG/ML
40 INJECTION INTRAMUSCULAR; INTRAVENOUS EVERY 12 HOURS
Status: DISCONTINUED | OUTPATIENT
Start: 2025-02-26 | End: 2025-02-27

## 2025-02-25 RX ORDER — BISACODYL 5 MG/1
5 TABLET, DELAYED RELEASE ORAL DAILY PRN
Status: DISCONTINUED | OUTPATIENT
Start: 2025-02-25 | End: 2025-02-28 | Stop reason: HOSPADM

## 2025-02-25 RX ORDER — BISACODYL 10 MG
10 SUPPOSITORY, RECTAL RECTAL DAILY PRN
Status: DISCONTINUED | OUTPATIENT
Start: 2025-02-25 | End: 2025-02-28 | Stop reason: HOSPADM

## 2025-02-25 RX ORDER — AMOXICILLIN 250 MG
2 CAPSULE ORAL 2 TIMES DAILY PRN
Status: DISCONTINUED | OUTPATIENT
Start: 2025-02-25 | End: 2025-02-28 | Stop reason: HOSPADM

## 2025-02-25 RX ORDER — SODIUM CHLORIDE 0.9 % (FLUSH) 0.9 %
10 SYRINGE (ML) INJECTION AS NEEDED
Status: DISCONTINUED | OUTPATIENT
Start: 2025-02-25 | End: 2025-02-28 | Stop reason: HOSPADM

## 2025-02-25 RX ORDER — NITROGLYCERIN 0.4 MG/1
0.4 TABLET SUBLINGUAL
Status: DISCONTINUED | OUTPATIENT
Start: 2025-02-25 | End: 2025-02-28 | Stop reason: HOSPADM

## 2025-02-25 RX ORDER — ONDANSETRON 2 MG/ML
4 INJECTION INTRAMUSCULAR; INTRAVENOUS EVERY 6 HOURS PRN
Status: DISCONTINUED | OUTPATIENT
Start: 2025-02-25 | End: 2025-02-28 | Stop reason: HOSPADM

## 2025-02-25 RX ADMIN — FUROSEMIDE 80 MG: 10 INJECTION, SOLUTION INTRAMUSCULAR; INTRAVENOUS at 18:25

## 2025-02-25 NOTE — H&P
"[facility] Medicine Services  History & Physical    Patient Name: Jasmine Cerda  : 1960  MRN: [id number]  Primary Care Physician:  Eleni Miranda APRN  Date of admission: 2025  Date and Time of Service: 2025 at 1625    Subjective      Chief Complaint: sent from PCP    History of Present Illness: Jasmine Cerda is a 64 y.o. female with a CMH of anxiety, CHF, COPD, depression, hyperlipidemia, hypertension who presented to [facility] on 2025 with complaints of shortness of breath and lower extremity swelling.  Patient states approximately 3 weeks ago she developed hypertension and was placed on clonidine by primary care provider, patient reports recently she experienced an increase in lower extremity swelling as well as \"my blood pressure bottomed out\".  Clonidine was stopped, and patient was started on Bumex.  Patient states that the lower extremity swelling is persistent and has not improved.  Patient does endorse shortness of breath on exertion at this time as well.  Patient states over the last 3 weeks she feels as though she has gained approximately 15 to 17 pounds. Patient does have a history of congestive heart failure, last echo was performed in .    On ED evaluation, patient vital signs stable.  Labs remarkable for essentially unremarkable CBC, UA unremarkable, CMP with creatinine 1.35 which appears near baseline.  CK normal at 68, troponin 12.  proBNP elevated at 3724.  TSH abnormal at 5.300, free T4 pending.  Chest x-ray shows \"mild linear left basilar atelectasis or scarring.  Stable cardiomegaly\".  CT abdomen pelvis was performed showing \"no evidence of nephrolithiasis or obstructive uropathy, mild to moderate bilateral renal atrophy.  Findings compatible with moderate constipation\".  Patient was given 80 mg IV Lasix in ED. Hospitalist service to admit for further management.      Review of Systems   Constitutional:  Negative for chills, fatigue and fever. "   Respiratory:  Positive for chest tightness and shortness of breath.    Cardiovascular:  Positive for leg swelling. Negative for chest pain.   Gastrointestinal:  Negative for abdominal pain, constipation, nausea and vomiting.   Neurological:  Negative for dizziness and headaches.       Personal History     Past Medical History:   Diagnosis Date    Anxiety     Breast cyst     CHF (congestive heart failure)     COPD (chronic obstructive pulmonary disease)     Coronary heart disease     Depression     Hyperlipidemia     Hypertension        Past Surgical History:   Procedure Laterality Date    APPENDECTOMY      BREAST CYST ASPIRATION      CARDIAC CATHETERIZATION  2017    No Stents placed - [facility]    CARDIAC CATHETERIZATION N/A 2023    Procedure: Left Heart Cath possible PCI;  Surgeon: Cuauhtemoc Kwon MD;  Location: [facility] CATH INVASIVE LOC[facility];  Service: Cardiovascular;  Laterality: N/A;    CERVICAL FUSION      C6-C7     SECTION      x 2    CHOLECYSTECTOMY      HYSTERECTOMY      partial       Family History: family history includes Breast cancer in her maternal aunt; Colon cancer in her mother; Diabetes in her father; Heart failure in her brother; Pulmonary embolism in her brother. Otherwise pertinent FHx was reviewed and not pertinent to current issue.    Social History:  reports that she has never smoked. She has never used smokeless tobacco. She reports that she does not drink alcohol and does not use drugs.    Home Medications:  Prior to Admission Medications       Prescriptions Last Dose Informant Patient Reported? Taking?    allopurinol (Zyloprim) 100 MG tablet   No No    Take 1 tablet by mouth Daily.    ALPRAZolam (XANAX) 0.5 MG tablet   No No    Take 1 tablet by mouth 3 (Three) Times a Day As Needed for Anxiety. for anxiety    amLODIPine (NORVASC) 10 MG tablet   No No    Take 1 tablet by mouth Daily.    ARIPiprazole (ABILIFY) 10 MG tablet   No No    Take 1 tablet by mouth Daily.    aspirin  81 MG EC tablet   Yes No    0    Budeson-Glycopyrrol-Formoterol (Breztri Aerosphere) 160-9-4.8 MCG/ACT aerosol inhaler   No No    Inhale 2 puffs 2 (Two) Times a Day.    carbidopa-levodopa (SINEMET)  MG per tablet   No No    Take 1 tab at: 6 am, 10 am, 2pm, 7 pm    carvedilol (COREG) 25 MG tablet   No No    Take 1 tablet by mouth 2 (Two) Times a Day.    citalopram (CeleXA) 40 MG tablet   No No    Take 1 tablet by mouth Every Morning.    cloNIDine (CATAPRES) 0.1 MG tablet   No No    Take 1 tablet by mouth Every 8 (Eight) Hours As Needed for High Blood Pressure (for systolic over 160).    colchicine 0.6 MG tablet   No No    TAKE 1 TABLET BY MOUTH TWICE DAILY AS NEEDED FOR  GOUT  FLARE  UP    cyanocobalamin 1000 MCG/ML injection   No No    INJECT 1 ML INTO THE APPROPRIATE MUSCLE AS DIRECTED EVERY 28 DAYS    gabapentin (NEURONTIN) 300 MG capsule   No No    Take 1 capsule by mouth 3 (Three) Times a Day.    hydrALAZINE (APRESOLINE) 25 MG tablet   No No    Take 1 tablet by mouth 3 (Three) Times a Day.    HYDROcodone-acetaminophen (NORCO) 7.5-325 MG per tablet   No No    Take 1 tablet by mouth Every 4 (Four) Hours As Needed for Severe Pain.    hydrOXYzine (ATARAX) 25 MG tablet   No No    Take 1 tablet by mouth 3 (Three) Times a Day As Needed for Anxiety.    magnesium hydroxide (MILK OF MAGNESIA) 400 MG/5ML suspension   Yes No    0    metoclopramide (Reglan) 5 MG tablet   No No    Take 1 tablet by mouth 3 (Three) Times a Day With Meals.    mirtazapine (REMERON) 30 MG tablet   No No    Take 1 tablet by mouth every night at bedtime.    Multiple Vitamins-Minerals (MULTIVITAMIN ADULT) tablet   Yes No    Take 1 tablet by mouth Daily. With Omega XL    nitroglycerin (NITROSTAT) 0.4 MG SL tablet   No No    Place 1 tablet under the tongue Every 5 (Five) Minutes As Needed for Chest Pain. Take no more than 3 doses in 15 minutes.    ondansetron ODT (ZOFRAN-ODT) 4 MG disintegrating tablet   No No    DISSOLVE 1 TABLET IN MOUTH EVERY  "8 HOURS AS NEEDED FOR NAUSEA FOR VOMITING    pantoprazole (PROTONIX) 40 MG EC tablet   No No    Take 1 tablet by mouth 2 (Two) Times a Day.    potassium chloride 10 MEQ CR tablet   No No    TAKE 1 TABLET BY MOUTH ONCE DAILY AS NEEDED (IF  TAKES  LASIX)    QUEtiapine (SEROquel) 100 MG tablet   No No    Take 1 tablet by mouth every night at bedtime.    rosuvastatin (CRESTOR) 40 MG tablet   No No    Take 1 tablet by mouth Every Evening.    Syringe/Needle, Disp, (B-D 3CC LUER-MAI SYR 23GX1\") 23G X 1\" 3 ML misc   No No    USE 1 SYRINGE ONCE EVERY MONTH FOR  B12  INJECTION INTRAMUSCULARLY    vitamin D (ERGOCALCIFEROL) 1.25 MG (58574 UT) capsule capsule   No No    Take 1 capsule by mouth 1 (One) Time Per Week.              Allergies:  No Known Allergies    Objective      Vitals:   Temp:  [98.2 °F (36.8 °C)] 98.2 °F (36.8 °C)  Heart Rate:  [] 75  Resp:  [20] 20  BP: ()/(61-74) 110/62  Body mass index is 35.28 kg/m².  Physical Exam  General: 63 yo female, Alert and oriented, obese, no acute distress.  HENT: Normocephalic, normal hearing, moist oral mucosa, no scleral icterus.  Neck: Supple, nontender, no carotid bruits, no JVD, no LAD.  Lungs:  nonlabored respiration.  Heart: RRR  Abdomen: Soft, nontender, nondistended, + bowel sounds.  Musculoskeletal: Normal range of motion and strength, no tenderness.  1+ pitting edema noted to bilateral lower extremities  Skin: Skin is warm, dry and pink, no rashes or lesions.  Psychiatric: Cooperative, appropriate mood and affect.      Diagnostic Data:  Lab Results (last 24 hours)       Procedure Component Value Units Date/Time    TSH Rfx On Abnormal To Free T4 [[id number]]  (Abnormal) Collected: 02/25/25 1457    Specimen: Blood Updated: 02/25/25 1545     TSH 5.300 uIU/mL     BNP [[id number]]  (Abnormal) Collected: 02/25/25 1457    Specimen: Blood Updated: 02/25/25 1545     proBNP 3,724.0 pg/mL     Narrative:      This assay is used as an aid in the diagnosis of individuals " suspected of having heart failure. It can be used as an aid in the diagnosis of acute decompensated heart failure (ADHF) in patients presenting with signs and symptoms of ADHF to the emergency department (ED). In addition, NT-proBNP of <300 pg/mL indicates ADHF is not likely.    Age Range Result Interpretation  NT-proBNP Concentration (pg/mL:      <50             Positive            >450                   Gray                 300-450                    Negative             <300    50-75           Positive            >900                  Gray                300-900                  Negative            <300      >75             Positive            >1800                  Gray                300-1800                  Negative            <300    High Sensitivity Troponin T [[id number]]  (Normal) Collected: 02/25/25 1457    Specimen: Blood Updated: 02/25/25 1545     HS Troponin T 12 ng/L     Narrative:      High Sensitive Troponin T Reference Range:  <14.0 ng/L- Negative Female for AMI  <22.0 ng/L- Negative Male for AMI  >=14 - Abnormal Female indicating possible myocardial injury.  >=22 - Abnormal Male indicating possible myocardial injury.   Clinicians would have to utilize clinical acumen, EKG, Troponin, and serial changes to determine if it is an Acute Myocardial Infarction or myocardial injury due to an underlying chronic condition.         CK [[id number]]  (Normal) Collected: 02/25/25 1457    Specimen: Blood Updated: 02/25/25 1545     Creatine Kinase 68 U/L     T4, Free [[id number]] Collected: 02/25/25 1457    Specimen: Blood Updated: 02/25/25 1545    Comprehensive Metabolic Panel [[id number]]  (Abnormal) Collected: 02/25/25 1457    Specimen: Blood Updated: 02/25/25 1545     Glucose 90 mg/dL      BUN 21 mg/dL      Creatinine 1.35 mg/dL      Sodium 137 mmol/L      Potassium 4.8 mmol/L      Chloride 106 mmol/L      CO2 22.1 mmol/L      Calcium 10.0 mg/dL      Total Protein 6.3 g/dL      Albumin 4.3 g/dL      ALT (SGPT)  30 U/L      AST (SGOT) 25 U/L      Alkaline Phosphatase 86 U/L      Total Bilirubin 0.3 mg/dL      Globulin 2.0 gm/dL      A/G Ratio 2.2 g/dL      BUN/Creatinine Ratio 15.6     Anion Gap 8.9 mmol/L      eGFR 43.4 mL/min/1.73     Narrative:      GFR Categories in Chronic Kidney Disease (CKD)      GFR Category          GFR (mL/min/1.73)    Interpretation  G1                     90 or greater         Normal or high (1)  G2                      60-89                Mild decrease (1)  G3a                   45-59                Mild to moderate decrease  G3b                   30-44                Moderate to severe decrease  G4                    15-29                Severe decrease  G5                    14 or less           Kidney failure          (1)In the absence of evidence of kidney disease, neither GFR category G1 or G2 fulfill the criteria for CKD.    eGFR calculation 2021 CKD-EPI creatinine equation, which does not include race as a factor    Urinalysis With Microscopic If Indicated (No Culture) - Urine, Clean Catch [[id number]]  (Normal) Collected: 02/25/25 1448    Specimen: Urine, Clean Catch Updated: 02/25/25 1512     Color, UA Yellow     Appearance, UA Clear     pH, UA 7.0     Specific Gravity, UA 1.010     Glucose, UA Negative     Ketones, UA Negative     Bilirubin, UA Negative     Blood, UA Negative     Protein, UA Negative     Leuk Esterase, UA Negative     Nitrite, UA Negative     Urobilinogen, UA 0.2 E.U./dL    Narrative:      Urine microscopic not indicated.    CBC & Differential [[id number]]  (Abnormal) Collected: 02/25/25 1457    Specimen: Blood Updated: 02/25/25 1501    Narrative:      The following orders were created for panel order CBC & Differential.  Procedure                               Abnormality         Status                     ---------                               -----------         ------                     CBC Auto Differential[[id number]]        Abnormal            Final result                  Please view results for these tests on the individual orders.    CBC Auto Differential [[id number]]  (Abnormal) Collected: 02/25/25 1457    Specimen: Blood Updated: 02/25/25 1501     WBC 7.71 10*3/mm3      RBC 4.28 10*6/mm3      Hemoglobin 11.8 g/dL      Hematocrit 36.9 %      MCV 86.2 fL      MCH 27.6 pg      MCHC 32.0 g/dL      RDW 13.1 %      RDW-SD 41.2 fl      MPV 9.4 fL      Platelets 248 10*3/mm3      Neutrophil % 52.1 %      Lymphocyte % 37.7 %      Monocyte % 6.4 %      Eosinophil % 2.7 %      Basophil % 0.8 %      Immature Grans % 0.3 %      Neutrophils, Absolute 4.02 10*3/mm3      Lymphocytes, Absolute 2.91 10*3/mm3      Monocytes, Absolute 0.49 10*3/mm3      Eosinophils, Absolute 0.21 10*3/mm3      Basophils, Absolute 0.06 10*3/mm3      Immature Grans, Absolute 0.02 10*3/mm3      nRBC 0.0 /100 WBC              Imaging Results (Last 24 Hours)       Procedure Component Value Units Date/Time    XR Chest 1 View [[id number]] Collected: 02/25/25 1538     Updated: 02/25/25 1543    Narrative:      XR CHEST 1 VW    Date of Exam: 2/25/2025 3:20 PM EST    Indication: sob    Comparison: Portable chest 6/2/2024    Findings:  Cardiomegaly unchanged from prior study. Negative for pneumothorax. No pleural effusion. Mild linear atelectasis/scarring at the left lung base. No discrete consolidation. No significant effusion. Osseous structures appear intact.      Impression:      Impression:  1. Mild linear left basilar atelectasis/scarring.  2. Stable cardiomegaly.          Electronically Signed: Boaz Jones MD    2/25/2025 3:41 PM EST    Workstation ID: [id number]    CT Abdomen Pelvis Without Contrast [[id number]] Collected: 02/25/25 1526     Updated: 02/25/25 1532    Narrative:      CT ABDOMEN PELVIS WO CONTRAST    Date of Exam: 2/25/2025 3:15 PM EST    Indication: eval for obstructive uropathy.    Comparison: Noncontrast CT of the abdomen pelvis performed on September 20, 2018    Technique: Axial CT images were  obtained of the abdomen and pelvis without the administration of contrast. Sagittal and coronal reconstructions were performed.  Automated exposure control and iterative reconstruction methods were used.      Findings:    Lung Bases:  Mild bilateral dependent atelectasis. The heart is moderately enlarged.    Peritoneum:  No free intraperitoneal air or fluid.     Abdominal wall:  Unremarkable.    Liver:  Liver is normal in size and contour. No focal lesions.    Biliary/Gallbladder:  The gallbladder is surgically absent. The biliary tree is nondilated.    Pancreas:  Pancreas is within normal limits. There is no evidence of pancreatic mass or peripancreatic inflammatory changes.    Spleen:  Spleen is normal in size and contour.    Gastrointestinal/Mesentery:   No evidence of bowel obstruction or gross inflammatory changes. The appendix is not visualized. There are no secondary signs of acute appendicitis. There is moderate fecal retention.    Adrenals:  Adrenal glands are unremarkable.    Kidneys:  The kidneys are mildly to moderately atrophic. No evidence of nephrolithiasis. No evidence of hydronephrosis or significant perinephric fat stranding.    Bladder:   The urinary bladder is unremarkable.    Reproductive organs:    The patient is post hysterectomy.    Lymph Nodes:  No significant adenopathy is identified.     Vasculature:  Small scattered calcified plaques are visualized. The abdominal aorta is normal in caliber.    Osseous Structures:    No acute fracture or aggressive lesions. Mild to moderate multilevel lumbar spondylosis is visualized.        Impression:      Impression:  No evidence of nephrolithiasis or obstructive uropathy.    Mild to moderate bilateral renal atrophy.    Findings compatible with moderate constipation.            Electronically Signed: Zachariah Adams MD    2/25/2025 3:30 PM EST    Workstation ID: [id number]              Assessment & Plan        This is a 64 y.o. female with:    Active and  Resolved Problems  There are no hospital problems to display for this patient.      Acute exacerbation CHF  proBNP 3724  Chest x-ray with mild linear left basilar atelectasis or scarring as well as stable cardiomegaly  Last echo 2023, repeat pending at this time  80 mg IV Lasix given in ED  Start 40 mg twice daily IV Lasix in AM  Daily weights  Strict I&Os  Heart healthy diet, sodium restriction and fluid restriction   Consider cardiology consult given patient history    CKD  Creatinine 1.35, BUN 21-appears near baseline  Avoid nephrotoxic medications  Monitor renal function closely due to diuresis  AM labs ordered to monitor    Hypertension   BP stable  Resume home medications once reconciled and appropriate    Anxiety  Depression  PTSD  Parkinson's  GERD  HLD  Resume home medications once reconciled and appropriate      VTE Prophylaxis:  Mechanical VTE prophylaxis orders are present.        The patient desires to be as follows:    CODE STATUS:    Code Status (Patient has no pulse and is not breathing): CPR (Attempt to Resuscitate)  Medical Interventions (Patient has pulse or is breathing): Full Support        Rupert Cerda, who can be contacted at [phone], is the designated person to make medical decisions on the patient's behalf if She is incapable of doing so. This was clarified with patient and/or next of kin on 2/25/2025 during the course of this H&P.    Admission Status:  I believe this patient meets observation status.    Expected Length of Stay: <2 midnights    PDMP and Medication Dispenses via Sidebar reviewed and consistent with patient reported medications.    I discussed the patient's findings and my recommendations with patient.      Signature:     This document has been electronically signed by YUE Beaulieu on February 25, 2025 17:28 [facility] Hospitalist Team

## 2025-02-25 NOTE — ED PROVIDER NOTES
Subjective   History of Present Illness  64-year-old female sent in by her primary physician for some shortness of breath lower extremity edema and reportedly worsening kidney function.  Started on clonidine about 3 weeks ago.  Was having significant hypotension and states she still has been intermittently at home with blood pressures often in the 80s systolic.  Has had decreasing urine output as well.  States she has not had any chest pain.  No vomiting or diarrhea.  Review of Systems  See HPI  Past Medical History:   Diagnosis Date    Anxiety     Breast cyst     CHF (congestive heart failure)     COPD (chronic obstructive pulmonary disease)     Coronary heart disease     Depression     Hyperlipidemia     Hypertension        No Known Allergies    Past Surgical History:   Procedure Laterality Date    APPENDECTOMY      BREAST CYST ASPIRATION      CARDIAC CATHETERIZATION  2017    No Stents placed - [facility]    CARDIAC CATHETERIZATION N/A 2023    Procedure: Left Heart Cath possible PCI;  Surgeon: Cuauhtemoc Kwon MD;  Location: [facility] CATH INVASIVE LOCATION;  Service: Cardiovascular;  Laterality: N/A;    CERVICAL FUSION      C6-C7     SECTION      x 2    CHOLECYSTECTOMY      HYSTERECTOMY      partial       Family History   Problem Relation Age of Onset    Colon cancer Mother     Diabetes Father     Pulmonary embolism Brother     Heart failure Brother     Breast cancer Maternal Aunt        Social History     Socioeconomic History    Marital status:    Tobacco Use    Smoking status: Never    Smokeless tobacco: Never    Tobacco comments:     Passive Smoke: N   Vaping Use    Vaping status: Never Used   Substance and Sexual Activity    Alcohol use: No    Drug use: No    Sexual activity: Defer           Objective   Physical Exam  No acute distress, mild tachypnea, bilateral lower extremity edema, regular rate and rhythm, abdomen soft nontender without rebound or guarding, diminished breath sounds  "bilaterally, extremities warm and well-perfused, alert.  Procedures           ED Course      /71 (BP Location: Right arm, Patient Position: Lying)   Pulse 76   Temp 98 °F (36.7 °C) (Oral)   Resp 18   Ht 157.5 cm (62\")   Wt 87.1 kg (192 lb)   SpO2 92%   BMI 35.12 kg/m²   Labs Reviewed   COMPREHENSIVE METABOLIC PANEL - Abnormal; Notable for the following components:       Result Value    Creatinine 1.35 (*)     eGFR 43.4 (*)     All other components within normal limits    Narrative:     GFR Categories in Chronic Kidney Disease (CKD)      GFR Category          GFR (mL/min/1.73)    Interpretation  G1                     90 or greater         Normal or high (1)  G2                      60-89                Mild decrease (1)  G3a                   45-59                Mild to moderate decrease  G3b                   30-44                Moderate to severe decrease  G4                    15-29                Severe decrease  G5                    14 or less           Kidney failure          (1)In the absence of evidence of kidney disease, neither GFR category G1 or G2 fulfill the criteria for CKD.    eGFR calculation 2021 CKD-EPI creatinine equation, which does not include race as a factor   TSH RFX ON ABNORMAL TO FREE T4 - Abnormal; Notable for the following components:    TSH 5.300 (*)     All other components within normal limits   BNP (IN-HOUSE) - Abnormal; Notable for the following components:    proBNP 3,724.0 (*)     All other components within normal limits    Narrative:     This assay is used as an aid in the diagnosis of individuals suspected of having heart failure. It can be used as an aid in the diagnosis of acute decompensated heart failure (ADHF) in patients presenting with signs and symptoms of ADHF to the emergency department (ED). In addition, NT-proBNP of <300 pg/mL indicates ADHF is not likely.    Age Range Result Interpretation  NT-proBNP Concentration (pg/mL:      <50             " Positive            >450                   Gray                 300-450                    Negative             <300    50-75           Positive            >900                  Gray                300-900                  Negative            <300      >75             Positive            >1800                  Gray                300-1800                  Negative            <300   CBC WITH AUTO DIFFERENTIAL - Abnormal; Notable for the following components:    Hemoglobin 11.8 (*)     All other components within normal limits   COVID-19/FLUA&B/RSV, NP SWAB IN TRANSPORT MEDIA 1 HR TAT - Normal   CK - Normal   TROPONIN - Normal    Narrative:     High Sensitive Troponin T Reference Range:  <14.0 ng/L- Negative Female for AMI  <22.0 ng/L- Negative Male for AMI  >=14 - Abnormal Female indicating possible myocardial injury.  >=22 - Abnormal Male indicating possible myocardial injury.   Clinicians would have to utilize clinical acumen, EKG, Troponin, and serial changes to determine if it is an Acute Myocardial Infarction or myocardial injury due to an underlying chronic condition.        URINALYSIS W/ MICROSCOPIC IF INDICATED (NO CULTURE) - Normal    Narrative:     Urine microscopic not indicated.   T4, FREE - Normal   HIGH SENSITIVITIY TROPONIN T 1HR - Normal    Narrative:     High Sensitive Troponin T Reference Range:  <14.0 ng/L- Negative Female for AMI  <22.0 ng/L- Negative Male for AMI  >=14 - Abnormal Female indicating possible myocardial injury.  >=22 - Abnormal Male indicating possible myocardial injury.   Clinicians would have to utilize clinical acumen, EKG, Troponin, and serial changes to determine if it is an Acute Myocardial Infarction or myocardial injury due to an underlying chronic condition.        BASIC METABOLIC PANEL   CBC WITH AUTO DIFFERENTIAL   CBC AND DIFFERENTIAL    Narrative:     The following orders were created for panel order CBC & Differential.  Procedure                                Abnormality         Status                     ---------                               -----------         ------                     CBC Auto Differential[[id number]]        Abnormal            Final result                 Please view results for these tests on the individual orders.   CBC AND DIFFERENTIAL    Narrative:     The following orders were created for panel order CBC & Differential.  Procedure                               Abnormality         Status                     ---------                               -----------         ------                     CBC Auto Differential[[id number]]                                                         Please view results for these tests on the individual orders.     Medications   sodium chloride 0.9 % flush 10 mL (has no administration in time range)   nitroglycerin (NITROSTAT) SL tablet 0.4 mg (has no administration in time range)   Potassium Replacement - Follow Nurse / BPA Driven Protocol (has no administration in time range)   Magnesium Standard Dose Replacement - Follow Nurse / BPA Driven Protocol (has no administration in time range)   Phosphorus Replacement - Follow Nurse / BPA Driven Protocol (has no administration in time range)   Calcium Replacement - Follow Nurse / BPA Driven Protocol (has no administration in time range)   sennosides-docusate (PERICOLACE) 8.6-50 MG per tablet 2 tablet (has no administration in time range)     And   polyethylene glycol (MIRALAX) packet 17 g (has no administration in time range)     And   bisacodyl (DULCOLAX) EC tablet 5 mg (has no administration in time range)     And   bisacodyl (DULCOLAX) suppository 10 mg (has no administration in time range)   furosemide (LASIX) injection 40 mg (has no administration in time range)   ondansetron (ZOFRAN) injection 4 mg (has no administration in time range)   furosemide (LASIX) injection 80 mg (80 mg Intravenous Given 2/25/25 1825)     XR Chest 1 View   Final Result   Impression:   1.  Mild linear left basilar atelectasis/scarring.   2. Stable cardiomegaly.               Electronically Signed: Boaz Jones MD     2/25/2025 3:41 PM EST     Workstation ID: [id number]      CT Abdomen Pelvis Without Contrast   Final Result   Impression:   No evidence of nephrolithiasis or obstructive uropathy.      Mild to moderate bilateral renal atrophy.      Findings compatible with moderate constipation.                  Electronically Signed: Zachariah Adams MD     2/25/2025 3:30 PM EST     Workstation ID: [id number]                                                         Medical Decision Making  Problems Addressed:  Acute on chronic congestive heart failure, unspecified heart failure type: complicated acute illness or injury    Amount and/or Complexity of Data Reviewed  Labs: ordered.  Radiology: ordered.  ECG/medicine tests: ordered.    Risk  Prescription drug management.  Decision regarding hospitalization.    EKG interpretation: 1405, rate 71, normal sinus rhythm, normal axis, low voltage in multiple leads, poor R wave progression.    My interpretation CT negative for obstructive uropathy.  See system radiology interpretation    Renal function improved here from prior.  Suspect previously was more secondary to hypotension.  Does appear volume overloaded here.  Lasix ordered.  Admitted to hospitalist    Final diagnoses:   Acute on chronic congestive heart failure, unspecified heart failure type       ED Disposition  ED Disposition       ED Disposition   Decision to Admit    Condition   --    Comment   Level of Care: Telemetry [5]   Diagnosis: Acute exacerbation of CHF (congestive heart failure) [[id number]]   Admitting Physician: ILDA WHITE [[id number]]   Attending Physician: ILDA WHITE [[id number]]                 No follow-up provider specified.       Medication List      No changes were made to your prescriptions during this visit.            Ray Bennett MD  02/25/25 7170

## 2025-02-25 NOTE — Clinical Note
Level of Care: Med/Surg [1]   Admitting Physician: ILDA WHITE [[id number]]   Attending Physician: ILDA WHITE [[id number]]

## 2025-02-26 LAB
ANION GAP SERPL CALCULATED.3IONS-SCNC: 11.4 MMOL/L (ref 5–15)
AORTIC DIMENSIONLESS INDEX: 0.46 (DI)
AV MEAN PRESS GRAD SYS DOP V1V2: 5 MMHG
AV VMAX SYS DOP: 153 CM/SEC
BASOPHILS # BLD AUTO: 0.08 10*3/MM3 (ref 0–0.2)
BASOPHILS NFR BLD AUTO: 0.9 % (ref 0–1.5)
BH CV ECHO LEFT VENTRICLE GLOBAL LONGITUDINAL STRAIN: -20.6 %
BH CV ECHO MEAS - ACS: 1.8 CM
BH CV ECHO MEAS - AI P1/2T: 640.7 MSEC
BH CV ECHO MEAS - AO MAX PG: 9.4 MMHG
BH CV ECHO MEAS - AO V2 VTI: 33.1 CM
BH CV ECHO MEAS - AVA(I,D): 1.41 CM2
BH CV ECHO MEAS - EDV(CUBED): 117.6 ML
BH CV ECHO MEAS - EDV(MOD-SP4): 98.2 ML
BH CV ECHO MEAS - EF(MOD-SP4): 57.3 %
BH CV ECHO MEAS - ESV(CUBED): 39.3 ML
BH CV ECHO MEAS - ESV(MOD-SP4): 41.9 ML
BH CV ECHO MEAS - FS: 30.6 %
BH CV ECHO MEAS - IVS/LVPW: 1 CM
BH CV ECHO MEAS - IVSD: 1.1 CM
BH CV ECHO MEAS - LA DIMENSION: 4.6 CM
BH CV ECHO MEAS - LAT PEAK E' VEL: 12.1 CM/SEC
BH CV ECHO MEAS - LV DIASTOLIC VOL/BSA (35-75): 52.3 CM2
BH CV ECHO MEAS - LV MASS(C)D: 200.5 GRAMS
BH CV ECHO MEAS - LV MAX PG: 1.95 MMHG
BH CV ECHO MEAS - LV MEAN PG: 1 MMHG
BH CV ECHO MEAS - LV SYSTOLIC VOL/BSA (12-30): 22.3 CM2
BH CV ECHO MEAS - LV V1 MAX: 69.8 CM/SEC
BH CV ECHO MEAS - LV V1 VTI: 14.9 CM
BH CV ECHO MEAS - LVIDD: 4.9 CM
BH CV ECHO MEAS - LVIDS: 3.4 CM
BH CV ECHO MEAS - LVOT AREA: 3.1 CM2
BH CV ECHO MEAS - LVOT DIAM: 2 CM
BH CV ECHO MEAS - LVPWD: 1.1 CM
BH CV ECHO MEAS - MED PEAK E' VEL: 8.2 CM/SEC
BH CV ECHO MEAS - MR MAX PG: 75 MMHG
BH CV ECHO MEAS - MR MAX VEL: 433 CM/SEC
BH CV ECHO MEAS - MV A MAX VEL: 94.4 CM/SEC
BH CV ECHO MEAS - MV DEC SLOPE: 710 CM/SEC2
BH CV ECHO MEAS - MV DEC TIME: 0.13 SEC
BH CV ECHO MEAS - MV E MAX VEL: 137 CM/SEC
BH CV ECHO MEAS - MV E/A: 1.45
BH CV ECHO MEAS - MV MAX PG: 6 MMHG
BH CV ECHO MEAS - MV MEAN PG: 3 MMHG
BH CV ECHO MEAS - MV P1/2T: 49.1 MSEC
BH CV ECHO MEAS - MV V2 VTI: 28.7 CM
BH CV ECHO MEAS - MVA(P1/2T): 4.5 CM2
BH CV ECHO MEAS - MVA(VTI): 1.63 CM2
BH CV ECHO MEAS - PA ACC TIME: 0.05 SEC
BH CV ECHO MEAS - PA V2 MAX: 126 CM/SEC
BH CV ECHO MEAS - RAP SYSTOLE: 3 MMHG
BH CV ECHO MEAS - RV MAX PG: 3.9 MMHG
BH CV ECHO MEAS - RV V1 MAX: 98.3 CM/SEC
BH CV ECHO MEAS - RV V1 VTI: 18.9 CM
BH CV ECHO MEAS - RVDD: 3.8 CM
BH CV ECHO MEAS - RVSP: 34.8 MMHG
BH CV ECHO MEAS - SV(LVOT): 46.8 ML
BH CV ECHO MEAS - SV(MOD-SP4): 56.3 ML
BH CV ECHO MEAS - SVI(LVOT): 24.9 ML/M2
BH CV ECHO MEAS - SVI(MOD-SP4): 30 ML/M2
BH CV ECHO MEAS - TAPSE (>1.6): 2.7 CM
BH CV ECHO MEAS - TR MAX PG: 31.8 MMHG
BH CV ECHO MEAS - TR MAX VEL: 282 CM/SEC
BH CV ECHO MEASUREMENTS AVERAGE E/E' RATIO: 13.5
BH CV XLRA - TDI S': 14.9 CM/SEC
BUN SERPL-MCNC: 20 MG/DL (ref 8–23)
BUN/CREAT SERPL: 14.4 (ref 7–25)
CALCIUM SPEC-SCNC: 10.2 MG/DL (ref 8.6–10.5)
CHLORIDE SERPL-SCNC: 104 MMOL/L (ref 98–107)
CO2 SERPL-SCNC: 23.6 MMOL/L (ref 22–29)
CREAT SERPL-MCNC: 1.39 MG/DL (ref 0.57–1)
DEPRECATED RDW RBC AUTO: 41 FL (ref 37–54)
EGFRCR SERPLBLD CKD-EPI 2021: 41.9 ML/MIN/1.73
EOSINOPHIL # BLD AUTO: 0.19 10*3/MM3 (ref 0–0.4)
EOSINOPHIL NFR BLD AUTO: 2.2 % (ref 0.3–6.2)
ERYTHROCYTE [DISTWIDTH] IN BLOOD BY AUTOMATED COUNT: 13.1 % (ref 12.3–15.4)
GLUCOSE SERPL-MCNC: 95 MG/DL (ref 65–99)
HCT VFR BLD AUTO: 37.7 % (ref 34–46.6)
HGB BLD-MCNC: 12.1 G/DL (ref 12–15.9)
IMM GRANULOCYTES # BLD AUTO: 0.02 10*3/MM3 (ref 0–0.05)
IMM GRANULOCYTES NFR BLD AUTO: 0.2 % (ref 0–0.5)
LEFT ATRIUM VOLUME INDEX: 44.9 ML/M2
LV EF BIPLANE MOD: 57 %
LYMPHOCYTES # BLD AUTO: 2.55 10*3/MM3 (ref 0.7–3.1)
LYMPHOCYTES NFR BLD AUTO: 29.5 % (ref 19.6–45.3)
MCH RBC QN AUTO: 27.7 PG (ref 26.6–33)
MCHC RBC AUTO-ENTMCNC: 32.1 G/DL (ref 31.5–35.7)
MCV RBC AUTO: 86.3 FL (ref 79–97)
MONOCYTES # BLD AUTO: 0.7 10*3/MM3 (ref 0.1–0.9)
MONOCYTES NFR BLD AUTO: 8.1 % (ref 5–12)
NEUTROPHILS NFR BLD AUTO: 5.1 10*3/MM3 (ref 1.7–7)
NEUTROPHILS NFR BLD AUTO: 59.1 % (ref 42.7–76)
NRBC BLD AUTO-RTO: 0 /100 WBC (ref 0–0.2)
PLATELET # BLD AUTO: 254 10*3/MM3 (ref 140–450)
PMV BLD AUTO: 9.4 FL (ref 6–12)
POTASSIUM SERPL-SCNC: 3.9 MMOL/L (ref 3.5–5.2)
QT INTERVAL: 433 MS
QTC INTERVAL: 471 MS
RBC # BLD AUTO: 4.37 10*6/MM3 (ref 3.77–5.28)
SINUS: 3.1 CM
SODIUM SERPL-SCNC: 139 MMOL/L (ref 136–145)
WBC NRBC COR # BLD AUTO: 8.64 10*3/MM3 (ref 3.4–10.8)

## 2025-02-26 PROCEDURE — 85025 COMPLETE CBC W/AUTO DIFF WBC: CPT

## 2025-02-26 PROCEDURE — 25010000002 ONDANSETRON PER 1 MG: Performed by: STUDENT IN AN ORGANIZED HEALTH CARE EDUCATION/TRAINING PROGRAM

## 2025-02-26 PROCEDURE — 80048 BASIC METABOLIC PNL TOTAL CA: CPT

## 2025-02-26 PROCEDURE — 25010000002 PROCHLORPERAZINE 10 MG/2ML SOLUTION: Performed by: INTERNAL MEDICINE

## 2025-02-26 PROCEDURE — 25010000002 FUROSEMIDE PER 20 MG

## 2025-02-26 PROCEDURE — 99223 1ST HOSP IP/OBS HIGH 75: CPT | Performed by: INTERNAL MEDICINE

## 2025-02-26 PROCEDURE — 97162 PT EVAL MOD COMPLEX 30 MIN: CPT

## 2025-02-26 RX ORDER — POTASSIUM CHLORIDE 1500 MG/1
20 TABLET, EXTENDED RELEASE ORAL ONCE
Status: COMPLETED | OUTPATIENT
Start: 2025-02-26 | End: 2025-02-26

## 2025-02-26 RX ORDER — ALPRAZOLAM 0.5 MG
0.5 TABLET ORAL ONCE
Status: COMPLETED | OUTPATIENT
Start: 2025-02-26 | End: 2025-02-26

## 2025-02-26 RX ORDER — GABAPENTIN 300 MG/1
300 CAPSULE ORAL ONCE
Status: COMPLETED | OUTPATIENT
Start: 2025-02-26 | End: 2025-02-26

## 2025-02-26 RX ORDER — PROCHLORPERAZINE EDISYLATE 5 MG/ML
5 INJECTION INTRAMUSCULAR; INTRAVENOUS EVERY 8 HOURS PRN
Status: DISCONTINUED | OUTPATIENT
Start: 2025-02-26 | End: 2025-02-28 | Stop reason: HOSPADM

## 2025-02-26 RX ADMIN — POTASSIUM CHLORIDE 20 MEQ: 1500 TABLET, EXTENDED RELEASE ORAL at 14:27

## 2025-02-26 RX ADMIN — ONDANSETRON 4 MG: 2 INJECTION INTRAMUSCULAR; INTRAVENOUS at 10:08

## 2025-02-26 RX ADMIN — FUROSEMIDE 40 MG: 10 INJECTION, SOLUTION INTRAMUSCULAR; INTRAVENOUS at 20:23

## 2025-02-26 RX ADMIN — ALPRAZOLAM 0.5 MG: 0.5 TABLET ORAL at 22:15

## 2025-02-26 RX ADMIN — FUROSEMIDE 40 MG: 10 INJECTION, SOLUTION INTRAMUSCULAR; INTRAVENOUS at 08:30

## 2025-02-26 RX ADMIN — ONDANSETRON 4 MG: 2 INJECTION INTRAMUSCULAR; INTRAVENOUS at 17:29

## 2025-02-26 RX ADMIN — ONDANSETRON 4 MG: 2 INJECTION INTRAMUSCULAR; INTRAVENOUS at 00:18

## 2025-02-26 RX ADMIN — PROCHLORPERAZINE EDISYLATE 5 MG: 5 INJECTION INTRAMUSCULAR; INTRAVENOUS at 20:24

## 2025-02-26 RX ADMIN — ONDANSETRON 4 MG: 2 INJECTION INTRAMUSCULAR; INTRAVENOUS at 12:50

## 2025-02-26 RX ADMIN — GABAPENTIN 300 MG: 300 CAPSULE ORAL at 22:15

## 2025-02-26 NOTE — PLAN OF CARE
Goal Outcome Evaluation:  Plan of Care Reviewed With: patient           Outcome Evaluation: Jasmine Cerda is a 64 y.o. female with a CMH of anxiety, CHF, COPD, depression, hyperlipidemia, HTN who presented to [facility] on 2/25/2025 with complaints of SOA and lower extremity swelling. Chest x-ray with mild linear left basilar atelectasis or scarring as well as stable cardiomegaly. Admitted for acute CHF exacerbation.Pt is A&Ox4 and reports she is typically IND with ADLs, and mobility w/o AD (has a RW but does not use). She lives with her spouse in a H with no JAG. Upon room entry pt on 2L O2 at 95% sat. Does not wear O2 at baseline but uses a nebulizer and inhaler. Pt is IND with bed mobility, SUP for STS from EOB, and SBA for ambulation w/o AD x60 ft. Demos good dynamic standing balance and strength/ROM WFL for ADLs. Pt is slightly hypotensive upon standing at 109/61, not symptomatic. O2 removed for ambulation, pt desats to 85% with SOA, recovers well with PLB and titrated up to 1L. Pt returned back to bed and removed O2, sat staying ~93-94%, nursing notified. Pt presenting near functional baseline at this time and is safe to return home with spouse assist upon d/c. No further PT needs but may benefit from 6MWT to assess home O2 needs. PT will sign off.    Anticipated Discharge Disposition (PT): home with assist

## 2025-02-26 NOTE — CONSULTS
[facility] CARDIOLOGY CONSULT  [facility]        Cardiology assessment and plan      Acute on chronic diastolic heart failure  Elevated abnormal proBNP secondary diastolic heart failure  Normal LV systolic function  Nonobstructive coronary artery disease  Chronic kidney disease  Hypertension  Hyperlipidemia  COPD  Volume status is improved  Tmax is 100.1 pulse is 93 respirations are 20 blood pressure is 108/68 sats are 91%  Normal troponin  Abnormal elevated proBNP  Sodium is 139 potassium is 3.9 creatinine is 1.39 hemoglobin is 12.1  Current medications include Lasix 40 mg IV twice daily  Some of the home medications were held secondary to hypotension  Blood pressure is improving  Volume status is improving  Further recommendations based on patient                    Subjective:     Encounter Date:02/25/2025      Patient ID: Jasmine Cerda is a 64 y.o. female.    Chief Complaint: SOA, edema      HPI:  Jasmine Cerda is a 64 y.o. female chronic diastolic heart failure and non-obstructive CAD of the LAD per cath in 2023.  PMH includes labile HTN, HLD, COPD, and CKD stage 3.  She presents from her nephrology office with hypotension and hypervolemia and started on IV diuretics.  2D echo 2/2025 showed EF 56 to 60% with grade 1 diastolic dysfunction and mild AI.  Cardiology was consulted for CHF management.    Patient tells me that 3 weeks ago she was started on clonidine for hypertension but that her BP bottomed out at home.  She is also had trouble with her potassium fluctuating and has been off Bumex for approximately 6 weeks.  In this time she has gained 15 pounds.  She complains of shortness of breath and lower extremity edema which she states have significantly improved since receiving iron IV diuretics in the hospital.  She reports good urine output.  She denies any chest pain.  I have noticed for sodas and a bag of chips on her bedside table.  She admits noncompliance with  sodium restriction.      Past Medical History:   Diagnosis Date    Anxiety     Breast cyst     CHF (congestive heart failure)     COPD (chronic obstructive pulmonary disease)     Coronary heart disease     Depression     Hyperlipidemia     Hypertension          Past Surgical History:   Procedure Laterality Date    APPENDECTOMY      BREAST CYST ASPIRATION      CARDIAC CATHETERIZATION  2017    No Stents placed - [facility]    CARDIAC CATHETERIZATION N/A 2023    Procedure: Left Heart Cath possible PCI;  Surgeon: Cuauhtemoc Kwon MD;  Location: [facility] CATH INVASIVE LOCATION;  Service: Cardiovascular;  Laterality: N/A;    CERVICAL FUSION      C6-C7     SECTION      x 2    CHOLECYSTECTOMY      HYSTERECTOMY      partial         Social History     Socioeconomic History    Marital status:    Tobacco Use    Smoking status: Never    Smokeless tobacco: Never    Tobacco comments:     Passive Smoke: N   Vaping Use    Vaping status: Never Used   Substance and Sexual Activity    Alcohol use: No    Drug use: No    Sexual activity: Defer       Family History   Problem Relation Age of Onset    Colon cancer Mother     Diabetes Father     Pulmonary embolism Brother     Heart failure Brother     Breast cancer Maternal Aunt          No Known Allergies    Current Medications:   Scheduled Meds:furosemide, 40 mg, Intravenous, Q12H      Continuous Infusions:     Review of Systems   Constitutional: Negative for chills, decreased appetite and malaise/fatigue.   HENT:  Negative for congestion and nosebleeds.    Eyes:  Negative for blurred vision and double vision.   Cardiovascular:  Negative for chest pain, dyspnea on exertion, irregular heartbeat, leg swelling, near-syncope, orthopnea, palpitations and paroxysmal nocturnal dyspnea.   Respiratory:  Negative for cough and shortness of breath.    Hematologic/Lymphatic: Negative for adenopathy. Does not bruise/bleed easily.   Skin:  Negative for color change and rash.  "  Musculoskeletal:  Negative for back pain and joint pain.   Gastrointestinal:  Negative for bloating, abdominal pain, hematemesis and hematochezia.   Genitourinary:  Negative for flank pain and hematuria.   Neurological:  Negative for dizziness and focal weakness.   Psychiatric/Behavioral:  Negative for altered mental status. The patient does not have insomnia.      All other systems reviewed and are negative.       Objective:         /68 (BP Location: Right arm, Patient Position: Lying)   Pulse 86   Temp 98.3 °F (36.8 °C) (Oral)   Resp 15   Ht 157.5 cm (62\")   Wt 86.8 kg (191 lb 5.8 oz)   SpO2 93%   BMI 35.00 kg/m²       General: Well-developed in NAD.  Neuro: AAOx3. No gross deficits.  HEENT: Sclerae clear, no xanthelasmas.  CV: S1S2, RRR. No murmurs or gallops.  Neck thick unable to appreciate JVD.  Resp: Breathing is unlabored. Lungs CTA throughout.  GI: BS+.  Abdominal ascites.  Ext: Pedal pulses are palpable. Extremities are 1+ pitting BLE edema.  MS: moves all extremities, no weakness.  Skin: warm, dry.  Psych: calm and cooperative.            Lab Review:     Results from last 7 days   Lab Units 02/26/25  0025 02/25/25  1457   SODIUM mmol/L 139 137   POTASSIUM mmol/L 3.9 4.8   CHLORIDE mmol/L 104 106   CO2 mmol/L 23.6 22.1   BUN mg/dL 20 21   CREATININE mg/dL 1.39* 1.35*   GLUCOSE mg/dL 95 90   CALCIUM mg/dL 10.2 10.0   AST (SGOT) U/L  --  25   ALT (SGPT) U/L  --  30     Results from last 7 days   Lab Units 02/25/25  1825 02/25/25  1457   CK TOTAL U/L  --  68   HSTROP T ng/L 13 12     Results from last 7 days   Lab Units 02/26/25  0025 02/25/25  1457   WBC 10*3/mm3 8.64 7.71   HEMOGLOBIN g/dL 12.1 11.8*   HEMATOCRIT % 37.7 36.9   PLATELETS 10*3/mm3 254 248                   Invalid input(s): \"LDLCALC\"  Results from last 7 days   Lab Units 02/25/25  1457   PROBNP pg/mL 3,724.0*     Results from last 7 days   Lab Units 02/25/25  1457   TSH uIU/mL 5.300*       Recent Radiology:  Imaging Results " (Most Recent)       Procedure Component Value Units Date/Time    XR Chest 1 View [[id number]] Collected: 02/25/25 1538     Updated: 02/25/25 1543    Narrative:      XR CHEST 1 VW    Date of Exam: 2/25/2025 3:20 PM EST    Indication: sob    Comparison: Portable chest 6/2/2024    Findings:  Cardiomegaly unchanged from prior study. Negative for pneumothorax. No pleural effusion. Mild linear atelectasis/scarring at the left lung base. No discrete consolidation. No significant effusion. Osseous structures appear intact.      Impression:      Impression:  1. Mild linear left basilar atelectasis/scarring.  2. Stable cardiomegaly.          Electronically Signed: Boaz Jones MD    2/25/2025 3:41 PM EST    Workstation ID: [id number]    CT Abdomen Pelvis Without Contrast [[id number]] Collected: 02/25/25 1526     Updated: 02/25/25 1532    Narrative:      CT ABDOMEN PELVIS WO CONTRAST    Date of Exam: 2/25/2025 3:15 PM EST    Indication: eval for obstructive uropathy.    Comparison: Noncontrast CT of the abdomen pelvis performed on September 20, 2018    Technique: Axial CT images were obtained of the abdomen and pelvis without the administration of contrast. Sagittal and coronal reconstructions were performed.  Automated exposure control and iterative reconstruction methods were used.      Findings:    Lung Bases:  Mild bilateral dependent atelectasis. The heart is moderately enlarged.    Peritoneum:  No free intraperitoneal air or fluid.     Abdominal wall:  Unremarkable.    Liver:  Liver is normal in size and contour. No focal lesions.    Biliary/Gallbladder:  The gallbladder is surgically absent. The biliary tree is nondilated.    Pancreas:  Pancreas is within normal limits. There is no evidence of pancreatic mass or peripancreatic inflammatory changes.    Spleen:  Spleen is normal in size and contour.    Gastrointestinal/Mesentery:   No evidence of bowel obstruction or gross inflammatory changes. The appendix is not visualized.  There are no secondary signs of acute appendicitis. There is moderate fecal retention.    Adrenals:  Adrenal glands are unremarkable.    Kidneys:  The kidneys are mildly to moderately atrophic. No evidence of nephrolithiasis. No evidence of hydronephrosis or significant perinephric fat stranding.    Bladder:   The urinary bladder is unremarkable.    Reproductive organs:    The patient is post hysterectomy.    Lymph Nodes:  No significant adenopathy is identified.     Vasculature:  Small scattered calcified plaques are visualized. The abdominal aorta is normal in caliber.    Osseous Structures:    No acute fracture or aggressive lesions. Mild to moderate multilevel lumbar spondylosis is visualized.        Impression:      Impression:  No evidence of nephrolithiasis or obstructive uropathy.    Mild to moderate bilateral renal atrophy.    Findings compatible with moderate constipation.            Electronically Signed: Zachariah Adams MD    2/25/2025 3:30 PM EST    Workstation ID: [id number]              ECHOCARDIOGRAM:    Results for orders placed during the hospital encounter of 02/25/25    Adult Transthoracic Echo Complete w/ Color, Spectral and Contrast if necessary per protocol    Interpretation Summary    Left ventricular systolic function is normal. Calculated left ventricular EF = 57% Left ventricular ejection fraction appears to be 56 - 60%.    Left ventricular wall thickness is consistent with mild concentric hypertrophy.    Left ventricular diastolic function is consistent with (grade I) impaired relaxation.    The left atrial cavity is mildly dilated.    Estimated right ventricular systolic pressure from tricuspid regurgitation is normal (<35 mmHg).            Assessment:         Active Hospital Problems    Diagnosis  POA    **Acute exacerbation of CHF (congestive heart failure) [I50.9]  Yes     1) acute on chronic diastolic heart failure  - BNP 3724  -CXR shows no vascular congestion  -2D echo 2/2025 showed  EF 56 to 60% with grade 1 diastolic dysfunction and mild AI.  - on IV diuretics     2) non-obstructive CAD of the LAD per cath in 2023     3) CKD stage 3  -Nephrology following  -Cr stable    4) HTN    5) HLD    6) COPD           Plan:   Patient is approaching euvolemia on IV diuretics.  Monitor daily weights, strict I/Os, renal function, and electrolytes closely.  Will institute daily fluid/Na restriction and discussed with patient.  2D echo resulted as above.  Further recommendations per attending cardiologist.         Electronically signed by YUE Oshea, 02/26/25, 4:31 PM EST.

## 2025-02-26 NOTE — CONSULTS
RENAL/KCC:    Referring Provider: Dr. Lord  Reason for Consultation: CKD, Edema, Hypotension    Subjective     Chief complaint: Edema    History of present illness:  Patient is a 63 yo WF with h/o CKD3 followed in our clinic who presented yesterday for follow-up and was directed to the ER for hypotension and edema.  She had recently had very high BP at home and was started on Clonidine.  This was in addition to her Carvedilol, Amlodipine and Hydralazine.   Her BP dropped to as low as the 80's systolic and she started gaining fluid.  She was put on PO Bumex which had to be stopped for hypokalemia.  She estimates she has gained around 15 pounds.  She was directed to the ER where she has been started on IV Lasix.  Edema is much better and Cr is stable at 1.3.  Current BP stable off all BP meds.  No new complaints.  She is s/p TTE and Cardiology has been consulted.    History  Past Medical History:   Diagnosis Date    Anxiety     Breast cyst     CHF (congestive heart failure)     COPD (chronic obstructive pulmonary disease)     Coronary heart disease     Depression     Hyperlipidemia     Hypertension    ,   Past Surgical History:   Procedure Laterality Date    APPENDECTOMY      BREAST CYST ASPIRATION      CARDIAC CATHETERIZATION  2017    No Stents placed - [facility]    CARDIAC CATHETERIZATION N/A 2023    Procedure: Left Heart Cath possible PCI;  Surgeon: Cuauhtemoc Kwon MD;  Location: [facility] CATH INVASIVE LOCATION;  Service: Cardiovascular;  Laterality: N/A;    CERVICAL FUSION      C6-C7     SECTION      x 2    CHOLECYSTECTOMY      HYSTERECTOMY      partial   ,   Family History   Problem Relation Age of Onset    Colon cancer Mother     Diabetes Father     Pulmonary embolism Brother     Heart failure Brother     Breast cancer Maternal Aunt    ,   Social History     Socioeconomic History    Marital status:    Tobacco Use    Smoking status: Never    Smokeless tobacco: Never    Tobacco comments:      Passive Smoke: N   Vaping Use    Vaping status: Never Used   Substance and Sexual Activity    Alcohol use: No    Drug use: No    Sexual activity: Defer     E-cigarette/Vaping    E-cigarette/Vaping Use Never User     Passive Exposure No     Counseling Given No      E-cigarette/Vaping Substances    Nicotine No     THC No     CBD No     Flavoring No      E-cigarette/Vaping Devices    Disposable No     Pre-filled or Refillable Cartridge No     Refillable Tank No     Pre-filled Pod No          ,   Medications Prior to Admission   Medication Sig Dispense Refill Last Dose/Taking    allopurinol (Zyloprim) 100 MG tablet Take 1 tablet by mouth Daily. 30 tablet 2 2/25/2025    ALPRAZolam (XANAX) 0.5 MG tablet Take 1 tablet by mouth 3 (Three) Times a Day As Needed for Anxiety. for anxiety 90 tablet 3 2/25/2025    ARIPiprazole (ABILIFY) 10 MG tablet Take 1 tablet by mouth Daily. 90 tablet 1 2/25/2025    aspirin 81 MG EC tablet 0   2/25/2025    carbidopa-levodopa (SINEMET)  MG per tablet Take 1 tab at: 6 am, 10 am, 2pm, 7 pm 120 tablet 5 2/25/2025    citalopram (CeleXA) 40 MG tablet Take 1 tablet by mouth Every Morning. 90 tablet 1 2/25/2025    cloNIDine (CATAPRES) 0.1 MG tablet Take 1 tablet by mouth Every 8 (Eight) Hours As Needed for High Blood Pressure (for systolic over 160). 90 tablet 0 2/25/2025    colchicine 0.6 MG tablet TAKE 1 TABLET BY MOUTH TWICE DAILY AS NEEDED FOR  GOUT  FLARE  UP 30 tablet 2 2/25/2025 Morning    gabapentin (NEURONTIN) 300 MG capsule Take 1 capsule by mouth 3 (Three) Times a Day. 270 capsule 1 2/25/2025    hydrOXYzine (ATARAX) 25 MG tablet Take 1 tablet by mouth 3 (Three) Times a Day As Needed for Anxiety. 90 tablet 3 2/25/2025 Morning    metoclopramide (Reglan) 5 MG tablet Take 1 tablet by mouth 3 (Three) Times a Day With Meals. 90 tablet 2 2/25/2025    pantoprazole (PROTONIX) 40 MG EC tablet Take 1 tablet by mouth 2 (Two) Times a Day. 180 tablet 1 2/25/2025 Morning    potassium chloride  "10 MEQ CR tablet TAKE 1 TABLET BY MOUTH ONCE DAILY AS NEEDED (IF  TAKES  LASIX) 90 tablet 0 2/24/2025    QUEtiapine (SEROquel) 100 MG tablet Take 1 tablet by mouth every night at bedtime. 90 tablet 1 2/24/2025    rosuvastatin (CRESTOR) 40 MG tablet Take 1 tablet by mouth Every Evening. 90 tablet 1 2/24/2025    amLODIPine (NORVASC) 10 MG tablet Take 1 tablet by mouth Daily. 90 tablet 0 Unknown    Budeson-Glycopyrrol-Formoterol (Breztri Aerosphere) 160-9-4.8 MCG/ACT aerosol inhaler Inhale 2 puffs 2 (Two) Times a Day. 2 each 0 Unknown    carvedilol (COREG) 25 MG tablet Take 1 tablet by mouth 2 (Two) Times a Day. 180 tablet 3 Unknown    cyanocobalamin 1000 MCG/ML injection INJECT 1 ML INTO THE APPROPRIATE MUSCLE AS DIRECTED EVERY 28 DAYS 3 mL 3 Unknown    hydrALAZINE (APRESOLINE) 25 MG tablet Take 1 tablet by mouth 3 (Three) Times a Day. 90 tablet 0 2/22/2025    HYDROcodone-acetaminophen (NORCO) 7.5-325 MG per tablet Take 1 tablet by mouth Every 4 (Four) Hours As Needed for Severe Pain. 5 tablet 0 Unknown    magnesium hydroxide (MILK OF MAGNESIA) 400 MG/5ML suspension 0   Unknown    mirtazapine (REMERON) 30 MG tablet Take 1 tablet by mouth every night at bedtime. 90 tablet 1 2/23/2025    Multiple Vitamins-Minerals (MULTIVITAMIN ADULT) tablet Take 1 tablet by mouth Daily. With Omega XL   Unknown    nitroglycerin (NITROSTAT) 0.4 MG SL tablet Place 1 tablet under the tongue Every 5 (Five) Minutes As Needed for Chest Pain. Take no more than 3 doses in 15 minutes. 25 tablet 2 Unknown    ondansetron ODT (ZOFRAN-ODT) 4 MG disintegrating tablet DISSOLVE 1 TABLET IN MOUTH EVERY 8 HOURS AS NEEDED FOR NAUSEA FOR VOMITING 60 tablet 0 Unknown    Syringe/Needle, Disp, (B-D 3CC LUER-MAI SYR 23GX1\") 23G X 1\" 3 ML misc USE 1 SYRINGE ONCE EVERY MONTH FOR  B12  INJECTION INTRAMUSCULARLY 12 each 0     vitamin D (ERGOCALCIFEROL) 1.25 MG (15409 UT) capsule capsule Take 1 capsule by mouth 1 (One) Time Per Week. 15 capsule 3 Unknown   , " "Scheduled Meds:  furosemide, 40 mg, Intravenous, Q12H   , Continuous Infusions:   , PRN Meds:    senna-docusate sodium **AND** polyethylene glycol **AND** bisacodyl **AND** bisacodyl    Calcium Replacement - Follow Nurse / BPA Driven Protocol    Magnesium Standard Dose Replacement - Follow Nurse / BPA Driven Protocol    nitroglycerin    ondansetron    Phosphorus Replacement - Follow Nurse / BPA Driven Protocol    Potassium Replacement - Follow Nurse / BPA Driven Protocol    [COMPLETED] Insert Peripheral IV **AND** sodium chloride, and Allergies:  Patient has no known allergies.    Review of Systems  Pertinent items are noted in HPI    Objective     Vital Signs  Temp:  [97.4 °F (36.3 °C)-98.6 °F (37 °C)] 98.2 °F (36.8 °C)  Heart Rate:  [69-87] 87  Resp:  [13-18] 18  BP: ()/(58-76) 108/76    I/O this shift:  In: 120 [P.O.:120]  Out: -   No intake/output data recorded.    Physical Exam:  GEN: Awake, NAD  ENT: PERRL, EOMI, MMM  NECK: Supple, no JVD  CHEST: CTAB, no W/R/C  CV: RRR, no M/G/R  ABD: Soft, NT, +BS  SKIN: Warm and Dry  NEURO: CN's intact      Results Review:   I reviewed the patient's new clinical results.    WBC WBC   Date Value Ref Range Status   02/26/2025 8.64 3.40 - 10.80 10*3/mm3 Final   02/25/2025 7.71 3.40 - 10.80 10*3/mm3 Final      HGB Hemoglobin   Date Value Ref Range Status   02/26/2025 12.1 12.0 - 15.9 g/dL Final   02/25/2025 11.8 (L) 12.0 - 15.9 g/dL Final      HCT Hematocrit   Date Value Ref Range Status   02/26/2025 37.7 34.0 - 46.6 % Final   02/25/2025 36.9 34.0 - 46.6 % Final      Platlets No results found for: \"LABPLAT\"   MCV MCV   Date Value Ref Range Status   02/26/2025 86.3 79.0 - 97.0 fL Final   02/25/2025 86.2 79.0 - 97.0 fL Final          Sodium Sodium   Date Value Ref Range Status   02/26/2025 139 136 - 145 mmol/L Final   02/25/2025 137 136 - 145 mmol/L Final      Potassium Potassium   Date Value Ref Range Status   02/26/2025 3.9 3.5 - 5.2 mmol/L Final   02/25/2025 4.8 3.5 - " "5.2 mmol/L Final      Chloride Chloride   Date Value Ref Range Status   02/26/2025 104 98 - 107 mmol/L Final   02/25/2025 106 98 - 107 mmol/L Final      CO2 CO2   Date Value Ref Range Status   02/26/2025 23.6 22.0 - 29.0 mmol/L Final   02/25/2025 22.1 22.0 - 29.0 mmol/L Final      BUN BUN   Date Value Ref Range Status   02/26/2025 20 8 - 23 mg/dL Final   02/25/2025 21 8 - 23 mg/dL Final      Creatinine Creatinine   Date Value Ref Range Status   02/26/2025 1.39 (H) 0.57 - 1.00 mg/dL Final   02/25/2025 1.35 (H) 0.57 - 1.00 mg/dL Final      Calcium Calcium   Date Value Ref Range Status   02/26/2025 10.2 8.6 - 10.5 mg/dL Final   02/25/2025 10.0 8.6 - 10.5 mg/dL Final      PO4 No results found for: \"CAPO4\"   Albumin Albumin   Date Value Ref Range Status   02/25/2025 4.3 3.5 - 5.2 g/dL Final      Magnesium No results found for: \"MG\"   Uric Acid No results found for: \"URICACID\"         furosemide, 40 mg, Intravenous, Q12H           Assessment & Plan     1) CKD3  2) Hypotension  3) Edema  4) Diastolic CHF    PLAN: Edema improving - continue current IV Lasix.  OK for D/C on Lasix 40 mg PO daily along with KCl 20 meq daily.  Would hold BP meds for now.  Discussed with patient that she could use her Hydralazine prn.  Will follow along while inpatient.      Clarke Balderas MD  [facility]  02/26/25  @NOW            "

## 2025-02-26 NOTE — PROGRESS NOTES
[facility] MEDICINE SERVICE  DAILY PROGRESS NOTE    NAME: Jasmine Cerda  : 1960  MRN: [id number]      LOS: 0 days     PROVIDER OF SERVICE: Vasiliy Lord MD    Chief Complaint: Acute exacerbation of CHF (congestive heart failure)    Subjective:     Interval History:  History taken from: patient    Feels better this morning. Lower extremity swelling improved      Review of Systems:   Review of Systems   Constitutional:  Negative for chills and fever.   HENT:  Negative for congestion, ear discharge, ear pain, sinus pain and sore throat.    Respiratory:  Positive for shortness of breath. Negative for apnea, cough, chest tightness and wheezing.    Cardiovascular:  Negative for chest pain and palpitations.   Gastrointestinal:  Negative for abdominal pain, blood in stool, constipation, diarrhea, nausea and vomiting.   Endocrine: Negative for cold intolerance and heat intolerance.   Genitourinary:  Negative for dysuria, flank pain, hematuria and urgency.   Musculoskeletal:  Negative for arthralgias, back pain, joint swelling, myalgias and neck pain.   Skin: Negative.    Neurological: Negative.    Hematological:  Does not bruise/bleed easily.   Psychiatric/Behavioral:  Negative for agitation, confusion, hallucinations, sleep disturbance and suicidal ideas. The patient is not nervous/anxious.        Objective:     Vital Signs  Temp:  [97.4 °F (36.3 °C)-98.6 °F (37 °C)] 98 °F (36.7 °C)  Heart Rate:  [] 76  Resp:  [13-20] 16  BP: ()/(58-74) 113/71  Flow (L/min) (Oxygen Therapy):  [2] 2   Body mass index is 35 kg/m².    Physical Exam  Physical Exam  Constitutional:       General: She is not in acute distress.     Appearance: Normal appearance.   HENT:      Head: Normocephalic and atraumatic.      Nose: Nose normal.      Mouth/Throat:      Mouth: Mucous membranes are moist.   Eyes:      Extraocular Movements: Extraocular movements intact.      Conjunctiva/sclera: Conjunctivae normal.    Cardiovascular:      Rate and Rhythm: Normal rate and regular rhythm.   Pulmonary:      Effort: Pulmonary effort is normal.      Breath sounds: Normal breath sounds.   Abdominal:      General: Abdomen is flat. Bowel sounds are normal.      Palpations: Abdomen is soft.   Musculoskeletal:         General: Normal range of motion.      Cervical back: Normal range of motion and neck supple.   Skin:     General: Skin is warm and dry.   Neurological:      General: No focal deficit present.      Mental Status: She is alert.   Psychiatric:         Mood and Affect: Mood normal.            Diagnostic Data    Results from last 7 days   Lab Units 02/26/25  0025 02/25/25  1457   WBC 10*3/mm3 8.64 7.71   HEMOGLOBIN g/dL 12.1 11.8*   HEMATOCRIT % 37.7 36.9   PLATELETS 10*3/mm3 254 248   GLUCOSE mg/dL 95 90   CREATININE mg/dL 1.39* 1.35*   BUN mg/dL 20 21   SODIUM mmol/L 139 137   POTASSIUM mmol/L 3.9 4.8   AST (SGOT) U/L  --  25   ALT (SGPT) U/L  --  30   ALK PHOS U/L  --  86   BILIRUBIN mg/dL  --  0.3   ANION GAP mmol/L 11.4 8.9       XR Chest 1 View    Result Date: 2/25/2025  Impression: 1. Mild linear left basilar atelectasis/scarring. 2. Stable cardiomegaly. Electronically Signed: Boaz Jones MD  2/25/2025 3:41 PM EST  Workstation ID: [id number]    CT Abdomen Pelvis Without Contrast    Result Date: 2/25/2025  Impression: No evidence of nephrolithiasis or obstructive uropathy. Mild to moderate bilateral renal atrophy. Findings compatible with moderate constipation. Electronically Signed: Zachariah Adams MD  2/25/2025 3:30 PM EST  Workstation ID: [id number]       I reviewed the patient's new clinical results.    Assessment/Plan:     Active and Resolved Problems  Active Hospital Problems    Diagnosis  POA    **Acute exacerbation of CHF (congestive heart failure) [I50.9]  Yes      Resolved Hospital Problems   No resolved problems to display.       Volume overload  CKD vs CHF  proBNP 3724  Chest xray no pulm edema, effusion  seen.  Lower extremity swelling much improved  ECHO LVSF 57%, grade 1 systolic dysfunction  Continue lasix 40 IV BID  Daily weights  Strict I&Os  Heart healthy diet, sodium restriction and fluid restriction   cardiology consult      CKD  Creatinine 1.39, BUN 21-appears near baseline  Avoid nephrotoxic medications  Monitor renal function closely due to diuresis    Hypothyroidism  TSH elevated but Free t4 WNL-lower end, will need re-evaluation in 4 weeks  Hypertension   BP stable      VTE Prophylaxis:  Mechanical VTE prophylaxis orders are present.             Disposition Planning:     Barriers to Discharge:I've dieuresis  Anticipated Date of Discharge: 2/28/25  Place of Discharge: home      Time: 35 minutes     Code Status and Medical Interventions: CPR (Attempt to Resuscitate); Full Support   Ordered at: 02/25/25 1728     Code Status (Patient has no pulse and is not breathing):    CPR (Attempt to Resuscitate)     Medical Interventions (Patient has pulse or is breathing):    Full Support       Signature: Electronically signed by Vasiliy Lord MD, 02/26/25, 10:50 EST.  [facility], [place], [facility]

## 2025-02-26 NOTE — THERAPY EVALUATION
Patient Name: Jasmine Cerda  : 1960    MRN: [id number]                              Today's Date: 2025       Admit Date: 2025    Visit Dx:     ICD-10-CM ICD-9-CM   1. Acute on chronic congestive heart failure, unspecified heart failure type  I50.9 428.0     Patient Active Problem List   Diagnosis    Chronic post-traumatic stress disorder (PTSD)    Severe episode of recurrent major depressive disorder, without psychotic features    Asthma    Chronic low back pain    Chronic diastolic heart failure    Coronary heart disease    Degeneration of intervertebral disc of lumbosacral region    Headache, temporal    Psychophysiological insomnia    Hyperlipidemia    Hypertension    Vitamin D deficiency    Other fatigue    Preventative health care    Hyperglycemia, unspecified     Nausea    Stable angina pectoris    Chest pain    Dyspnea    Abnormal nuclear stress test    Acute exacerbation of CHF (congestive heart failure)     Past Medical History:   Diagnosis Date    Anxiety     Breast cyst     CHF (congestive heart failure)     COPD (chronic obstructive pulmonary disease)     Coronary heart disease     Depression     Hyperlipidemia     Hypertension      Past Surgical History:   Procedure Laterality Date    APPENDECTOMY      BREAST CYST ASPIRATION      CARDIAC CATHETERIZATION  2017    No Stents placed - [facility]    CARDIAC CATHETERIZATION N/A 2023    Procedure: Left Heart Cath possible PCI;  Surgeon: Cuauhtemoc Kwon MD;  Location: [facility] CATH INVASIVE LOCATION;  Service: Cardiovascular;  Laterality: N/A;    CERVICAL FUSION      C6-C7     SECTION      x 2    CHOLECYSTECTOMY      HYSTERECTOMY      partial      General Information       Row Name 25 1143          Physical Therapy Time and Intention    Document Type evaluation  -     Mode of Treatment physical therapy  -       Row Name 25 1143          General Information    Patient Profile Reviewed yes  -AH     Prior Level of  Function independent:;all household mobility;community mobility;ADL's  -     Existing Precautions/Restrictions no known precautions/restrictions  -     Barriers to Rehab none identified  -[facility] Name 02/26/25 1143          Living Environment    People in Home spouse  -[facility] Name 02/26/25 1143          Home Main Entrance    Number of Stairs, Main Entrance none  -[facility] Name 02/26/25 1143          Stairs Within Home, Primary    Number of Stairs, Within Home, Primary none  -[facility] Name 02/26/25 1143          Cognition    Orientation Status (Cognition) oriented x 4  -[facility] Name 02/26/25 1143          Safety Issues/Impairments Affecting Functional Mobility    Impairments Affecting Function (Mobility) shortness of breath  -               User Key  (r) = Recorded By, (t) = Taken By, (c) = Cosigned By      Initials Name Provider Type     Kathrin Interiano, PT Physical Therapist                   Mobility       [facility] Name 02/26/25 1143          Bed Mobility    Bed Mobility bed mobility (all) activities  -     All Activities, [place] (Bed Mobility) modified independence  -[facility] Name 02/26/25 1143          Sit-Stand Transfer    Sit-Stand [place] (Transfers) supervision  -[facility] Name 02/26/25 1143          Gait/Stairs (Locomotion)    [place] Level (Gait) standby assist  -     Patient was able to Ambulate yes  -     Distance in Feet (Gait) 60  -               User Key  (r) = Recorded By, (t) = Taken By, (c) = Cosigned By      Initials Name Provider Type    Kathrin Carroll, PT Physical Therapist                   Obj/Interventions       [facility] Name 02/26/25 1144          Range of Motion Comprehensive    General Range of Motion no range of motion deficits identified  -[facility] Name 02/26/25 1144          Strength Comprehensive (MMT)    General Manual Muscle Testing (MMT) Assessment no strength deficits identified  -[facility] Name  02/26/25 1144          Balance    Balance Assessment sitting static balance;sitting dynamic balance;standing static balance;standing dynamic balance  -     Static Sitting Balance independent  -     Dynamic Sitting Balance independent  -     Position, Sitting Balance unsupported;sitting edge of bed  -     Static Standing Balance independent  -     Dynamic Standing Balance standby assist  -     Position/Device Used, Standing Balance unsupported  -       Row Name 02/26/25 1144          Sensory Assessment (Somatosensory)    Sensory Assessment (Somatosensory) sensation intact  -               User Key  (r) = Recorded By, (t) = Taken By, (c) = Cosigned By      Initials Name Provider Type    Kathrin Carroll, PT Physical Therapist                   Goals/Plan    No documentation.                  Clinical Impression       Row Name 02/26/25 1144          Pain    Pretreatment Pain Rating 0/10 - no pain  -     Posttreatment Pain Rating 0/10 - no pain  -       Row Name 02/26/25 1144          Plan of Care Review    Plan of Care Reviewed With patient  -     Outcome Evaluation Jasmine Cerda is a 64 y.o. female with a CMH of anxiety, CHF, COPD, depression, hyperlipidemia, HTN who presented to [facility] on 2/25/2025 with complaints of SOA and lower extremity swelling. Chest x-ray with mild linear left basilar atelectasis or scarring as well as stable cardiomegaly. Admitted for acute CHF exacerbation.Pt is A&Ox4 and reports she is typically IND with ADLs, and mobility w/o AD (has a RW but does not use). She lives with her spouse in a [facility] with no [facility]. Upon room entry pt on 2L O2 at 95% sat. Does not wear O2 at baseline but uses a nebulizer and inhaler. Pt is IND with bed mobility, SUP for STS from EOB, and SBA for ambulation w/o AD x60 ft. Demos good dynamic standing balance and strength/ROM WFL for ADLs. Pt is slightly hypotensive upon standing at 109/61, not symptomatic. O2 removed for ambulation, pt  desats to 85% with SOA, recovers well with PLB and titrated up to 1L. Pt returned back to bed and removed O2, sat staying ~93-94%, nursing notified. Pt presenting near functional baseline at this time and is safe to return home with spouse assist upon d/c. No further PT needs but may benefit from 6MWT to assess home O2 needs. PT will sign off.  -[facility] Name 02/26/25 1144          Therapy Assessment/Plan (PT)    Criteria for Skilled Interventions Met (PT) no;no problems identified which require skilled intervention  -     Therapy Frequency (PT) evaluation only  -[facility] Name 02/26/25 1144          Vital Signs    Pre Systolic BP Rehab 108  -AH     Pre Treatment Diastolic BP 65  -     Intra Systolic BP Rehab 109  -AH     Intra Treatment Diastolic BP 61  -AH     Pretreatment Heart Rate (beats/min) 78  -AH     Pretreatment Resp Rate (breaths/min) 19  -AH     Pre SpO2 (%) 95  -AH     O2 Delivery Pre Treatment nasal cannula  -AH     Intra SpO2 (%) 85  -AH     O2 Delivery Intra Treatment room air  -     Post SpO2 (%) 94  -AH     O2 Delivery Post Treatment nasal cannula  -AH     Pre Patient Position Sitting  -     Intra Patient Position Standing  -     Post Patient Position Sitting  -[facility] Name 02/26/25 1144          Positioning and Restraints    Pre-Treatment Position in bed  -     Post Treatment Position bed  -AH     In Bed notified nsg;mattweric;call light within reach;with family/caregiver  -               User Key  (r) = Recorded By, (t) = Taken By, (c) = Cosigned By      Initials Name Provider Type     Kathrin Interiano, PT Physical Therapist                   Outcome Measures       [facility] Name 02/26/25 0815          How much help from another person do you currently need...    Turning from your back to your side while in flat bed without using bedrails? 4  -HW     Moving from lying on back to sitting on the side of a flat bed without bedrails? 4  -HW     Moving to and from a bed to  a chair (including a wheelchair)? 4  -HW     Standing up from a chair using your arms (e.g., wheelchair, bedside chair)? 4  -HW     Climbing 3-5 steps with a railing? 4  -HW     To walk in hospital room? 4  -HW     AM-PAC 6 Clicks Score (PT) 24  -HW               User Key  (r) = Recorded By, (t) = Taken By, (c) = Cosigned By      Initials Name Provider Type     Meena Durbin, RN Registered Nurse                                 Physical Therapy Education       Title: PT OT SLP Therapies (In Progress)       Topic: Physical Therapy (In Progress)       Point: Mobility training (Done)       Learning Progress Summary            Patient Acceptance, E, VU by  at 2/26/2025 1157                      Point: Body mechanics (Done)       Learning Progress Summary            Patient Acceptance, E, VU by  at 2/26/2025 1157                      Point: Precautions (Not Started)       Learner Progress:  Not documented in this visit.                              User Key       Initials Effective Dates Name Provider Type [facility] 08/12/24 -  Kathrin Interiano, PT Physical Therapist PT                  PT Recommendation and Plan     Outcome Evaluation: Jasmine Cerda is a 64 y.o. female with a CMH of anxiety, CHF, COPD, depression, hyperlipidemia, HTN who presented to [facility] on [facility]/25/2025 with complaints of SOA and lower extremity swelling. Chest x-ray with mild linear left basilar atelectasis or scarring as well as stable cardiomegaly. Admitted for acute CHF exacerbation.Pt is A&Ox4 and reports she is typically IND with ADLs, and mobility w/o AD (has a RW but does not use). She lives with her spouse in a [facility] with no [facility]. Upon room entry pt on 2L O2 at 95% sat. Does not wear O2 at baseline but uses a nebulizer and inhaler. Pt is IND with bed mobility, SUP for STS from EOB, and SBA for ambulation w/o AD x60 ft. Demos good dynamic standing balance and strength/ROM WFL for ADLs. Pt is slightly hypotensive upon standing at  109/61, not symptomatic. O2 removed for ambulation, pt desats to 85% with SOA, recovers well with PLB and titrated up to 1L. Pt returned back to bed and removed O2, sat staying ~93-94%, nursing notified. Pt presenting near functional baseline at this time and is safe to return home with spouse assist upon d/c. No further PT needs but may benefit from 6MWT to assess home O2 needs. PT will sign off.     Time Calculation:         PT Charges       Row Name 02/26/25 1157             Time Calculation    Start Time 0945  -      Stop Time 1005  -      Time Calculation (min) 20 min  -      PT Received On 02/26/25  -                User Key  (r) = Recorded By, (t) = Taken By, (c) = Cosigned By      Initials Name Provider Type     Kathrin Interiano, PT Physical Therapist                  Therapy Charges for Today       Code Description Service Date Service Provider Modifiers Qty    [id number] HC PT EVAL MOD COMPLEXITY 4 2/26/2025 Kathrin Interiano, PT GP 1            PT G-Codes  AM-PAC 6 Clicks Score (PT): 24  PT Discharge Summary  Anticipated Discharge Disposition (PT): home with assist    Kathrin Interiano, PT  2/26/2025

## 2025-02-26 NOTE — CASE MANAGEMENT/SOCIAL WORK
Discharge Planning Assessment   Harsha     Patient Name: Jasmine Cerda  MRN: [id number]  Today's Date: 2/26/2025    Admit Date: 2/25/2025    Plan: From home with spouse   Discharge Needs Assessment       Row Name 02/26/25 1051       Living Environment    People in Home spouse    Name(s) of People in Home Spouse Rupert    Current Living Arrangements home    Potentially Unsafe Housing Conditions none    In the past 12 months has the electric, gas, oil, or water company threatened to shut off services in your home? No    Primary Care Provided by self    Provides Primary Care For no one    Family Caregiver if Needed spouse    Family Caregiver Names Spouse Rupert    Quality of Family Relationships helpful;involved;supportive    Able to Return to Prior Arrangements yes       Resource/Environmental Concerns    Resource/Environmental Concerns none    Transportation Concerns none       Transportation Needs    In the past 12 months, has lack of transportation kept you from medical appointments or from getting medications? no    In the past 12 months, has lack of transportation kept you from meetings, work, or from getting things needed for daily living? No       Food Insecurity    Within the past 12 months, you worried that your food would run out before you got the money to buy more. Never true    Within the past 12 months, the food you bought just didn't last and you didn't have money to get more. Never true       Transition Planning    Patient/Family Anticipates Transition to home with family    Patient/Family Anticipated Services at Transition none    Transportation Anticipated family or friend will provide       Discharge Needs Assessment    Readmission Within the Last 30 Days no previous admission in last 30 days    Equipment Currently Used at Home bp cuff;pulse ox;glucometer    Concerns to be Addressed no discharge needs identified;denies needs/concerns at this time    Do you want help finding or keeping work or a job?  I do not need or want help    Do you want help with school or training? For example, starting or completing job training or getting a high school diploma, GED or equivalent No    Anticipated Changes Related to Illness none    Equipment Needed After Discharge none    Provided Post Acute Provider List? N/A    Provided Post Acute Provider Quality & Resource List? N/A    Offered/Gave Vendor List no                   Discharge Plan       Row Name 02/26/25 1052       Plan    Plan From home with spouse    Patient/Family in Agreement with Plan yes    Provided Post Acute Provider List? N/A    Provided Post Acute Provider Quality & Resource List? N/A    Plan Comments CM met with patient and spouse Rupert at the bedside to review discharge planning. Patient lives at home with spouse, is IADLs and drives. Spouse Rupert to transport patient at d/c. Confirmed PCP, insurance, and pharmacy. Patient accepts [facility]. Patient denies any difficulty affording food, utilities, or medications. Patient is not current with any HHC/OPPT/OT services. DC Barriers: Increased O2 demands @ 2L NC, IV Lasix, IV Zofran, consult to Neph pending                  Continued Care and Services - Admitted Since 2/25/2025    No active coordination exists for this encounter.       Expected Discharge Date and Time       Expected Discharge Date Expected Discharge Time    Feb 27, 2025            Demographic Summary       Row Name 02/26/25 1050       General Information    Admission Type observation    Arrived From emergency department    Required Notices Provided Observation Status Notice    Referral Source admission list    Reason for Consult discharge planning    Preferred Language English       Contact Information    Permission Granted to Share Info With     Contact Information Obtained for                    Functional Status       Row Name 02/26/25 1050       Functional Status    Usual Activity Tolerance good    Current Activity Tolerance  good       Functional Status, IADL    Medications independent    Meal Preparation independent    Housekeeping independent    Laundry independent    Shopping independent    If for any reason you need help with day-to-day activities such as bathing, preparing meals, shopping, managing finances, etc., do you get the help you need? I don't need any help       Mental Status    General Appearance WDL WDL       Mental Status Summary    Recent Changes in Mental Status/Cognitive Functioning no changes              Patient Forms       Row Name 02/26/25 0824       Patient Forms    Important Message from Medicare ([place]) Delivered  MOON per Reg 2/25               Marlene Richard RN     [phone]  [email or web address]

## 2025-02-26 NOTE — PLAN OF CARE
Goal Outcome Evaluation:      Patient is alert and oriented x4 and able to make needs known, voiced no complaints during the night. Spouse at bedside. Call light placed within reach and answered in person. 2L NC applied for sleep apnea. Plan of care ongoing

## 2025-02-27 LAB
ANION GAP SERPL CALCULATED.3IONS-SCNC: 12.7 MMOL/L (ref 5–15)
ANION GAP SERPL CALCULATED.3IONS-SCNC: 16.2 MMOL/L (ref 5–15)
BASOPHILS # BLD AUTO: 0.06 10*3/MM3 (ref 0–0.2)
BASOPHILS NFR BLD AUTO: 0.8 % (ref 0–1.5)
BUN SERPL-MCNC: 21 MG/DL (ref 8–23)
BUN SERPL-MCNC: 22 MG/DL (ref 8–23)
BUN/CREAT SERPL: 11.6 (ref 7–25)
BUN/CREAT SERPL: 12.3 (ref 7–25)
CALCIUM SPEC-SCNC: 10.8 MG/DL (ref 8.6–10.5)
CALCIUM SPEC-SCNC: 11.3 MG/DL (ref 8.6–10.5)
CHLORIDE SERPL-SCNC: 100 MMOL/L (ref 98–107)
CHLORIDE SERPL-SCNC: 100 MMOL/L (ref 98–107)
CO2 SERPL-SCNC: 22.8 MMOL/L (ref 22–29)
CO2 SERPL-SCNC: 26.3 MMOL/L (ref 22–29)
CREAT SERPL-MCNC: 1.71 MG/DL (ref 0.57–1)
CREAT SERPL-MCNC: 1.9 MG/DL (ref 0.57–1)
DEPRECATED RDW RBC AUTO: 40.8 FL (ref 37–54)
EGFRCR SERPLBLD CKD-EPI 2021: 28.8 ML/MIN/1.73
EGFRCR SERPLBLD CKD-EPI 2021: 32.7 ML/MIN/1.73
EOSINOPHIL # BLD AUTO: 0.13 10*3/MM3 (ref 0–0.4)
EOSINOPHIL NFR BLD AUTO: 1.7 % (ref 0.3–6.2)
ERYTHROCYTE [DISTWIDTH] IN BLOOD BY AUTOMATED COUNT: 13.1 % (ref 12.3–15.4)
GLUCOSE SERPL-MCNC: 94 MG/DL (ref 65–99)
GLUCOSE SERPL-MCNC: 96 MG/DL (ref 65–99)
HCT VFR BLD AUTO: 41.5 % (ref 34–46.6)
HGB BLD-MCNC: 13.3 G/DL (ref 12–15.9)
IMM GRANULOCYTES # BLD AUTO: 0.02 10*3/MM3 (ref 0–0.05)
IMM GRANULOCYTES NFR BLD AUTO: 0.3 % (ref 0–0.5)
LYMPHOCYTES # BLD AUTO: 3.08 10*3/MM3 (ref 0.7–3.1)
LYMPHOCYTES NFR BLD AUTO: 40.4 % (ref 19.6–45.3)
MAGNESIUM SERPL-MCNC: 1.8 MG/DL (ref 1.6–2.4)
MCH RBC QN AUTO: 27.4 PG (ref 26.6–33)
MCHC RBC AUTO-ENTMCNC: 32 G/DL (ref 31.5–35.7)
MCV RBC AUTO: 85.4 FL (ref 79–97)
MONOCYTES # BLD AUTO: 0.78 10*3/MM3 (ref 0.1–0.9)
MONOCYTES NFR BLD AUTO: 10.2 % (ref 5–12)
NEUTROPHILS NFR BLD AUTO: 3.55 10*3/MM3 (ref 1.7–7)
NEUTROPHILS NFR BLD AUTO: 46.6 % (ref 42.7–76)
NRBC BLD AUTO-RTO: 0 /100 WBC (ref 0–0.2)
PLATELET # BLD AUTO: 290 10*3/MM3 (ref 140–450)
PMV BLD AUTO: 9.1 FL (ref 6–12)
POTASSIUM SERPL-SCNC: 4 MMOL/L (ref 3.5–5.2)
POTASSIUM SERPL-SCNC: 4.1 MMOL/L (ref 3.5–5.2)
RBC # BLD AUTO: 4.86 10*6/MM3 (ref 3.77–5.28)
SODIUM SERPL-SCNC: 139 MMOL/L (ref 136–145)
SODIUM SERPL-SCNC: 139 MMOL/L (ref 136–145)
WBC NRBC COR # BLD AUTO: 7.62 10*3/MM3 (ref 3.4–10.8)

## 2025-02-27 PROCEDURE — 80048 BASIC METABOLIC PNL TOTAL CA: CPT

## 2025-02-27 PROCEDURE — 99233 SBSQ HOSP IP/OBS HIGH 50: CPT | Performed by: INTERNAL MEDICINE

## 2025-02-27 PROCEDURE — 83735 ASSAY OF MAGNESIUM: CPT | Performed by: INTERNAL MEDICINE

## 2025-02-27 PROCEDURE — 25010000002 ONDANSETRON PER 1 MG: Performed by: STUDENT IN AN ORGANIZED HEALTH CARE EDUCATION/TRAINING PROGRAM

## 2025-02-27 PROCEDURE — 85025 COMPLETE CBC W/AUTO DIFF WBC: CPT

## 2025-02-27 RX ORDER — CARBIDOPA AND LEVODOPA 25; 100 MG/1; MG/1
1 TABLET ORAL EVERY 6 HOURS SCHEDULED
Status: DISCONTINUED | OUTPATIENT
Start: 2025-02-27 | End: 2025-02-27

## 2025-02-27 RX ORDER — FUROSEMIDE 10 MG/ML
40 INJECTION INTRAMUSCULAR; INTRAVENOUS EVERY 12 HOURS
Status: DISCONTINUED | OUTPATIENT
Start: 2025-02-27 | End: 2025-02-28

## 2025-02-27 RX ORDER — HYDROCODONE BITARTRATE AND ACETAMINOPHEN 7.5; 325 MG/1; MG/1
1 TABLET ORAL EVERY 4 HOURS PRN
Status: DISCONTINUED | OUTPATIENT
Start: 2025-02-27 | End: 2025-02-28 | Stop reason: HOSPADM

## 2025-02-27 RX ORDER — ALLOPURINOL 100 MG/1
100 TABLET ORAL DAILY
Status: DISCONTINUED | OUTPATIENT
Start: 2025-02-27 | End: 2025-02-27

## 2025-02-27 RX ORDER — MULTIPLE VITAMINS W/ MINERALS TAB 9MG-400MCG
1 TAB ORAL EVERY 24 HOURS
Status: DISCONTINUED | OUTPATIENT
Start: 2025-02-27 | End: 2025-02-28 | Stop reason: HOSPADM

## 2025-02-27 RX ORDER — MIRTAZAPINE 15 MG/1
30 TABLET, FILM COATED ORAL NIGHTLY
Status: DISCONTINUED | OUTPATIENT
Start: 2025-02-27 | End: 2025-02-28 | Stop reason: HOSPADM

## 2025-02-27 RX ORDER — HYDROXYZINE HYDROCHLORIDE 25 MG/1
25 TABLET, FILM COATED ORAL 3 TIMES DAILY PRN
Status: DISCONTINUED | OUTPATIENT
Start: 2025-02-27 | End: 2025-02-28 | Stop reason: HOSPADM

## 2025-02-27 RX ORDER — METOCLOPRAMIDE 5 MG/1
5 TABLET ORAL
Status: DISCONTINUED | OUTPATIENT
Start: 2025-02-27 | End: 2025-02-27

## 2025-02-27 RX ORDER — ALPRAZOLAM 0.5 MG
0.5 TABLET ORAL 3 TIMES DAILY PRN
Status: DISCONTINUED | OUTPATIENT
Start: 2025-02-27 | End: 2025-02-28 | Stop reason: HOSPADM

## 2025-02-27 RX ORDER — ARIPIPRAZOLE 10 MG/1
10 TABLET ORAL DAILY
Status: DISCONTINUED | OUTPATIENT
Start: 2025-02-27 | End: 2025-02-28 | Stop reason: HOSPADM

## 2025-02-27 RX ORDER — HYDRALAZINE HYDROCHLORIDE 25 MG/1
25 TABLET, FILM COATED ORAL 3 TIMES DAILY
Status: DISCONTINUED | OUTPATIENT
Start: 2025-02-27 | End: 2025-02-28 | Stop reason: HOSPADM

## 2025-02-27 RX ORDER — CARVEDILOL 25 MG/1
25 TABLET ORAL 2 TIMES DAILY
Status: DISCONTINUED | OUTPATIENT
Start: 2025-02-27 | End: 2025-02-28 | Stop reason: HOSPADM

## 2025-02-27 RX ORDER — CLONIDINE HYDROCHLORIDE 0.1 MG/1
0.1 TABLET ORAL EVERY 8 HOURS PRN
Status: DISCONTINUED | OUTPATIENT
Start: 2025-02-27 | End: 2025-02-28 | Stop reason: HOSPADM

## 2025-02-27 RX ORDER — ACETAMINOPHEN 325 MG/1
650 TABLET ORAL EVERY 6 HOURS PRN
Status: DISCONTINUED | OUTPATIENT
Start: 2025-02-27 | End: 2025-02-28 | Stop reason: HOSPADM

## 2025-02-27 RX ORDER — QUETIAPINE FUMARATE 100 MG/1
100 TABLET, FILM COATED ORAL NIGHTLY
Status: DISCONTINUED | OUTPATIENT
Start: 2025-02-27 | End: 2025-02-28 | Stop reason: HOSPADM

## 2025-02-27 RX ORDER — ASPIRIN 81 MG/1
81 TABLET ORAL DAILY
Status: DISCONTINUED | OUTPATIENT
Start: 2025-02-27 | End: 2025-02-28 | Stop reason: HOSPADM

## 2025-02-27 RX ORDER — PANTOPRAZOLE SODIUM 40 MG/1
40 TABLET, DELAYED RELEASE ORAL 2 TIMES DAILY
Status: DISCONTINUED | OUTPATIENT
Start: 2025-02-27 | End: 2025-02-28 | Stop reason: HOSPADM

## 2025-02-27 RX ORDER — AMLODIPINE BESYLATE 5 MG/1
10 TABLET ORAL DAILY
Status: DISCONTINUED | OUTPATIENT
Start: 2025-02-27 | End: 2025-02-28 | Stop reason: HOSPADM

## 2025-02-27 RX ORDER — CITALOPRAM HYDROBROMIDE 20 MG/1
40 TABLET ORAL DAILY
Status: DISCONTINUED | OUTPATIENT
Start: 2025-02-27 | End: 2025-02-28 | Stop reason: HOSPADM

## 2025-02-27 RX ORDER — ROSUVASTATIN CALCIUM 10 MG/1
40 TABLET, COATED ORAL NIGHTLY
Status: DISCONTINUED | OUTPATIENT
Start: 2025-02-27 | End: 2025-02-28 | Stop reason: HOSPADM

## 2025-02-27 RX ORDER — GABAPENTIN 300 MG/1
300 CAPSULE ORAL 3 TIMES DAILY
Status: DISCONTINUED | OUTPATIENT
Start: 2025-02-27 | End: 2025-02-28 | Stop reason: HOSPADM

## 2025-02-27 RX ORDER — CARBIDOPA AND LEVODOPA 25; 100 MG/1; MG/1
1 TABLET ORAL 4 TIMES DAILY
Status: DISCONTINUED | OUTPATIENT
Start: 2025-02-27 | End: 2025-02-28

## 2025-02-27 RX ADMIN — ARIPIPRAZOLE 10 MG: 10 TABLET ORAL at 08:38

## 2025-02-27 RX ADMIN — ASPIRIN 81 MG: 81 TABLET, COATED ORAL at 10:46

## 2025-02-27 RX ADMIN — QUETIAPINE FUMARATE 100 MG: 100 TABLET ORAL at 20:41

## 2025-02-27 RX ADMIN — ROSUVASTATIN CALCIUM 40 MG: 10 TABLET, FILM COATED ORAL at 20:48

## 2025-02-27 RX ADMIN — ACETAMINOPHEN 650 MG: 325 TABLET, FILM COATED ORAL at 08:38

## 2025-02-27 RX ADMIN — PANTOPRAZOLE SODIUM 40 MG: 40 TABLET, DELAYED RELEASE ORAL at 08:38

## 2025-02-27 RX ADMIN — MIRTAZAPINE 30 MG: 15 TABLET, FILM COATED ORAL at 20:41

## 2025-02-27 RX ADMIN — GABAPENTIN 300 MG: 300 CAPSULE ORAL at 16:38

## 2025-02-27 RX ADMIN — ONDANSETRON 4 MG: 2 INJECTION INTRAMUSCULAR; INTRAVENOUS at 20:40

## 2025-02-27 RX ADMIN — GABAPENTIN 300 MG: 300 CAPSULE ORAL at 08:38

## 2025-02-27 RX ADMIN — Medication 1 TABLET: at 08:38

## 2025-02-27 RX ADMIN — CARBIDOPA AND LEVODOPA 1 TABLET: 25; 100 TABLET ORAL at 14:16

## 2025-02-27 RX ADMIN — Medication 10 ML: at 20:40

## 2025-02-27 RX ADMIN — GABAPENTIN 300 MG: 300 CAPSULE ORAL at 20:40

## 2025-02-27 RX ADMIN — CARBIDOPA AND LEVODOPA 1 TABLET: 25; 100 TABLET ORAL at 19:01

## 2025-02-27 RX ADMIN — CITALOPRAM HYDROBROMIDE 40 MG: 20 TABLET ORAL at 08:38

## 2025-02-27 RX ADMIN — HYDROCODONE BITARTRATE AND ACETAMINOPHEN 1 TABLET: 7.5; 325 TABLET ORAL at 20:41

## 2025-02-27 RX ADMIN — ALPRAZOLAM 0.5 MG: 0.5 TABLET ORAL at 20:41

## 2025-02-27 RX ADMIN — PANTOPRAZOLE SODIUM 40 MG: 40 TABLET, DELAYED RELEASE ORAL at 20:41

## 2025-02-27 NOTE — PROGRESS NOTES
Cardiology assessment and plan        Acute on chronic diastolic heart failure  Elevated abnormal proBNP secondary diastolic heart failure  Normal LV systolic function  Nonobstructive coronary artery disease  Chronic kidney disease  Hypertension  Hyperlipidemia  COPD  Volume status is improved  Patient clinically feels better  Blood pressure has improved  Denies any new complaints  No further episodes of hypotension  Blood pressure is improving  Volume status is improving  Discussed with patient and family at bedside  Tmax is 98.6 pulse is 89 respirations are 16 blood pressure is 134/78 sats are 90%  Sodium is 139 potassium is 4.0 creatinine is 1.7 worse than baseline creatinine 1.3 yesterday hemoglobin is 13.3  Home blood pressure readings are currently being hold  Diuretic therapy was discontinued secondary to worsening renal failure  Agree with nephrology recommendation of using hydralazine at home as a as needed medications in place of clonidine for elevated blood pressure  Consider restarting the home medications as the blood pressure tolerates  Discussed with the patient and family at bedside                          Subjective:      Encounter Date:02/25/2025        Subjective  Patient ID: Jasmine Cerda is a 64 y.o. female.     Chief Complaint: SOA, edema        HPI:  Jasmine Cerda is a 64 y.o. female chronic diastolic heart failure and non-obstructive CAD of the LAD per cath in 2023.  PMH includes labile HTN, HLD, COPD, and CKD stage 3.  She presents from her nephrology office with hypotension and hypervolemia and started on IV diuretics.  2D echo 2/2025 showed EF 56 to 60% with grade 1 diastolic dysfunction and mild AI.  Cardiology was consulted for CHF management.     Patient tells me that 3 weeks ago she was started on clonidine for hypertension but that her BP bottomed out at home.  She is also had trouble with her potassium fluctuating and has been off Bumex for approximately 6 weeks.  In this  time she has gained 15 pounds.  She complains of shortness of breath and lower extremity edema which she states have significantly improved since receiving iron IV diuretics in the hospital.  She reports good urine output.  She denies any chest pain.  I have noticed for sodas and a bag of chips on her bedside table.  She admits noncompliance with sodium restriction.        Medical History        Past Medical History:   Diagnosis Date    Anxiety      Breast cyst      CHF (congestive heart failure)      COPD (chronic obstructive pulmonary disease)      Coronary heart disease      Depression      Hyperlipidemia      Hypertension                 Surgical History         Past Surgical History:   Procedure Laterality Date    APPENDECTOMY        BREAST CYST ASPIRATION        CARDIAC CATHETERIZATION   2017     No Stents placed - [facility]    CARDIAC CATHETERIZATION N/A 2023     Procedure: Left Heart Cath possible PCI;  Surgeon: Cuauhtemoc Kwon MD;  Location: [facility] CATH INVASIVE LOCATION;  Service: Cardiovascular;  Laterality: N/A;    CERVICAL FUSION         C6-C7     SECTION         x 2    CHOLECYSTECTOMY        HYSTERECTOMY         partial               Social History   Social History            Socioeconomic History    Marital status:    Tobacco Use    Smoking status: Never    Smokeless tobacco: Never    Tobacco comments:       Passive Smoke: N   Vaping Use    Vaping status: Never Used   Substance and Sexual Activity    Alcohol use: No    Drug use: No    Sexual activity: Defer                  Family History   Problem Relation Age of Onset    Colon cancer Mother      Diabetes Father      Pulmonary embolism Brother      Heart failure Brother      Breast cancer Maternal Aunt              Allergies   No Known Allergies        Current Medications:   Scheduled Meds:  Scheduled Medication   furosemide, 40 mg, Intravenous, Q12H         Continuous Infusions:  Infusion Medications            Review of  "Systems   Constitutional: Negative for chills, decreased appetite and malaise/fatigue.   HENT:  Negative for congestion and nosebleeds.    Eyes:  Negative for blurred vision and double vision.   Cardiovascular:  Negative for chest pain, dyspnea on exertion, irregular heartbeat, leg swelling, near-syncope, orthopnea, palpitations and paroxysmal nocturnal dyspnea.   Respiratory:  Negative for cough and shortness of breath.    Hematologic/Lymphatic: Negative for adenopathy. Does not bruise/bleed easily.   Skin:  Negative for color change and rash.   Musculoskeletal:  Negative for back pain and joint pain.   Gastrointestinal:  Negative for bloating, abdominal pain, hematemesis and hematochezia.   Genitourinary:  Negative for flank pain and hematuria.   Neurological:  Negative for dizziness and focal weakness.   Psychiatric/Behavioral:  Negative for altered mental status. The patient does not have insomnia.       All other systems reviewed and are negative.           Objective:      Objective  /68 (BP Location: Right arm, Patient Position: Lying)   Pulse 86   Temp 98.3 °F (36.8 °C) (Oral)   Resp 15   Ht 157.5 cm (62\")   Wt 86.8 kg (191 lb 5.8 oz)   SpO2 93%   BMI 35.00 kg/m²         General: Well-developed in NAD.  Neuro: AAOx3. No gross deficits.  HEENT: Sclerae clear, no xanthelasmas.  CV: S1S2, RRR. No murmurs or gallops.  Neck thick unable to appreciate JVD.  Resp: Breathing is unlabored. Lungs CTA throughout.  GI: BS+.  Abdominal ascites.  Ext: Pedal pulses are palpable. Extremities are 1+ pitting BLE edema.  MS: moves all extremities, no weakness.  Skin: warm, dry.  Psych: calm and cooperative.                 Lab Review:            Results from last 7 days   Lab Units 02/26/25  0025 02/25/25  1457   SODIUM mmol/L 139 137   POTASSIUM mmol/L 3.9 4.8   CHLORIDE mmol/L 104 106   CO2 mmol/L 23.6 22.1   BUN mg/dL 20 21   CREATININE mg/dL 1.39* 1.35*   GLUCOSE mg/dL 95 90   CALCIUM mg/dL 10.2 10.0   AST " "(SGOT) U/L  --  25   ALT (SGPT) U/L  --  30            Results from last 7 days   Lab Units 02/25/25  1825 02/25/25  1457   CK TOTAL U/L  --  68   HSTROP T ng/L 13 12            Results from last 7 days   Lab Units 02/26/25  0025 02/25/25  1457   WBC 10*3/mm3 8.64 7.71   HEMOGLOBIN g/dL 12.1 11.8*   HEMATOCRIT % 37.7 36.9   PLATELETS 10*3/mm3 254 248                     Invalid input(s): \"LDLCALC\"       Results from last 7 days   Lab Units 02/25/25  1457   PROBNP pg/mL 3,724.0*           Results from last 7 days   Lab Units 02/25/25  1457   TSH uIU/mL 5.300*         Recent Radiology:  Imaging Results (Most Recent)         Procedure Component Value Units Date/Time     XR Chest 1 View [[id number]] Collected: 02/25/25 1538       Updated: 02/25/25 1543     Narrative:       XR CHEST 1 VW     Date of Exam: 2/25/2025 3:20 PM EST     Indication: sob     Comparison: Portable chest 6/2/2024     Findings:  Cardiomegaly unchanged from prior study. Negative for pneumothorax. No pleural effusion. Mild linear atelectasis/scarring at the left lung base. No discrete consolidation. No significant effusion. Osseous structures appear intact.        Impression:       Impression:  1. Mild linear left basilar atelectasis/scarring.  2. Stable cardiomegaly.              Electronically Signed: Boaz Jones MD    2/25/2025 3:41 PM EST    Workstation ID: [id number]     CT Abdomen Pelvis Without Contrast [[id number]] Collected: 02/25/25 1526       Updated: 02/25/25 1532     Narrative:       CT ABDOMEN PELVIS WO CONTRAST     Date of Exam: 2/25/2025 3:15 PM EST     Indication: eval for obstructive uropathy.     Comparison: Noncontrast CT of the abdomen pelvis performed on September 20, 2018     Technique: Axial CT images were obtained of the abdomen and pelvis without the administration of contrast. Sagittal and coronal reconstructions were performed.  Automated exposure control and iterative reconstruction methods were used.        Findings:   "   Lung Bases:  Mild bilateral dependent atelectasis. The heart is moderately enlarged.     Peritoneum:  No free intraperitoneal air or fluid.      Abdominal wall:  Unremarkable.     Liver:  Liver is normal in size and contour. No focal lesions.     Biliary/Gallbladder:  The gallbladder is surgically absent. The biliary tree is nondilated.     Pancreas:  Pancreas is within normal limits. There is no evidence of pancreatic mass or peripancreatic inflammatory changes.     Spleen:  Spleen is normal in size and contour.     Gastrointestinal/Mesentery:   No evidence of bowel obstruction or gross inflammatory changes. The appendix is not visualized. There are no secondary signs of acute appendicitis. There is moderate fecal retention.     Adrenals:  Adrenal glands are unremarkable.     Kidneys:  The kidneys are mildly to moderately atrophic. No evidence of nephrolithiasis. No evidence of hydronephrosis or significant perinephric fat stranding.     Bladder:   The urinary bladder is unremarkable.     Reproductive organs:    The patient is post hysterectomy.     Lymph Nodes:  No significant adenopathy is identified.      Vasculature:  Small scattered calcified plaques are visualized. The abdominal aorta is normal in caliber.     Osseous Structures:    No acute fracture or aggressive lesions. Mild to moderate multilevel lumbar spondylosis is visualized.           Impression:       Impression:  No evidence of nephrolithiasis or obstructive uropathy.     Mild to moderate bilateral renal atrophy.     Findings compatible with moderate constipation.

## 2025-02-27 NOTE — CASE MANAGEMENT/SOCIAL WORK
Continued Stay Note  [facility]     Patient Name: Jasmine Cerda  MRN: [id number]  Today's Date: 2/27/2025    Admit Date: 2/25/2025    Plan: From home with spouse   Discharge Plan       Row Name 02/27/25 1624       Plan    Plan Comments Redraw BMP in AM, if Creatinine stable then anticipate d/c. D/C Barriers: Cards/Neph following, Cr trending up               Expected Discharge Date and Time       Expected Discharge Date Expected Discharge Time    Feb 28, 2025        Marlene Richard RN     [phone]  [email or web address]

## 2025-02-27 NOTE — PROGRESS NOTES
"RENAL/KCC:     LOS: 1 day    Patient Care Team:  Eleni Miranda APRN as PCP - General (Nurse Practitioner)  Sivan Ken MD as Consulting Physician (Psychiatry)  CHASTITY Richardson DPM as Consulting Physician (Podiatry)  Cuauhtemoc Kwon MD as Consulting Physician (Cardiology)    Chief Complaint:      Subjective     Interval History:   Chart reviewed  Feeling well  BP stable  Asking about D/C    Objective     Vital Sign Min/Max for last 24 hours  Temp  Min: 97.7 °F (36.5 °C)  Max: 100.1 °F (37.8 °C)   BP  Min: 108/68  Max: 134/78   Pulse  Min: 83  Max: 93   Resp  Min: 15  Max: 21   SpO2  Min: 89 %  Max: 93 %   No data recorded   Weight  Min: 86.6 kg (191 lb)  Max: 86.6 kg (191 lb)     Flowsheet Rows      Flowsheet Row First Filed Value   Admission Height 157.5 cm (62\") Documented at 02/25/2025 1358   Admission Weight 87.5 kg (192 lb 14.4 oz) Documented at 02/25/2025 1356            I/O this shift:  In: 240 [P.O.:240]  Out: -   I/O last 3 completed shifts:  In: 600 [P.O.:600]  Out: 1100 [Urine:1100]    Physical Exam:  GEN: Awake, NAD  ENT: PERRL, EOMI, MMM  NECK: Supple, no JVD  CHEST: CTAB, no W/R/C  CV: RRR, no M/G/R  ABD: Soft, NT, +BS  SKIN: Warm and Dry  NEURO: CN's intact      WBC WBC   Date Value Ref Range Status   02/27/2025 7.62 3.40 - 10.80 10*3/mm3 Final   02/26/2025 8.64 3.40 - 10.80 10*3/mm3 Final   02/25/2025 7.71 3.40 - 10.80 10*3/mm3 Final      HGB Hemoglobin   Date Value Ref Range Status   02/27/2025 13.3 12.0 - 15.9 g/dL Final   02/26/2025 12.1 12.0 - 15.9 g/dL Final   02/25/2025 11.8 (L) 12.0 - 15.9 g/dL Final      HCT Hematocrit   Date Value Ref Range Status   02/27/2025 41.5 34.0 - 46.6 % Final   02/26/2025 37.7 34.0 - 46.6 % Final   02/25/2025 36.9 34.0 - 46.6 % Final      Platlets No results found for: \"LABPLAT\"   MCV MCV   Date Value Ref Range Status   02/27/2025 85.4 79.0 - 97.0 fL Final   02/26/2025 86.3 79.0 - 97.0 fL Final   02/25/2025 86.2 79.0 - 97.0 fL Final          Sodium " "Sodium   Date Value Ref Range Status   02/27/2025 139 136 - 145 mmol/L Final   02/26/2025 139 136 - 145 mmol/L Final   02/25/2025 137 136 - 145 mmol/L Final      Potassium Potassium   Date Value Ref Range Status   02/27/2025 4.0 3.5 - 5.2 mmol/L Final   02/26/2025 3.9 3.5 - 5.2 mmol/L Final   02/25/2025 4.8 3.5 - 5.2 mmol/L Final      Chloride Chloride   Date Value Ref Range Status   02/27/2025 100 98 - 107 mmol/L Final   02/26/2025 104 98 - 107 mmol/L Final   02/25/2025 106 98 - 107 mmol/L Final      CO2 CO2   Date Value Ref Range Status   02/27/2025 26.3 22.0 - 29.0 mmol/L Final   02/26/2025 23.6 22.0 - 29.0 mmol/L Final   02/25/2025 22.1 22.0 - 29.0 mmol/L Final      BUN BUN   Date Value Ref Range Status   02/27/2025 21 8 - 23 mg/dL Final   02/26/2025 20 8 - 23 mg/dL Final   02/25/2025 21 8 - 23 mg/dL Final      Creatinine Creatinine   Date Value Ref Range Status   02/27/2025 1.71 (H) 0.57 - 1.00 mg/dL Final   02/26/2025 1.39 (H) 0.57 - 1.00 mg/dL Final   02/25/2025 1.35 (H) 0.57 - 1.00 mg/dL Final      Calcium Calcium   Date Value Ref Range Status   02/27/2025 10.8 (H) 8.6 - 10.5 mg/dL Final   02/26/2025 10.2 8.6 - 10.5 mg/dL Final   02/25/2025 10.0 8.6 - 10.5 mg/dL Final      PO4 No results found for: \"CAPO4\"   Albumin Albumin   Date Value Ref Range Status   02/25/2025 4.3 3.5 - 5.2 g/dL Final      Magnesium Magnesium   Date Value Ref Range Status   02/27/2025 1.8 1.6 - 2.4 mg/dL Final      Uric Acid No results found for: \"URICACID\"        Results Review:     I reviewed the patient's new clinical results.    allopurinol, 100 mg, Oral, Daily  [Held by provider] amLODIPine, 10 mg, Oral, Daily  ARIPiprazole, 10 mg, Oral, Daily  aspirin, 81 mg, Oral, Daily  carbidopa-levodopa, 1 tablet, Oral, Q6H  [Held by provider] carvedilol, 25 mg, Oral, BID  citalopram, 40 mg, Oral, Daily  [Held by provider] furosemide, 40 mg, Intravenous, Q12H  gabapentin, 300 mg, Oral, TID  [Held by provider] hydrALAZINE, 25 mg, Oral, " TID  metoclopramide, 5 mg, Oral, TID With Meals  mirtazapine, 30 mg, Oral, Nightly  multivitamin with minerals, 1 tablet, Oral, Q24H  pantoprazole, 40 mg, Oral, BID  QUEtiapine, 100 mg, Oral, Nightly  rosuvastatin, 40 mg, Oral, Nightly      Medication Review: Reviewed    Assessment & Plan     1) CKD3  2) Hypotension  3) Edema  4) Diastolic CHF     PLAN: Edema much improved.  Cr up slightly at 1.7 after IV diuresis.  Hold Lasix this morning.  OK for D/C on Lasix 40 mg PO daily along with KCl 20 meq daily.  Would continue to hold BP meds for now.  Discussed with patient that she could use her Hydralazine prn.  Will arrange close outpatient follow-up.        Clarke Balderas MD  [facility]  02/27/25  09:10 EST

## 2025-02-27 NOTE — PLAN OF CARE
Problem: Adult Inpatient Plan of Care  Goal: Plan of Care Review  Outcome: Progressing  Flowsheets (Taken 2/27/2025 5747)  Progress: improving  Plan of Care Reviewed With: patient  Goal: Patient-Specific Goal (Individualized)  Outcome: Progressing  Goal: Absence of Hospital-Acquired Illness or Injury  Outcome: Progressing  Intervention: Identify and Manage Fall Risk  Recent Flowsheet Documentation  Taken 2/27/2025 0843 by Kerry Saavedra, RN  Safety Promotion/Fall Prevention: safety round/check completed  Intervention: Prevent Skin Injury  Recent Flowsheet Documentation  Taken 2/27/2025 0843 by Kerry Saavedra, RN  Body Position: position changed independently  Goal: Optimal Comfort and Wellbeing  Outcome: Progressing  Intervention: Provide Person-Centered Care  Recent Flowsheet Documentation  Taken 2/27/2025 0843 by Kerry Saavedra, RN  Trust Relationship/Rapport:   care explained   questions answered  Goal: Readiness for Transition of Care  Outcome: Progressing   Goal Outcome Evaluation:  Plan of Care Reviewed With: patient        Progress: improving

## 2025-02-27 NOTE — PROGRESS NOTES
[facility] MEDICINE SERVICE  DAILY PROGRESS NOTE    NAME: Jasmine Cerda  : 1960  MRN: [id number]      LOS: 1 day     PROVIDER OF SERVICE: Jennifer Cox PA-C    Chief Complaint: Acute exacerbation of CHF (congestive heart failure)    Subjective:     Interval History:  History taken from: patient    Patient reports feeling significantly better since admission.  She reports bilateral lower extremity edema improved.  She denies chest pain, shortness of breath.  She is inquiring about discharge.        Review of Systems:   Review of Systems   Constitutional:  Negative for chills and fever.   Respiratory:  Negative for shortness of breath.    Cardiovascular:  Negative for chest pain, palpitations and leg swelling.   Gastrointestinal:  Negative for abdominal pain, nausea and vomiting.   All other systems reviewed and are negative.      Objective:     Vital Signs  Temp:  [97.7 °F (36.5 °C)-100.1 °F (37.8 °C)] 98.7 °F (37.1 °C)  Heart Rate:  [83-93] 89  Resp:  [15-21] 17  BP: (108-134)/(62-78) 134/78   Body mass index is 34.93 kg/m².    Physical Exam  Physical Exam  General: awake, alert, in NAD  Neck: Supple  Chest: CTA bilat.  CV: S1S2 nl. RRR  Abd: Soft, NTND. +BS  Ext: No c/c/e.        Diagnostic Data    Results from last 7 days   Lab Units 25  0052 25  0025 25  1457   WBC 10*3/mm3 7.62   < > 7.71   HEMOGLOBIN g/dL 13.3   < > 11.8*   HEMATOCRIT % 41.5   < > 36.9   PLATELETS 10*3/mm3 290   < > 248   GLUCOSE mg/dL 94   < > 90   CREATININE mg/dL 1.71*   < > 1.35*   BUN mg/dL 21   < > 21   SODIUM mmol/L 139   < > 137   POTASSIUM mmol/L 4.0   < > 4.8   AST (SGOT) U/L  --   --  25   ALT (SGPT) U/L  --   --  30   ALK PHOS U/L  --   --  86   BILIRUBIN mg/dL  --   --  0.3   ANION GAP mmol/L 12.7   < > 8.9    < > = values in this interval not displayed.       XR Chest 1 View    Result Date: 2025  Impression: 1. Mild linear left basilar atelectasis/scarring. 2. Stable cardiomegaly.  Electronically Signed: Boaz Jones MD  2/25/2025 3:41 PM EST  Workstation ID: [id number]    CT Abdomen Pelvis Without Contrast    Result Date: 2/25/2025  Impression: No evidence of nephrolithiasis or obstructive uropathy. Mild to moderate bilateral renal atrophy. Findings compatible with moderate constipation. Electronically Signed: Zachariah Adams MD  2/25/2025 3:30 PM EST  Workstation ID: [id number]       I reviewed the patient's new clinical results.    Assessment/Plan:     Active and Resolved Problems  Active Hospital Problems    Diagnosis  POA    **Acute exacerbation of CHF (congestive heart failure) [I50.9]  Yes      Resolved Hospital Problems   No resolved problems to display.       Volume overload  Acute exacerbation of CHF  Chronic kidney disease  Hypothyroidism  Hypertension      Repeat TTE 2/25/2025 with EF 56 to 60%, mild concentric hypertrophy.  Further diuresis with Lasix has been held.  Mild elevation in creatinine at 1.7.  Nephrology following recommendations for discharge with Lasix 40 mg p.o. daily with KCl 20 mEq daily.  Patient was noted intermittent hypotension.  Antihypertensives has been held.  Instructed to use hydralazine as needed for hypertension per nephrology.  Cardiology following.  Repeat BMP in the morning, if creatinine at baseline, may be discharged home.        VTE Prophylaxis:  Mechanical VTE prophylaxis orders are present.             Disposition Planning:     Barriers to Discharge: Elevated creatinine  Anticipated Date of Discharge: 2/28/2025  Place of Discharge: home      Time: 35 minutes     Code Status and Medical Interventions: CPR (Attempt to Resuscitate); Full Support   Ordered at: 02/25/25 1728     Code Status (Patient has no pulse and is not breathing):    CPR (Attempt to Resuscitate)     Medical Interventions (Patient has pulse or is breathing):    Full Support       Signature: Electronically signed by Jennifer Cox PA-C, 02/27/25, 09:32 EST.  [facility]  Team

## 2025-02-28 ENCOUNTER — READMISSION MANAGEMENT (OUTPATIENT)
Dept: CALL CENTER | Facility: HOSPITAL | Age: 65
End: 2025-02-28
Payer: MEDICARE

## 2025-02-28 VITALS
DIASTOLIC BLOOD PRESSURE: 77 MMHG | HEIGHT: 62 IN | TEMPERATURE: 97.9 F | WEIGHT: 191 LBS | HEART RATE: 109 BPM | RESPIRATION RATE: 20 BRPM | OXYGEN SATURATION: 94 % | BODY MASS INDEX: 35.15 KG/M2 | SYSTOLIC BLOOD PRESSURE: 120 MMHG

## 2025-02-28 LAB
ANION GAP SERPL CALCULATED.3IONS-SCNC: 11.7 MMOL/L (ref 5–15)
BASOPHILS # BLD AUTO: 0.07 10*3/MM3 (ref 0–0.2)
BASOPHILS NFR BLD AUTO: 1 % (ref 0–1.5)
BUN SERPL-MCNC: 21 MG/DL (ref 8–23)
BUN/CREAT SERPL: 13.5 (ref 7–25)
CALCIUM SPEC-SCNC: 10.4 MG/DL (ref 8.6–10.5)
CHLORIDE SERPL-SCNC: 99 MMOL/L (ref 98–107)
CO2 SERPL-SCNC: 26.3 MMOL/L (ref 22–29)
CREAT SERPL-MCNC: 1.56 MG/DL (ref 0.57–1)
DEPRECATED RDW RBC AUTO: 39.9 FL (ref 37–54)
EGFRCR SERPLBLD CKD-EPI 2021: 36.5 ML/MIN/1.73
EOSINOPHIL # BLD AUTO: 0.29 10*3/MM3 (ref 0–0.4)
EOSINOPHIL NFR BLD AUTO: 4.1 % (ref 0.3–6.2)
ERYTHROCYTE [DISTWIDTH] IN BLOOD BY AUTOMATED COUNT: 12.8 % (ref 12.3–15.4)
GLUCOSE SERPL-MCNC: 89 MG/DL (ref 65–99)
HCT VFR BLD AUTO: 38.8 % (ref 34–46.6)
HGB BLD-MCNC: 12.6 G/DL (ref 12–15.9)
IMM GRANULOCYTES # BLD AUTO: 0.01 10*3/MM3 (ref 0–0.05)
IMM GRANULOCYTES NFR BLD AUTO: 0.1 % (ref 0–0.5)
LYMPHOCYTES # BLD AUTO: 3.2 10*3/MM3 (ref 0.7–3.1)
LYMPHOCYTES NFR BLD AUTO: 45.3 % (ref 19.6–45.3)
MCH RBC QN AUTO: 27.9 PG (ref 26.6–33)
MCHC RBC AUTO-ENTMCNC: 32.5 G/DL (ref 31.5–35.7)
MCV RBC AUTO: 86 FL (ref 79–97)
MONOCYTES # BLD AUTO: 0.67 10*3/MM3 (ref 0.1–0.9)
MONOCYTES NFR BLD AUTO: 9.5 % (ref 5–12)
NEUTROPHILS NFR BLD AUTO: 2.83 10*3/MM3 (ref 1.7–7)
NEUTROPHILS NFR BLD AUTO: 40 % (ref 42.7–76)
NRBC BLD AUTO-RTO: 0 /100 WBC (ref 0–0.2)
PLATELET # BLD AUTO: 262 10*3/MM3 (ref 140–450)
PMV BLD AUTO: 9.2 FL (ref 6–12)
POTASSIUM SERPL-SCNC: 3.7 MMOL/L (ref 3.5–5.2)
RBC # BLD AUTO: 4.51 10*6/MM3 (ref 3.77–5.28)
SODIUM SERPL-SCNC: 137 MMOL/L (ref 136–145)
WBC NRBC COR # BLD AUTO: 7.07 10*3/MM3 (ref 3.4–10.8)

## 2025-02-28 PROCEDURE — 80048 BASIC METABOLIC PNL TOTAL CA: CPT

## 2025-02-28 PROCEDURE — 85025 COMPLETE CBC W/AUTO DIFF WBC: CPT

## 2025-02-28 RX ORDER — FUROSEMIDE 40 MG/1
40 TABLET ORAL DAILY
Qty: 30 TABLET | Refills: 0 | Status: SHIPPED | OUTPATIENT
Start: 2025-02-28 | End: 2025-03-30

## 2025-02-28 RX ORDER — POTASSIUM CHLORIDE 750 MG/1
20 CAPSULE, EXTENDED RELEASE ORAL DAILY
Qty: 60 CAPSULE | Refills: 0 | Status: SHIPPED | OUTPATIENT
Start: 2025-02-28 | End: 2025-03-30

## 2025-02-28 RX ADMIN — ARIPIPRAZOLE 10 MG: 10 TABLET ORAL at 09:46

## 2025-02-28 RX ADMIN — PANTOPRAZOLE SODIUM 40 MG: 40 TABLET, DELAYED RELEASE ORAL at 09:46

## 2025-02-28 RX ADMIN — GABAPENTIN 300 MG: 300 CAPSULE ORAL at 09:46

## 2025-02-28 RX ADMIN — Medication 1 TABLET: at 09:46

## 2025-02-28 RX ADMIN — ASPIRIN 81 MG: 81 TABLET, COATED ORAL at 09:45

## 2025-02-28 RX ADMIN — CITALOPRAM HYDROBROMIDE 40 MG: 20 TABLET ORAL at 09:46

## 2025-02-28 NOTE — PLAN OF CARE
Goal Outcome Evaluation:      Pt rested well this shift. VSS with no new complaints. Continuing to monitor for duration of this shift. Pt will DC home with spouse once medically stable.

## 2025-02-28 NOTE — PROGRESS NOTES
"RENAL/KCC:     LOS: 2 days    Patient Care Team:  Eleni Miranda APRN as PCP - General (Nurse Practitioner)  Sivan Ken MD as Consulting Physician (Psychiatry)  CHASTITY Richardson DPM as Consulting Physician (Podiatry)  Cuauhtemoc Kwon MD as Consulting Physician (Cardiology)    Chief Complaint:  DENIS on CKD    Subjective     Interval History:   Chart reviewed  Feeling well  BP stable without meds  Asking about D/C    Objective     Vital Sign Min/Max for last 24 hours  Temp  Min: 98 °F (36.7 °C)  Max: 98.9 °F (37.2 °C)   BP  Min: 107/64  Max: 149/97   Pulse  Min: 87  Max: 115   Resp  Min: 13  Max: 24   SpO2  Min: 90 %  Max: 98 %   No data recorded   No data recorded     Flowsheet Rows      Flowsheet Row First Filed Value   Admission Height 157.5 cm (62\") Documented at 02/25/2025 1358   Admission Weight 87.5 kg (192 lb 14.4 oz) Documented at 02/25/2025 1356            No intake/output data recorded.  I/O last 3 completed shifts:  In: 1200 [P.O.:1200]  Out: 2700 [Urine:2700]    Physical Exam:  GEN: Awake, NAD  ENT: PERRL, EOMI, MMM  NECK: Supple, no JVD  CHEST: CTAB, no W/R/C  CV: RRR, no M/G/R  ABD: Soft, NT, +BS  SKIN: Warm and Dry  NEURO: CN's intact      WBC WBC   Date Value Ref Range Status   02/28/2025 7.07 3.40 - 10.80 10*3/mm3 Final   02/27/2025 7.62 3.40 - 10.80 10*3/mm3 Final   02/26/2025 8.64 3.40 - 10.80 10*3/mm3 Final   02/25/2025 7.71 3.40 - 10.80 10*3/mm3 Final      HGB Hemoglobin   Date Value Ref Range Status   02/28/2025 12.6 12.0 - 15.9 g/dL Final   02/27/2025 13.3 12.0 - 15.9 g/dL Final   02/26/2025 12.1 12.0 - 15.9 g/dL Final   02/25/2025 11.8 (L) 12.0 - 15.9 g/dL Final      HCT Hematocrit   Date Value Ref Range Status   02/28/2025 38.8 34.0 - 46.6 % Final   02/27/2025 41.5 34.0 - 46.6 % Final   02/26/2025 37.7 34.0 - 46.6 % Final   02/25/2025 36.9 34.0 - 46.6 % Final      Platlets No results found for: \"LABPLAT\"   MCV MCV   Date Value Ref Range Status   02/28/2025 86.0 79.0 - 97.0 fL " "Final   02/27/2025 85.4 79.0 - 97.0 fL Final   02/26/2025 86.3 79.0 - 97.0 fL Final   02/25/2025 86.2 79.0 - 97.0 fL Final          Sodium Sodium   Date Value Ref Range Status   02/28/2025 137 136 - 145 mmol/L Final   02/27/2025 139 136 - 145 mmol/L Final   02/27/2025 139 136 - 145 mmol/L Final   02/26/2025 139 136 - 145 mmol/L Final   02/25/2025 137 136 - 145 mmol/L Final      Potassium Potassium   Date Value Ref Range Status   02/28/2025 3.7 3.5 - 5.2 mmol/L Final   02/27/2025 4.1 3.5 - 5.2 mmol/L Final   02/27/2025 4.0 3.5 - 5.2 mmol/L Final   02/26/2025 3.9 3.5 - 5.2 mmol/L Final   02/25/2025 4.8 3.5 - 5.2 mmol/L Final      Chloride Chloride   Date Value Ref Range Status   02/28/2025 99 98 - 107 mmol/L Final   02/27/2025 100 98 - 107 mmol/L Final   02/27/2025 100 98 - 107 mmol/L Final   02/26/2025 104 98 - 107 mmol/L Final   02/25/2025 106 98 - 107 mmol/L Final      CO2 CO2   Date Value Ref Range Status   02/28/2025 26.3 22.0 - 29.0 mmol/L Final   02/27/2025 22.8 22.0 - 29.0 mmol/L Final   02/27/2025 26.3 22.0 - 29.0 mmol/L Final   02/26/2025 23.6 22.0 - 29.0 mmol/L Final   02/25/2025 22.1 22.0 - 29.0 mmol/L Final      BUN BUN   Date Value Ref Range Status   02/28/2025 21 8 - 23 mg/dL Final   02/27/2025 22 8 - 23 mg/dL Final   02/27/2025 21 8 - 23 mg/dL Final   02/26/2025 20 8 - 23 mg/dL Final   02/25/2025 21 8 - 23 mg/dL Final      Creatinine Creatinine   Date Value Ref Range Status   02/28/2025 1.56 (H) 0.57 - 1.00 mg/dL Final   02/27/2025 1.90 (H) 0.57 - 1.00 mg/dL Final   02/27/2025 1.71 (H) 0.57 - 1.00 mg/dL Final   02/26/2025 1.39 (H) 0.57 - 1.00 mg/dL Final   02/25/2025 1.35 (H) 0.57 - 1.00 mg/dL Final      Calcium Calcium   Date Value Ref Range Status   02/28/2025 10.4 8.6 - 10.5 mg/dL Final   02/27/2025 11.3 (H) 8.6 - 10.5 mg/dL Final   02/27/2025 10.8 (H) 8.6 - 10.5 mg/dL Final   02/26/2025 10.2 8.6 - 10.5 mg/dL Final   02/25/2025 10.0 8.6 - 10.5 mg/dL Final      PO4 No results found for: \"CAPO4\" " "  Albumin Albumin   Date Value Ref Range Status   02/25/2025 4.3 3.5 - 5.2 g/dL Final      Magnesium Magnesium   Date Value Ref Range Status   02/27/2025 1.8 1.6 - 2.4 mg/dL Final      Uric Acid No results found for: \"URICACID\"        Results Review:     I reviewed the patient's new clinical results.    [Held by provider] amLODIPine, 10 mg, Oral, Daily  ARIPiprazole, 10 mg, Oral, Daily  aspirin, 81 mg, Oral, Daily  carbidopa-levodopa, 1 tablet, Oral, 4x Daily  [Held by provider] carvedilol, 25 mg, Oral, BID  citalopram, 40 mg, Oral, Daily  gabapentin, 300 mg, Oral, TID  [Held by provider] hydrALAZINE, 25 mg, Oral, TID  mirtazapine, 30 mg, Oral, Nightly  multivitamin with minerals, 1 tablet, Oral, Q24H  pantoprazole, 40 mg, Oral, BID  QUEtiapine, 100 mg, Oral, Nightly  rosuvastatin, 40 mg, Oral, Nightly      Medication Review: Reviewed    Assessment & Plan     1) CKD3  2) Hypotension  3) Edema  4) Diastolic CHF     PLAN: Edema much improved.  Cr better at 1.5 = moving to baseline.  OK for D/C on Lasix 40 mg PO daily along with KCl 20 meq daily.  Would continue to hold BP meds for now as BP at goal without them.  Discussed with patient that she could use her Hydralazine prn +.  Will arrange close outpatient follow-up.        Clarke Balderas MD  [facility]  02/28/25  07:58 EST      "

## 2025-02-28 NOTE — DISCHARGE SUMMARY
"             [facility] Medicine Services  Discharge Summary    Date of Service: 2025  Patient Name: Jasmine Cerda  : 1960  MRN: [id number]    Date of Admission: 2025  Discharge Diagnosis: Acute exacerbation of CHF (congestive heart failure)  Date of Discharge: 2025  Primary Care Physician: Eleni Miranda APRN      Presenting Problem:   Acute exacerbation of CHF (congestive heart failure) [I50.9]  Acute on chronic congestive heart failure, unspecified heart failure type [I50.9]    Active and Resolved Hospital Problems:  Active Hospital Problems    Diagnosis POA    **Acute exacerbation of CHF (congestive heart failure) [I50.9] Yes      Resolved Hospital Problems   No resolved problems to display.         Hospital Course     HPI:      History of Present Illness: Jasmine Cerda is a 64 y.o. female with a CMH of anxiety, CHF, COPD, depression, hyperlipidemia, hypertension who presented to [facility] on 2025 with complaints of shortness of breath and lower extremity swelling.  Patient states approximately 3 weeks ago she developed hypertension and was placed on clonidine by primary care provider, patient reports recently she experienced an increase in lower extremity swelling as well as \"my blood pressure bottomed out\".  Clonidine was stopped, and patient was started on Bumex.  Patient states that the lower extremity swelling is persistent and has not improved.  Patient does endorse shortness of breath on exertion at this time as well.  Patient states over the last 3 weeks she feels as though she has gained approximately 15 to 17 pounds. Patient does have a history of congestive heart failure, last echo was performed in .     On ED evaluation, patient vital signs stable.  Labs remarkable for essentially unremarkable CBC, UA unremarkable, CMP with creatinine 1.35 which appears near baseline.  CK normal at 68, troponin 12.  proBNP elevated at 3724.  TSH abnormal at 5.300, " "free T4 pending.  Chest x-ray shows \"mild linear left basilar atelectasis or scarring.  Stable cardiomegaly\".  CT abdomen pelvis was performed showing \"no evidence of nephrolithiasis or obstructive uropathy, mild to moderate bilateral renal atrophy.  Findings compatible with moderate constipation\".  Patient was given 80 mg IV Lasix in ED. Hospitalist service to admit for further management.    Hospital Course:  Patient is a 64-year-old female with a prior history of chronic diastolic heart failure as well as nonobstructive coronary disease.  She was admitted with above.  Past medical history includes hypertension, hyperlipidemia, COPD as well as longstanding history of noncompliance.  She presented from her nephrology office with hypotension as well as hypervolemia.  Through this hospital course she was started on IV diuretics.  Recent 2D echocardiogram in February 2025 showed an EF of 60%.  Grade 1 diastolic dysfunction was noted.  Through this hospital course cardiology and nephrology services both were consulted.  Recommendation has been made given persistent hypotension for patient to be discharged on Lasix 40 mg daily in addition to an increased dose of potassium 20 meq equivalents daily.    Routine blood pressure medications including Coreg, Norvasc have both been discontinued.  Hydralazine will be used on a as needed basis.  This was discussed with patient in detail prior to discharge planning.  It is noted the patient has a longstanding history of noncompliance.    Extensive psychiatric history as well.  These medications were unchanged at time of discharge.    Feels otherwise well today.  She wishes to be discharged home.    Will follow-up with nephrology as an outpatient.      DISCHARGE Follow Up Recommendations for labs and diagnostics: BMP, CBC with nephrology in the next 2 to 3 weeks.  Blood pressure monitor has been advised to patient prior to discharge.  She is to record her blood pressure " measurements and document them as well as review them with her nephrologist and/or cardiologist at follow-up appointment.        Day of Discharge     Vital Signs:  Temp:  [97.9 °F (36.6 °C)-98.9 °F (37.2 °C)] 97.9 °F (36.6 °C)  Heart Rate:  [] 109  Resp:  [13-24] 20  BP: (107-149)/(64-97) 120/77    Physical Exam:  Physical Exam       Pertinent  and/or Most Recent Results     LAB RESULTS:      Lab 02/28/25  0516 02/27/25  0052 02/26/25  0025 02/25/25  1457   WBC 7.07 7.62 8.64 7.71   HEMOGLOBIN 12.6 13.3 12.1 11.8*   HEMATOCRIT 38.8 41.5 37.7 36.9   PLATELETS 262 290 254 248   NEUTROS ABS 2.83 3.55 5.10 4.02   IMMATURE GRANS (ABS) 0.01 0.02 0.02 0.02   LYMPHS ABS 3.20* 3.08 2.55 2.91   MONOS ABS 0.67 0.78 0.70 0.49   EOS ABS 0.29 0.13 0.19 0.21   MCV 86.0 85.4 86.3 86.2         Lab 02/28/25  0516 02/27/25  1420 02/27/25 0052 02/26/25 0025 02/25/25  1457   SODIUM 137 139 139 139 137   POTASSIUM 3.7 4.1 4.0 3.9 4.8   CHLORIDE 99 100 100 104 106   CO2 26.3 22.8 26.3 23.6 22.1   ANION GAP 11.7 16.2* 12.7 11.4 8.9   BUN 21 22 21 20 21   CREATININE 1.56* 1.90* 1.71* 1.39* 1.35*   EGFR 36.5* 28.8* 32.7* 41.9* 43.4*   GLUCOSE 89 96 94 95 90   CALCIUM 10.4 11.3* 10.8* 10.2 10.0   MAGNESIUM  --   --  1.8  --   --    TSH  --   --   --   --  5.300*         Lab 02/25/25  1457   TOTAL PROTEIN 6.3   ALBUMIN 4.3   GLOBULIN 2.0   ALT (SGPT) 30   AST (SGOT) 25   BILIRUBIN 0.3   ALK PHOS 86         Lab 02/25/25  1825 02/25/25  1457   PROBNP  --  3,724.0*   HSTROP T 13 12                 Brief Urine Lab Results  (Last result in the past 365 days)        Color   Clarity   Blood   Leuk Est   Nitrite   Protein   CREAT   Urine HCG        02/25/25 1448 Yellow   Clear   Negative   Negative   Negative   Negative                 Microbiology Results (last 10 days)       Procedure Component Value - Date/Time    COVID-19, FLU A/B, RSV PCR 1 HR TAT - Swab, Nasopharynx [[id number]]  (Normal) Collected: 02/25/25 1827    Lab Status: Final  result Specimen: Swab from Nasopharynx Updated: 02/25/25 1924     COVID19 Not Detected     Influenza A PCR Not Detected     Influenza B PCR Not Detected     RSV, PCR Not Detected            XR Chest 1 View    Result Date: 2/25/2025  Impression: Impression: 1. Mild linear left basilar atelectasis/scarring. 2. Stable cardiomegaly. Electronically Signed: Boaz Jones MD  2/25/2025 3:41 PM EST  Workstation ID: [id number]    CT Abdomen Pelvis Without Contrast    Result Date: 2/25/2025  Impression: Impression: No evidence of nephrolithiasis or obstructive uropathy. Mild to moderate bilateral renal atrophy. Findings compatible with moderate constipation. Electronically Signed: Zachariah Adams MD  2/25/2025 3:30 PM EST  Workstation ID: [id number]     Results for orders placed during the hospital encounter of 02/14/25    Duplex Carotid Ultrasound CAR    Interpretation Summary    Right internal carotid artery demonstrates a less than 50% stenosis.    Antegrade right vertebral flow.    Left internal carotid artery demonstrates a less than 50% stenosis.    Antegrade left vertebral flow.      Results for orders placed during the hospital encounter of 02/14/25    Duplex Carotid Ultrasound CAR    Interpretation Summary    Right internal carotid artery demonstrates a less than 50% stenosis.    Antegrade right vertebral flow.    Left internal carotid artery demonstrates a less than 50% stenosis.    Antegrade left vertebral flow.      Results for orders placed during the hospital encounter of 02/25/25    Adult Transthoracic Echo Complete w/ Color, Spectral and Contrast if necessary per protocol    Interpretation Summary    Left ventricular systolic function is normal. Calculated left ventricular EF = 57% Left ventricular ejection fraction appears to be 56 - 60%.    Left ventricular wall thickness is consistent with mild concentric hypertrophy.    Left ventricular diastolic function is consistent with (grade I) impaired relaxation.    The  "left atrial cavity is mildly dilated.    Estimated right ventricular systolic pressure from tricuspid regurgitation is normal (<35 mmHg).      Labs Pending at Discharge:  Pending Results       None            Procedures Performed           Consults:   Consults       Date and Time Order Name Status Description    2/26/2025 11:01 AM Inpatient Cardiology Consult Completed     2/26/2025  8:47 AM Inpatient Nephrology Consult Completed               Discharge Details        Discharge Medications        New Medications        Instructions Start Date   furosemide 40 MG tablet  Commonly known as: Lasix   40 mg, Oral, Daily      potassium chloride 10 MEQ CR capsule  Commonly known as: MICRO-K  Replaces: potassium chloride 10 MEQ CR tablet   20 mEq, Oral, Daily             Continue These Medications        Instructions Start Date   ALPRAZolam 0.5 MG tablet  Commonly known as: XANAX   0.5 mg, Oral, 3 Times Daily PRN, for anxiety      ARIPiprazole 10 MG tablet  Commonly known as: ABILIFY   10 mg, Oral, Daily      aspirin 81 MG EC tablet   0      B-D 3CC LUER-MAI SYR 23GX1\" 23G X 1\" 3 ML misc  Generic drug: Syringe/Needle (Disp)   USE 1 SYRINGE ONCE EVERY MONTH FOR  B12  INJECTION INTRAMUSCULARLY      Breztri Aerosphere 160-9-4.8 MCG/ACT aerosol inhaler  Generic drug: Budeson-Glycopyrrol-Formoterol   2 puffs, Inhalation, 2 Times Daily      citalopram 40 MG tablet  Commonly known as: CeleXA   40 mg, Oral, Every Morning      cloNIDine 0.1 MG tablet  Commonly known as: CATAPRES   0.1 mg, Oral, Every 8 Hours PRN      colchicine 0.6 MG tablet   TAKE 1 TABLET BY MOUTH TWICE DAILY AS NEEDED FOR  GOUT  FLARE  UP      cyanocobalamin 1000 MCG/ML injection   INJECT 1 ML INTO THE APPROPRIATE MUSCLE AS DIRECTED EVERY 28 DAYS      gabapentin 300 MG capsule  Commonly known as: NEURONTIN   300 mg, Oral, 3 Times Daily      HYDROcodone-acetaminophen 7.5-325 MG per tablet  Commonly known as: NORCO   1 tablet, Oral, Every 4 Hours PRN    "   hydrOXYzine 25 MG tablet  Commonly known as: ATARAX   25 mg, Oral, 3 Times Daily PRN      magnesium hydroxide 400 MG/5ML suspension  Commonly known as: MILK OF MAGNESIA   0      mirtazapine 30 MG tablet  Commonly known as: REMERON   30 mg, Oral, Every Night at Bedtime      Multivitamin Adult tablet tablet  Generic drug: multivitamin with minerals   1 tablet, Every 24 Hours      nitroglycerin 0.4 MG SL tablet  Commonly known as: NITROSTAT   0.4 mg, Sublingual, Every 5 Minutes PRN, Take no more than 3 doses in 15 minutes.      ondansetron ODT 4 MG disintegrating tablet  Commonly known as: ZOFRAN-ODT   DISSOLVE 1 TABLET IN MOUTH EVERY 8 HOURS AS NEEDED FOR NAUSEA FOR VOMITING      pantoprazole 40 MG EC tablet  Commonly known as: PROTONIX   40 mg, Oral, 2 Times Daily      QUEtiapine 100 MG tablet  Commonly known as: SEROquel   100 mg, Oral, Every Night at Bedtime      rosuvastatin 40 MG tablet  Commonly known as: CRESTOR   40 mg, Oral, Every Evening      vitamin D 1.25 MG (88542 UT) capsule capsule  Commonly known as: ERGOCALCIFEROL   50,000 Units, Oral, Weekly             Stop These Medications      allopurinol 100 MG tablet  Commonly known as: Zyloprim     amLODIPine 10 MG tablet  Commonly known as: NORVASC     carbidopa-levodopa  MG per tablet  Commonly known as: SINEMET     carvedilol 25 MG tablet  Commonly known as: COREG     hydrALAZINE 25 MG tablet  Commonly known as: APRESOLINE     potassium chloride 10 MEQ CR tablet  Replaced by: potassium chloride 10 MEQ CR capsule              No Known Allergies      Discharge Disposition:   Home or Self Care    Diet:  Hospital:  Diet Order   Procedures    Diet: Cardiac, Fluid Restriction (240 mL/tray); Low Sodium (2g); Other (Specify mL/day) (1800 cc/d); Fluid Consistency: Thin (IDDSI 0)         Discharge Activity:   As tolerated      CODE STATUS:  Code Status and Medical Interventions: CPR (Attempt to Resuscitate); Full Support   Ordered at: 02/25/25 8528     Code  Status (Patient has no pulse and is not breathing):    CPR (Attempt to Resuscitate)     Medical Interventions (Patient has pulse or is breathing):    Full Support         Future Appointments   Date Time Provider Department Center   4/29/2025  8:45 AM Eleni Miranda APRN [facility]   6/11/2025  8:30 AM Sivan Ken MD [facility]           Time spent on Discharge including face to face service: 35 minutes    Signature: Electronically signed by Aldair Felder MD, 02/28/25, 09:50 EST.  [facility] Hospitalist Team

## 2025-02-28 NOTE — OUTREACH NOTE
Prep Survey      Flowsheet Row Responses   [facility] [place] patient discharged from? [facility]   Is LACE score < 7 ? No   Eligibility [facility]   Date of Admission 02/26/25   Date of Discharge 02/28/25   Discharge Disposition Home or Self Care   Discharge diagnosis Acute exacerbation of CHF (congestive heart failure)   Does the patient have one of the following disease processes/diagnoses(primary or secondary)? CHF   Does the patient have Home health ordered? No   Is there a DME ordered? No   Prep survey completed? Yes            Malou ESQUIVEL - Registered Nurse

## 2025-03-03 ENCOUNTER — TRANSITIONAL CARE MANAGEMENT TELEPHONE ENCOUNTER (OUTPATIENT)
Dept: CALL CENTER | Facility: HOSPITAL | Age: 65
End: 2025-03-03
Payer: MEDICARE

## 2025-03-03 NOTE — CASE MANAGEMENT/SOCIAL WORK
Case Management Discharge Note      Final Note: Home.    Selected Continued Care - Discharged on 2/28/2025 Admission date: 2/25/2025 - Discharge disposition: Home or Self Care       Transportation Services  Private: Car    Final Discharge Disposition Code: 01 - home or self-care

## 2025-03-03 NOTE — OUTREACH NOTE
Call Center TCM Note      Flowsheet Row Responses   [facility] patient discharged from? [facility]   Does the patient have one of the following disease processes/diagnoses(primary or secondary)? CHF   TCM attempt successful? Yes   Call start time 1508   Call end time 1511   Discharge diagnosis Acute exacerbation of CHF (congestive heart failure)   Person spoke with today (if not patient) and relationship pt   Meds reviewed with patient/caregiver? Yes   Is the patient having any side effects they believe may be caused by any medication additions or changes? No   Does the patient have all medications ordered at discharge? Yes   Is the patient taking all medications as directed (includes completed medication regime)? Yes   Comments Cardiology 3/7/25   Does the patient have an appointment with their PCP within 7-14 days of discharge? Yes  [3/11/2025 at 2:30 PM]   Psychosocial issues? No   Did the patient receive a copy of their discharge instructions? Yes   Nursing interventions Reviewed instructions with patient   What is the patient's perception of their health status since discharge? Improving   Nursing interventions Nurse provided patient education   Is the patient able to teach back signs and symptoms of worsening condition? (i.e. weight gain, shortness of air, etc.) Yes   If the patient is a current smoker, are they able to teach back resources for cessation? Not a smoker   Notified Case Management Education issues   Is the patient able to teach back Heart Failure Zones? Yes   CHF Zone this Call Green Zone   Green Zone Patient reports doing well, No new or worsening shortness of breath, Physical activity level is normal for you, No new swelling -  feet, ankles and legs look normal for you, Weight check stable, No chest pain   Green Zone Interventions Daily weight check, Meds as directed, Low sodium diet, Follow up visits planned   TCM call completed? Yes   Wrap up additional comments Pt states she has gained 2  lbs since being at home. Pt verbalized knowing when to report wt gain to cardiologist. Reviewed AVS/meds with pt. Pt verified cardiology, PCP fu appts   Call end time 1511   Would this patient benefit from a Referral to [facility] Social Work? No   Is the patient interested in additional calls from an ambulatory ? No             Swetha Saucedo RN    3/3/2025, 15:13 EST

## 2025-03-06 RX ORDER — ONDANSETRON 4 MG/1
4 TABLET, ORALLY DISINTEGRATING ORAL EVERY 8 HOURS PRN
Qty: 60 TABLET | Refills: 0 | Status: SHIPPED | OUTPATIENT
Start: 2025-03-06

## 2025-03-07 ENCOUNTER — OFFICE VISIT (OUTPATIENT)
Dept: CARDIOLOGY | Facility: CLINIC | Age: 65
End: 2025-03-07
Payer: MEDICARE

## 2025-03-07 VITALS
HEIGHT: 62 IN | OXYGEN SATURATION: 96 % | WEIGHT: 192.2 LBS | SYSTOLIC BLOOD PRESSURE: 130 MMHG | HEART RATE: 118 BPM | DIASTOLIC BLOOD PRESSURE: 87 MMHG | BODY MASS INDEX: 35.37 KG/M2

## 2025-03-07 DIAGNOSIS — I50.33 ACUTE ON CHRONIC DIASTOLIC CONGESTIVE HEART FAILURE: Primary | ICD-10-CM

## 2025-03-07 DIAGNOSIS — E78.2 MIXED HYPERLIPIDEMIA: ICD-10-CM

## 2025-03-07 DIAGNOSIS — I50.32 CHRONIC DIASTOLIC HEART FAILURE: ICD-10-CM

## 2025-03-07 DIAGNOSIS — I25.10 CORONARY ARTERY DISEASE INVOLVING NATIVE HEART, UNSPECIFIED VESSEL OR LESION TYPE, UNSPECIFIED WHETHER ANGINA PRESENT: ICD-10-CM

## 2025-03-07 DIAGNOSIS — I10 PRIMARY HYPERTENSION: ICD-10-CM

## 2025-03-07 RX ORDER — METOPROLOL SUCCINATE 25 MG/1
25 TABLET, EXTENDED RELEASE ORAL DAILY
Qty: 90 TABLET | Refills: 3 | Status: SHIPPED | OUTPATIENT
Start: 2025-03-07

## 2025-03-07 NOTE — PROGRESS NOTES
Cardiology Office Visit      Encounter Date:  03/07/2025    Patient ID:   Jasmine Cerda is a 64 y.o. female.    Reason For Followup:  Shortness of breath  Coronary artery disease    Brief Clinical History:  Dear Lety Gillis APRN    I had the pleasure of seeing Jasmine Cerda today. As you are well aware, this is a 63 y.o. female  had a prior history of known coronary artery disease prior moderate disease in the LAD that is currently being treated medically and also history of congestive heart failure secondary to diastolic dysfunction.   Echocardiogram with  normal LV systolic function and evidence of diastolic dysfunction          Interval History:  Recent hospitalization with symptomatic hypotension and congestive heart failure and worsening renal failure requiring multiple adjustments to the medications  Home blood pressure readings are optimal  No further episodes of hypotension  Volume status is improved  Patient is tolerating with diuretic therapy  Heart rate has been elevated      Assessment & Plan    Impressions:  Coronary artery disease  Hypertension  Hyperlipidemia  Obesity  Mild obstructive coronary artery disease  Normal left-sided filling pressures  Diastolic dysfunction  Normal LV systolic function  Chronic kidney disease stage III  Sinus tachycardia secondary to abrupt withdrawal of the beta-blockers  Diastolic heart failure  Chronic renal insufficiency    Recommendations:  Continue current medical therapy  Medical records from recent hospitalization reviewed and discussed with patient  Echocardiogram with normal LV systolic function  Labs medications reviewed and discussed with patient  Continue current dose of diuretic therapy and potassium supplements and follow-up with primary care physician and nephrology closely  Monitor renal function closely  Patient is scheduled to have labs done next week  Start patient back on Toprol-XL 25 mg p.o. once a day to help with the sinus tachycardia  Close  "monitoring of blood pressure at home  Multiple antihypertensive medications from before were discontinued secondary to symptomatic hypotension episode  Current medications include Crestor 40 mg p.o. once a day Lasix 40 mg p.o. once a day potassium 20 mill colons p.o. once a day patient is on Toprol-XL 25 mg p.o. once a day  Home blood pressure readings are optimal  Recheck lipids in few months  Continue close monitoring  Follow-up with nephrology and primary care physician as scheduled  Follow-up in office in 6 months        Vitals:  Vitals:    03/07/25 0819   BP: 130/87   Pulse: 118   SpO2: 96%   Weight: 87.2 kg (192 lb 3.2 oz)   Height: 157.5 cm (62\")       Physical Exam:    General: Alert, cooperative, no distress, appears stated age  Head:  Normocephalic, atraumatic, mucous membranes moist  Eyes:  Conjunctiva/corneas clear, EOM's intact     Neck:  Supple,  no adenopathy;      Lungs: Clear to auscultation bilaterally, no wheezes rhonchi rales are noted  Chest wall: No tenderness  Heart::  Regular rate and rhythm, S1 and S2 normal, no murmur, rub or gallop  Abdomen: Soft, non-tender, nondistended bowel sounds active  Extremities: No cyanosis, clubbing, or edema  Pulses: 2+ and symmetric all extremities  Skin:  No rashes or lesions  Neuro/psych: A&O x3. CN II through XII are grossly intact with appropriate affect              Lab Results   Component Value Date    GLUCOSE 89 02/28/2025    BUN 21 02/28/2025    CREATININE 1.56 (H) 02/28/2025    EGFR 36.5 (L) 02/28/2025    BCR 13.5 02/28/2025    K 3.7 02/28/2025    CO2 26.3 02/28/2025    CALCIUM 10.4 02/28/2025    ALBUMIN 4.3 02/25/2025    BILITOT 0.3 02/25/2025    AST 25 02/25/2025    ALT 30 02/25/2025     Results for orders placed during the hospital encounter of 02/25/25    Adult Transthoracic Echo Complete w/ Color, Spectral and Contrast if necessary per protocol    Interpretation Summary    Left ventricular systolic function is normal. Calculated left ventricular " EF = 57% Left ventricular ejection fraction appears to be 56 - 60%.    Left ventricular wall thickness is consistent with mild concentric hypertrophy.    Left ventricular diastolic function is consistent with (grade I) impaired relaxation.    The left atrial cavity is mildly dilated.    Estimated right ventricular systolic pressure from tricuspid regurgitation is normal (<35 mmHg).     Results for orders placed during the hospital encounter of 06/22/23    Cardiac Catheterization/Vascular Study    Conclusion  Table formatting from the original result was not included.  Cardiac Catheterization Operative Report    Jasmine Cerda  [id number]  6/22/2023  @PCP@    She underwent cardiac catheterization.    Indications for the procedure include: abnormal stress test.    Procedure Details:  The risks, benefits, complications, treatment options, and expected outcomes were discussed with the patient. The patient and/or family concurred with the proposed plan, giving informed consent.    After informed consent the patient was brought to the cath lab after appropriate IV hydration was begun and oral premedication was given. She was further sedated with fentanyl. She was prepped and draped in the usual manner. Using the modified Seldinger access technique, a 6 Estonian sheath was placed in the femoral artery. A left heart catheterization with coronary arteriography was performed. Findings are discussed below.    After the procedure was completed, sedation was stopped and the sheaths and catheters were all removed. Hemostasis was achieved per established hospital protocols.    Conscious sedation:  Conscious sedation was performed according to protocol.  I supervised and directed an independent trained observer with the assistance of monitoring the patient's level of consciousness and physiologic status throughout the procedure.  Intraoperative service time was 60 minutes.    Findings:    Hemodynamics Central aortic pressure systolic  155 diastolic 78 with a mean pressure 108 mmHg  LV end-diastolic pressure of 16 mmHg  There was no gradient across the aortic valve on the pullback of the pigtail catheter  Left Main Large-caliber vessel angiographically normal bifurcates into left anterior descending and left circumflex arteries  RCA Large-caliber dominant vessel angiographically normal  Right coronary artery provides a large caliber PDA branch that is angiographically normal and moderate caliber PLB branches angiographically normal  LAD Large-caliber vessel proximal angiographic 10 to 20% stenosis otherwise angiographically rest of the LAD and diagonal branches are normal  LAD provides 2 small to moderate-sized diagonal branches that are angiographically normal  Circ Large-caliber vessel angiographically normal  Left circumflex artery provides a large caliber marginal branch that is angiographically normal  LV Normal LV systolic function normal wall motion estimate LV ejection fraction of 65%  Coronary Dominance Right coronary artery    Estimated Blood Loss:  Minimal    Specimens: None    Complications:  None; patient tolerated the procedure well.    Disposition: PACU - hemodynamically stable.    Condition: stable    Impressions:  Mild obstructive coronary artery disease involving the LAD otherwise normal coronaries  Normal LV systolic function  Stable LV ejection fraction of 65%  Normal left-sided filling pressures    Recommendations:  Optimize medical therapy  Test results reviewed and discussed patient and family     Lab Results   Component Value Date    CHOL 146 01/21/2025    TRIG 110 01/21/2025    HDL 67 (H) 01/21/2025    LDL 59 01/21/2025      Results for orders placed during the hospital encounter of 05/23/23    Stress Test With Myocardial Perfusion One Day    Interpretation Summary    Abnormal myocardial perfusion study    Lexiscan myocardial perfusion study shows a moderate to large size moderate to severe intensity reversible ischemia  involving the anteroapical wall and LV apex    Left ventricular ejection fraction is normal (Calculated EF = 59%).    Impressions are consistent with a high risk study.    Clinical correlation is recommended   Results for orders placed in visit on 11/15/17    CARDIOVASCULAR STUDIES - CONVERTED    Narrative  ECHO REPORT      Imported By: Casandra Kebede 1/18/2018 10:29:55 AM    _____________________________________________________________________    External Attachment:    Type: Image  Comment:  External Document      Signed before import by Cuauhtemoc Kwon MD  Filed automatically on 01/18/2018 at 10:30 AM  ________________________________________________________________________      Disclaimer: Converted Note message may not contain all data elements that existed in the legacy source system. Please see [facility] System for the original note details.           Objective:          Allergies:  No Known Allergies    Medication Review:     Current Outpatient Medications:     ALPRAZolam (XANAX) 0.5 MG tablet, Take 1 tablet by mouth 3 (Three) Times a Day As Needed for Anxiety. for anxiety, Disp: 90 tablet, Rfl: 3    ARIPiprazole (ABILIFY) 10 MG tablet, Take 1 tablet by mouth Daily., Disp: 90 tablet, Rfl: 1    aspirin 81 MG EC tablet, 0, Disp: , Rfl:     Budeson-Glycopyrrol-Formoterol (Breztri Aerosphere) 160-9-4.8 MCG/ACT aerosol inhaler, Inhale 2 puffs 2 (Two) Times a Day., Disp: 2 each, Rfl: 0    citalopram (CeleXA) 40 MG tablet, Take 1 tablet by mouth Every Morning., Disp: 90 tablet, Rfl: 1    cloNIDine (CATAPRES) 0.1 MG tablet, Take 1 tablet by mouth Every 8 (Eight) Hours As Needed for High Blood Pressure (for systolic over 160)., Disp: 90 tablet, Rfl: 0    colchicine 0.6 MG tablet, TAKE 1 TABLET BY MOUTH TWICE DAILY AS NEEDED FOR  GOUT  FLARE  UP, Disp: 30 tablet, Rfl: 2    cyanocobalamin 1000 MCG/ML injection, INJECT 1 ML INTO THE APPROPRIATE MUSCLE AS DIRECTED EVERY 28 DAYS, Disp: 3 mL, Rfl: 3     "furosemide (Lasix) 40 MG tablet, Take 1 tablet by mouth Daily for 30 days., Disp: 30 tablet, Rfl: 0    gabapentin (NEURONTIN) 300 MG capsule, Take 1 capsule by mouth 3 (Three) Times a Day., Disp: 270 capsule, Rfl: 1    HYDROcodone-acetaminophen (NORCO) 7.5-325 MG per tablet, Take 1 tablet by mouth Every 4 (Four) Hours As Needed for Severe Pain., Disp: 5 tablet, Rfl: 0    hydrOXYzine (ATARAX) 25 MG tablet, Take 1 tablet by mouth 3 (Three) Times a Day As Needed for Anxiety., Disp: 90 tablet, Rfl: 3    magnesium hydroxide (MILK OF MAGNESIA) 400 MG/5ML suspension, 0, Disp: , Rfl:     mirtazapine (REMERON) 30 MG tablet, Take 1 tablet by mouth every night at bedtime., Disp: 90 tablet, Rfl: 1    Multiple Vitamins-Minerals (MULTIVITAMIN ADULT) tablet, Take 1 tablet by mouth Daily. With Omega XL, Disp: , Rfl:     nitroglycerin (NITROSTAT) 0.4 MG SL tablet, Place 1 tablet under the tongue Every 5 (Five) Minutes As Needed for Chest Pain. Take no more than 3 doses in 15 minutes., Disp: 25 tablet, Rfl: 2    ondansetron ODT (ZOFRAN-ODT) 4 MG disintegrating tablet, DISSOLVE 1 TABLET IN MOUTH EVERY 8 HOURS AS NEEDED FOR NAUSEA AND VOMITING, Disp: 60 tablet, Rfl: 0    pantoprazole (PROTONIX) 40 MG EC tablet, Take 1 tablet by mouth 2 (Two) Times a Day., Disp: 180 tablet, Rfl: 1    potassium chloride (MICRO-K) 10 MEQ CR capsule, Take 2 capsules by mouth Daily for 30 days., Disp: 60 capsule, Rfl: 0    QUEtiapine (SEROquel) 100 MG tablet, Take 1 tablet by mouth every night at bedtime., Disp: 90 tablet, Rfl: 1    rosuvastatin (CRESTOR) 40 MG tablet, Take 1 tablet by mouth Every Evening., Disp: 90 tablet, Rfl: 1    Syringe/Needle, Disp, (B-D 3CC LUER-MAI SYR 23GX1\") 23G X 1\" 3 ML misc, USE 1 SYRINGE ONCE EVERY MONTH FOR  B12  INJECTION INTRAMUSCULARLY, Disp: 12 each, Rfl: 0    vitamin D (ERGOCALCIFEROL) 1.25 MG (78818 UT) capsule capsule, Take 1 capsule by mouth 1 (One) Time Per Week., Disp: 15 capsule, Rfl: 3    Family History:  Family " History   Problem Relation Age of Onset    Colon cancer Mother     Diabetes Father     Pulmonary embolism Brother     Heart failure Brother     Breast cancer Maternal Aunt        Past Medical History:  Past Medical History:   Diagnosis Date    Anxiety     Breast cyst     CHF (congestive heart failure)     COPD (chronic obstructive pulmonary disease)     Coronary heart disease     Depression     Hyperlipidemia     Hypertension        Past surgical History:  Past Surgical History:   Procedure Laterality Date    APPENDECTOMY      BREAST CYST ASPIRATION      CARDIAC CATHETERIZATION  2017    No Stents placed - [facility]    CARDIAC CATHETERIZATION N/A 2023    Procedure: Left Heart Cath possible PCI;  Surgeon: Cuauhtemoc Kwon MD;  Location: [facility] CATH INVASIVE LOCATION;  Service: Cardiovascular;  Laterality: N/A;    CERVICAL FUSION      C6-C7     SECTION      x 2    CHOLECYSTECTOMY      HYSTERECTOMY      partial       Social History:  Social History     Socioeconomic History    Marital status:    Tobacco Use    Smoking status: Never    Smokeless tobacco: Never    Tobacco comments:     Passive Smoke: N   Vaping Use    Vaping status: Never Used   Substance and Sexual Activity    Alcohol use: No    Drug use: No    Sexual activity: Defer       Review of Systems:  The following systems were reviewed as they relate to the cardiovascular system: Constitutional, Eyes, ENT, Cardiovascular, Respiratory, Gastrointestinal, Integumentary, Neurological, Psychiatric, Hematologic, Endocrine, Musculoskeletal, and Genitourinary. The pertinent cardiovascular findings are reported above with all other cardiovascular points within those systems being negative.    Diagnostic Study Review:     Current Electrocardiogram:    ECG 12 Lead    Date/Time: 3/7/2025 9:05 AM  Performed by: Cuauhtemoc Kwon MD    Authorized by: Cuauhtemoc Kwon MD  Comparison: compared with previous ECG   Similar to previous  ECG  Rhythm: sinus tachycardia  Rate: normal  BPM: 109  Conduction: conduction normal  ST Segments: ST segments normal  T Waves: T waves normal  QRS axis: normal    Clinical impression: normal ECG                NOTE: The following portions of the patient's history were reviewed and updated this visit as appropriate: allergies, current medications, past family history, past medical history, past social history, past surgical history and problem list.

## 2025-03-11 ENCOUNTER — READMISSION MANAGEMENT (OUTPATIENT)
Dept: CALL CENTER | Facility: HOSPITAL | Age: 65
End: 2025-03-11
Payer: MEDICARE

## 2025-03-11 ENCOUNTER — OFFICE VISIT (OUTPATIENT)
Dept: FAMILY MEDICINE CLINIC | Facility: CLINIC | Age: 65
End: 2025-03-11
Payer: MEDICARE

## 2025-03-11 VITALS
BODY MASS INDEX: 35.04 KG/M2 | DIASTOLIC BLOOD PRESSURE: 82 MMHG | HEART RATE: 78 BPM | TEMPERATURE: 98.1 F | SYSTOLIC BLOOD PRESSURE: 128 MMHG | OXYGEN SATURATION: 97 % | WEIGHT: 190.4 LBS | HEIGHT: 62 IN

## 2025-03-11 DIAGNOSIS — I10 PRIMARY HYPERTENSION: ICD-10-CM

## 2025-03-11 DIAGNOSIS — J44.9 CHRONIC OBSTRUCTIVE PULMONARY DISEASE, UNSPECIFIED COPD TYPE: ICD-10-CM

## 2025-03-11 DIAGNOSIS — I50.32 CHRONIC DIASTOLIC HEART FAILURE: Primary | ICD-10-CM

## 2025-03-11 DIAGNOSIS — N18.32 STAGE 3B CHRONIC KIDNEY DISEASE: ICD-10-CM

## 2025-03-11 DIAGNOSIS — J06.9 VIRAL UPPER RESPIRATORY ILLNESS: ICD-10-CM

## 2025-03-11 LAB
BILIRUB UR QL STRIP: NEGATIVE
CLARITY UR: CLEAR
COLOR UR: YELLOW
EXPIRATION DATE: NORMAL
FLUAV AG UPPER RESP QL IA.RAPID: NOT DETECTED
FLUBV AG UPPER RESP QL IA.RAPID: NOT DETECTED
GLUCOSE UR STRIP-MCNC: NEGATIVE MG/DL
HGB UR QL STRIP.AUTO: NEGATIVE
HOLD SPECIMEN: NORMAL
INTERNAL CONTROL: NORMAL
KETONES UR QL STRIP: NEGATIVE
LEUKOCYTE ESTERASE UR QL STRIP.AUTO: NEGATIVE
Lab: NORMAL
NITRITE UR QL STRIP: NEGATIVE
PH UR STRIP.AUTO: 6.5 [PH] (ref 5–8)
PROT UR QL STRIP: NEGATIVE
SARS-COV-2 AG UPPER RESP QL IA.RAPID: NOT DETECTED
SP GR UR STRIP: 1.01 (ref 1–1.03)
UROBILINOGEN UR QL STRIP: NORMAL

## 2025-03-11 PROCEDURE — 83880 ASSAY OF NATRIURETIC PEPTIDE: CPT | Performed by: NURSE PRACTITIONER

## 2025-03-11 PROCEDURE — 81003 URINALYSIS AUTO W/O SCOPE: CPT | Performed by: NURSE PRACTITIONER

## 2025-03-11 PROCEDURE — 82570 ASSAY OF URINE CREATININE: CPT | Performed by: NURSE PRACTITIONER

## 2025-03-11 PROCEDURE — 80048 BASIC METABOLIC PNL TOTAL CA: CPT | Performed by: NURSE PRACTITIONER

## 2025-03-11 PROCEDURE — 36415 COLL VENOUS BLD VENIPUNCTURE: CPT | Performed by: NURSE PRACTITIONER

## 2025-03-11 PROCEDURE — 82043 UR ALBUMIN QUANTITATIVE: CPT | Performed by: NURSE PRACTITIONER

## 2025-03-11 RX ORDER — BENZONATATE 200 MG/1
200 CAPSULE ORAL 3 TIMES DAILY PRN
Qty: 30 CAPSULE | Refills: 0 | Status: SHIPPED | OUTPATIENT
Start: 2025-03-11

## 2025-03-11 NOTE — PROGRESS NOTES
Transitional Care Follow Up Visit  Subjective     Jasmine Cerda is a 64 y.o. female who presents for a transitional care management visit.    Within 48 business hours after discharge our office contacted her via telephone to coordinate her care and needs.      I reviewed and discussed the details of that call along with the discharge summary, hospital problems, inpatient lab results, inpatient diagnostic studies, and consultation reports with Jasmine.     Current outpatient and discharge medications have been reconciled for the patient.  Reviewed by: Eleni Miranda, YUE          2/28/2025     5:36 PM   Date of TCM Phone Call   Hospital [facility]   Date of Admission 2/26/2025   Date of Discharge 2/28/2025   Discharge Disposition Home or Self Care     Risk for Readmission (LACE) No data recorded      History of Present Illness   Course During Hospital Stay:      Patient with history of CHF, COPD, depression, hyperlipidemia, hypertension, CKD, presented to [facility] on 2/25/2025 for shortness of breath, BAY, hypotension, lower extremity edema and weight gain.  ProBNP was elevated at 3724, TSH 5.3, chest x-ray with left basilar atelectasis.  Echocardiogram grade 1 diastolic dysfunction, EF 60%.  She was given 80 mg IV Lasix in the ED.  Coreg and Norvasc were both discontinued, hydralazine recommended to use as needed.  Symptoms improved, BP stable and she was discharged home on furosemide 40 mg daily and potassium 20 mEq daily    She has orders from her nephrologist for labs today    She saw cardiology on 3/7/2025, Toprol XL 25 mg was resumed for sinus tachycardia    She reports a head cold/cough today with clear runny nose, reports soa since hospitalization, denies fever. Her  had a cold as well. She presents a BP log and 103/63 to 140/58, primarily 120s/60s.  She reports swelling has resolved, denies chest pain, palpitations.        The following portions of the patient's history were  "reviewed and updated as appropriate: allergies, current medications, past family history, past medical history, past social history, past surgical history, and problem list.    Review of Systems    Objective   /82 (BP Location: Left arm, Patient Position: Sitting, Cuff Size: Adult)   Pulse 78   Temp 98.1 °F (36.7 °C) (Oral)   Ht 157.5 cm (62\")   Wt 86.4 kg (190 lb 6.4 oz)   SpO2 97%   BMI 34.82 kg/m²   Physical Exam  Constitutional:       General: She is not in acute distress.     Appearance: Normal appearance. She is well-developed. She is not ill-appearing or diaphoretic.   HENT:      Head: Normocephalic.   Eyes:      Conjunctiva/sclera: Conjunctivae normal.      Pupils: Pupils are equal, round, and reactive to light.   Neck:      Thyroid: No thyromegaly.      Vascular: No JVD.   Cardiovascular:      Rate and Rhythm: Normal rate and regular rhythm.      Heart sounds: Normal heart sounds. No murmur heard.  Pulmonary:      Effort: Pulmonary effort is normal. No respiratory distress.      Breath sounds: Normal breath sounds. No wheezing, rhonchi or rales.   Chest:      Chest wall: No tenderness.   Abdominal:      General: Bowel sounds are normal. There is no distension.      Palpations: Abdomen is soft.      Tenderness: There is no abdominal tenderness.   Musculoskeletal:         General: No swelling or tenderness. Normal range of motion.      Cervical back: Normal range of motion and neck supple. No tenderness.   Lymphadenopathy:      Cervical: No cervical adenopathy.   Skin:     General: Skin is warm and dry.      Coloration: Skin is not jaundiced.      Findings: No erythema or rash.   Neurological:      General: No focal deficit present.      Mental Status: She is alert and oriented to person, place, and time. Mental status is at baseline.      Sensory: No sensory deficit.      Motor: Weakness and tremor present.      Gait: Gait abnormal.   Psychiatric:         Attention and Perception: Attention " normal.         Mood and Affect: Mood normal. Mood is not anxious or depressed.         Behavior: Behavior normal.         Thought Content: Thought content normal.         Judgment: Judgment normal.         Assessment & Plan   Diagnoses and all orders for this visit:    1. Chronic diastolic heart failure (Primary)  Comments:  Recheck BNP  Continue furosemide 40 mg daily and potassium 20 meq daily  Orders:  -     Basic Metabolic Panel  -     BNP    2. Primary hypertension  Comments:  BP/HR improved  continue Toprol 25 mg daily  use clonidine prn for sbp >160  Orders:  -     Basic Metabolic Panel  -     BNP    3. Stage 3b chronic kidney disease  Comments:  Labs today, will send to Dr. Balderas  Orders:  -     Basic Metabolic Panel  -     Urinalysis With Culture If Indicated - Urine, Clean Catch  -     Microalbumin / Creatinine Urine Ratio - Urine, Clean Catch  -     [place] Urine Culture Tube - Urine, Clean Catch    4. Chronic obstructive pulmonary disease, unspecified COPD type  Comments:  Continue Breztri 2 puffs twice daily and albuterol as needed    5. Viral upper respiratory illness  Comments:  Continue to treat symptoms  Refill Tessalon Perles to use prn  call in 3-4 days if wonb  covid/flu negative.  Orders:  -     POCT SARS-CoV-2 Antigen COLLETTE + Flu    Other orders  -     benzonatate (TESSALON) 200 MG capsule; Take 1 capsule by mouth 3 (Three) Times a Day As Needed for Cough.  Dispense: 30 capsule; Refill: 0           Follow-up with cardiology as directed    Return for Next scheduled follow up.          EMR Dragon transcription disclaimer:  Part of this note may be an electronic transcription/translation of spoken language to printed text using the Dragon Dictation System.

## 2025-03-11 NOTE — OUTREACH NOTE
CHF Week 2 Survey      Flowsheet Row Responses   [facility] patient discharged from? [facility]   Does the patient have one of the following disease processes/diagnoses(primary or secondary)? CHF   Week 2 attempt successful? No   Unsuccessful attempts Attempt 1  [Patient at appt with pcp currently]            Shahida CABALLERO - Registered Nurse

## 2025-03-11 NOTE — PROGRESS NOTES
Venipuncture Blood Specimen Collection  Venipuncture performed in right arm by Dot Siu MA with good hemostasis. Patient tolerated the procedure well without complications.   03/11/25   Dot Siu MA

## 2025-03-12 LAB
ALBUMIN UR-MCNC: 1.8 MG/DL
ANION GAP SERPL CALCULATED.3IONS-SCNC: 14.6 MMOL/L (ref 5–15)
BUN SERPL-MCNC: 21 MG/DL (ref 8–23)
BUN/CREAT SERPL: 17.1 (ref 7–25)
CALCIUM SPEC-SCNC: 10.3 MG/DL (ref 8.6–10.5)
CHLORIDE SERPL-SCNC: 101 MMOL/L (ref 98–107)
CO2 SERPL-SCNC: 25.4 MMOL/L (ref 22–29)
CREAT SERPL-MCNC: 1.23 MG/DL (ref 0.57–1)
CREAT UR-MCNC: 50.3 MG/DL
EGFRCR SERPLBLD CKD-EPI 2021: 48.6 ML/MIN/1.73
GLUCOSE SERPL-MCNC: 83 MG/DL (ref 65–99)
MICROALBUMIN/CREAT UR: 35.8 MG/G (ref 0–29)
NT-PROBNP SERPL-MCNC: 146 PG/ML (ref 0–900)
POTASSIUM SERPL-SCNC: 3.7 MMOL/L (ref 3.5–5.2)
SODIUM SERPL-SCNC: 141 MMOL/L (ref 136–145)

## 2025-03-13 ENCOUNTER — OFFICE (AMBULATORY)
Dept: URBAN - METROPOLITAN AREA CLINIC 64 | Facility: CLINIC | Age: 65
End: 2025-03-13
Payer: MEDICARE

## 2025-03-13 VITALS
SYSTOLIC BLOOD PRESSURE: 116 MMHG | WEIGHT: 190 LBS | HEIGHT: 63 IN | DIASTOLIC BLOOD PRESSURE: 78 MMHG | HEART RATE: 79 BPM

## 2025-03-13 DIAGNOSIS — K59.00 CONSTIPATION, UNSPECIFIED: ICD-10-CM

## 2025-03-13 DIAGNOSIS — R11.2 NAUSEA WITH VOMITING, UNSPECIFIED: ICD-10-CM

## 2025-03-13 DIAGNOSIS — R10.10 UPPER ABDOMINAL PAIN, UNSPECIFIED: ICD-10-CM

## 2025-03-13 DIAGNOSIS — Z86.0101 PERSONAL HISTORY OF ADENOMATOUS AND SERRATED COLON POLYPS: ICD-10-CM

## 2025-03-13 PROCEDURE — 99214 OFFICE O/P EST MOD 30 MIN: CPT | Performed by: NURSE PRACTITIONER

## 2025-03-13 RX ORDER — PROMETHAZINE HYDROCHLORIDE 25 MG/1
TABLET ORAL
Qty: 30 | Refills: 5 | Status: ACTIVE

## 2025-03-24 ENCOUNTER — TELEPHONE (OUTPATIENT)
Dept: FAMILY MEDICINE CLINIC | Facility: CLINIC | Age: 65
End: 2025-03-24
Payer: MEDICARE

## 2025-03-24 RX ORDER — PREDNISONE 10 MG/1
TABLET ORAL
Qty: 33 TABLET | Refills: 0 | Status: SHIPPED | OUTPATIENT
Start: 2025-03-24

## 2025-03-24 RX ORDER — AZITHROMYCIN 250 MG/1
TABLET, FILM COATED ORAL
Qty: 6 TABLET | Refills: 0 | Status: SHIPPED | OUTPATIENT
Start: 2025-03-24

## 2025-03-24 RX ORDER — ONDANSETRON 4 MG/1
4 TABLET, ORALLY DISINTEGRATING ORAL EVERY 8 HOURS PRN
Qty: 60 TABLET | Refills: 0 | Status: SHIPPED | OUTPATIENT
Start: 2025-03-24

## 2025-03-24 NOTE — TELEPHONE ENCOUNTER
Caller: Jasmine Cerda    Relationship: Self    Best call back number: [phone]     What medication are you requesting: ANTIBIOTIC AND STEROID    What are your current symptoms: COUGH AND CHEST CONGESTION    How long have you been experiencing symptoms: OVER A WEEK    Have you had these symptoms before:    [] Yes  [] No    Have you been treated for these symptoms before:   [] Yes  [] No    If a prescription is needed, what is your preferred pharmacy and phone number:  [facility] [address] [place], [place] - [address] [facility] [phone] [place] [phone]      Additional notes:

## 2025-04-01 ENCOUNTER — TELEPHONE (OUTPATIENT)
Dept: CARDIOLOGY | Facility: CLINIC | Age: 65
End: 2025-04-01
Payer: MEDICARE

## 2025-04-01 RX ORDER — FUROSEMIDE 40 MG/1
40 TABLET ORAL DAILY
Qty: 90 TABLET | Refills: 1 | Status: SHIPPED | OUTPATIENT
Start: 2025-04-01 | End: 2025-05-01

## 2025-04-01 NOTE — TELEPHONE ENCOUNTER
Called and spoke with the patient. Informing the patient that I have sent in the requested medication. To her pharmacy for her. Patient acknowledged this information.

## 2025-04-01 NOTE — TELEPHONE ENCOUNTER
Caller: Jasmine Cerda ELIGIO    Relationship: Self    Best call back number: [phone]    Requested Prescriptions:   Requested Prescriptions     Pending Prescriptions Disp Refills    furosemide (Lasix) 40 MG tablet 30 tablet 0     Sig: Take 1 tablet by mouth Daily for 30 days.        Pharmacy where request should be sent: [facility] [address] [place] [address] [phone] [place] [phone]      Last office visit with prescribing clinician: 3/7/2025   Last telemedicine visit with prescribing clinician: Visit date not found   Next office visit with prescribing clinician: 9/5/2025     Additional details provided by patient: PT IS OUT OF MEDICATION    Does the patient have less than a 3 day supply:  [x] Yes  [] No    Would you like a call back once the refill request has been completed: [] Yes [x] No    If the office needs to give you a call back, can they leave a voicemail: [] Yes [x] No    Kailash Wang Rep   04/01/25 12:24 EDT

## 2025-04-02 DIAGNOSIS — J44.1 CHRONIC OBSTRUCTIVE PULMONARY DISEASE WITH ACUTE EXACERBATION: ICD-10-CM

## 2025-04-03 RX ORDER — BUDESONIDE, GLYCOPYRROLATE, AND FORMOTEROL FUMARATE 160; 9; 4.8 UG/1; UG/1; UG/1
2 AEROSOL, METERED RESPIRATORY (INHALATION) 2 TIMES DAILY
Qty: 3 EACH | Refills: 3 | Status: SHIPPED | OUTPATIENT
Start: 2025-04-03

## 2025-04-14 RX ORDER — ONDANSETRON 4 MG/1
4 TABLET, ORALLY DISINTEGRATING ORAL EVERY 8 HOURS PRN
Qty: 60 TABLET | Refills: 0 | Status: SHIPPED | OUTPATIENT
Start: 2025-04-14

## 2025-04-21 RX ORDER — COLCHICINE 0.6 MG/1
TABLET ORAL
Qty: 90 TABLET | Refills: 0 | Status: SHIPPED | OUTPATIENT
Start: 2025-04-21

## 2025-04-22 ENCOUNTER — LAB (OUTPATIENT)
Dept: FAMILY MEDICINE CLINIC | Facility: CLINIC | Age: 65
End: 2025-04-22
Payer: MEDICARE

## 2025-04-22 DIAGNOSIS — I10 PRIMARY HYPERTENSION: ICD-10-CM

## 2025-04-22 DIAGNOSIS — N18.32 STAGE 3B CHRONIC KIDNEY DISEASE: Primary | ICD-10-CM

## 2025-04-22 DIAGNOSIS — E87.5 HYPERKALEMIA: ICD-10-CM

## 2025-04-22 DIAGNOSIS — E83.52 HYPERCALCEMIA: ICD-10-CM

## 2025-04-22 DIAGNOSIS — E55.9 VITAMIN D DEFICIENCY: ICD-10-CM

## 2025-04-22 DIAGNOSIS — E53.8 B12 DEFICIENCY: ICD-10-CM

## 2025-04-22 DIAGNOSIS — R25.1 TREMOR, UNSPECIFIED: ICD-10-CM

## 2025-04-22 PROCEDURE — 82607 VITAMIN B-12: CPT | Performed by: NURSE PRACTITIONER

## 2025-04-22 PROCEDURE — 82306 VITAMIN D 25 HYDROXY: CPT | Performed by: NURSE PRACTITIONER

## 2025-04-22 PROCEDURE — 83970 ASSAY OF PARATHORMONE: CPT | Performed by: NURSE PRACTITIONER

## 2025-04-22 PROCEDURE — 80053 COMPREHEN METABOLIC PANEL: CPT | Performed by: NURSE PRACTITIONER

## 2025-04-22 PROCEDURE — 84443 ASSAY THYROID STIM HORMONE: CPT | Performed by: NURSE PRACTITIONER

## 2025-04-22 PROCEDURE — 85027 COMPLETE CBC AUTOMATED: CPT | Performed by: NURSE PRACTITIONER

## 2025-04-22 PROCEDURE — 83880 ASSAY OF NATRIURETIC PEPTIDE: CPT | Performed by: NURSE PRACTITIONER

## 2025-04-22 PROCEDURE — 80061 LIPID PANEL: CPT | Performed by: NURSE PRACTITIONER

## 2025-04-22 PROCEDURE — 84550 ASSAY OF BLOOD/URIC ACID: CPT | Performed by: NURSE PRACTITIONER

## 2025-04-22 PROCEDURE — 36415 COLL VENOUS BLD VENIPUNCTURE: CPT | Performed by: NURSE PRACTITIONER

## 2025-04-23 LAB
25(OH)D3 SERPL-MCNC: 40.9 NG/ML (ref 30–100)
ALBUMIN SERPL-MCNC: 4.3 G/DL (ref 3.5–5.2)
ALBUMIN/GLOB SERPL: 1.8 G/DL
ALP SERPL-CCNC: 99 U/L (ref 39–117)
ALT SERPL W P-5'-P-CCNC: 44 U/L (ref 1–33)
ANION GAP SERPL CALCULATED.3IONS-SCNC: 9.2 MMOL/L (ref 5–15)
AST SERPL-CCNC: 28 U/L (ref 1–32)
BILIRUB SERPL-MCNC: 0.4 MG/DL (ref 0–1.2)
BUN SERPL-MCNC: 10 MG/DL (ref 8–23)
BUN/CREAT SERPL: 8.1 (ref 7–25)
CALCIUM SPEC-SCNC: 10.3 MG/DL (ref 8.6–10.5)
CHLORIDE SERPL-SCNC: 106 MMOL/L (ref 98–107)
CHOLEST SERPL-MCNC: 156 MG/DL (ref 0–200)
CO2 SERPL-SCNC: 25.8 MMOL/L (ref 22–29)
CREAT SERPL-MCNC: 1.24 MG/DL (ref 0.57–1)
DEPRECATED RDW RBC AUTO: 44.4 FL (ref 37–54)
EGFRCR SERPLBLD CKD-EPI 2021: 48.1 ML/MIN/1.73
ERYTHROCYTE [DISTWIDTH] IN BLOOD BY AUTOMATED COUNT: 14.4 % (ref 12.3–15.4)
GLOBULIN UR ELPH-MCNC: 2.4 GM/DL
GLUCOSE SERPL-MCNC: 93 MG/DL (ref 65–99)
HCT VFR BLD AUTO: 40 % (ref 34–46.6)
HDLC SERPL-MCNC: 79 MG/DL (ref 40–60)
HGB BLD-MCNC: 13.3 G/DL (ref 12–15.9)
LDLC SERPL CALC-MCNC: 58 MG/DL (ref 0–100)
LDLC/HDLC SERPL: 0.71 {RATIO}
MCH RBC QN AUTO: 28.4 PG (ref 26.6–33)
MCHC RBC AUTO-ENTMCNC: 33.3 G/DL (ref 31.5–35.7)
MCV RBC AUTO: 85.5 FL (ref 79–97)
NT-PROBNP SERPL-MCNC: 90.4 PG/ML (ref 0–900)
PLATELET # BLD AUTO: 226 10*3/MM3 (ref 140–450)
PMV BLD AUTO: 9.5 FL (ref 6–12)
POTASSIUM SERPL-SCNC: 4.7 MMOL/L (ref 3.5–5.2)
PROT SERPL-MCNC: 6.7 G/DL (ref 6–8.5)
PTH-INTACT SERPL-MCNC: 267 PG/ML (ref 15–65)
RBC # BLD AUTO: 4.68 10*6/MM3 (ref 3.77–5.28)
SODIUM SERPL-SCNC: 141 MMOL/L (ref 136–145)
TRIGL SERPL-MCNC: 106 MG/DL (ref 0–150)
TSH SERPL DL<=0.05 MIU/L-ACNC: 2.39 UIU/ML (ref 0.27–4.2)
URATE SERPL-MCNC: 7 MG/DL (ref 2.4–5.7)
VIT B12 BLD-MCNC: 779 PG/ML (ref 211–946)
VLDLC SERPL-MCNC: 19 MG/DL (ref 5–40)
WBC NRBC COR # BLD AUTO: 6.19 10*3/MM3 (ref 3.4–10.8)

## 2025-04-23 RX ORDER — CARBIDOPA AND LEVODOPA 25; 100 MG/1; MG/1
TABLET ORAL
Qty: 120 TABLET | Refills: 0 | Status: SHIPPED | OUTPATIENT
Start: 2025-04-23

## 2025-04-23 NOTE — TELEPHONE ENCOUNTER
Lmom requesting call back. Medication was stopped upon discharge from hospital. Need clarification on if pt is still taking or if this is an auto fill from pharmacy

## 2025-04-29 ENCOUNTER — OFFICE VISIT (OUTPATIENT)
Dept: FAMILY MEDICINE CLINIC | Facility: CLINIC | Age: 65
End: 2025-04-29
Payer: MEDICARE

## 2025-04-29 VITALS
WEIGHT: 203 LBS | BODY MASS INDEX: 37.36 KG/M2 | HEIGHT: 62 IN | SYSTOLIC BLOOD PRESSURE: 153 MMHG | HEART RATE: 98 BPM | OXYGEN SATURATION: 96 % | DIASTOLIC BLOOD PRESSURE: 100 MMHG

## 2025-04-29 DIAGNOSIS — R25.1 TREMOR, UNSPECIFIED: ICD-10-CM

## 2025-04-29 DIAGNOSIS — M10.9 GOUTY ARTHRITIS: ICD-10-CM

## 2025-04-29 DIAGNOSIS — J44.9 CHRONIC OBSTRUCTIVE PULMONARY DISEASE, UNSPECIFIED COPD TYPE: ICD-10-CM

## 2025-04-29 DIAGNOSIS — R79.89 ELEVATED PARATHYROID HORMONE: ICD-10-CM

## 2025-04-29 DIAGNOSIS — N18.31 STAGE 3A CHRONIC KIDNEY DISEASE: Primary | ICD-10-CM

## 2025-04-29 DIAGNOSIS — F43.12 CHRONIC POST-TRAUMATIC STRESS DISORDER (PTSD): Chronic | ICD-10-CM

## 2025-04-29 DIAGNOSIS — Z78.0 POSTMENOPAUSAL: ICD-10-CM

## 2025-04-29 DIAGNOSIS — I50.32 CHRONIC DIASTOLIC HEART FAILURE: ICD-10-CM

## 2025-04-29 DIAGNOSIS — Z12.31 BREAST CANCER SCREENING BY MAMMOGRAM: ICD-10-CM

## 2025-04-29 DIAGNOSIS — E78.2 MIXED HYPERLIPIDEMIA: ICD-10-CM

## 2025-04-29 DIAGNOSIS — I10 PRIMARY HYPERTENSION: ICD-10-CM

## 2025-04-29 RX ORDER — ALLOPURINOL 300 MG/1
300 TABLET ORAL DAILY
Qty: 90 TABLET | Refills: 1 | Status: SHIPPED | OUTPATIENT
Start: 2025-04-29

## 2025-04-29 RX ORDER — CARBIDOPA AND LEVODOPA 25; 100 MG/1; MG/1
TABLET ORAL
Qty: 360 TABLET | Refills: 1 | Status: SHIPPED | OUTPATIENT
Start: 2025-04-29

## 2025-04-29 RX ORDER — GABAPENTIN 300 MG/1
300 CAPSULE ORAL 3 TIMES DAILY
Qty: 270 CAPSULE | Refills: 1 | Status: SHIPPED | OUTPATIENT
Start: 2025-04-29

## 2025-04-29 RX ORDER — AMLODIPINE BESYLATE 10 MG/1
10 TABLET ORAL DAILY
COMMUNITY

## 2025-04-29 RX ORDER — PROMETHAZINE HYDROCHLORIDE 25 MG/1
TABLET ORAL
COMMUNITY
Start: 2025-04-24

## 2025-04-29 RX ORDER — COLCHICINE 0.6 MG/1
0.6 TABLET ORAL 2 TIMES DAILY
COMMUNITY

## 2025-04-29 RX ORDER — METOPROLOL SUCCINATE 50 MG/1
1 TABLET, EXTENDED RELEASE ORAL DAILY
COMMUNITY
Start: 2025-04-24 | End: 2025-04-29 | Stop reason: SDUPTHER

## 2025-04-29 RX ORDER — POTASSIUM CHLORIDE 750 MG/1
10 TABLET, EXTENDED RELEASE ORAL DAILY
COMMUNITY
End: 2025-05-05 | Stop reason: SDUPTHER

## 2025-04-29 RX ORDER — BUDESONIDE, GLYCOPYRROLATE, AND FORMOTEROL FUMARATE 160; 9; 4.8 UG/1; UG/1; UG/1
2 AEROSOL, METERED RESPIRATORY (INHALATION) 2 TIMES DAILY
COMMUNITY

## 2025-04-29 RX ORDER — ROSUVASTATIN CALCIUM 40 MG/1
40 TABLET, COATED ORAL EVERY EVENING
Qty: 90 TABLET | Refills: 1 | Status: SHIPPED | OUTPATIENT
Start: 2025-04-29

## 2025-04-29 RX ORDER — ALLOPURINOL 100 MG/1
100 TABLET ORAL DAILY
COMMUNITY
End: 2025-04-29 | Stop reason: SDUPTHER

## 2025-04-29 RX ORDER — PREDNISONE 10 MG/1
TABLET ORAL
Qty: 33 TABLET | Refills: 0 | Status: SHIPPED | OUTPATIENT
Start: 2025-04-29

## 2025-04-29 RX ORDER — METOPROLOL SUCCINATE 100 MG/1
100 TABLET, EXTENDED RELEASE ORAL DAILY
Qty: 90 TABLET | Refills: 1 | Status: SHIPPED | OUTPATIENT
Start: 2025-04-29

## 2025-04-29 NOTE — PROGRESS NOTES
Chief Complaint  Chief Complaint   Patient presents with    Follow-up     3 month  Recently seen by nephrology- [facility]  Updated med list today  Pt states doing well today    Gout     BLE x 2 weeks 10/10 on pain scale           Subjective          Jasmine Cerda presents to [facility] for   History of Present Illness    Stage IIIa CKD, patient is following with Dr. Balderas.  She reports having 1 episode of urinary hesitancy, resolved.  Dr. Balderas is managing BP medications    Chronic diastolic heart failure, hypertension, patient is following with cardiology, stable on meds and takes as directed, denies chest pain, headache, shortness of air, palpitations and swelling of extremities.     COPD, uses Breztri 2 puffs twice daily and albuterol as needed, she receives Breztri through patient assist program    Hyperlipidemia, The patient denies muscle aches, constipation, diarrhea, GI upset, fatigue, chest pain/pressure, exercise intolerance, dyspnea, palpitations, syncope and pedal edema.      Gout, taking allopurinol 100 mg daily, using colchicine currently for acute flare. She is on furosemide 40mg daily.     PTSD, anxiety, patient is taking gabapentin, Seroquel, mirtazapine and Celexa, uses hydroxyzine as needed following with psychiatry    B12 deficiency, taking B12 injections monthly          The following portions of the patient's history were reviewed and updated as appropriate: allergies, current medications, past family history, past medical history, past social history, past surgical history and problem list.    Past Medical History:   Diagnosis Date    Anxiety     Breast cyst     CHF (congestive heart failure)     COPD (chronic obstructive pulmonary disease)     Coronary heart disease     Depression     Hyperlipidemia     Hypertension      Past Surgical History:   Procedure Laterality Date    APPENDECTOMY      BREAST CYST ASPIRATION      CARDIAC CATHETERIZATION  07/2017    No  Stents placed - [facility]    CARDIAC CATHETERIZATION N/A 2023    Procedure: Left Heart Cath possible PCI;  Surgeon: Cuauhtemoc Kwon MD;  Location: [facility] CATH INVASIVE LOCATION;  Service: Cardiovascular;  Laterality: N/A;    CERVICAL FUSION      C6-C7     SECTION      x 2    CHOLECYSTECTOMY      HYSTERECTOMY      partial     Family History   Problem Relation Age of Onset    Colon cancer Mother     Diabetes Father     Pulmonary embolism Brother     Heart failure Brother     Breast cancer Maternal Aunt      Social History     Tobacco Use    Smoking status: Never    Smokeless tobacco: Never    Tobacco comments:     Passive Smoke: N   Substance Use Topics    Alcohol use: No       Current Outpatient Medications:     allopurinol (ZYLOPRIM) 300 MG tablet, Take 1 tablet by mouth Daily., Disp: 90 tablet, Rfl: 1    ALPRAZolam (XANAX) 0.5 MG tablet, Take 1 tablet by mouth 3 (Three) Times a Day As Needed for Anxiety. for anxiety, Disp: 90 tablet, Rfl: 3    amLODIPine (NORVASC) 10 MG tablet, Take 1 tablet by mouth Daily., Disp: , Rfl:     aspirin 81 MG EC tablet, 0, Disp: , Rfl:     Budeson-Glycopyrrol-Formoterol (Breztri Aerosphere) 160-9-4.8 MCG/ACT aerosol inhaler, Inhale 2 puffs 2 (Two) Times a Day., Disp: , Rfl:     carbidopa-levodopa (SINEMET)  MG per tablet, TAKE 1 TABLET BY MOUTH 4 TIMES DAILY TAKE 1 TABLET AT 6AM, 10AM, 2PM AND 7PM, Disp: 360 tablet, Rfl: 1    citalopram (CeleXA) 40 MG tablet, Take 1 tablet by mouth Every Morning., Disp: 90 tablet, Rfl: 1    cloNIDine (CATAPRES) 0.1 MG tablet, Take 1 tablet by mouth Every 8 (Eight) Hours As Needed for High Blood Pressure (for systolic over 160)., Disp: 90 tablet, Rfl: 0    colchicine 0.6 MG tablet, Take 1 tablet by mouth 2 (Two) Times a Day. As needed for gout flare, Disp: , Rfl:     cyanocobalamin 1000 MCG/ML injection, INJECT 1 ML INTO THE APPROPRIATE MUSCLE AS DIRECTED EVERY 28 DAYS, Disp: 3 mL, Rfl: 3    furosemide (Lasix) 40 MG tablet, Take 1  "tablet by mouth Daily for 30 days., Disp: 90 tablet, Rfl: 1    gabapentin (NEURONTIN) 300 MG capsule, Take 1 capsule by mouth 3 (Three) Times a Day., Disp: 270 capsule, Rfl: 1    hydrOXYzine (ATARAX) 25 MG tablet, Take 1 tablet by mouth 3 (Three) Times a Day As Needed for Anxiety., Disp: 90 tablet, Rfl: 3    metoprolol succinate XL (TOPROL-XL) 100 MG 24 hr tablet, Take 1 tablet by mouth Daily., Disp: 90 tablet, Rfl: 1    mirtazapine (REMERON) 30 MG tablet, Take 1 tablet by mouth every night at bedtime., Disp: 90 tablet, Rfl: 1    Multiple Vitamins-Minerals (MULTIVITAMIN ADULT) tablet, Take 1 tablet by mouth Daily. With Omega XL, Disp: , Rfl:     nitroglycerin (NITROSTAT) 0.4 MG SL tablet, Place 1 tablet under the tongue Every 5 (Five) Minutes As Needed for Chest Pain. Take no more than 3 doses in 15 minutes., Disp: 25 tablet, Rfl: 2    ondansetron ODT (ZOFRAN-ODT) 4 MG disintegrating tablet, DISSOLVE 1 TABLET IN MOUTH EVERY 8 HOURS AS NEEDED FOR NAUSEA AND VOMITING, Disp: 60 tablet, Rfl: 0    pantoprazole (PROTONIX) 40 MG EC tablet, Take 1 tablet by mouth 2 (Two) Times a Day., Disp: 180 tablet, Rfl: 1    potassium chloride 10 MEQ CR tablet, Take 1 tablet by mouth Daily. On Monday and Friday, Disp: , Rfl:     predniSONE (DELTASONE) 10 MG tablet, Take 5 po for 2 days, Take 4 for 2 days, then 3 for 2 days, then 2 for 2 days, then 1 for 5 days, then stop, Disp: 33 tablet, Rfl: 0    promethazine (PHENERGAN) 25 MG tablet, , Disp: , Rfl:     QUEtiapine (SEROquel) 100 MG tablet, Take 1 tablet by mouth every night at bedtime., Disp: 90 tablet, Rfl: 1    rosuvastatin (CRESTOR) 40 MG tablet, Take 1 tablet by mouth Every Evening., Disp: 90 tablet, Rfl: 1    Syringe/Needle, Disp, (B-D 3CC LUER-MAI SYR 23GX1\") 23G X 1\" 3 ML misc, USE 1 SYRINGE ONCE EVERY MONTH FOR  B12  INJECTION INTRAMUSCULARLY, Disp: 12 each, Rfl: 0    Objective   Vital Signs:   Vitals:    04/29/25 0840   BP: 153/100   BP Location: Right arm   Patient Position: " "Sitting   Cuff Size: Adult   Pulse: 98   SpO2: 96%   Weight: 92.1 kg (203 lb)   Height: 157.5 cm (62\")   PainSc: 10-Worst pain ever   PainLoc: Foot       Body mass index is 37.13 kg/m².    Physical Exam  Constitutional:       General: She is not in acute distress.     Appearance: Normal appearance. She is well-developed. She is not ill-appearing or diaphoretic.   HENT:      Head: Normocephalic.   Eyes:      Conjunctiva/sclera: Conjunctivae normal.      Pupils: Pupils are equal, round, and reactive to light.   Neck:      Thyroid: No thyromegaly.      Vascular: No JVD.   Cardiovascular:      Rate and Rhythm: Normal rate and regular rhythm.      Heart sounds: Normal heart sounds. No murmur heard.  Pulmonary:      Effort: Pulmonary effort is normal. No respiratory distress.      Breath sounds: Normal breath sounds. No wheezing or rhonchi.      Comments: Prolonged expiration  Abdominal:      General: Bowel sounds are normal. There is no distension.      Palpations: Abdomen is soft.      Tenderness: There is no abdominal tenderness.   Musculoskeletal:         General: Tenderness (B feet +gout) present. No swelling. Normal range of motion.      Cervical back: Normal range of motion and neck supple. No tenderness.   Lymphadenopathy:      Cervical: No cervical adenopathy.   Skin:     General: Skin is warm and dry.      Coloration: Skin is not jaundiced.      Findings: No erythema or rash.   Neurological:      General: No focal deficit present.      Mental Status: She is alert and oriented to person, place, and time. Mental status is at baseline.      Sensory: No sensory deficit.      Motor: No weakness.      Gait: Gait normal.   Psychiatric:         Mood and Affect: Mood normal.         Behavior: Behavior normal.         Thought Content: Thought content normal.         Judgment: Judgment normal.          Result Review :     No visits with results within 7 Day(s) from this visit.   Latest known visit with results is:   Orders " Only on 04/22/2025   Component Date Value Ref Range Status    25 Hydroxy, Vitamin D 04/22/2025 40.9  30.0 - 100.0 ng/ml Final    Uric Acid 04/22/2025 7.0 (H)  2.4 - 5.7 mg/dL Final    Total Cholesterol 04/22/2025 156  0 - 200 mg/dL Final    Triglycerides 04/22/2025 106  0 - 150 mg/dL Final    HDL Cholesterol 04/22/2025 79 (H)  40 - 60 mg/dL Final    LDL Cholesterol  04/22/2025 58  0 - 100 mg/dL Final    VLDL Cholesterol 04/22/2025 19  5 - 40 mg/dL Final    LDL/HDL Ratio 04/22/2025 0.71   Final    Glucose 04/22/2025 93  65 - 99 mg/dL Final    BUN 04/22/2025 10  8 - 23 mg/dL Final    Creatinine 04/22/2025 1.24 (H)  0.57 - 1.00 mg/dL Final    Sodium 04/22/2025 141  136 - 145 mmol/L Final    Potassium 04/22/2025 4.7  3.5 - 5.2 mmol/L Final    Chloride 04/22/2025 106  98 - 107 mmol/L Final    CO2 04/22/2025 25.8  22.0 - 29.0 mmol/L Final    Calcium 04/22/2025 10.3  8.6 - 10.5 mg/dL Final    Total Protein 04/22/2025 6.7  6.0 - 8.5 g/dL Final    Albumin 04/22/2025 4.3  3.5 - 5.2 g/dL Final    ALT (SGPT) 04/22/2025 44 (H)  1 - 33 U/L Final    AST (SGOT) 04/22/2025 28  1 - 32 U/L Final    Alkaline Phosphatase 04/22/2025 99  39 - 117 U/L Final    Total Bilirubin 04/22/2025 0.4  0.0 - 1.2 mg/dL Final    Globulin 04/22/2025 2.4  gm/dL Final    A/G Ratio 04/22/2025 1.8  g/dL Final    BUN/Creatinine Ratio 04/22/2025 8.1  7.0 - 25.0 Final    Anion Gap 04/22/2025 9.2  5.0 - 15.0 mmol/L Final    eGFR 04/22/2025 48.1 (L)  >60.0 mL/min/1.73 Final    WBC 04/22/2025 6.19  3.40 - 10.80 10*3/mm3 Final    RBC 04/22/2025 4.68  3.77 - 5.28 10*6/mm3 Final    Hemoglobin 04/22/2025 13.3  12.0 - 15.9 g/dL Final    Hematocrit 04/22/2025 40.0  34.0 - 46.6 % Final    MCV 04/22/2025 85.5  79.0 - 97.0 fL Final    MCH 04/22/2025 28.4  26.6 - 33.0 pg Final    MCHC 04/22/2025 33.3  31.5 - 35.7 g/dL Final    RDW 04/22/2025 14.4  12.3 - 15.4 % Final    RDW-SD 04/22/2025 44.4  37.0 - 54.0 fl Final    MPV 04/22/2025 9.5  6.0 - 12.0 fL Final    Platelets  04/22/2025 226  140 - 450 10*3/mm3 Final    TSH 04/22/2025 2.390  0.270 - 4.200 uIU/mL Final    PTH, Intact 04/22/2025 267.0 (H)  15.0 - 65.0 pg/mL Final    Vitamin B-12 04/22/2025 779  211 - 946 pg/mL Final    proBNP 04/22/2025 90.4  0.0 - 900.0 pg/mL Final                              Assessment and Plan    Diagnoses and all orders for this visit:    1. Stage 3a chronic kidney disease (Primary)    2. Tremor, unspecified  -     carbidopa-levodopa (SINEMET)  MG per tablet; TAKE 1 TABLET BY MOUTH 4 TIMES DAILY TAKE 1 TABLET AT 6AM, 10AM, 2PM AND 7PM  Dispense: 360 tablet; Refill: 1    3. Chronic post-traumatic stress disorder (PTSD)  -     gabapentin (NEURONTIN) 300 MG capsule; Take 1 capsule by mouth 3 (Three) Times a Day.  Dispense: 270 capsule; Refill: 1    4. Mixed hyperlipidemia  Comments:  Lipids improved, continue Crestor  Orders:  -     rosuvastatin (CRESTOR) 40 MG tablet; Take 1 tablet by mouth Every Evening.  Dispense: 90 tablet; Refill: 1    5. Chronic diastolic heart failure    6. Chronic obstructive pulmonary disease, unspecified COPD type    7. Gouty arthritis  Comments:  Increase allopurinol to 300 mg daily  Use colchicine as needed  Add prednisone taper    8. Breast cancer screening by mammogram  -     Mammo Screening Digital Tomosynthesis Bilateral With CAD; Future    9. Postmenopausal  -     DEXA Bone Density Axial; Future    10. Elevated parathyroid hormone    11. Primary hypertension  Comments:  Increase metoprolol to 100mg daily    Other orders  -     metoprolol succinate XL (TOPROL-XL) 100 MG 24 hr tablet; Take 1 tablet by mouth Daily.  Dispense: 90 tablet; Refill: 1  -     allopurinol (ZYLOPRIM) 300 MG tablet; Take 1 tablet by mouth Daily.  Dispense: 90 tablet; Refill: 1  -     predniSONE (DELTASONE) 10 MG tablet; Take 5 po for 2 days, Take 4 for 2 days, then 3 for 2 days, then 2 for 2 days, then 1 for 5 days, then stop  Dispense: 33 tablet; Refill: 0      Overall doing well  Continue  current med regimen with changes above, refills when needed  handicap placard paperwork completed for patient today  We will fax labs from 4/22/2025 to Dr. Balderas  Colonoscopy is scheduled      I spent 30 minutes caring for Jasmine Cerda on this date of service. This time includes time spent by me in the following activities: preparing for the visit, reviewing tests, performing a medically appropriate examination and/or evaluation , counseling and educating the patient/family/caregiver, ordering medications, tests, or procedures and documenting information in the medical record        Follow Up     Return in about 6 months (around 10/29/2025) for Recheck, Medicare Wellness. HTN panel, vit D, b12, PTH prior to appt .  Patient was given instructions and counseling regarding her condition or for health maintenance advice. Please see specific information pulled into the AVS if appropriate.        Part of this note may be an electronic transcription/translation of spoken language to printed text using the Dragon Dictation System

## 2025-04-30 RX ORDER — ONDANSETRON 4 MG/1
4 TABLET, ORALLY DISINTEGRATING ORAL EVERY 8 HOURS PRN
Qty: 60 TABLET | Refills: 0 | Status: SHIPPED | OUTPATIENT
Start: 2025-04-30

## 2025-05-05 RX ORDER — PANTOPRAZOLE SODIUM 40 MG/1
40 TABLET, DELAYED RELEASE ORAL 2 TIMES DAILY
Qty: 180 TABLET | Refills: 1 | Status: SHIPPED | OUTPATIENT
Start: 2025-05-05 | End: 2025-05-12 | Stop reason: SDUPTHER

## 2025-05-05 RX ORDER — POTASSIUM CHLORIDE 750 MG/1
10 TABLET, EXTENDED RELEASE ORAL DAILY
Qty: 24 TABLET | Refills: 1 | Status: SHIPPED | OUTPATIENT
Start: 2025-05-05

## 2025-05-05 NOTE — TELEPHONE ENCOUNTER
Caller: PrashantJasmine    Relationship: Self    Best call back number: [phone]    Requested Prescriptions:   Requested Prescriptions     Pending Prescriptions Disp Refills    pantoprazole (PROTONIX) 40 MG EC tablet 180 tablet 1     Sig: Take 1 tablet by mouth 2 (Two) Times a Day.    potassium chloride 10 MEQ CR tablet       Sig: Take 1 tablet by mouth Daily. On Monday and Friday        Pharmacy where request should be sent: [place] [facility] [phone] [place], [place] [phone]      Last office visit with prescribing clinician: 4/29/2025   Last telemedicine visit with prescribing clinician: Visit date not found   Next office visit with prescribing clinician: 2/2/2026     Additional details provided by patient: IS OUT/ NEEDS FILLED     Does the patient have less than a 3 day supply:  [x] Yes  [] No    Would you like a call back once the refill request has been completed: [] Yes [x] No    If the office needs to give you a call back, can they leave a voicemail: [] Yes [x] No    Ronen Simms   05/05/25 09:10 EDT

## 2025-05-12 RX ORDER — PANTOPRAZOLE SODIUM 40 MG/1
40 TABLET, DELAYED RELEASE ORAL 2 TIMES DAILY
Qty: 180 TABLET | Refills: 1 | Status: SHIPPED | OUTPATIENT
Start: 2025-05-12

## 2025-05-12 NOTE — TELEPHONE ENCOUNTER
Caller: Jasmine Cerda    Relationship: Self    Requested Prescriptions:   Requested Prescriptions     Pending Prescriptions Disp Refills    pantoprazole (PROTONIX) 40 MG EC tablet 180 tablet 1     Sig: Take 1 tablet by mouth 2 (Two) Times a Day.      Pharmacy where request should be sent: [facility] [address] [place] [address] [address] [place] [phone] [place] [phone]      Last office visit with prescribing clinician: 4/29/2025   Last telemedicine visit with prescribing clinician: Visit date not found   Next office visit with prescribing clinician: 2/2/2026     Additional details provided by patient: PATIENT IS OUT.       Does the patient have less than a 3 day supply:  [x] Yes  [] No    Would you like a call back once the refill request has been completed: [] Yes [x] No    If the office needs to give you a call back, can they leave a voicemail: [] Yes [x] No    Kailash Song   05/12/25 08:44 EDT

## 2025-05-19 RX ORDER — ONDANSETRON 4 MG/1
4 TABLET, ORALLY DISINTEGRATING ORAL EVERY 8 HOURS PRN
Qty: 60 TABLET | Refills: 0 | Status: SHIPPED | OUTPATIENT
Start: 2025-05-19

## 2025-05-27 RX ORDER — PANTOPRAZOLE SODIUM 40 MG/1
40 TABLET, DELAYED RELEASE ORAL 2 TIMES DAILY
Qty: 180 TABLET | Refills: 1 | Status: SHIPPED | OUTPATIENT
Start: 2025-05-27

## 2025-05-27 NOTE — TELEPHONE ENCOUNTER
Caller: Jasmine Cerda    Relationship: Self    Best call back number: [phone]     Requested Prescriptions:   Requested Prescriptions     Pending Prescriptions Disp Refills    pantoprazole (PROTONIX) 40 MG EC tablet 180 tablet 1     Sig: Take 1 tablet by mouth 2 (Two) Times a Day.        Pharmacy where request should be sent: [facility] [phone] [place] [phone]     Last office visit with prescribing clinician: 4/29/2025   Last telemedicine visit with prescribing clinician: Visit date not found   Next office visit with prescribing clinician: 2/2/2026     Additional details provided by patient: PATIENT IS CURRENTLY OUT OF MEDICINE    Does the patient have less than a 3 day supply:  [x] Yes  [] No        Kailash Denton Rep   05/27/25 14:58 EDT

## 2025-06-02 ENCOUNTER — OFFICE VISIT (OUTPATIENT)
Dept: CARDIOLOGY | Facility: CLINIC | Age: 65
End: 2025-06-02
Payer: MEDICARE

## 2025-06-02 VITALS
WEIGHT: 202 LBS | SYSTOLIC BLOOD PRESSURE: 129 MMHG | OXYGEN SATURATION: 98 % | HEIGHT: 62 IN | DIASTOLIC BLOOD PRESSURE: 87 MMHG | BODY MASS INDEX: 37.17 KG/M2 | HEART RATE: 79 BPM

## 2025-06-02 DIAGNOSIS — R06.09 DYSPNEA ON EXERTION: ICD-10-CM

## 2025-06-02 DIAGNOSIS — I10 ESSENTIAL (PRIMARY) HYPERTENSION: ICD-10-CM

## 2025-06-02 DIAGNOSIS — R42 DIZZINESS: ICD-10-CM

## 2025-06-02 DIAGNOSIS — R00.0 TACHYCARDIA: Primary | ICD-10-CM

## 2025-06-02 PROCEDURE — 1160F RVW MEDS BY RX/DR IN RCRD: CPT | Performed by: NURSE PRACTITIONER

## 2025-06-02 PROCEDURE — 1159F MED LIST DOCD IN RCRD: CPT | Performed by: NURSE PRACTITIONER

## 2025-06-02 PROCEDURE — 3074F SYST BP LT 130 MM HG: CPT | Performed by: NURSE PRACTITIONER

## 2025-06-02 PROCEDURE — 99214 OFFICE O/P EST MOD 30 MIN: CPT | Performed by: NURSE PRACTITIONER

## 2025-06-02 PROCEDURE — 3079F DIAST BP 80-89 MM HG: CPT | Performed by: NURSE PRACTITIONER

## 2025-06-02 RX ORDER — VERAPAMIL HYDROCHLORIDE 120 MG/1
120 TABLET, FILM COATED, EXTENDED RELEASE ORAL NIGHTLY
Qty: 30 TABLET | Refills: 3 | Status: SHIPPED | OUTPATIENT
Start: 2025-06-02

## 2025-06-02 NOTE — PROGRESS NOTES
[facility] CARDIOLOGY      REASON FOR FOLLOW-UP:  Acute office visit for elevated heart rate          Chief Complaint   Patient presents with    Heart Problem         Dear Eleni Miranda APRN        History of Present Illness   It was my pleasure to see Jasmine Cerda in acute office visit today.  She is a 65-year-old female with known history of coronary artery disease per heart cath demonstrating moderate disease of the LAD being managed medically.  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  Ms. Cerda presents today in acute office visit for elevated heart rate and shortness of breath.  She describes chronic shortness of breath at baseline which has progressively worsened.  She stated that ambulating short distance can make her very short of breath which in turn causes her heart rate to increase.  She denies any dizziness, near syncopal or syncopal episodes.  No lower extremity edema.  She presents a blood pressure log with episodic elevated heart rates.  Highest reading is 148 bpm.  Blood pressures uncontrolled with highest reading 154/90.  The patient did become short of breath ambulating from the waiting room into exam room and remains conversationally dyspneic upon my evaluation today.      ASSESSMENT:  Shortness of breath/dyspnea on exertion  History of COPD due to secondhand exposure  Coronary artery disease-nonocclusive  Primary hypertension  Dyslipidemia  History of heart failure with preserved ejection fraction    PLAN:  I will place a 14-day mobile cardiac telemetry to evaluate for any evidence of atrial arrhythmias.  She has history of rebound tachycardia due to abruptly discontinuing beta-blocker.  I will start the patient on verapamil 120 mg daily and discontinue amlodipine.  Check labs and chest x-ray.  She was advised to go to the ED if her  shortness of breath worsens.  Follow-up after testing        CHF Guideline Directed Medical Therapy  Beta Blocker:   ARNI/ACE/ARB:   SGLT 2 inhibitors:   Diuretics:   Aldosterone Antagonist:   Vasodilators & Nitrates:       Diagnoses and all orders for this visit:    1. Tachycardia (Primary)  -     Cardiac Event Monitor (JAELYN) or Mobile Cardiac Outpatient Telemetry (MCT); Future  -     XR Chest 2 View; Future  -     proBNP; Future  -     Basic Metabolic Panel; Future  -     CBC (No Diff); Future    2. Dyspnea on exertion  -     Cardiac Event Monitor (JAELYN) or Mobile Cardiac Outpatient Telemetry (MCT); Future  -     XR Chest 2 View; Future  -     proBNP; Future  -     Basic Metabolic Panel; Future  -     CBC (No Diff); Future    3. Dizziness  -     Cardiac Event Monitor (JAELYN) or Mobile Cardiac Outpatient Telemetry (MCT); Future  -     XR Chest 2 View; Future  -     proBNP; Future  -     Basic Metabolic Panel; Future  -     CBC (No Diff); Future    4. Essential (primary) hypertension  -     proBNP; Future    Other orders  -     verapamil SR (CALAN-SR) 120 MG CR tablet; Take 1 tablet by mouth Every Night.  Dispense: 30 tablet; Refill: 3          The following portions of the patient's history were reviewed and updated as appropriate: allergies, current medications, past family history, past medical history, past social history, past surgical history, and problem list.    REVIEW OF SYSTEMS:    Review of Systems   Cardiovascular:  Positive for dyspnea on exertion and palpitations.   Respiratory:  Positive for shortness of breath.    All other systems reviewed and are negative.      Vitals:    06/02/25 0954   BP: 129/87   Pulse: 79   SpO2: 98%         PHYSICAL EXAM:    General: Alert, cooperative, no distress, appears stated age  Head:  Normocephalic, atraumatic, mucous membranes moist  Eyes:  Conjunctiva/corneas clear, EOM's intact     Neck:  Supple,  no JVD or bruit     Lungs: Clear to auscultation bilaterally, no wheezes  rhonchi rales are noted  Chest wall: No tenderness  Musculoskeletal:   Ambulates freely without assistance  Heart::  Regular rate and rhythm, S1 and S2 normal, no murmur, rub or gallop  Abdomen: Soft, non-tender, nondistended, bowel sounds active, no abdominal bruit  Extremities: No cyanosis, clubbing, or edema   Pulses: 2+ and symmetric all extremities  Skin:  No rashes or lesions  Neuro/psych: A&O x3. CN II through XII are grossly intact with appropriate affect        Past Medical History:   Diagnosis Date    Anxiety     Breast cyst     CHF (congestive heart failure)     COPD (chronic obstructive pulmonary disease)     Coronary heart disease     Depression     Hyperlipidemia     Hypertension        Past Surgical History:   Procedure Laterality Date    APPENDECTOMY      BREAST CYST ASPIRATION      CARDIAC CATHETERIZATION  2017    No Stents placed - [facility]    CARDIAC CATHETERIZATION N/A 2023    Procedure: Left Heart Cath possible PCI;  Surgeon: Cuauhtemoc Kwon MD;  Location: [facility] CATH INVASIVE LOCATION;  Service: Cardiovascular;  Laterality: N/A;    CERVICAL FUSION      C6-C7     SECTION      x 2    CHOLECYSTECTOMY      HYSTERECTOMY      partial         Current Outpatient Medications:     allopurinol (ZYLOPRIM) 300 MG tablet, Take 1 tablet by mouth Daily., Disp: 90 tablet, Rfl: 1    ALPRAZolam (XANAX) 0.5 MG tablet, Take 1 tablet by mouth 3 (Three) Times a Day As Needed for Anxiety. for anxiety, Disp: 90 tablet, Rfl: 3    aspirin 81 MG EC tablet, 0, Disp: , Rfl:     Budeson-Glycopyrrol-Formoterol (Breztri Aerosphere) 160-9-4.8 MCG/ACT aerosol inhaler, Inhale 2 puffs 2 (Two) Times a Day., Disp: , Rfl:     carbidopa-levodopa (SINEMET)  MG per tablet, TAKE 1 TABLET BY MOUTH 4 TIMES DAILY TAKE 1 TABLET AT 6AM, 10AM, 2PM AND 7PM, Disp: 360 tablet, Rfl: 1    citalopram (CeleXA) 40 MG tablet, Take 1 tablet by mouth Every Morning., Disp: 90 tablet, Rfl: 1    cloNIDine (CATAPRES) 0.1 MG tablet,  Take 1 tablet by mouth Every 8 (Eight) Hours As Needed for High Blood Pressure (for systolic over 160)., Disp: 90 tablet, Rfl: 0    colchicine 0.6 MG tablet, Take 1 tablet by mouth 2 (Two) Times a Day. As needed for gout flare, Disp: , Rfl:     cyanocobalamin 1000 MCG/ML injection, INJECT 1 ML INTO THE APPROPRIATE MUSCLE AS DIRECTED EVERY 28 DAYS, Disp: 3 mL, Rfl: 3    gabapentin (NEURONTIN) 300 MG capsule, Take 1 capsule by mouth 3 (Three) Times a Day., Disp: 270 capsule, Rfl: 1    hydrOXYzine (ATARAX) 25 MG tablet, Take 1 tablet by mouth 3 (Three) Times a Day As Needed for Anxiety., Disp: 90 tablet, Rfl: 3    metoprolol succinate XL (TOPROL-XL) 100 MG 24 hr tablet, Take 1 tablet by mouth Daily., Disp: 90 tablet, Rfl: 1    mirtazapine (REMERON) 30 MG tablet, Take 1 tablet by mouth every night at bedtime., Disp: 90 tablet, Rfl: 1    Multiple Vitamins-Minerals (MULTIVITAMIN ADULT) tablet, Take 1 tablet by mouth Daily. With Omega XL, Disp: , Rfl:     nitroglycerin (NITROSTAT) 0.4 MG SL tablet, Place 1 tablet under the tongue Every 5 (Five) Minutes As Needed for Chest Pain. Take no more than 3 doses in 15 minutes., Disp: 25 tablet, Rfl: 2    ondansetron ODT (ZOFRAN-ODT) 4 MG disintegrating tablet, DISSOLVE 1 TABLET IN MOUTH EVERY 8 HOURS AS NEEDED FOR NAUSEA AND VOMITING, Disp: 60 tablet, Rfl: 0    pantoprazole (PROTONIX) 40 MG EC tablet, Take 1 tablet by mouth 2 (Two) Times a Day., Disp: 180 tablet, Rfl: 1    potassium chloride 10 MEQ CR tablet, Take 1 tablet by mouth Daily. On Monday and Friday, Disp: 24 tablet, Rfl: 1    predniSONE (DELTASONE) 10 MG tablet, Take 5 po for 2 days, Take 4 for 2 days, then 3 for 2 days, then 2 for 2 days, then 1 for 5 days, then stop, Disp: 33 tablet, Rfl: 0    promethazine (PHENERGAN) 25 MG tablet, , Disp: , Rfl:     QUEtiapine (SEROquel) 100 MG tablet, Take 1 tablet by mouth every night at bedtime., Disp: 90 tablet, Rfl: 1    rosuvastatin (CRESTOR) 40 MG tablet, Take 1 tablet by mouth  "Every Evening., Disp: 90 tablet, Rfl: 1    Syringe/Needle, Disp, (B-D 3CC LUER-MAI SYR 23GX1\") 23G X 1\" 3 ML misc, USE 1 SYRINGE ONCE EVERY MONTH FOR  B12  INJECTION INTRAMUSCULARLY, Disp: 12 each, Rfl: 0    furosemide (Lasix) 40 MG tablet, Take 1 tablet by mouth Daily for 30 days., Disp: 90 tablet, Rfl: 1    verapamil SR (CALAN-SR) 120 MG CR tablet, Take 1 tablet by mouth Every Night., Disp: 30 tablet, Rfl: 3    No Known Allergies    Family History   Problem Relation Age of Onset    Colon cancer Mother     Diabetes Father     Pulmonary embolism Brother     Heart failure Brother     Breast cancer Maternal Aunt        Social History     Tobacco Use    Smoking status: Never    Smokeless tobacco: Never    Tobacco comments:     Passive Smoke: N   Substance Use Topics    Alcohol use: No           Current Electrocardiogram:  Procedures        EMR Dragon/Transcription:   \"Dictated utilizing Dragon dictation\".     Copied text in this note has been reviewed by me and is accurate as of 06/02/25.    "

## 2025-06-03 ENCOUNTER — HOSPITAL ENCOUNTER (OUTPATIENT)
Dept: CARDIOLOGY | Facility: HOSPITAL | Age: 65
Discharge: HOME | End: 2025-06-03
Payer: MEDICARE

## 2025-06-03 ENCOUNTER — LAB (OUTPATIENT)
Dept: LAB | Facility: HOSPITAL | Age: 65
End: 2025-06-03
Payer: MEDICARE

## 2025-06-03 ENCOUNTER — TELEPHONE (OUTPATIENT)
Dept: CARDIOLOGY | Facility: CLINIC | Age: 65
End: 2025-06-03
Payer: MEDICARE

## 2025-06-03 ENCOUNTER — HOSPITAL ENCOUNTER (OUTPATIENT)
Dept: GENERAL RADIOLOGY | Facility: HOSPITAL | Age: 65
Discharge: HOME | End: 2025-06-03
Payer: MEDICARE

## 2025-06-03 DIAGNOSIS — R42 DIZZINESS: ICD-10-CM

## 2025-06-03 DIAGNOSIS — R00.0 TACHYCARDIA: ICD-10-CM

## 2025-06-03 DIAGNOSIS — R06.09 DYSPNEA ON EXERTION: ICD-10-CM

## 2025-06-03 DIAGNOSIS — I10 ESSENTIAL (PRIMARY) HYPERTENSION: ICD-10-CM

## 2025-06-03 LAB
ANION GAP SERPL CALCULATED.3IONS-SCNC: 15.7 MMOL/L (ref 5–15)
BUN SERPL-MCNC: 16 MG/DL (ref 8–23)
BUN/CREAT SERPL: 10.1 (ref 7–25)
CALCIUM SPEC-SCNC: 11.1 MG/DL (ref 8.6–10.5)
CHLORIDE SERPL-SCNC: 100 MMOL/L (ref 98–107)
CO2 SERPL-SCNC: 23.3 MMOL/L (ref 22–29)
CREAT SERPL-MCNC: 1.58 MG/DL (ref 0.57–1)
DEPRECATED RDW RBC AUTO: 43.2 FL (ref 37–54)
EGFRCR SERPLBLD CKD-EPI 2021: 35.7 ML/MIN/1.73
ERYTHROCYTE [DISTWIDTH] IN BLOOD BY AUTOMATED COUNT: 13.9 % (ref 12.3–15.4)
GLUCOSE SERPL-MCNC: 92 MG/DL (ref 65–99)
HCT VFR BLD AUTO: 46.7 % (ref 34–46.6)
HGB BLD-MCNC: 15.6 G/DL (ref 12–15.9)
MCH RBC QN AUTO: 28.7 PG (ref 26.6–33)
MCHC RBC AUTO-ENTMCNC: 33.4 G/DL (ref 31.5–35.7)
MCV RBC AUTO: 86 FL (ref 79–97)
NT-PROBNP SERPL-MCNC: 116 PG/ML (ref 0–900)
PLATELET # BLD AUTO: 316 10*3/MM3 (ref 140–450)
PMV BLD AUTO: 9.9 FL (ref 6–12)
POTASSIUM SERPL-SCNC: 3.9 MMOL/L (ref 3.5–5.2)
RBC # BLD AUTO: 5.43 10*6/MM3 (ref 3.77–5.28)
SODIUM SERPL-SCNC: 139 MMOL/L (ref 136–145)
WBC NRBC COR # BLD AUTO: 9.04 10*3/MM3 (ref 3.4–10.8)

## 2025-06-03 PROCEDURE — 71046 X-RAY EXAM CHEST 2 VIEWS: CPT

## 2025-06-03 PROCEDURE — 85027 COMPLETE CBC AUTOMATED: CPT

## 2025-06-03 PROCEDURE — 83880 ASSAY OF NATRIURETIC PEPTIDE: CPT

## 2025-06-03 PROCEDURE — 36415 COLL VENOUS BLD VENIPUNCTURE: CPT

## 2025-06-03 PROCEDURE — 80048 BASIC METABOLIC PNL TOTAL CA: CPT

## 2025-06-03 NOTE — TELEPHONE ENCOUNTER
Jessica Ruiz APRN [place], Tracy G, MA  It looks like she is on colchicine for an acute gout flare.  I am fine with her discontinuing the colchicine and we will see if verapamil improves her symptoms.  Thank you          Previous Messages       ----- Message -----  From: Juliana Jain MA  Sent: 6/2/2025   4:14 PM EDT  To: YUE Sifuentes    Pharmacy called said there is a drug interaction with the verapamil and colchicine. Wanted to see if pt should stop the Colchicine so that she can take the verapamil.  Please advise

## 2025-06-06 RX ORDER — ONDANSETRON 4 MG/1
4 TABLET, ORALLY DISINTEGRATING ORAL EVERY 8 HOURS PRN
Qty: 60 TABLET | Refills: 0 | Status: SHIPPED | OUTPATIENT
Start: 2025-06-06

## 2025-06-11 ENCOUNTER — OFFICE VISIT (OUTPATIENT)
Dept: PSYCHIATRY | Facility: CLINIC | Age: 65
End: 2025-06-11
Payer: MEDICARE

## 2025-06-11 DIAGNOSIS — F51.04 PSYCHOPHYSIOLOGICAL INSOMNIA: Chronic | ICD-10-CM

## 2025-06-11 DIAGNOSIS — Z79.899 ENCOUNTER FOR LONG-TERM (CURRENT) USE OF MEDICATIONS: ICD-10-CM

## 2025-06-11 DIAGNOSIS — F33.2 SEVERE EPISODE OF RECURRENT MAJOR DEPRESSIVE DISORDER, WITHOUT PSYCHOTIC FEATURES: Primary | Chronic | ICD-10-CM

## 2025-06-11 DIAGNOSIS — F43.12 CHRONIC POST-TRAUMATIC STRESS DISORDER (PTSD): Chronic | ICD-10-CM

## 2025-06-11 RX ORDER — HYDROXYZINE HYDROCHLORIDE 25 MG/1
25 TABLET, FILM COATED ORAL 3 TIMES DAILY PRN
Qty: 90 TABLET | Refills: 3 | Status: SHIPPED | OUTPATIENT
Start: 2025-06-11

## 2025-06-11 RX ORDER — MIRTAZAPINE 30 MG/1
30 TABLET, FILM COATED ORAL
Qty: 90 TABLET | Refills: 1 | Status: SHIPPED | OUTPATIENT
Start: 2025-06-11

## 2025-06-11 RX ORDER — ALPRAZOLAM 0.5 MG
0.5 TABLET ORAL 3 TIMES DAILY PRN
Qty: 90 TABLET | Refills: 3 | Status: SHIPPED | OUTPATIENT
Start: 2025-06-11

## 2025-06-11 RX ORDER — QUETIAPINE FUMARATE 100 MG/1
100 TABLET, FILM COATED ORAL
Qty: 90 TABLET | Refills: 1 | Status: SHIPPED | OUTPATIENT
Start: 2025-06-11

## 2025-06-11 RX ORDER — CITALOPRAM HYDROBROMIDE 40 MG/1
40 TABLET ORAL EVERY MORNING
Qty: 90 TABLET | Refills: 1 | Status: SHIPPED | OUTPATIENT
Start: 2025-06-11

## 2025-06-11 NOTE — PROGRESS NOTES
Subjective   Jasmine Cerda is a 65 y.o. female who presents today for follow up     Chief Complaint:  depression anxiety     History of Present Illness:   The pt suffers from depression for a long time, was on multiple meds with limited response   The pt was more depressed last few years 2ry  to family situation , her son attempted suicide last year and  relapsed on drugs,  developed  CHF and she had no contact with him   The pt is still on celexa ,remeron,  trazodone,  xanax, seroquel  and gabapentin   Neurologist - no concerns about psych meds, she was dsd with parkinsons     Last visit - no med changes     Today the pt reported feeling better, her  is not at home all the time, he goes fishing and that helps her anxiety and depression when he is not around  depression  is rated as 6/10, almost every day , depression is associated with crying spells , low self esteem, guilt feelings    Anxiety is intense and persistent, associated with  GI sxs, BP elevation, unpleasant somatic sensations    denied SI/HI   Denied AVH, no elicited delusions   Psychotherapy was offered, the pt has no transportation, no phone to do virtual sessions   Precipitating and Ameliorating Factors: relationship problems   Alleviating factors - to spend time with her grand daughter         PAST PSYCHIATRIC HISTORY      Previous Psychiatric Diagnoses   Axis I: Anxiety/Panic Disorder     Past Hospitalizations or Residential Treatment   Locations\Providers: none      Past Outpatient Treatment   Diagnosis Treated: Anxiety/Panic Dis.  Treatment Type: Medication Management  Location: with PCP, Dr Manning      Prior Psychiatric Medications   Comments: xanax - effective      celexa- not effective   zoloft - not effective  cymbalta - not effective      Prozac - GI sxs   ambien - side effects   Risperidone - not effective and side effects   Hydroxyzine - not effective  Abilify - not effective   Trazodone - not effective     Consequences of Mental  Disorder   Consequences: emotional distress     SOCIAL HISTORY     Number of children: 2  Number of grandkids: 1  Current Relationship is: supportive  Family of Origin is: supportive  Comments: Patient has never smoked.  Passive Smoke: N  Alcohol Use: N  Drug Use: N  HIV/High Risk: N        Current Living Situation   Lives with: spouse     Education   Level: high school graduate     Employment   Job Status: disabled     Hobbies and Leisure Activities   Activity Type: quiet activities     Smoking History   Smoking Hx: Never smoker     Exercise   Exercise sessions (per wk): 1     Illicit Drug Use   Illicit Drugs used: no     FAMILY HISTORY OF MENTAL DISORDERS   fh Grandparents: Non-contributory  fh Mother: Non-contributory  fh Father: Non-contributory  fh Siblings: Non-contributory  fh Other: Non-contributory  The following portions of the patient's history were reviewed and updated as appropriate: allergies, current medications, past family history, past medical history, past social history, past surgical history and problem list.      Interval History  Minimal improvement        Side Effects  Denied       Past Medical History:  Past Medical History:   Diagnosis Date    Anxiety     Breast cyst     CHF (congestive heart failure)     COPD (chronic obstructive pulmonary disease)     Coronary heart disease     Depression     Hyperlipidemia     Hypertension        Social History:  Social History     Socioeconomic History    Marital status:    Tobacco Use    Smoking status: Never    Smokeless tobacco: Never    Tobacco comments:     Passive Smoke: N   Vaping Use    Vaping status: Never Used   Substance and Sexual Activity    Alcohol use: No    Drug use: No    Sexual activity: Defer       Family History:  Family History   Problem Relation Age of Onset    Colon cancer Mother     Diabetes Father     Pulmonary embolism Brother     Heart failure Brother     Breast cancer Maternal Aunt        Past Surgical  History:  Past Surgical History:   Procedure Laterality Date    APPENDECTOMY      BREAST CYST ASPIRATION      CARDIAC CATHETERIZATION  2017    No Stents placed - [facility]    CARDIAC CATHETERIZATION N/A 2023    Procedure: Left Heart Cath possible PCI;  Surgeon: Cuauhtemoc Kwon MD;  Location: [facility] CATH [facility];  Service: Cardiovascular;  Laterality: N/A;    CERVICAL FUSION      C6-C7     SECTION      x 2    CHOLECYSTECTOMY      HYSTERECTOMY      partial       Problem List:  Patient Active Problem List   Diagnosis    Chronic post-traumatic stress disorder (PTSD)    Severe episode of recurrent major depressive disorder, without psychotic features    Asthma    Chronic low back pain    Chronic diastolic heart failure    Coronary heart disease    Degeneration of intervertebral disc of lumbosacral region    Headache, temporal    Psychophysiological insomnia    Hyperlipidemia    Hypertension    Vitamin D deficiency    Other fatigue    Preventative health care    Hyperglycemia, unspecified     Nausea    Stable angina pectoris    Chest pain    Dyspnea    Abnormal nuclear stress test    Acute exacerbation of CHF (congestive heart failure)       Allergy:   No Known Allergies     Discontinued Medications:  Medications Discontinued During This Encounter   Medication Reason    mirtazapine (REMERON) 30 MG tablet Reorder    QUEtiapine (SEROquel) 100 MG tablet Reorder    hydrOXYzine (ATARAX) 25 MG tablet Reorder    citalopram (CeleXA) 40 MG tablet Reorder    ALPRAZolam (XANAX) 0.5 MG tablet Reorder         Current Medications:   Current Outpatient Medications   Medication Sig Dispense Refill    ALPRAZolam (XANAX) 0.5 MG tablet Take 1 tablet by mouth 3 (Three) Times a Day As Needed for Anxiety. for anxiety 90 tablet 3    citalopram (CeleXA) 40 MG tablet Take 1 tablet by mouth Every Morning. 90 tablet 1    hydrOXYzine (ATARAX) 25 MG tablet Take 1 tablet by mouth 3 (Three) Times a Day As Needed for Anxiety.  90 tablet 3    mirtazapine (REMERON) 30 MG tablet Take 1 tablet by mouth every night at bedtime. 90 tablet 1    QUEtiapine (SEROquel) 100 MG tablet Take 1 tablet by mouth every night at bedtime. 90 tablet 1    allopurinol (ZYLOPRIM) 300 MG tablet Take 1 tablet by mouth Daily. 90 tablet 1    aspirin 81 MG EC tablet 0      Budeson-Glycopyrrol-Formoterol (Breztri Aerosphere) 160-9-4.8 MCG/ACT aerosol inhaler Inhale 2 puffs 2 (Two) Times a Day.      carbidopa-levodopa (SINEMET)  MG per tablet TAKE 1 TABLET BY MOUTH 4 TIMES DAILY TAKE 1 TABLET AT 6AM, 10AM, 2PM AND 7PM 360 tablet 1    cloNIDine (CATAPRES) 0.1 MG tablet Take 1 tablet by mouth Every 8 (Eight) Hours As Needed for High Blood Pressure (for systolic over 160). 90 tablet 0    colchicine 0.6 MG tablet Take 1 tablet by mouth 2 (Two) Times a Day. As needed for gout flare      cyanocobalamin 1000 MCG/ML injection INJECT 1 ML INTO THE APPROPRIATE MUSCLE AS DIRECTED EVERY 28 DAYS 3 mL 3    furosemide (Lasix) 40 MG tablet Take 1 tablet by mouth Daily for 30 days. 90 tablet 1    gabapentin (NEURONTIN) 300 MG capsule Take 1 capsule by mouth 3 (Three) Times a Day. 270 capsule 1    metoprolol succinate XL (TOPROL-XL) 100 MG 24 hr tablet Take 1 tablet by mouth Daily. 90 tablet 1    Multiple Vitamins-Minerals (MULTIVITAMIN ADULT) tablet Take 1 tablet by mouth Daily. With Omega XL      nitroglycerin (NITROSTAT) 0.4 MG SL tablet Place 1 tablet under the tongue Every 5 (Five) Minutes As Needed for Chest Pain. Take no more than 3 doses in 15 minutes. 25 tablet 2    ondansetron ODT (ZOFRAN-ODT) 4 MG disintegrating tablet DISSOLVE 1 TABLET IN MOUTH EVERY 8 HOURS AS NEEDED FOR NAUSEA AND VOMITING 60 tablet 0    pantoprazole (PROTONIX) 40 MG EC tablet Take 1 tablet by mouth 2 (Two) Times a Day. 180 tablet 1    potassium chloride 10 MEQ CR tablet Take 1 tablet by mouth Daily. On Monday and Friday 24 tablet 1    predniSONE (DELTASONE) 10 MG tablet Take 5 po for 2 days, Take 4  "for 2 days, then 3 for 2 days, then 2 for 2 days, then 1 for 5 days, then stop 33 tablet 0    promethazine (PHENERGAN) 25 MG tablet       rosuvastatin (CRESTOR) 40 MG tablet Take 1 tablet by mouth Every Evening. 90 tablet 1    Syringe/Needle, Disp, (B-D 3CC LUER-MAI SYR 23GX1\") 23G X 1\" 3 ML misc USE 1 SYRINGE ONCE EVERY MONTH FOR  B12  INJECTION INTRAMUSCULARLY 12 each 0    verapamil SR (CALAN-SR) 120 MG CR tablet Take 1 tablet by mouth Every Night. 30 tablet 3     No current facility-administered medications for this visit.         Review of Symptoms:    Psychiatric/Behavioral: Negative for agitation, behavioral problems, confusion, decreased concentration, dysphoric mood, hallucinations, self-injury, sleep disturbance and suicidal ideas.   The patient is  less  depressed,  Still very  nervous/anxious and is not hyperactive.        Physical Exam:   There were no vitals taken for this visit.    Mental Status Exam:   Hygiene:   good  Cooperation:  Cooperative  Eye Contact:  Good  Psychomotor Behavior:  Appropriate  Affect:  Appropriate    Mood: depressed and fluctates,  Anxious    Hopelessness: Denies  Speech:  Normal  Thought Process:  Goal directed and Linear  Thought Content:  Normal  Suicidal:  None  Homicidal:  None  Hallucinations:  None  Delusion:  None  Memory:  Intact  Orientation:  Person, Place, Time and Situation  Reliability:  fair  Insight:  Good  Judgement:  Good  Impulse Control:  Good  Physical/Medical Issues:  Yes GI issues        MSE from 2/11/25     reviewed and accepted without changes     PHQ-9 Depression Screening  Little interest or pleasure in doing things? Over half   Feeling down, depressed, or hopeless? Several days   Trouble falling or staying asleep, or sleeping too much? Several days   Feeling tired or having little energy? Over half   Poor appetite or overeating? Not at all   Feeling bad about yourself - or that you are a failure or have let yourself or your family down? Over half "   Trouble concentrating on things, such as reading the newspaper or watching television? Several days   Moving or speaking so slowly that other people could have noticed? Or the opposite - being so fidgety or restless that you have been moving around a lot more than usual? Several days   Thoughts that you would be better off dead, or of hurting yourself in some way? Not at all   PHQ-9 Total Score 10   If you checked off any problems, how difficult have these problems made it for you to do your work, take care of things at home, or get along with other people? Very difficult      Over the last two weeks, how often have you been bothered by the following problems?  Feeling nervous, anxious or on edge: More than half the days  Not being able to stop or control worrying: More than half the days  Worrying too much about different things: Several days  Trouble Relaxing: More than half the days  Being so restless that it is hard to sit still: Several days  Becoming easily annoyed or irritable: Not at all  Feeling afraid as if something awful might happen: More than half the days  DWIGHT 7 Total Score: 10  If you checked any problems, how difficult have these problems made it for you to do your work, take care of things at home, or get along with other people: Very difficult         Never smoker    I advised Jasmine of the risks of tobacco use.     Lab Results:   Lab on 06/03/2025   Component Date Value Ref Range Status    proBNP 06/03/2025 116.0  0.0 - 900.0 pg/mL Final    Glucose 06/03/2025 92  65 - 99 mg/dL Final    BUN 06/03/2025 16.0  8.0 - 23.0 mg/dL Final    Creatinine 06/03/2025 1.58 (H)  0.57 - 1.00 mg/dL Final    Sodium 06/03/2025 139  136 - 145 mmol/L Final    Potassium 06/03/2025 3.9  3.5 - 5.2 mmol/L Final    Chloride 06/03/2025 100  98 - 107 mmol/L Final    CO2 06/03/2025 23.3  22.0 - 29.0 mmol/L Final    Calcium 06/03/2025 11.1 (H)  8.6 - 10.5 mg/dL Final    BUN/Creatinine Ratio 06/03/2025 10.1  7.0 - 25.0 Final     Anion Gap 06/03/2025 15.7 (H)  5.0 - 15.0 mmol/L Final    eGFR 06/03/2025 35.7 (L)  >60.0 mL/min/1.73 Final    WBC 06/03/2025 9.04  3.40 - 10.80 10*3/mm3 Final    RBC 06/03/2025 5.43 (H)  3.77 - 5.28 10*6/mm3 Final    Hemoglobin 06/03/2025 15.6  12.0 - 15.9 g/dL Final    Hematocrit 06/03/2025 46.7 (H)  34.0 - 46.6 % Final    MCV 06/03/2025 86.0  79.0 - 97.0 fL Final    MCH 06/03/2025 28.7  26.6 - 33.0 pg Final    MCHC 06/03/2025 33.4  31.5 - 35.7 g/dL Final    RDW 06/03/2025 13.9  12.3 - 15.4 % Final    RDW-SD 06/03/2025 43.2  37.0 - 54.0 fl Final    MPV 06/03/2025 9.9  6.0 - 12.0 fL Final    Platelets 06/03/2025 316  140 - 450 10*3/mm3 Final   Orders Only on 04/22/2025   Component Date Value Ref Range Status    25 Hydroxy, Vitamin D 04/22/2025 40.9  30.0 - 100.0 ng/ml Final    Uric Acid 04/22/2025 7.0 (H)  2.4 - 5.7 mg/dL Final    Total Cholesterol 04/22/2025 156  0 - 200 mg/dL Final    Triglycerides 04/22/2025 106  0 - 150 mg/dL Final    HDL Cholesterol 04/22/2025 79 (H)  40 - 60 mg/dL Final    LDL Cholesterol  04/22/2025 58  0 - 100 mg/dL Final    VLDL Cholesterol 04/22/2025 19  5 - 40 mg/dL Final    LDL/HDL Ratio 04/22/2025 0.71   Final    Glucose 04/22/2025 93  65 - 99 mg/dL Final    BUN 04/22/2025 10  8 - 23 mg/dL Final    Creatinine 04/22/2025 1.24 (H)  0.57 - 1.00 mg/dL Final    Sodium 04/22/2025 141  136 - 145 mmol/L Final    Potassium 04/22/2025 4.7  3.5 - 5.2 mmol/L Final    Chloride 04/22/2025 106  98 - 107 mmol/L Final    CO2 04/22/2025 25.8  22.0 - 29.0 mmol/L Final    Calcium 04/22/2025 10.3  8.6 - 10.5 mg/dL Final    Total Protein 04/22/2025 6.7  6.0 - 8.5 g/dL Final    Albumin 04/22/2025 4.3  3.5 - 5.2 g/dL Final    ALT (SGPT) 04/22/2025 44 (H)  1 - 33 U/L Final    AST (SGOT) 04/22/2025 28  1 - 32 U/L Final    Alkaline Phosphatase 04/22/2025 99  39 - 117 U/L Final    Total Bilirubin 04/22/2025 0.4  0.0 - 1.2 mg/dL Final    Globulin 04/22/2025 2.4  gm/dL Final    A/G Ratio 04/22/2025 1.8  g/dL Final     BUN/Creatinine Ratio 04/22/2025 8.1  7.0 - 25.0 Final    Anion Gap 04/22/2025 9.2  5.0 - 15.0 mmol/L Final    eGFR 04/22/2025 48.1 (L)  >60.0 mL/min/1.73 Final    WBC 04/22/2025 6.19  3.40 - 10.80 10*3/mm3 Final    RBC 04/22/2025 4.68  3.77 - 5.28 10*6/mm3 Final    Hemoglobin 04/22/2025 13.3  12.0 - 15.9 g/dL Final    Hematocrit 04/22/2025 40.0  34.0 - 46.6 % Final    MCV 04/22/2025 85.5  79.0 - 97.0 fL Final    MCH 04/22/2025 28.4  26.6 - 33.0 pg Final    MCHC 04/22/2025 33.3  31.5 - 35.7 g/dL Final    RDW 04/22/2025 14.4  12.3 - 15.4 % Final    RDW-SD 04/22/2025 44.4  37.0 - 54.0 fl Final    MPV 04/22/2025 9.5  6.0 - 12.0 fL Final    Platelets 04/22/2025 226  140 - 450 10*3/mm3 Final    TSH 04/22/2025 2.390  0.270 - 4.200 uIU/mL Final    PTH, Intact 04/22/2025 267.0 (H)  15.0 - 65.0 pg/mL Final    Vitamin B-12 04/22/2025 779  211 - 946 pg/mL Final    proBNP 04/22/2025 90.4  0.0 - 900.0 pg/mL Final       Assessment & Plan   Problems Addressed this Visit       Chronic post-traumatic stress disorder (PTSD) (Chronic)    Relevant Medications    QUEtiapine (SEROquel) 100 MG tablet    mirtazapine (REMERON) 30 MG tablet    hydrOXYzine (ATARAX) 25 MG tablet    citalopram (CeleXA) 40 MG tablet    ALPRAZolam (XANAX) 0.5 MG tablet    Other Relevant Orders    ToxAssure Flex 22, Ur w/DL -    Severe episode of recurrent major depressive disorder, without psychotic features - Primary (Chronic)    Relevant Medications    QUEtiapine (SEROquel) 100 MG tablet    mirtazapine (REMERON) 30 MG tablet    hydrOXYzine (ATARAX) 25 MG tablet    citalopram (CeleXA) 40 MG tablet    ALPRAZolam (XANAX) 0.5 MG tablet    Other Relevant Orders    ToxAssure Flex 22, Ur w/DL -    Psychophysiological insomnia (Chronic)    Relevant Medications    QUEtiapine (SEROquel) 100 MG tablet    mirtazapine (REMERON) 30 MG tablet    hydrOXYzine (ATARAX) 25 MG tablet    citalopram (CeleXA) 40 MG tablet    ALPRAZolam (XANAX) 0.5 MG tablet    Other Relevant Orders     ToxAssure Flex 22, Ur w/DL -     Other Visit Diagnoses         Encounter for long-term (current) use of medications        Relevant Orders    ToxAssure Flex 22, Ur w/DL -          Diagnoses         Codes Comments      Severe episode of recurrent major depressive disorder, without psychotic features    -  Primary ICD-10-CM: F33.2  ICD-9-CM: 296.33       Chronic post-traumatic stress disorder (PTSD)     ICD-10-CM: F43.12  ICD-9-CM: 309.81       Psychophysiological insomnia     ICD-10-CM: F51.04  ICD-9-CM: 307.42       Encounter for long-term (current) use of medications     ICD-10-CM: Z79.899  ICD-9-CM: V58.69             Visit Diagnoses:    ICD-10-CM ICD-9-CM   1. Severe episode of recurrent major depressive disorder, without psychotic features  F33.2 296.33   2. Chronic post-traumatic stress disorder (PTSD)  F43.12 309.81   3. Psychophysiological insomnia  F51.04 307.42   4. Encounter for long-term (current) use of medications  Z79.899 V58.69         TREATMENT PLAN/GOALS: Continue supportive psychotherapy efforts and medications as indicated. Treatment and medication options discussed during today's visit. Patient ackowledged and verbally consented to continue with current treatment plan and was educated on the importance of compliance with treatment and follow-up appointments.    MEDICATION ISSUES:  1. MDD - cont celexa 40 mg ,   cont   seroquel 100 mg po QHS for depression, anxiety, insomnia, reported good response , QTC  483 and 469 on 3/7/25, the pt sees cardio and he was not concerned about meds she is on   Neurology was ok with her meds   Cont  remeron   30 mg po QHS for depression and insomnia   2. PTSD - cont celexa 40 mg  (Qtc 483) , Xanax - low dose 0.5 mg TID, NO  increase recommended to avoid rebound anxiety ,  long term benzo use discussed again,   Cont  hydroxyzine PRN with alprazolam. Recommended to take less xanax, to try to take 0.5 tab QAM and 2 other as usual , but the pt has tachycardia and  anxiety is making negative impact on her rhythm     3. Insomnia -  cont  seroquel 100 mg po QHS    Cont  remeron   30 mg po QHS      4. Long term therapeutic drug monitoring - UDS  8/31/2021 - consistent  1/5/23 - consistent   LABORATORY - SCAN - [facility] DRUG SCREEN, [facility], 1/5/2023 (01/05/2023) ,   UDS 6/19/24 - consistent for alprazolam   ToxAssure Flex 22, Ur w/DL - Urine, Random Void (06/19/2024 09:13)     Will repeat next on Friday  - the pt just voided     EKG 5/15/23 Qtc 464     EKG 6/2/24   HEART RATE= 73  bpm  RR Interval= 820  ms  CA Interval= 153  ms  P Horizontal Axis= -11  deg  P Front Axis= 5  deg  QRSD Interval= 105  ms  QT Interval= 437  ms  QTcB= 483  ms  QRS Axis= -15  deg      EKG 3/7/25     ECG 12 Lead (03/07/2025 09:05)     Psychotherapy was recommended , the is reluctant to start due to no transportation and no smart phone   cont supportive therapy, [facility]  - did not contact yet     INSPECT reviewed as expected, last xanax 0.5 mg # 90 for 30 days refill was on 6/8/25      Patient screened positive for depression based on a PHQ-9 score of 10 on 6/11/2025. Follow-up recommendations include: Prescribed antidepressant medication treatment.    PHQ scored 10 and indicated moderate depression   DWIGHT 7 scored 10  - moderate anxiety     Discussed medication options and treatment plan of prescribed medication as well as the risks, benefits, and side effects including potential falls, possible impaired driving and metabolic adversities among others. Patient is agreeable to call the office with any worsening of symptoms or onset of side effects. Patient is agreeable to call 911 or go to the nearest ER should he/she begin having SI/HI. No medication side effects or related complaints today.     MEDS ORDERED DURING VISIT:  New Medications Ordered This Visit   Medications    QUEtiapine (SEROquel) 100 MG tablet     Sig: Take 1 tablet by mouth every night at bedtime.     Dispense:  90 tablet      Refill:  1    mirtazapine (REMERON) 30 MG tablet     Sig: Take 1 tablet by mouth every night at bedtime.     Dispense:  90 tablet     Refill:  1    hydrOXYzine (ATARAX) 25 MG tablet     Sig: Take 1 tablet by mouth 3 (Three) Times a Day As Needed for Anxiety.     Dispense:  90 tablet     Refill:  3    citalopram (CeleXA) 40 MG tablet     Sig: Take 1 tablet by mouth Every Morning.     Dispense:  90 tablet     Refill:  1    ALPRAZolam (XANAX) 0.5 MG tablet     Sig: Take 1 tablet by mouth 3 (Three) Times a Day As Needed for Anxiety. for anxiety     Dispense:  90 tablet     Refill:  3     Please dispense only when it is due, last fill was on 6/8/25       Return in about 4 months (around 10/11/2025).         This document has been electronically signed by Sivan Ken MD  June 11, 2025 08:53 EDT

## 2025-06-13 ENCOUNTER — OFFICE (AMBULATORY)
Dept: URBAN - METROPOLITAN AREA CLINIC 64 | Facility: CLINIC | Age: 65
End: 2025-06-13
Payer: MEDICARE

## 2025-06-13 VITALS
SYSTOLIC BLOOD PRESSURE: 129 MMHG | HEART RATE: 94 BPM | DIASTOLIC BLOOD PRESSURE: 82 MMHG | HEIGHT: 63 IN | WEIGHT: 196 LBS

## 2025-06-13 DIAGNOSIS — K21.9 GASTRO-ESOPHAGEAL REFLUX DISEASE WITHOUT ESOPHAGITIS: ICD-10-CM

## 2025-06-13 DIAGNOSIS — R11.2 NAUSEA WITH VOMITING, UNSPECIFIED: ICD-10-CM

## 2025-06-13 DIAGNOSIS — R10.10 UPPER ABDOMINAL PAIN, UNSPECIFIED: ICD-10-CM

## 2025-06-13 DIAGNOSIS — K59.00 CONSTIPATION, UNSPECIFIED: ICD-10-CM

## 2025-06-13 PROCEDURE — 99214 OFFICE O/P EST MOD 30 MIN: CPT | Performed by: NURSE PRACTITIONER

## 2025-06-13 RX ORDER — ONDANSETRON 4 MG/1
12 TABLET, ORALLY DISINTEGRATING ORAL
Qty: 60 | Refills: 5 | Status: ACTIVE

## 2025-06-13 RX ORDER — PROMETHAZINE HYDROCHLORIDE 25 MG/1
TABLET ORAL
Qty: 60 | Refills: 6 | Status: ACTIVE

## 2025-06-13 RX ORDER — AMITRIPTYLINE HYDROCHLORIDE 50 MG/1
50 TABLET, FILM COATED ORAL
Qty: 30 | Refills: 6 | Status: ACTIVE
Start: 2025-06-13

## 2025-06-13 RX ORDER — SORBITOL SOLUTION 70 %
SOLUTION, ORAL MISCELLANEOUS
Qty: 50 | Refills: 0 | Status: ACTIVE
Start: 2025-06-13

## 2025-07-02 DIAGNOSIS — N18.31 STAGE 3A CHRONIC KIDNEY DISEASE: Primary | ICD-10-CM

## 2025-07-02 LAB
CV ZIO BASELINE AVG BPM: 87
CV ZIO BASELINE BPM HIGH: 182
CV ZIO BASELINE BPM LOW: 55

## 2025-07-09 ENCOUNTER — LAB (OUTPATIENT)
Dept: FAMILY MEDICINE CLINIC | Facility: CLINIC | Age: 65
End: 2025-07-09
Payer: MEDICARE

## 2025-07-09 PROCEDURE — 82570 ASSAY OF URINE CREATININE: CPT | Performed by: INTERNAL MEDICINE

## 2025-07-09 PROCEDURE — 81003 URINALYSIS AUTO W/O SCOPE: CPT | Performed by: INTERNAL MEDICINE

## 2025-07-09 PROCEDURE — 80048 BASIC METABOLIC PNL TOTAL CA: CPT | Performed by: INTERNAL MEDICINE

## 2025-07-09 PROCEDURE — 84156 ASSAY OF PROTEIN URINE: CPT | Performed by: INTERNAL MEDICINE

## 2025-07-10 LAB
ANION GAP SERPL CALCULATED.3IONS-SCNC: 13.8 MMOL/L (ref 5–15)
BUN SERPL-MCNC: 18 MG/DL (ref 8–23)
BUN/CREAT SERPL: 13 (ref 7–25)
CALCIUM SPEC-SCNC: 10.6 MG/DL (ref 8.6–10.5)
CHLORIDE SERPL-SCNC: 102 MMOL/L (ref 98–107)
CO2 SERPL-SCNC: 25.2 MMOL/L (ref 22–29)
CREAT SERPL-MCNC: 1.38 MG/DL (ref 0.57–1)
CREAT UR-MCNC: 9.6 MG/DL
EGFRCR SERPLBLD CKD-EPI 2021: 42 ML/MIN/1.73
GLUCOSE SERPL-MCNC: 72 MG/DL (ref 65–99)
POTASSIUM SERPL-SCNC: 4 MMOL/L (ref 3.5–5.2)
PROT ?TM UR-MCNC: <4 MG/DL
PROT/CREAT UR: NORMAL MG/G{CREAT}
SODIUM SERPL-SCNC: 141 MMOL/L (ref 136–145)

## 2025-07-14 NOTE — PROGRESS NOTES
[facility] CARDIOLOGY      REASON FOR FOLLOW-UP:  Follow-up monitor          Chief Complaint   Patient presents with    Follow-up     6 week         Dear Eleni Miranda APRN        History of Present Illness   It was my pleasure to see Jasmine Cerda in acute office visit today. She is a 65-year-old female with known history of coronary artery disease per heart cath demonstrating moderate disease of the LAD being managed medically. 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 Ms. Cerda presents today in acute office visit for elevated heart rate and shortness of breath. She describes chronic shortness of breath at baseline which has progressively worsened. She stated that ambulating short distance can make her very short of breath which in turn causes her heart rate to increase.  She was fitted with a 14-day Holter monitor with underlying rhythm sinus.  Heart rate range  bpm (87).  Multiple episodes of supraventricular tachycardia total 135 events longest episode lasting 34 beats and the fastest episode with a heart rate of 176 bpm.  She was also noted to have multiple episodes of ventricular tachycardia with 32 episodes total and longest episode lasting for 20 beats at a rate of 186 bpm.  She presents today to review monitor and follow-up on symptoms.  She tells me that she does become very short of breath with activity due to elevated heart rate.  She has had 1 episode of near syncope without actual loss of consciousness.  She denies any chest pain, pressure or tightness.  No lower extremity edema.      ASSESSMENT:  Shortness of breath/dyspnea on exertion  History of COPD due to secondhand exposure  Coronary artery disease-nonocclusive  Primary hypertension  Dyslipidemia  History of heart failure with preserved ejection fraction    PLAN:  We will have patient  seen by electrophysiologist for further recommendations.  In the meantime, I will increase her dose of verapamil.  She should continue the beta-blocker.  She was advised to go to the ED if symptoms persist or new symptoms arise.  She was noted to have elevated TSH in February of this year, normalized in April.  She is not on thyroid medication.  Follow-up with me in 6 months or sooner if needed        CHF Guideline Directed Medical Therapy  Beta Blocker:   ARNI/ACE/ARB:   SGLT 2 inhibitors:   Diuretics:   Aldosterone Antagonist:   Vasodilators & Nitrates:       Diagnoses and all orders for this visit:    1. Paroxysmal SVT (supraventricular tachycardia) (Primary)    2. Ventricular tachycardia (paroxysmal)    3. Dizziness    4. Near syncope    5. Coronary artery disease involving native coronary artery of native heart without angina pectoris    Other orders  -     verapamil SR (CALAN-SR) 180 MG CR tablet; Take 1 tablet by mouth Every Night.  Dispense: 60 tablet; Refill: 2          The following portions of the patient's history were reviewed and updated as appropriate: allergies, current medications, past family history, past medical history, past social history, past surgical history, and problem list.    REVIEW OF SYSTEMS:    Review of Systems   Cardiovascular:  Positive for dyspnea on exertion and palpitations.   Respiratory:  Positive for shortness of breath.    Neurological:  Positive for dizziness.   All other systems reviewed and are negative.      Vitals:    07/14/25 0951   BP: 119/84   Pulse: 94   Resp: 16   SpO2: 97%         PHYSICAL EXAM:    General: Alert, cooperative, no distress, appears stated age  Head:  Normocephalic, atraumatic, mucous membranes moist  Eyes:  Conjunctiva/corneas clear, EOM's intact     Neck:  Supple,  no JVD or bruit     Lungs: Clear to auscultation bilaterally, no wheezes rhonchi rales are noted  Chest wall: No tenderness  Musculoskeletal:   Ambulates freely without  assistance  Heart::  Regular rate and rhythm, S1 and S2 normal, no murmur, rub or gallop  Abdomen: Soft, non-tender, nondistended, bowel sounds active, no abdominal bruit  Extremities: No cyanosis, clubbing, or edema   Pulses: 2+ and symmetric all extremities  Skin:  No rashes or lesions  Neuro/psych: A&O x3. CN II through XII are grossly intact with appropriate affect        Past Medical History:   Diagnosis Date    Anxiety     Breast cyst     CHF (congestive heart failure)     COPD (chronic obstructive pulmonary disease)     Coronary heart disease     Depression     Hyperlipidemia     Hypertension        Past Surgical History:   Procedure Laterality Date    APPENDECTOMY      BREAST CYST ASPIRATION      CARDIAC CATHETERIZATION  2017    No Stents placed - [facility]    CARDIAC CATHETERIZATION N/A 2023    Procedure: Left Heart Cath possible PCI;  Surgeon: Cuauhtemoc Kwon MD;  Location: [facility] CATH INVASIVE LOCATION;  Service: Cardiovascular;  Laterality: N/A;    CERVICAL FUSION      C6-C7     SECTION      x 2    CHOLECYSTECTOMY      HYSTERECTOMY      partial         Current Outpatient Medications:     allopurinol (ZYLOPRIM) 300 MG tablet, Take 1 tablet by mouth Daily., Disp: 90 tablet, Rfl: 1    ALPRAZolam (XANAX) 0.5 MG tablet, Take 1 tablet by mouth 3 (Three) Times a Day As Needed for Anxiety. for anxiety, Disp: 90 tablet, Rfl: 3    aspirin 81 MG EC tablet, 0, Disp: , Rfl:     Budeson-Glycopyrrol-Formoterol (Breztri Aerosphere) 160-9-4.8 MCG/ACT aerosol inhaler, Inhale 2 puffs 2 (Two) Times a Day., Disp: , Rfl:     citalopram (CeleXA) 40 MG tablet, Take 1 tablet by mouth Every Morning., Disp: 90 tablet, Rfl: 1    cloNIDine (CATAPRES) 0.1 MG tablet, Take 1 tablet by mouth Every 8 (Eight) Hours As Needed for High Blood Pressure (for systolic over 160)., Disp: 90 tablet, Rfl: 0    colchicine 0.6 MG tablet, Take 1 tablet by mouth 2 (Two) Times a Day. As needed for gout flare, Disp: , Rfl:      "cyanocobalamin 1000 MCG/ML injection, INJECT 1 ML INTO THE APPROPRIATE MUSCLE AS DIRECTED EVERY 28 DAYS, Disp: 3 mL, Rfl: 3    furosemide (Lasix) 40 MG tablet, Take 1 tablet by mouth Daily for 30 days., Disp: 90 tablet, Rfl: 1    gabapentin (NEURONTIN) 300 MG capsule, Take 1 capsule by mouth 3 (Three) Times a Day., Disp: 270 capsule, Rfl: 1    hydrOXYzine (ATARAX) 25 MG tablet, Take 1 tablet by mouth 3 (Three) Times a Day As Needed for Anxiety., Disp: 90 tablet, Rfl: 3    metoprolol succinate XL (TOPROL-XL) 100 MG 24 hr tablet, Take 1 tablet by mouth Daily., Disp: 90 tablet, Rfl: 1    mirtazapine (REMERON) 30 MG tablet, Take 1 tablet by mouth every night at bedtime., Disp: 90 tablet, Rfl: 1    Multiple Vitamins-Minerals (MULTIVITAMIN ADULT) tablet, Take 1 tablet by mouth Daily. With Omega XL, Disp: , Rfl:     nitroglycerin (NITROSTAT) 0.4 MG SL tablet, Place 1 tablet under the tongue Every 5 (Five) Minutes As Needed for Chest Pain. Take no more than 3 doses in 15 minutes., Disp: 25 tablet, Rfl: 2    ondansetron ODT (ZOFRAN-ODT) 4 MG disintegrating tablet, DISSOLVE 1 TABLET IN MOUTH EVERY 8 HOURS AS NEEDED FOR NAUSEA AND VOMITING, Disp: 60 tablet, Rfl: 0    pantoprazole (PROTONIX) 40 MG EC tablet, Take 1 tablet by mouth 2 (Two) Times a Day., Disp: 180 tablet, Rfl: 1    potassium chloride 10 MEQ CR tablet, Take 1 tablet by mouth Daily. On Monday and Friday, Disp: 24 tablet, Rfl: 1    promethazine (PHENERGAN) 25 MG tablet, , Disp: , Rfl:     QUEtiapine (SEROquel) 100 MG tablet, Take 1 tablet by mouth every night at bedtime., Disp: 90 tablet, Rfl: 1    rosuvastatin (CRESTOR) 40 MG tablet, Take 1 tablet by mouth Every Evening., Disp: 90 tablet, Rfl: 1    Syringe/Needle, Disp, (B-D 3CC LUER-MAI SYR 23GX1\") 23G X 1\" 3 ML misc, USE 1 SYRINGE ONCE EVERY MONTH FOR  B12  INJECTION INTRAMUSCULARLY, Disp: 12 each, Rfl: 0    verapamil SR (CALAN-SR) 180 MG CR tablet, Take 1 tablet by mouth Every Night., Disp: 60 tablet, Rfl: " "2    No Known Allergies    Family History   Problem Relation Age of Onset    Colon cancer Mother     Diabetes Father     Pulmonary embolism Brother     Heart failure Brother     Breast cancer Maternal Aunt        Social History     Tobacco Use    Smoking status: Never    Smokeless tobacco: Never    Tobacco comments:     Passive Smoke: N   Substance Use Topics    Alcohol use: No           Current Electrocardiogram:  Procedures        EMR Dragon/Transcription:   \"Dictated utilizing Dragon dictation\".     Copied text in this note has been reviewed by me and is accurate as of 07/14/25.    "

## 2025-07-17 NOTE — ED PROVIDER NOTES
Subjective   History of Present Illness  Chief complaint: Cardiac arrest    65-year-old female presents in cardiac arrest by EMS.  EMS reports patient had a witnessed arrest.  Family started chest compressions immediately.  For EMS patient has been alternating between asystole and PEA.  They placed a left tibial IO as well as a 7.0 oral ET tube.  Patient received 6 doses of epi prior to arrival.  Patient has had no response to this.  EMS reports patient has been down for 35 to 40 minutes upon arrival to the emergency room.    History provided by:  EMS personnel      Review of Systems   Unable to perform ROS: Patient unresponsive       Past Medical History:   Diagnosis Date    Anxiety     Breast cyst     CHF (congestive heart failure)     COPD (chronic obstructive pulmonary disease)     Coronary heart disease     Depression     Hyperlipidemia     Hypertension        No Known Allergies    Past Surgical History:   Procedure Laterality Date    APPENDECTOMY      BREAST CYST ASPIRATION      CARDIAC CATHETERIZATION  2017    No Stents placed - [facility]    CARDIAC CATHETERIZATION N/A 2023    Procedure: Left Heart Cath possible PCI;  Surgeon: Cuauhtemoc Kwon MD;  Location: [facility] CATH INVASIVE LOCATION;  Service: Cardiovascular;  Laterality: N/A;    CERVICAL FUSION      C6-C7     SECTION      x 2    CHOLECYSTECTOMY      HYSTERECTOMY      partial       Family History   Problem Relation Age of Onset    Colon cancer Mother     Diabetes Father     Pulmonary embolism Brother     Heart failure Brother     Breast cancer Maternal Aunt        Social History     Socioeconomic History    Marital status:    Tobacco Use    Smoking status: Never    Smokeless tobacco: Never    Tobacco comments:     Passive Smoke: N   Vaping Use    Vaping status: Never Used   Substance and Sexual Activity    Alcohol use: No    Drug use: No    Sexual activity: Defer       Pulse 106   SpO2 90%       Objective   Physical Exam  Vitals  and nursing note reviewed.   Constitutional:       Comments: Unconscious, unresponsive   HENT:      Head: Normocephalic and atraumatic.      Mouth/Throat:      Comments: Oral ET tube in place  Eyes:      Comments: Pupils fixed and dilated   Neck:      Comments: Trachea midline  Cardiovascular:      Comments: No spontaneous pulse, chest compressions in progress  Pulmonary:      Comments: No spontaneous respirations, bagged via ET tube  Abdominal:      General: There is no distension.      Palpations: Abdomen is soft.   Musculoskeletal:         General: No deformity or signs of injury.      Comments: Left tibial IO in place   Neurological:      Comments: GCS 3 T         Procedures           ED Course                                                       Medical Decision Making  Problems Addressed:  Cardiac arrest: complicated acute illness or injury    Risk  Prescription drug management.      Chest compressions were continued on patient arrival.  ET tube placement was confirmed.  Tube was tied at 22 cm at the lip.  Pulse oximeter showed O2 saturation in the mid 90s so oxygenation did not seem to be an issue.  Patient received multiple rounds of epinephrine and remained in asystole with no return of spontaneous circulation.  It was felt that further resuscitative efforts would be futile and time of death was called.  Family was present in the emergency room and notified of patient's death.      Final diagnoses:   Cardiac arrest       ED Disposition  ED Disposition       ED Disposition       Condition   --    Comment   --               No follow-up provider specified.       Medication List      No changes were made to your prescriptions during this visit.            Crow Cabello MD  [phone]

## 2025-07-25 ENCOUNTER — TELEPHONE (OUTPATIENT)
Dept: FAMILY MEDICINE CLINIC | Facility: CLINIC | Age: 65
End: 2025-07-25
Payer: MEDICARE